# Patient Record
Sex: FEMALE | Race: ASIAN | NOT HISPANIC OR LATINO | Employment: FULL TIME | URBAN - METROPOLITAN AREA
[De-identification: names, ages, dates, MRNs, and addresses within clinical notes are randomized per-mention and may not be internally consistent; named-entity substitution may affect disease eponyms.]

---

## 2017-04-28 ENCOUNTER — GENERIC CONVERSION - ENCOUNTER (OUTPATIENT)
Dept: OTHER | Facility: OTHER | Age: 42
End: 2017-04-28

## 2017-05-19 ENCOUNTER — GENERIC CONVERSION - ENCOUNTER (OUTPATIENT)
Dept: OTHER | Facility: OTHER | Age: 42
End: 2017-05-19

## 2017-10-25 ENCOUNTER — DOCTOR'S OFFICE (OUTPATIENT)
Dept: URBAN - METROPOLITAN AREA CLINIC 137 | Facility: CLINIC | Age: 42
Setting detail: OPHTHALMOLOGY
End: 2017-10-25
Payer: COMMERCIAL

## 2017-10-25 DIAGNOSIS — H52.13: ICD-10-CM

## 2017-10-25 PROCEDURE — 92014 COMPRE OPH EXAM EST PT 1/>: CPT | Performed by: OPHTHALMOLOGY

## 2017-10-25 ASSESSMENT — REFRACTION_MANIFEST
OS_VA3: 20/
OD_VA2: 20/
OU_VA: 20/
OS_VA2: 20/
OS_VA1: 20/
OD_VA1: 20/
OD_VA1: 20/
OS_VA1: 20/
OD_VA2: 20/
OD_VA3: 20/
OS_VA2: 20/
OD_VA3: 20/
OU_VA: 20/
OS_VA3: 20/

## 2017-10-25 ASSESSMENT — REFRACTION_CURRENTRX
OD_OVR_VA: 20/
OD_SPHERE: -5.25
OS_OVR_VA: 20/
OD_OVR_VA: 20/
OS_VPRISM_DIRECTION: SV
OS_OVR_VA: 20/
OS_OVR_VA: 20/
OD_AXIS: 155
OS_SPHERE: -5.50
OS_AXIS: 086
OD_VPRISM_DIRECTION: SV
OD_OVR_VA: 20/
OS_CYLINDER: +1.50
OD_CYLINDER: +0.50

## 2017-10-25 ASSESSMENT — REFRACTION_OUTSIDERX
OD_VA1: 20/20
OS_CYLINDER: +1.00
OD_CYLINDER: SPH
OS_AXIS: 090
OD_VA3: 20/
OS_VA2: 20/
OU_VA: 20/
OD_VA2: 20/
OS_SPHERE: -5.25
OS_VA3: 20/
OD_SPHERE: -5.00
OS_VA1: 20/20

## 2017-10-25 ASSESSMENT — KERATOMETRY
OS_AXISANGLE_DEGREES: 088
OS_K2POWER_DIOPTERS: 43.00
OS_K1POWER_DIOPTERS: 42.00
OD_K1POWER_DIOPTERS: 41.75
OD_K2POWER_DIOPTERS: 42.25
OD_AXISANGLE_DEGREES: 097

## 2017-10-25 ASSESSMENT — SPHEQUIV_DERIVED
OS_SPHEQUIV: -4.625
OD_SPHEQUIV: -4.625

## 2017-10-25 ASSESSMENT — CONFRONTATIONAL VISUAL FIELD TEST (CVF)
OS_FINDINGS: FULL
OD_FINDINGS: FULL

## 2017-10-25 ASSESSMENT — REFRACTION_AUTOREFRACTION
OS_CYLINDER: +1.25
OS_AXIS: 086
OD_AXIS: 111
OD_SPHERE: -4.75
OD_CYLINDER: +0.25
OS_SPHERE: -5.25

## 2017-10-25 ASSESSMENT — VISUAL ACUITY
OD_BCVA: 20/20-1
OS_BCVA: 20/25

## 2017-10-25 ASSESSMENT — AXIALLENGTH_DERIVED
OS_AL: 25.9808
OD_AL: 26.2054

## 2017-10-26 ENCOUNTER — GENERIC CONVERSION - ENCOUNTER (OUTPATIENT)
Dept: OTHER | Facility: OTHER | Age: 42
End: 2017-10-26

## 2017-10-26 LAB
CHOLEST SERPL-MCNC: 196 MG/DL (ref 100–199)
CHOLEST/HDLC SERPL: 2.2 RATIO UNITS (ref 0–4.4)
DEPRECATED RDW RBC AUTO: 13.4 % (ref 12.3–15.4)
HCT VFR BLD AUTO: 38.3 % (ref 34–46.6)
HDLC SERPL-MCNC: 88 MG/DL
HGB BLD-MCNC: 13 G/DL (ref 11.1–15.9)
LDLC SERPL CALC-MCNC: 85 MG/DL (ref 0–99)
MCH RBC QN AUTO: 30.1 PG (ref 26.6–33)
MCHC RBC AUTO-ENTMCNC: 33.9 G/DL (ref 31.5–35.7)
MCV RBC AUTO: 89 FL (ref 79–97)
PLATELET # BLD AUTO: 360 X10E3/UL (ref 150–379)
RBC (HISTORICAL): 4.32 X10E6/UL (ref 3.77–5.28)
TRIGL SERPL-MCNC: 116 MG/DL (ref 0–149)
VLDLC SERPL CALC-MCNC: 23 MG/DL (ref 5–40)
WBC # BLD AUTO: 8.1 X10E3/UL (ref 3.4–10.8)

## 2017-10-27 ENCOUNTER — GENERIC CONVERSION - ENCOUNTER (OUTPATIENT)
Dept: OTHER | Facility: OTHER | Age: 42
End: 2017-10-27

## 2017-10-27 LAB
25(OH)D3 SERPL-MCNC: 20.9 NG/ML (ref 30–100)
A/G RATIO (HISTORICAL): 1.8 (ref 1.2–2.2)
ALBUMIN SERPL BCP-MCNC: 4.6 G/DL (ref 3.5–5.5)
ALP SERPL-CCNC: 37 IU/L (ref 39–117)
ALT SERPL W P-5'-P-CCNC: 8 IU/L (ref 0–32)
AST SERPL W P-5'-P-CCNC: 15 IU/L (ref 0–40)
BILIRUB SERPL-MCNC: 0.4 MG/DL (ref 0–1.2)
BUN SERPL-MCNC: 8 MG/DL (ref 6–24)
BUN/CREA RATIO (HISTORICAL): 15 (ref 9–23)
CALCIUM SERPL-MCNC: 9.6 MG/DL (ref 8.7–10.2)
CHLORIDE SERPL-SCNC: 101 MMOL/L (ref 96–106)
CO2 SERPL-SCNC: 23 MMOL/L (ref 18–29)
CREAT SERPL-MCNC: 0.54 MG/DL (ref 0.57–1)
EGFR AFRICAN AMERICAN (HISTORICAL): 133 ML/MIN/1.73
EGFR-AMERICAN CALC (HISTORICAL): 115 ML/MIN/1.73
GLUCOSE SERPL-MCNC: 93 MG/DL (ref 65–99)
INTERPRETATION (HISTORICAL): NORMAL
POTASSIUM SERPL-SCNC: 4.5 MMOL/L (ref 3.5–5.2)
SODIUM SERPL-SCNC: 141 MMOL/L (ref 134–144)
TOT. GLOBULIN, SERUM (HISTORICAL): 2.5 G/DL (ref 1.5–4.5)
TOTAL PROTEIN (HISTORICAL): 7.1 G/DL (ref 6–8.5)
TSH SERPL DL<=0.05 MIU/L-ACNC: 1.6 UIU/ML (ref 0.45–4.5)

## 2017-11-17 ENCOUNTER — ALLSCRIPTS OFFICE VISIT (OUTPATIENT)
Dept: OTHER | Facility: OTHER | Age: 42
End: 2017-11-17

## 2017-11-17 DIAGNOSIS — Z12.31 ENCOUNTER FOR SCREENING MAMMOGRAM FOR MALIGNANT NEOPLASM OF BREAST: ICD-10-CM

## 2017-11-17 DIAGNOSIS — R92.2 INCONCLUSIVE MAMMOGRAM: ICD-10-CM

## 2017-12-28 ENCOUNTER — GENERIC CONVERSION - ENCOUNTER (OUTPATIENT)
Dept: OTHER | Facility: OTHER | Age: 42
End: 2017-12-28

## 2017-12-28 ENCOUNTER — HOSPITAL ENCOUNTER (OUTPATIENT)
Dept: RADIOLOGY | Facility: HOSPITAL | Age: 42
Discharge: HOME | End: 2017-12-28
Payer: COMMERCIAL

## 2017-12-28 DIAGNOSIS — Z12.31 ENCOUNTER FOR SCREENING MAMMOGRAM FOR MALIGNANT NEOPLASM OF BREAST: ICD-10-CM

## 2017-12-28 DIAGNOSIS — R92.2 INCONCLUSIVE MAMMOGRAM: ICD-10-CM

## 2017-12-28 PROCEDURE — 76641 ULTRASOUND BREAST COMPLETE: CPT

## 2017-12-28 PROCEDURE — 76377 3D RENDER W/INTRP POSTPROCES: CPT

## 2018-01-10 NOTE — RESULT NOTES
Discussion/Summary   Appointment 11/17/17   Please print and put in my folder  Dr Ruiz Bojorquez      Verified Results  [facility]) Cardiovascular Risk Assessment 26Oct2017 10:13AM Antonia Daniel     Test Name Result Flag Reference   Interpretation Note     Supplemental report is available  PDF Image   (1) VITAMIN D 25-HYDROXY 26Oct2017 10:13AM Antonia [place]     Test Name Result Flag Reference   Vitamin D, 25-Hydroxy 20 9 ng/mL L 30 0-100 0   Vitamin D deficiency has been defined by the [address] practice guideline as a  level of serum 25-OH vitamin D less than 20 ng/mL (1,2)  The Endocrine Society went on to further define vitamin D  insufficiency as a level between 21 and 29 ng/mL (2)  1  IOM ([employer])  2010  Dietary reference     intakes for calcium and D  [facility]: The     [place]  2  Ab MF, Young SALES, Kristi VALDEZ, et al      Evaluation, treatment, and prevention of vitamin D     deficiency: an Endocrine Society clinical practice     guideline  JCEM  2011 Jul; 96(7):1911-30  (1) LIPID PANEL, FASTING 26Oct2017 10:13AM Antonia [place]Storm     Test Name Result Flag Reference   Cholesterol, Total 196 mg/dL  100-199   Triglycerides 116 mg/dL  0-149   HDL Cholesterol 88 mg/dL  >39   VLDL Cholesterol Hcetor 23 mg/dL  5-40   LDL Cholesterol Calc 85 mg/dL  0-99   T  Chol/HDL Ratio 2 2 ratio units  0 0-4 4   T  Chol/HDL Ratio                                                             Men  Women                                               1/2 Avg  Risk  3 4    3 3                                                   Avg Risk  5 0    4 4                                                2X Avg  Risk  9 6    7 1                                                3X Avg  Risk 23 4   11 0     (1) COMPREHENSIVE METABOLIC PANEL [id number] 57:68EZ Antonia Rand     Test Name Result Flag Reference   Glucose, Serum 93 mg/dL  65-99   BUN 8 mg/dL  6-24   Creatinine, Serum 0 54 mg/dL L 0 57-1 00   BUN/Creatinine Ratio 15  9-23   Sodium, Serum 141 mmol/L  134-144   Potassium, Serum 4 5 mmol/L  3 5-5 2   Chloride, Serum 101 mmol/L     Carbon Dioxide, Total 23 mmol/L  18-29   Calcium, Serum 9 6 mg/dL  8 7-10 2   Protein, Total, Serum 7 1 g/dL  6 0-8 5   Albumin, Serum 4 6 g/dL  3 5-5 5   Globulin, Total 2 5 g/dL  1 5-4 5   A/G Ratio 1 8  1 2-2 2   Bilirubin, Total 0 4 mg/dL  0 0-1 2   Alkaline Phosphatase, S 37 IU/L L    AST (SGOT) 15 IU/L  0-40   ALT (SGPT) 8 IU/L  0-32   eGFR If NonAfricn Am 115 mL/min/1 73  >59   eGFR If Africn Am 133 mL/min/1 73  >59     (1) TSH WITH FT4 REFLEX 26Oct2017 10:13AM Levada Hopping     Test Name Result Flag Reference   TSH 1 600 uIU/mL  0 450-4 500     (1) CBC/ PLT (NO DIFF) 26Oct2017 10:13AM Levada Hopping     Test Name Result Flag Reference   WBC 8 1 x10E3/uL  3 4-10 8   RBC 4 32 x10E6/uL  3 77-5 28   Hemoglobin 13 0 g/dL  11 1-15 9   Hematocrit 38 3 %  34 0-46  6   MCV 89 fL  79-97   MCH 30 1 pg  26 6-33 0   MCHC 33 9 g/dL  31 5-35 7   RDW 13 4 %  12 3-15 4   Platelets 960 F78U5/NV  150-379

## 2018-01-15 NOTE — PROGRESS NOTES
Assessment    1  Encounter for preventive health examination (V70 0) (Z00 00)   2  BMI 30 0-30 9,adult (V85 30) (Z68 30)   3  Dense breasts (793 82) (R92 2)   4  Encounter for screening mammogram for breast cancer (V76 12) (Z12 31)    Plan  BMI 30 0-30 9,adult    · Begin a limited exercise program ; Status:Complete;   Done: 07YKX5002   · We recommend that you bring your body mass index down to 26 ; Status:Complete;    Done: 67OBW1085  Dense breasts, Encounter for screening mammogram for breast cancer    · MAMMO SCREENING BILATERAL W 3D & CAD; Status:Hold For - Scheduling; Requested  for:17Nov2017;    · Katheryn (DANIEL); Status:Hold For -  Scheduling; Requested for:17Nov2017;   Migraine headache    · Rizatriptan Benzoate 10 MG Oral Tablet Disintegrating (Maxalt-MLT); 1 po at  onset of headache, may repeat in 2 hours if ineffective  Mild intermittent asthma    · ProAir  (90 Base) MCG/ACT Inhalation Aerosol Solution; 1 puff q4hrs prn  cough/wheeze    Discussion/Summary  health maintenance visit Currently, she eats a healthy diet and has an adequate exercise regimen  follows with gynecologist  Breast cancer screening: mammogram has been ordered  Colorectal cancer screening: colorectal cancer screening is not indicated  The immunizations are up to date  Advice and education were given regarding weight loss  Chief Complaint  cpe      History of Present Illness  HM, Adult Female: The patient is being seen for a health maintenance evaluation  General Health: The patient's health since the last visit is described as good  Immunizations status: up to date  Lifestyle:  She does not have a healthy diet  She has weight concerns  She exercises regularly  She does not use tobacco    Reproductive health: the patient is premenopausal    Screening: Active Problems    1  Allergic rhinitis (477 9) (J30 9)   2  BMI 30 0-30 9,adult (V85 30) (Z68 30)   3   Dense breasts (793 82) (R92 2) 4  Encounter for screening for cardiovascular disorders (V81 2) (Z13 6)   5  Encounter for screening mammogram for breast cancer (V76 12) (Z12 31)   6  Migraine headache (346 90) (G43 909)   7  Mild intermittent asthma (493 90) (J45 20)   8  Motion sickness (994 6) (T75 3XXA)   9  Screening for diabetes mellitus (DM) (V77 1) (Z13 1)   10  Vitamin D deficiency (268 9) (E55 9)   11  Well adult on routine health check (V70 0) (Z00 00)    Past Medical History    · Acute upper respiratory infection (465 9) (J06 9)   · History of Acute upper respiratory infection (465 9) (J06 9)   · History of Adhesive capsulitis of right shoulder (726 0) (M75 01)   · History of Benign paroxysmal vertigo, unspecified laterality (386 11) (H81 10)   · History of Cellulitis of leg, except foot (682 6) (L03 119)   · History of acute pharyngitis (V12 69) (Z87 09)   · History of acute sinusitis (V12 69) (Z87 09)   · History of acute sinusitis (V12 69) (Z87 09)   · History of Lymphadenitis (289 3) (I88 9)   · History of Need for immunization against influenza (V04 81) (Z23)   · Screening for thyroid disorder (V77 0) (Z13 29)   · Urinary tract infection (599 0) (N39 0)   · History of Urinary Tract Infection (V13 02)    Surgical History    · History of Shoulder Surgery Right    Family History  Mother    · Family history of Alzheimer's disease (V17 2) (Z82 0)    Social History    · Denied: Alcohol use   · Never a smoker    Current Meds   1  LoSeasonique 0 1-0 02 & 0 01 MG Oral Tablet Recorded   2  Meloxicam 15 MG Oral Tablet; take 1 tablet daily as needed Recorded   3  ProAir  (90 Base) MCG/ACT Inhalation Aerosol Solution; 1 puff q4hrs prn   cough/wheeze; Therapy: 30FNQ9777 to (Last Rx:55Jat8150)  Requested for: 16PKH5506 Ordered   4  Rizatriptan Benzoate 10 MG Oral Tablet Disintegrating; 1 po at onset of headache, may   repeat in 2 hours if ineffective; Therapy: 05NTZ8948 to (Last Rx:13Jan2016)  Requested for: 26VKE0766 Ordered   5  Transderm-Scop (1 5 MG) 1 MG/3DAYS Transdermal Patch 72 Hour; Apply 4 hours prior   to travel, then change every 72 hours; Therapy: 49EIY7093 to (Last EK:18LVY1265)  Requested for: 40KNM9181; Status:   ACTIVE - Transmit to Pharmacy - Awaiting Verification Ordered   6  Vitamin D3 2000 UNIT Oral Tablet; 1 po daily; Therapy: 32LLW6528 to (Evaluate:26Jun2016); Last Rx:03Dbh0522 Ordered    Allergies    1  Penicillins    Vitals   Recorded: 08LXD3979 03:55PM Recorded: 67ATB1400 03:18PM   Temperature  98 2 F   Heart Rate  84   Respiration  16   Systolic  454   Diastolic  76   Height 5 ft 1 5 in 5 ft    Weight  165 lb    BMI Calculated 30 67 32 22   BSA Calculated 1 75 1 72     Physical Exam    Constitutional   General appearance: No acute distress, well appearing and well nourished  Ears, Nose, Mouth, and Throat   External inspection of ears and nose: Normal     Otoscopic examination: Tympanic membranes translucent with normal light reflex  Canals patent without erythema  Oropharynx: Normal with no erythema, edema, exudate or lesions  Pulmonary   Auscultation of lungs: Clear to auscultation  Cardiovascular   Auscultation of heart: Normal rate and rhythm, normal S1 and S2, no murmurs  Abdomen   Abdomen: Non-tender, no masses  Liver and spleen: No hepatomegaly or splenomegaly  Musculoskeletal   Gait and station: Normal     Neurologic   Cortical function: Normal mental status  Psychiatric   Mood and affect: Normal        Procedure    Procedure: Indication: routine screening     Results: 20/20 in both eyes with corrective device, 20/25 in the right eye with corrective device, 20/30 in the left eye with corrective device   Color vision was and the results were normal       Signatures   Electronically signed by : Shiloh Webb DO; Nov 17 2017  4:22PM EST                       (Author)

## 2018-01-16 NOTE — PROGRESS NOTES
Assessment    1  Encounter for preventive health examination (V70 0) (Z00 00)   2  Dense breasts (793 82) (R92 2)   3  Encounter for screening mammogram for breast cancer (V76 12) (Z12 31)   4  BMI 30 0-30 9,adult (V85 30) (Z68 30)   5  Never a smoker   6  Mild intermittent asthma (493 90) (J45 20)    Plan  Dense breasts, Encounter for screening mammogram for breast cancer    · MAMMO SCREENING BILATERAL W 3D & CAD; Status:Active; Requested for:93Rfh5876;    Mild intermittent asthma    · ProAir  (90 Base) MCG/ACT Inhalation Aerosol Solution; 1 puff q4hrs prn  cough/wheeze    Discussion/Summary  health maintenance visit Currently, she eats a healthy diet and has an inadequate exercise regimen  cervical cancer screening is current Dr Los Martinez does her pap smears  She will go for lab work when feeling better  Chief Complaint  cpe      History of Present Illness  HM, Adult Female: The patient is being seen for a health maintenance evaluation  General Health: The patient's health since the last visit is described as good  Lifestyle:  She consumes a diverse and healthy diet  She has weight concerns  She does not exercise regularly  She does not use tobacco    Screening:      Review of Systems    Constitutional: No fever, no chills, feels well, no tiredness, no recent weight gain or weight loss  Cardiovascular: No complaints of slow heart rate, no fast heart rate, no chest pain, no palpitations, no leg claudication, no lower extremity edema  Respiratory: No complaints of shortness of breath, no wheezing, no cough, no SOB on exertion, no orthopnea, no PND  Gastrointestinal: No complaints of abdominal pain, no constipation, no nausea or vomiting, no diarrhea, no bloody stools  Musculoskeletal: No complaints of arthralgias, no myalgias, no joint swelling or stiffness, no limb pain or swelling  Integumentary: No complaints of skin rash or lesions, no itching, no skin wounds, no breast pain or lump  Active Problems    1  Allergic rhinitis (477 9) (J30 9)   2  BMI 30 0-30 9,adult (V85 30) (Z68 30)   3  Dense breasts (793 82) (R92 2)   4  Encounter for screening for cardiovascular disorders (V81 2) (Z13 6)   5  Encounter for screening mammogram for breast cancer (V76 12) (Z12 31)   6  Migraine headache (346 90) (G43 909)   7  Screening for diabetes mellitus (DM) (V77 1) (Z13 1)   8  Screening for thyroid disorder (V77 0) (Z13 29)   9  Vitamin D deficiency (268 9) (E55 9)   10  Well adult on routine health check (V70 0) (Z00 00)    Past Medical History    · Acute upper respiratory infection (465 9) (J06 9)   · History of Acute upper respiratory infection (465 9) (J06 9)   · History of Adhesive capsulitis of right shoulder (726 0) (M75 01)   · History of Benign paroxysmal vertigo, unspecified laterality (386 11) (H81 10)   · History of Cellulitis of leg, except foot (682 6) (L03 119)   · History of acute pharyngitis (V12 69) (Z87 09)   · History of acute sinusitis (V12 69) (Z87 09)   · History of acute sinusitis (V12 69) (Z87 09)   · History of Lymphadenitis (289 3) (I88 9)   · History of Motion Sickness (994 6)   · History of Need for immunization against influenza (V04 81) (Z23)   · Urinary tract infection (599 0) (N39 0)   · History of Urinary Tract Infection (V13 02)    Surgical History    · History of Shoulder Surgery Right    Family History  Mother    · No pertinent family history  Father    · No pertinent family history    Social History    · Denied: Alcohol use   · Never a smoker    Current Meds   1  LoSeasonique 0 1-0 02 & 0 01 MG Oral Tablet Recorded   2  Meloxicam 15 MG Oral Tablet; take 1 tablet daily as needed Recorded   3  ProAir  (90 Base) MCG/ACT Inhalation Aerosol Solution; 1 puff q4hrs prn   cough/wheeze; Therapy: 61JCB0582 to (Last Rx:09Nov2012)  Requested for: 23BEO7631 Ordered   4   Rizatriptan Benzoate 10 MG Oral Tablet Dispersible; 1 po at onset of headache, may   repeat in 2 hours if ineffective; Therapy: 13VTW9314 to (Last Rx:13Jan2016)  Requested for: 47YWZ2195 Ordered   5  Vitamin D3 2000 UNIT Oral Tablet; 1 po daily; Therapy: 24AAE3905 to (Evaluate:26Jun2016); Last Rx:17Rwl4121 Ordered    Allergies    1  Penicillins    Vitals   Recorded: 98HNZ5857 68:30SH   Systolic 047   Diastolic 66   Heart Rate 84   Respiration 16   Temperature 98 2 F   Height 5 ft 1 5 in   Weight 164 lb    BMI Calculated 30 49   BSA Calculated 1 74     Physical Exam    Constitutional   General appearance: No acute distress, well appearing and well nourished  Ears, Nose, Mouth, and Throat   External inspection of ears and nose: Normal     Otoscopic examination: Tympanic membranes translucent with normal light reflex  Canals patent without erythema  Oropharynx: Normal with no erythema, edema, exudate or lesions  Neck   Neck: Supple, symmetric, trachea midline, no masses  Pulmonary   Auscultation of lungs: Clear to auscultation  Cardiovascular   Auscultation of heart: Normal rate and rhythm, normal S1 and S2, no murmurs  Abdomen   Abdomen: Non-tender, no masses  Liver and spleen: No hepatomegaly or splenomegaly  Musculoskeletal   Gait and station: Normal     Neurologic   Cortical function: Normal mental status  Coordination: Normal finger to nose and heel to shin  Psychiatric   Mood and affect: Normal        Results/Data  PHQ-2 Adult Depression Screening 70IJY2937 03:45PM User, s     Test Name Result Flag Reference   PHQ-2 Adult Depression Score 0     Over the last two weeks, how often have you been bothered by any of the following problems? Little interest or pleasure in doing things: Not at all - 0  Feeling down, depressed, or hopeless: Not at all - 0   PHQ-2 Adult Depression Screening Negative         Procedure    Procedure: Indication: routine screening     Results: 20/20 in both eyes with corrective device, 20/20 in the right eye with corrective device, 20/20 in the left eye with corrective device   Color vision was and the results were normal       Signatures   Electronically signed by : Helga Briones DO; Nov 15 2016  4:04PM EST                       (Author)

## 2018-01-17 NOTE — RESULT NOTES
Verified Results  (1) URINE CULTURE [id number] 12:00AM Luisito Lora     Test Name Result Flag Reference   Result 1 Escherichia coli A    50,000-100,000 colony forming units per mL   Urine Culture,Comprehensive Final report A    Antimicrobial Susceptibility Comment     ** S = Susceptible; I = Intermediate; R = Resistant **                     P = Positive; N = Negative              MICS are expressed in micrograms per mL     Antibiotic                 RSLT#1    RSLT#2    RSLT#3    RSLT#4  Amoxicillin/Clavulanic Acid    S  Ampicillin                     S  Cefepime                       S  Ceftriaxone                    S  Cefuroxime                     S  Cephalothin                    I  Ciprofloxacin                  S  Ertapenem                      S  Gentamicin                     S  Imipenem                       S  Levofloxacin                   S  Nitrofurantoin                 S  Piperacillin                   S  Tetracycline                   S  Tobramycin                     S  Trimethoprim/Sulfa             S   Result 1 Escherichia coli A    50,000-100,000 colony forming units per mL   Urine Culture,Comprehensive Final report A    Antimicrobial Susceptibility Comment     ** S = Susceptible; I = Intermediate; R = Resistant **                     P = Positive; N = Negative              MICS are expressed in micrograms per mL     Antibiotic                 RSLT#1    RSLT#2    RSLT#3    RSLT#4  Amoxicillin/Clavulanic Acid    S  Ampicillin                     S  Cefepime                       S  Ceftriaxone                    S  Cefuroxime                     S  Cephalothin                    I  Ciprofloxacin                  S  Ertapenem                      S  Gentamicin                     S  Imipenem                       S  Levofloxacin                   S  Nitrofurantoin                 S  Piperacillin                   S  Tetracycline                   S  Tobramycin S  Trimethoprim/Sulfa             S             Discussion/Summary   Jocelyn,   Your urine culture was positive for E  coli  It was sensitive to the prescribed antibiotic     Dr Salomon Palm

## 2018-01-22 VITALS
DIASTOLIC BLOOD PRESSURE: 76 MMHG | HEIGHT: 62 IN | BODY MASS INDEX: 30.36 KG/M2 | SYSTOLIC BLOOD PRESSURE: 124 MMHG | RESPIRATION RATE: 16 BRPM | TEMPERATURE: 98.2 F | WEIGHT: 165 LBS | HEART RATE: 84 BPM

## 2018-01-23 NOTE — RESULT NOTES
Discussion/Summary   Jocelyn,   Your breast ultrasound is normal    Dr Amanda Barry ([address] [place] [address]) 11Jhh4533 02:50PM Senait Larry Order Number: [id number]    - Patient Instructions: To schedule this appointment, please contact Central Scheduling at [phone]  Test Name Result Flag Reference   US BREAST BILATERAL ABUS (Report)     US Breast Screening (ABUS): December 28, 2017 - Check In #:    [de-identified]   Standard views  Automated breast ultrasound images were obtained on the [employer]   somo-v ABUS system  Imaging was obtained in standard projections including AP,    lateral and medial for both breasts  There is no evidence of    hypoechoic mass or architectural distortion to suggest    malignancy  Automated breast ultrasound is an adjunct, not a replacement, for   routine yearly screening mammography  IMPRESSION: No suspicious hypoechoic mass or architectural    distortion to suggest malignancy  ACR BI-RADSï¾® Assessments: BiRad:1 - Negative     Recommendation:   Routine screening mammogram      The patient is scheduled in a reminder system for screening    mammography  Transcription Location: [facility] 98: [id number]     Risk Value(s):   Tyrer-Cuzick 10 Year: 1 800%, Tyrer-Cuzick Lifetime: 10 800%,    Myriad Table: 1 5%, LUIS MIGUEL 5 Year: 0 5%, NCI Lifetime: 6 5%   US Breast Screening (ABUS): December 28, 2017 - Check In #:    [de-identified]   Standard views  Automated breast ultrasound images were obtained on the [employer]   somo-v ABUS system  Imaging was obtained in standard projections including AP,    lateral and medial for both breasts  There is no evidence of    hypoechoic mass or architectural distortion to suggest    malignancy  Automated breast ultrasound is an adjunct, not a replacement, for   routine yearly screening mammography        No suspicious hypoechoic mass or architectural    distortion to suggest malignancy  ACR BI-RADSï¾® Assessments: BiRad:1 - Negative     Recommendation:   Routine screening mammogram      The patient is scheduled in a reminder system for screening    mammography       Transcription Location: [address] [place] [address] [place]: [id number]     Risk Value(s):   Tyrer-Cuzick 10 Year: 1 800%, Tyrer-Cuzick Lifetime: 10 800%,    Myriad Table: 1 5%, LUIS MIGUEL 5 Year: 0 5%, NCI Lifetime: 6 5%   Signed by:   Sonya Whitfield MD   12/28/17

## 2018-02-08 ENCOUNTER — TELEPHONE (OUTPATIENT)
Dept: FAMILY MEDICINE CLINIC | Facility: CLINIC | Age: 43
End: 2018-02-08

## 2018-02-08 DIAGNOSIS — Z20.828 EXPOSURE TO THE FLU: Primary | ICD-10-CM

## 2018-02-08 RX ORDER — OSELTAMIVIR PHOSPHATE 75 MG/1
75 CAPSULE ORAL DAILY
Qty: 10 CAPSULE | Refills: 0 | Status: SHIPPED | OUTPATIENT
Start: 2018-02-08 | End: 2018-02-18

## 2018-02-08 NOTE — TELEPHONE ENCOUNTER
[address]    Her  and three kids have been diagnosed with the flu and she wanted to know if we can call in tamiflu

## 2018-06-01 ENCOUNTER — OPTICAL OFFICE (OUTPATIENT)
Dept: URBAN - METROPOLITAN AREA CLINIC 146 | Facility: CLINIC | Age: 43
Setting detail: OPHTHALMOLOGY
End: 2018-06-01
Payer: COMMERCIAL

## 2018-06-01 ENCOUNTER — DOCTOR'S OFFICE (OUTPATIENT)
Dept: URBAN - METROPOLITAN AREA CLINIC 137 | Facility: CLINIC | Age: 43
Setting detail: OPHTHALMOLOGY
End: 2018-06-01
Payer: COMMERCIAL

## 2018-06-01 DIAGNOSIS — H52.13: ICD-10-CM

## 2018-06-01 DIAGNOSIS — H52.223: ICD-10-CM

## 2018-06-01 PROCEDURE — 92014 COMPRE OPH EXAM EST PT 1/>: CPT | Performed by: OPHTHALMOLOGY

## 2018-06-01 PROCEDURE — V2744 TINT PHOTOCHROMATIC LENS/ES: HCPCS | Performed by: OPHTHALMOLOGY

## 2018-06-01 PROCEDURE — V2207 LENS SPHCY BIFOCAL 4.25-7D/.: HCPCS | Performed by: OPHTHALMOLOGY

## 2018-06-01 PROCEDURE — V2781 PROGRESSIVE LENS PER LENS: HCPCS | Performed by: OPHTHALMOLOGY

## 2018-06-01 PROCEDURE — V2020 VISION SVCS FRAMES PURCHASES: HCPCS | Performed by: OPHTHALMOLOGY

## 2018-06-01 ASSESSMENT — REFRACTION_CURRENTRX
OD_VPRISM_DIRECTION: SV
OS_CYLINDER: +1.50
OS_AXIS: 086
OD_OVR_VA: 20/
OS_OVR_VA: 20/
OS_SPHERE: -5.50
OS_OVR_VA: 20/
OD_OVR_VA: 20/
OD_OVR_VA: 20/
OD_CYLINDER: +0.50
OS_OVR_VA: 20/
OD_AXIS: 155
OS_VPRISM_DIRECTION: SV
OD_SPHERE: -5.25

## 2018-06-01 ASSESSMENT — CONFRONTATIONAL VISUAL FIELD TEST (CVF)
OS_FINDINGS: FULL
OD_FINDINGS: FULL

## 2018-06-01 ASSESSMENT — REFRACTION_OUTSIDERX
OD_VA1: 20/20
OS_CYLINDER: +1.25
OS_CYLINDER: +1.00
OD_VA2: 20/20
OD_VA1: 20/20
OS_VA2: 20/20
OD_AXIS: 105
OD_SPHERE: -5.25
OS_VA1: 20/20
OD_ADD: +1.50
OD_CYLINDER: SPH
OS_VA3: 20/
OS_AXIS: 090
OD_VA3: 20/
OU_VA: 20/
OS_SPHERE: -5.25
OS_SPHERE: -5.25
OD_SPHERE: -5.00
OS_VA2: 20/
OU_VA: 20/20
OD_CYLINDER: +0.50
OS_VA1: 20/20
OS_ADD: +1.50
OD_VA2: 20/
OS_AXIS: 090
OS_VA3: 20/
OD_VA3: 20/

## 2018-06-01 ASSESSMENT — VISUAL ACUITY
OS_BCVA: 20/30
OD_BCVA: 20/20

## 2018-06-01 ASSESSMENT — REFRACTION_MANIFEST
OS_VA2: 20/
OU_VA: 20/
OD_VA1: 20/
OS_VA3: 20/
OS_VA1: 20/
OD_VA2: 20/
OD_VA3: 20/

## 2018-06-01 ASSESSMENT — KERATOMETRY
OS_K2POWER_DIOPTERS: 43.00
OS_AXISANGLE_DEGREES: 088
OD_K1POWER_DIOPTERS: 41.75
OD_AXISANGLE_DEGREES: 097
OS_K1POWER_DIOPTERS: 42.00
OD_K2POWER_DIOPTERS: 42.25

## 2018-06-01 ASSESSMENT — REFRACTION_AUTOREFRACTION
OD_SPHERE: -5.25
OS_SPHERE: -4.75
OS_AXIS: 90
OD_CYLINDER: +1.00
OS_CYLINDER: +0.75
OD_AXIS: 105

## 2018-06-01 ASSESSMENT — AXIALLENGTH_DERIVED
OS_AL: 25.8632
OD_AL: 26.2657

## 2018-06-01 ASSESSMENT — SPHEQUIV_DERIVED
OS_SPHEQUIV: -4.375
OD_SPHEQUIV: -4.75

## 2018-06-26 ENCOUNTER — DOCTOR'S OFFICE (OUTPATIENT)
Dept: URBAN - METROPOLITAN AREA CLINIC 137 | Facility: CLINIC | Age: 43
Setting detail: OPHTHALMOLOGY
End: 2018-06-26
Payer: COMMERCIAL

## 2018-06-26 DIAGNOSIS — H52.13: ICD-10-CM

## 2018-06-26 PROCEDURE — 92310 CONTACT LENS FITTING OU: CPT | Performed by: OPHTHALMOLOGY

## 2018-06-26 ASSESSMENT — REFRACTION_AUTOREFRACTION
OD_AXIS: 105
OD_SPHERE: -5.25
OS_SPHERE: -4.75
OS_AXIS: 90
OS_CYLINDER: +0.75
OD_CYLINDER: +1.00

## 2018-06-26 ASSESSMENT — REFRACTION_OUTSIDERX
OD_VA1: 20/20
OD_ADD: +1.50
OS_ADD: +1.50
OS_VA1: 20/20
OD_VA3: 20/
OD_SPHERE: -5.25
OS_VA3: 20/
OD_VA3: 20/
OS_AXIS: 090
OU_VA: 20/
OD_AXIS: 105
OD_VA2: 20/
OS_VA2: 20/
OD_VA1: 20/20
OS_VA2: 20/20
OD_VA2: 20/20
OD_SPHERE: -5.00
OS_CYLINDER: +1.25
OS_SPHERE: -5.25
OS_VA3: 20/
OS_CYLINDER: +1.00
OD_CYLINDER: SPH
OS_SPHERE: -5.25
OS_AXIS: 090
OD_CYLINDER: +0.50
OS_VA1: 20/20
OU_VA: 20/20

## 2018-06-26 ASSESSMENT — VISUAL ACUITY
OS_BCVA: 20/20
OD_BCVA: 20/20

## 2018-06-26 ASSESSMENT — REFRACTION_CURRENTRX
OD_SPHERE: -5.25
OD_OVR_VA: 20/
OS_AXIS: 086
OS_OVR_VA: 20/
OS_CYLINDER: +1.50
OD_AXIS: 155
OD_CYLINDER: +0.50
OD_VPRISM_DIRECTION: SV
OS_OVR_VA: 20/
OD_OVR_VA: 20/
OD_OVR_VA: 20/
OS_SPHERE: -5.50
OS_OVR_VA: 20/
OS_VPRISM_DIRECTION: SV

## 2018-06-26 ASSESSMENT — REFRACTION_MANIFEST
OD_VA2: 20/
OU_VA: 20/
OS_VA2: 20/
OS_VA3: 20/
OS_VA1: 20/
OD_VA3: 20/
OD_VA1: 20/

## 2018-06-26 ASSESSMENT — CONFRONTATIONAL VISUAL FIELD TEST (CVF)
OD_FINDINGS: FULL
OS_FINDINGS: FULL

## 2018-06-26 ASSESSMENT — SPHEQUIV_DERIVED
OD_SPHEQUIV: -4.75
OS_SPHEQUIV: -4.375

## 2018-10-05 ENCOUNTER — OFFICE VISIT (OUTPATIENT)
Dept: FAMILY MEDICINE CLINIC | Facility: CLINIC | Age: 43
End: 2018-10-05
Payer: COMMERCIAL

## 2018-10-05 VITALS
WEIGHT: 173 LBS | DIASTOLIC BLOOD PRESSURE: 72 MMHG | BODY MASS INDEX: 32.66 KG/M2 | RESPIRATION RATE: 18 BRPM | SYSTOLIC BLOOD PRESSURE: 122 MMHG | TEMPERATURE: 97.2 F | HEIGHT: 61 IN | HEART RATE: 72 BPM

## 2018-10-05 DIAGNOSIS — M54.50 LUMBAR BACK PAIN: Primary | ICD-10-CM

## 2018-10-05 PROCEDURE — 1036F TOBACCO NON-USER: CPT | Performed by: FAMILY MEDICINE

## 2018-10-05 PROCEDURE — 99213 OFFICE O/P EST LOW 20 MIN: CPT | Performed by: FAMILY MEDICINE

## 2018-10-05 RX ORDER — CYCLOBENZAPRINE HCL 10 MG
10 TABLET ORAL
Qty: 21 TABLET | Refills: 0 | Status: SHIPPED | OUTPATIENT
Start: 2018-10-05 | End: 2019-06-17 | Stop reason: ALTCHOICE

## 2018-10-05 RX ORDER — PREDNISONE 20 MG/1
TABLET ORAL
Qty: 32 TABLET | Refills: 0 | Status: SHIPPED | OUTPATIENT
Start: 2018-10-05 | End: 2019-06-17 | Stop reason: ALTCHOICE

## 2018-10-05 RX ORDER — ALBUTEROL SULFATE 90 UG/1
AEROSOL, METERED RESPIRATORY (INHALATION) AS NEEDED
COMMUNITY
Start: 2012-11-09 | End: 2020-02-07 | Stop reason: SDUPTHER

## 2018-10-05 NOTE — PROGRESS NOTES
Assessment/Plan:    Problem List Items Addressed This Visit     None      Visit Diagnoses     Lumbar back pain    -  Primary    Relevant Medications    predniSONE 20 mg tablet    cyclobenzaprine (FLEXERIL) 10 mg tablet          There are no Patient Instructions on file for this visit  No Follow-up on file  Subjective:      Patient ID: Audelia Cabrera is a 37 y o  female  Chief Complaint   Patient presents with    Back Pain     level 6-8 with movement  symptoms in lower back for the past week  [id number]       Pt states she hurt her back this weekend,  Thinks it may be from carrying heavy bags  Monday am woke up with slight discomfort  Has been taking aleve twice a day every day except today  Has been getting worse, mostly in am   throufght the day gets better, more discomfort than pain  The following portions of the patient's history were reviewed and updated as appropriate: allergies, current medications, past family history, past medical history, past social history, past surgical history and problem list     Review of Systems   Constitutional: Negative  Negative for activity change, appetite change, chills, diaphoresis and fatigue  HENT: Negative  Negative for dental problem, ear pain, sinus pressure and sore throat  Eyes: Negative  Negative for photophobia, pain, discharge, redness, itching and visual disturbance  Respiratory: Negative for apnea and chest tightness  Cardiovascular: Negative  Negative for chest pain, palpitations and leg swelling  Gastrointestinal: Negative  Negative for abdominal distention, abdominal pain, constipation and diarrhea  Endocrine: Negative  Negative for cold intolerance and heat intolerance  Genitourinary: Negative  Negative for difficulty urinating and dyspareunia  Musculoskeletal: Positive for arthralgias, back pain and myalgias  Skin: Negative  Allergic/Immunologic: Negative for environmental allergies  Neurological: Negative  Negative for dizziness  Psychiatric/Behavioral: Negative  Negative for agitation  Current Outpatient Prescriptions   Medication Sig Dispense Refill    albuterol (PROAIR HFA) 90 mcg/act inhaler Inhale as needed      cyclobenzaprine (FLEXERIL) 10 mg tablet Take 1 tablet (10 mg total) by mouth daily at bedtime for 21 days 21 tablet 0    predniSONE 20 mg tablet 4 tabs for three days, 3 tabs for three days, 2 tabs for three days, 1 tab for three days, 1/2 tab for 4 days 32 tablet 0     No current facility-administered medications for this visit  Objective:    /72   Pulse 72   Temp (!) 97 2 °F (36 2 °C)   Resp 18   Ht 5' 1" (1 549 m)   Wt 78 5 kg (173 lb)   LMP 10/05/2014   BMI 32 69 kg/m²        Physical Exam   Constitutional: She appears well-developed and well-nourished  No distress  HENT:   Head: Normocephalic and atraumatic  Right Ear: External ear normal    Left Ear: External ear normal    Nose: Nose normal    Mouth/Throat: Oropharynx is clear and moist  No oropharyngeal exudate  Eyes: Pupils are equal, round, and reactive to light  EOM are normal  Right eye exhibits no discharge  Left eye exhibits no discharge  No scleral icterus  Neck: No thyromegaly present  Cardiovascular: Normal rate and normal heart sounds  No murmur heard  Pulmonary/Chest: Effort normal and breath sounds normal  No respiratory distress  She has no wheezes  Abdominal: Soft  Bowel sounds are normal  She exhibits no distension and no mass  There is no tenderness  There is no rebound and no guarding  Musculoskeletal: Normal range of motion  Arms:  Neurological: She is alert  She displays normal reflexes  Coordination normal    Skin: Skin is warm and dry  No rash noted  She is not diaphoretic  No erythema  Psychiatric: She has a normal mood and affect  Her behavior is normal    Nursing note and vitals reviewed               Sasha Santo DO

## 2018-11-01 ENCOUNTER — TRANSCRIBE ORDERS (OUTPATIENT)
Dept: ADMINISTRATIVE | Facility: HOSPITAL | Age: 43
End: 2018-11-01

## 2018-11-02 ENCOUNTER — TRANSCRIBE ORDERS (OUTPATIENT)
Dept: ADMINISTRATIVE | Facility: HOSPITAL | Age: 43
End: 2018-11-02

## 2018-11-02 DIAGNOSIS — R92.2 INCONCLUSIVE MAMMOGRAM: Primary | ICD-10-CM

## 2019-01-03 ENCOUNTER — TRANSCRIBE ORDERS (OUTPATIENT)
Dept: ADMINISTRATIVE | Facility: HOSPITAL | Age: 44
End: 2019-01-03

## 2019-01-03 DIAGNOSIS — R92.2 INCONCLUSIVE MAMMOGRAPHY: Primary | ICD-10-CM

## 2019-05-08 ENCOUNTER — HOSPITAL ENCOUNTER (OUTPATIENT)
Dept: RADIOLOGY | Facility: HOSPITAL | Age: 44
Discharge: HOME | End: 2019-05-08
Payer: COMMERCIAL

## 2019-05-08 ENCOUNTER — TRANSCRIBE ORDERS (OUTPATIENT)
Dept: ADMINISTRATIVE | Facility: HOSPITAL | Age: 44
End: 2019-05-08

## 2019-05-08 VITALS — HEIGHT: 61 IN | WEIGHT: 161 LBS | BODY MASS INDEX: 30.4 KG/M2

## 2019-05-08 DIAGNOSIS — R92.2 INCONCLUSIVE MAMMOGRAPHY: ICD-10-CM

## 2019-05-08 PROCEDURE — 76377 3D RENDER W/INTRP POSTPROCES: CPT

## 2019-05-08 PROCEDURE — 76641 ULTRASOUND BREAST COMPLETE: CPT

## 2019-05-22 DIAGNOSIS — Z13.6 SCREENING FOR CARDIOVASCULAR CONDITION: ICD-10-CM

## 2019-05-22 DIAGNOSIS — E55.9 VITAMIN D DEFICIENCY: Primary | ICD-10-CM

## 2019-05-22 DIAGNOSIS — Z13.0 SCREENING FOR DEFICIENCY ANEMIA: ICD-10-CM

## 2019-05-24 ENCOUNTER — HOSPITAL ENCOUNTER (OUTPATIENT)
Dept: RADIOLOGY | Facility: HOSPITAL | Age: 44
Discharge: HOME | End: 2019-05-24
Payer: COMMERCIAL

## 2019-05-24 ENCOUNTER — TELEPHONE (OUTPATIENT)
Dept: RADIOLOGY | Facility: HOSPITAL | Age: 44
End: 2019-05-24

## 2019-05-24 VITALS — HEIGHT: 61 IN | WEIGHT: 159 LBS | BODY MASS INDEX: 30.02 KG/M2

## 2019-05-24 DIAGNOSIS — R92.8 ABNORMAL FINDING ON BREAST IMAGING: ICD-10-CM

## 2019-05-24 PROCEDURE — 76642 ULTRASOUND BREAST LIMITED: CPT

## 2019-05-29 ENCOUNTER — TELEPHONE (OUTPATIENT)
Dept: RADIOLOGY | Facility: HOSPITAL | Age: 44
End: 2019-05-29

## 2019-06-07 ENCOUNTER — HOSPITAL ENCOUNTER (OUTPATIENT)
Dept: RADIOLOGY | Facility: HOSPITAL | Age: 44
Discharge: HOME | End: 2019-06-07
Payer: COMMERCIAL

## 2019-06-07 ENCOUNTER — HOSPITAL ENCOUNTER (OUTPATIENT)
Dept: RADIOLOGY | Facility: HOSPITAL | Age: 44
Discharge: HOME | End: 2019-06-07
Admitting: RADIOLOGY
Payer: COMMERCIAL

## 2019-06-07 VITALS
WEIGHT: 159 LBS | BODY MASS INDEX: 30.02 KG/M2 | OXYGEN SATURATION: 100 % | DIASTOLIC BLOOD PRESSURE: 75 MMHG | RESPIRATION RATE: 18 BRPM | HEART RATE: 72 BPM | HEIGHT: 61 IN | SYSTOLIC BLOOD PRESSURE: 123 MMHG

## 2019-06-07 DIAGNOSIS — R92.8 ABNORMAL FINDING ON BREAST IMAGING: ICD-10-CM

## 2019-06-07 PROCEDURE — 88305 TISSUE EXAM BY PATHOLOGIST: CPT | Performed by: PATHOLOGY

## 2019-06-07 PROCEDURE — 88342 IMHCHEM/IMCYTCHM 1ST ANTB: CPT | Performed by: PATHOLOGY

## 2019-06-07 PROCEDURE — 88360 TUMOR IMMUNOHISTOCHEM/MANUAL: CPT | Performed by: PATHOLOGY

## 2019-06-07 PROCEDURE — 88341 IMHCHEM/IMCYTCHM EA ADD ANTB: CPT | Performed by: PATHOLOGY

## 2019-06-07 PROCEDURE — 19083 BX BREAST 1ST LESION US IMAG: CPT

## 2019-06-07 RX ORDER — LIDOCAINE HYDROCHLORIDE 10 MG/ML
INJECTION, SOLUTION INFILTRATION; PERINEURAL CODE/TRAUMA/SEDATION MEDICATION
Status: COMPLETED | OUTPATIENT
Start: 2019-06-07 | End: 2019-06-07

## 2019-06-07 RX ADMIN — LIDOCAINE HYDROCHLORIDE 9 ML: 10 INJECTION, SOLUTION INFILTRATION; PERINEURAL at 08:39

## 2019-06-10 ENCOUNTER — TELEPHONE (OUTPATIENT)
Dept: RADIOLOGY | Facility: HOSPITAL | Age: 44
End: 2019-06-10

## 2019-06-11 ENCOUNTER — TELEPHONE (OUTPATIENT)
Dept: RADIOLOGY | Facility: HOSPITAL | Age: 44
End: 2019-06-11

## 2019-06-11 ENCOUNTER — DOCUMENTATION (OUTPATIENT)
Dept: HEMATOLOGY ONCOLOGY | Facility: CLINIC | Age: 44
End: 2019-06-11

## 2019-06-13 DIAGNOSIS — T75.3XXS MOTION SICKNESS, SEQUELA: Primary | ICD-10-CM

## 2019-06-13 PROBLEM — C50.212 MALIGNANT NEOPLASM OF UPPER-INNER QUADRANT OF LEFT FEMALE BREAST (HCC): Status: ACTIVE | Noted: 2019-06-13

## 2019-06-13 PROBLEM — Z17.0 MALIGNANT NEOPLASM OF UPPER-INNER QUADRANT OF LEFT BREAST IN FEMALE, ESTROGEN RECEPTOR POSITIVE (HCC): Status: ACTIVE | Noted: 2019-06-13

## 2019-06-13 PROBLEM — Z20.828 EXPOSURE TO THE FLU: Status: RESOLVED | Noted: 2018-02-08 | Resolved: 2019-06-13

## 2019-06-13 LAB
25(OH)D3+25(OH)D2 SERPL-MCNC: 41 NG/ML (ref 30–100)
ALBUMIN SERPL-MCNC: 4.3 G/DL (ref 3.5–5.5)
ALBUMIN/GLOB SERPL: 1.7 {RATIO} (ref 1.2–2.2)
ALP SERPL-CCNC: 45 IU/L (ref 39–117)
ALT SERPL-CCNC: 11 IU/L (ref 0–32)
AST SERPL-CCNC: 15 IU/L (ref 0–40)
BASOPHILS # BLD AUTO: 0 X10E3/UL (ref 0–0.2)
BASOPHILS NFR BLD AUTO: 1 %
BILIRUB SERPL-MCNC: 0.2 MG/DL (ref 0–1.2)
BUN SERPL-MCNC: 8 MG/DL (ref 6–24)
BUN/CREAT SERPL: 13 (ref 9–23)
CALCIUM SERPL-MCNC: 9.7 MG/DL (ref 8.7–10.2)
CHLORIDE SERPL-SCNC: 104 MMOL/L (ref 96–106)
CHOLEST SERPL-MCNC: 167 MG/DL (ref 100–199)
CO2 SERPL-SCNC: 24 MMOL/L (ref 20–29)
CREAT SERPL-MCNC: 0.63 MG/DL (ref 0.57–1)
EOSINOPHIL # BLD AUTO: 0.2 X10E3/UL (ref 0–0.4)
EOSINOPHIL NFR BLD AUTO: 3 %
ERYTHROCYTE [DISTWIDTH] IN BLOOD BY AUTOMATED COUNT: 13.5 % (ref 12.3–15.4)
GLOBULIN SER-MCNC: 2.6 G/DL (ref 1.5–4.5)
GLUCOSE SERPL-MCNC: 94 MG/DL (ref 65–99)
HCT VFR BLD AUTO: 37.4 % (ref 34–46.6)
HDLC SERPL-MCNC: 72 MG/DL
HGB BLD-MCNC: 12.2 G/DL (ref 11.1–15.9)
IMM GRANULOCYTES # BLD: 0 X10E3/UL (ref 0–0.1)
IMM GRANULOCYTES NFR BLD: 0 %
LABCORP COMMENT: NORMAL
LDLC SERPL CALC-MCNC: 76 MG/DL (ref 0–99)
LYMPHOCYTES # BLD AUTO: 2 X10E3/UL (ref 0.7–3.1)
LYMPHOCYTES NFR BLD AUTO: 32 %
MCH RBC QN AUTO: 29.8 PG (ref 26.6–33)
MCHC RBC AUTO-ENTMCNC: 32.6 G/DL (ref 31.5–35.7)
MCV RBC AUTO: 91 FL (ref 79–97)
MICRODELETION SYND BLD/T FISH: NORMAL
MONOCYTES # BLD AUTO: 0.5 X10E3/UL (ref 0.1–0.9)
MONOCYTES NFR BLD AUTO: 8 %
NEUTROPHILS # BLD AUTO: 3.5 X10E3/UL (ref 1.4–7)
NEUTROPHILS NFR BLD AUTO: 56 %
PLATELET # BLD AUTO: 268 X10E3/UL (ref 150–450)
POTASSIUM SERPL-SCNC: 4.4 MMOL/L (ref 3.5–5.2)
PROT SERPL-MCNC: 6.9 G/DL (ref 6–8.5)
RBC # BLD AUTO: 4.1 X10E6/UL (ref 3.77–5.28)
SL AMB EGFR AFRICAN AMERICAN: 124 ML/MIN/1.73
SL AMB EGFR NON AFRICAN AMERICAN: 108 ML/MIN/1.73
SODIUM SERPL-SCNC: 140 MMOL/L (ref 134–144)
TRIGL SERPL-MCNC: 94 MG/DL (ref 0–149)
WBC # BLD AUTO: 6.3 X10E3/UL (ref 3.4–10.8)

## 2019-06-13 RX ORDER — SCOLOPAMINE TRANSDERMAL SYSTEM 1 MG/1
1 PATCH, EXTENDED RELEASE TRANSDERMAL
Qty: 6 PATCH | Refills: 0 | Status: SHIPPED | OUTPATIENT
Start: 2019-06-13 | End: 2019-07-23

## 2019-06-17 ENCOUNTER — APPOINTMENT (OUTPATIENT)
Dept: LAB | Facility: CLINIC | Age: 44
End: 2019-06-17
Payer: COMMERCIAL

## 2019-06-17 ENCOUNTER — CONSULT (OUTPATIENT)
Dept: SURGICAL ONCOLOGY | Facility: CLINIC | Age: 44
End: 2019-06-17
Payer: COMMERCIAL

## 2019-06-17 ENCOUNTER — TRANSCRIBE ORDERS (OUTPATIENT)
Dept: LAB | Facility: CLINIC | Age: 44
End: 2019-06-17

## 2019-06-17 VITALS
SYSTOLIC BLOOD PRESSURE: 140 MMHG | HEIGHT: 62 IN | TEMPERATURE: 98.7 F | BODY MASS INDEX: 28.89 KG/M2 | DIASTOLIC BLOOD PRESSURE: 80 MMHG | WEIGHT: 157 LBS | RESPIRATION RATE: 16 BRPM | HEART RATE: 57 BPM

## 2019-06-17 DIAGNOSIS — Z17.0 ESTROGEN RECEPTOR POSITIVE: ICD-10-CM

## 2019-06-17 DIAGNOSIS — C50.212 MALIGNANT NEOPLASM OF UPPER-INNER QUADRANT OF LEFT FEMALE BREAST, UNSPECIFIED ESTROGEN RECEPTOR STATUS (HCC): Primary | ICD-10-CM

## 2019-06-17 DIAGNOSIS — Z17.0 MALIGNANT NEOPLASM OF UPPER-INNER QUADRANT OF LEFT BREAST IN FEMALE, ESTROGEN RECEPTOR POSITIVE (HCC): Primary | ICD-10-CM

## 2019-06-17 DIAGNOSIS — C50.212 MALIGNANT NEOPLASM OF UPPER-INNER QUADRANT OF LEFT BREAST IN FEMALE, ESTROGEN RECEPTOR POSITIVE (HCC): Primary | ICD-10-CM

## 2019-06-17 DIAGNOSIS — C50.212 MALIGNANT NEOPLASM OF UPPER-INNER QUADRANT OF LEFT FEMALE BREAST, UNSPECIFIED ESTROGEN RECEPTOR STATUS (HCC): ICD-10-CM

## 2019-06-17 PROCEDURE — 99245 OFF/OP CONSLTJ NEW/EST HI 55: CPT | Performed by: SURGERY

## 2019-06-17 PROCEDURE — 36415 COLL VENOUS BLD VENIPUNCTURE: CPT

## 2019-06-17 RX ORDER — DIPHENOXYLATE HYDROCHLORIDE AND ATROPINE SULFATE 2.5; .025 MG/1; MG/1
1 TABLET ORAL DAILY
COMMUNITY

## 2019-06-17 RX ORDER — LEVONORGESTREL AND ETHINYL ESTRADIOL 100-20(84)
KIT ORAL
COMMUNITY
End: 2019-07-23

## 2019-06-17 RX ORDER — FLUTICASONE PROPIONATE 50 MCG
1 SPRAY, SUSPENSION (ML) NASAL DAILY PRN
COMMUNITY

## 2019-06-17 RX ORDER — CETIRIZINE HYDROCHLORIDE 10 MG/1
10 TABLET, CHEWABLE ORAL DAILY PRN
COMMUNITY

## 2019-06-18 ENCOUNTER — DOCUMENTATION (OUTPATIENT)
Dept: HEMATOLOGY ONCOLOGY | Facility: CLINIC | Age: 44
End: 2019-06-18

## 2019-06-18 LAB — MISCELLANEOUS LAB TEST RESULT: NORMAL

## 2019-06-25 ENCOUNTER — TELEPHONE (OUTPATIENT)
Dept: HEMATOLOGY ONCOLOGY | Facility: CLINIC | Age: 44
End: 2019-06-25

## 2019-06-25 ENCOUNTER — TELEPHONE (OUTPATIENT)
Dept: SURGICAL ONCOLOGY | Facility: CLINIC | Age: 44
End: 2019-06-25

## 2019-07-01 ENCOUNTER — OFFICE VISIT (OUTPATIENT)
Dept: SURGICAL ONCOLOGY | Facility: CLINIC | Age: 44
End: 2019-07-01
Payer: COMMERCIAL

## 2019-07-01 VITALS
WEIGHT: 155.5 LBS | RESPIRATION RATE: 16 BRPM | HEIGHT: 62 IN | SYSTOLIC BLOOD PRESSURE: 110 MMHG | BODY MASS INDEX: 28.61 KG/M2 | TEMPERATURE: 98.2 F | DIASTOLIC BLOOD PRESSURE: 78 MMHG | HEART RATE: 78 BPM

## 2019-07-01 DIAGNOSIS — Z17.0 MALIGNANT NEOPLASM OF UPPER-INNER QUADRANT OF LEFT BREAST IN FEMALE, ESTROGEN RECEPTOR POSITIVE (HCC): Primary | ICD-10-CM

## 2019-07-01 DIAGNOSIS — C50.212 MALIGNANT NEOPLASM OF UPPER-INNER QUADRANT OF LEFT BREAST IN FEMALE, ESTROGEN RECEPTOR POSITIVE (HCC): Primary | ICD-10-CM

## 2019-07-01 DIAGNOSIS — Z01.818 PREOP EXAMINATION: ICD-10-CM

## 2019-07-01 PROCEDURE — 99214 OFFICE O/P EST MOD 30 MIN: CPT | Performed by: SURGERY

## 2019-07-01 RX ORDER — OXYCODONE HYDROCHLORIDE AND ACETAMINOPHEN 5; 325 MG/1; MG/1
1 TABLET ORAL EVERY 6 HOURS PRN
Qty: 6 TABLET | Refills: 0 | Status: SHIPPED | OUTPATIENT
Start: 2019-07-01 | End: 2019-08-02 | Stop reason: ALTCHOICE

## 2019-07-01 NOTE — PATIENT INSTRUCTIONS
Breast Lumpectomy   AMBULATORY CARE:   What you need to know about a lumpectomy:  A lumpectomy is surgery to remove a mass in your breast  Breast tissue that surrounds the mass may also be taken  A lumpectomy is also known as breast-conserving surgery, a partial mastectomy, or a segmental mastectomy  How to prepare for a lumpectomy:   · You may need a mammogram or ultrasound before surgery  These tests may be done the same day as your surgery or at an earlier time  Your healthcare provider may use pictures from these tests to jeanne the location of the mass  The marker will show him where to make your incision  · Your healthcare provider will talk to you about how to prepare for surgery  He may tell you not to eat or drink anything after midnight on the day of your surgery  He will tell you what medicines to take or not take on the day of your surgery  You may need to stop taking blood thinners or aspirin several days before your surgery  Arrange for someone to drive you home and stay with you for 24 hours after surgery  This person can help care for you, and monitor for any problems  What will happen during a lumpectomy:  You will be given general anesthesia to keep you asleep and free from pain during surgery  You may be given an antibiotic through your IV to help prevent a bacterial infection  Your healthcare provider will make an incision in your breast and remove the mass  He may also remove breast tissue or lymph nodes that are close to the mass  A drain may be inserted near your incision to remove extra fluid  This will decrease swelling and help your incision heal  Your healthcare provider will close your incision with stitches or strips of medical tape and cover it with a bandage  He may also wrap a tight-fitting bandage around both of your breasts  This may decrease swelling, bleeding, and pain  What will happen after a lumpectomy:  Healthcare providers will monitor you until you are awake   You may able to go home when you are awake and your pain is controlled  Instead you may need to spend the night in the hospital    Risks of a lumpectomy:  You may bleed more than expected or get an infection  Nerves, blood vessels, and muscles may be damaged during your surgery  You may have swelling in your arm closest to the lumpectomy or where lymph nodes were removed  This swelling is called lymphedema  Lymphedema may cause tingling, numbness, stiffness, and weakness in your arm  This may be permanent  You may get a blood clot in your arm or leg  The blood clot may travel to your heart lungs, or brain  This may become life-threatening  Call 911 for any of the following:   · You feel lightheaded, short of breath, and have chest pain  · You cough up blood  · You have trouble breathing  Seek care immediately if:   · Blood soaks through your bandage  · Your stitches come apart  · Your bruise suddenly gets bigger  · Your leg or arm is larger than normal and painful  Contact your healthcare provider if:   · You have a fever or chills  · Your wound is red, swollen, or draining pus  · You have nausea or are vomiting  · Your skin is itchy, swollen, or you have a rash  · Your pain does not get better after you take pain medicine  · Your drain falls out or stops draining fluid  · Your drain has pus or foul-smelling fluid coming out of it  · You have questions or concerns about your condition or care  Medicines: You may need any of the following:  · Antibiotics  help prevent a bacterial infection  · Prescription pain medicine  may be given  Ask your healthcare provider how to take this medicine safely  Some prescription pain medicines contain acetaminophen  Do not take other medicines that contain acetaminophen without talking to your healthcare provider  Too much acetaminophen may cause liver damage  Prescription pain medicine may cause constipation   Ask your healthcare provider how to prevent or treat constipation  · NSAIDs , such as ibuprofen, help decrease swelling, pain, and fever  NSAIDs can cause stomach bleeding or kidney problems in certain people  If you take blood thinner medicine, always ask your healthcare provider if NSAIDs are safe for you  Always read the medicine label and follow directions  · Take your medicine as directed  Contact your healthcare provider if you think your medicine is not helping or if you have side effects  Tell him or her if you are allergic to any medicine  Keep a list of the medicines, vitamins, and herbs you take  Include the amounts, and when and why you take them  Bring the list or the pill bottles to follow-up visits  Carry your medicine list with you in case of an emergency  Care for your wound as directed: If you have a tight-fitting bandage, you can remove it in 24 to 48 hours, or as directed  Ask your healthcare provider when your incision can get wet  You may need to take a sponge bath until your drain is removed  Carefully wash around the incision with soap and water  It is okay to allow the soap and water to gently run over your incision  Gently pat dry the area and put on new, clean bandages as directed  Change your bandages when they get wet or dirty  Check your incision every day for redness, pus, or swelling  Self-care:   · Apply ice  on your breast for 15 to 20 minutes every hour or as directed  Use an ice pack, or put crushed ice in a plastic bag  Cover it with a towel  Ice helps prevent tissue damage and decreases swelling and pain  · Rest  as directed  Do not lift anything heavy  Do not push or pull with your arms  Take short walks around the house  Gradually walk further as you feel better  Ask your healthcare provider when you can return to your normal activities  · Empty your drain  as directed  You may need to write down how much you empty from your drain each day   Ask your healthcare provider for more information about how to empty your drain  · Wear a supportive bra  as directed  Wait until you remove the tight-fitting bandage to wear a bra  You may be given a surgical bra or told to wear a sports bra  A supportive bra may help hold your bandages in place  It may also help with swelling and pain  Do not  wear bras with lace or underwire  They may rub against your incision and cause discomfort  Arm stretches: Your healthcare provider may show you how to do arm stretches  Arm stretches may prevent stiff arms or shoulders  You may need to wait until after your drains are removed to begin stretching  Do not do arm stretches until your healthcare provider says it is okay  Ask your healthcare provider for more information about arm stretches  Follow up with your healthcare provider as directed:  Write down your questions so you remember to ask them during your visits  © 2017 [facility] Information is for End User's use only and may not be sold, redistributed or otherwise used for commercial purposes  All illustrations and images included in CareNotes® are the copyrighted property of [employer] M [employer]  or [employer]  The above information is an  only  It is not intended as medical advice for individual conditions or treatments  Talk to your doctor, nurse or pharmacist before following any medical regimen to see if it is safe and effective for you  Breast Cancer [place] Lymph Node Biopsy   AMBULATORY CARE:   What you need to know about a sentinel lymph node biopsy (SLNB):  A sentinel lymph node (SLN) is usually the lymph node closest to the breast tumor  It is usually found in the armpit, or along the sternum (breastbone) or collarbone  A biopsy is a procedure used to find and remove a SLN  During the biopsy, the SLN will be tested for cancer cells   If the test is positive, it may mean that breast cancer has spread outside of your breast  This information can help your healthcare provider decide what other treatments you need  How to prepare for a SLNB:   · You may need a nuclear scan before your procedure  During a nuclear scan, healthcare providers will inject a small amount of radioactive liquid in your breast  Radioactive liquid will move to the location of your lymph nodes and help them show up better in pictures  A camera will take pictures of the lymph nodes  The pictures will help your healthcare provider plan for your procedure  · Your healthcare provider will talk to you about how to prepare for your procedure  He may tell you not to eat or drink anything after midnight on the day of your procedure  He will tell you what medicines to take or not take on the day of your procedure  You may be given contrast liquid during your biopsy  Tell your healthcare provider if you have ever had an allergic reaction to contrast liquid  Arrange for someone to drive you home and stay with you after your procedure  What will happen during a SLNB:   · You may be given an antibiotic through your IV to help prevent a bacterial infection  Tell the healthcare provider if you have ever had an allergic reaction to an antibiotic  You may be given general anesthesia to keep you asleep and free from pain during your procedure  You may instead be given local anesthesia to numb the area  With local anesthesia, you may still feel pressure or pushing during the procedure, but you should not feel any pain  · Your healthcare provider will inject blue contrast liquid, radioactive liquid, or both near the tumor  The liquid will move to the SLN  Your healthcare provider may use an instrument to help find the SLN  He will do this by gently moving an instrument over your skin  The instrument will show pictures of the SLN on a monitor  Your healthcare provider will make a small incision in the skin that covers the SLN   The incision is usually in your armpit or chest  The SLN will be removed and checked for cancer cells  If cancer is found, your healthcare provider may remove several more lymph nodes for testing  Your incision may be closed with stitches or strips of medical tape and covered with a bandage  What will happen after a SLNB:  Healthcare providers will monitor you until you are awake  You may be able to go home after you are awake and your pain is controlled  Your urine or bowel movement may be blue for 24 to 48 hours after your procedure  This is caused by the blue contrast liquid given to you during the procedure  You may have bruising or swelling at the biopsy site  This is normal and expected  The arm closest to the biopsy site may be sore  This should get better within 48 to 72 hours  Risks of a SLNB:  You may bleed more than expected or get an infection  You may develop a condition called lymphedema  Lymphedema is tissue swelling in your arm nearest to where the SLN was removed  You may have long-term pain or discomfort in your arm  Your skin in the arm may be permanently thick or hard  Your nerves may be damaged during your procedure  This may cause numbness or tingling in your arm  It may also cause difficulty moving your arm  You may have an allergic reaction to the contrast liquid  This may require medicine or other treatments  Seek care immediately if:   · Blood soaks through your bandage  · Your stitches come apart  · Your bruise suddenly gets larger and feels firm  Contact your healthcare provider if:   · You have a fever or chills  · Your wound is red, swollen, or draining pus  · You have nausea or are vomiting  · Your skin is itchy, swollen, or you have a rash  · Your pain does not get better after you take medicine for pain  · You have questions or concerns about your condition or care  Medicines: You may need any of the following:  · NSAIDs , such as ibuprofen, help decrease swelling, pain, and fever   This medicine is available with or without a doctor's order  NSAIDs can cause stomach bleeding or kidney problems in certain people  If you take blood thinner medicine, always ask your healthcare provider if NSAIDs are safe for you  Always read the medicine label and follow directions  · Acetaminophen  decreases pain and fever  It is available without a doctor's order  Ask how much to take and how often to take it  Follow directions  Read the labels of all other medicines you are using to see if they also contain acetaminophen, or ask your doctor or pharmacist  Acetaminophen can cause liver damage if not taken correctly  Do not use more than 4 grams (4,000 milligrams) total of acetaminophen in one day  · Prescription pain medicine  may be given  Ask your healthcare provider how to take this medicine safely  Some prescription pain medicines contain acetaminophen  Do not take other medicines that contain acetaminophen without talking to your healthcare provider  Too much acetaminophen may cause liver damage  Prescription pain medicine may cause constipation  Ask your healthcare provider how to prevent or treat constipation  · Take your medicine as directed  Contact your healthcare provider if you think your medicine is not helping or if you have side effects  Tell him or her if you are allergic to any medicine  Keep a list of the medicines, vitamins, and herbs you take  Include the amounts, and when and why you take them  Bring the list or the pill bottles to follow-up visits  Carry your medicine list with you in case of an emergency  Care for your incision wound as directed:  Ask your healthcare provider when your wound can get wet  Carefully wash around the wound with soap and water  It is okay to let soap and water gently run over your wound  Do not  scrub your wound  Gently pat dry the area and put on new, clean bandages as directed  Change your bandages when they get wet or dirty   If you have strips of medical tape, let them fall of on their own  It may take 10 to 14 days for them to fall off  Check your wound every day for signs of infection, such as redness, swelling, or pus  Do not put powders or lotions on your wound  If lymph nodes have been taken from your armpit, ask your healthcare provider when you can wear deodorant  Self-care:   · Apply ice  on your wound for 15 to 20 minutes every hour or as directed  Use an ice pack, or put crushed ice in a plastic bag  Cover it with a towel before you apply it to your skin  Ice helps prevent tissue damage and decreases swelling and pain  · Elevate  your arm nearest to the biopsy site as often as you can  This will help decrease swelling and pain  Prop your arm on pillows or blankets to keep it elevated above the level of your heart comfortably  · Do not do strenuous activities  for 24 to 48 hours  Strenuous activities include heavy lifting, sports, or running  If lymph nodes were taken from your armpit, do not push or pull with your arm  These activities may put too much stress on your wound  Rest and take short walks around the house  Ask your healthcare provider when you can return to your normal activities  · Drink plenty of liquids  as directed  This will help flush out contrast liquid from your body  Ask how much liquid to drink each day and which liquids are best for you  Ask your healthcare provider how to prevent lymphedema and infection:  Lymphedema is fluid buildup in fatty tissues under your skin  Lymphedema may happen in the arm closest to where lymph nodes were removed  An infection in your skin can make lymphedema worse  Ask your healthcare provider how you can decrease your risk for skin infections and lymphedema  Follow up with your healthcare provider as directed:  Write down your questions so you remember to ask them during your visits     © 2017 [employer] Information is for End User's use only and may not be sold, redistributed or otherwise used for commercial purposes  All illustrations and images included in CareNotes® are the copyrighted property of [employer] M , Inc  or [employer]  The above information is an  only  It is not intended as medical advice for individual conditions or treatments  Talk to your doctor, nurse or pharmacist before following any medical regimen to see if it is safe and effective for you

## 2019-07-01 NOTE — H&P (VIEW-ONLY)
Surgical Oncology Follow Up       [address] [facility] [place]  [address] [address]    Jocelyn Medranoadam  1975  [id number]  [address] Sheehan [facility] [place]  [address] [facility] [address]    Chief Complaint   Patient presents with    Follow-up     2wk f/u and pre op          Assessment & Plan:   Patient presents for a follow-up visit  Her BRCA testing was negative  We went through the process of informed consent for left breast needle localization lumpectomy in conjunction with a sentinel lymph node biopsy and possible axillary dissection  All questions were answered the patient's satisfaction  We will coordinate the surgery for are next mutual convenience  Cancer History:        Malignant neoplasm of upper-inner quadrant of left breast in female, estrogen receptor positive ([address] )    6/7/2019 Biopsy     Left breast ultrasound-guided biopsy  11 o'clock, 4 cm from nipple  Invasive lobular carcinoma  Grade 1  ER 90  WI 45  HER2 1+      6/17/2019 Genetic Testing     BRCA negative  [facility]           Interval History:   Patient returns  Her BRCA testing was negative  This has been communicated to her previously  Clinically this is a stage I T1 N0 M0 ER 90% WI 45% HER2 Dai negative tumor  Her axillary ultrasound is negative  Review of Systems:   Review of Systems   All other systems reviewed and are negative        Past Medical History     Patient Active Problem List   Diagnosis    Allergic rhinitis    Dense breasts    Migraine headache    Mild intermittent asthma    Motion sickness    Vitamin D deficiency    Malignant neoplasm of upper-inner quadrant of left breast in female, estrogen receptor positive ([address] )     Past Medical History:   Diagnosis Date    Adhesive capsulitis of right shoulder     Resolved 11/15/2016     Benign paroxysmal vertigo, unspecified ear     Resolved 11/15/2016  BRCA gene mutation negative 06/2019    [facility]    Cellulitis of leg, except foot     Resolved 12/10/2014     Lymphadenitis     Resolved 12/10/2014      Past Surgical History:   Procedure Laterality Date    SHOULDER SURGERY Right     Last assessed 12/29/2015     US GUIDED BREAST BIOPSY LEFT COMPLETE Left 6/7/2019     Family History   Problem Relation Age of Onset    Alzheimer's disease Mother     COPD Father     No Known Problems Sister     No Known Problems Daughter     Alzheimer's disease Maternal Grandmother     No Known Problems Maternal Grandfather     No Known Problems Paternal Grandmother     No Known Problems Paternal Grandfather     No Known Problems Brother     No Known Problems Brother     No Known Problems Brother     No Known Problems Daughter      Social History     Socioeconomic History    Marital status: /Civil Union     Spouse name: Not on file    Number of children: Not on file    Years of education: Not on file    Highest education level: Not on file   Occupational History    Not on file   Social Needs    Financial resource strain: Not on file    Food insecurity:     Worry: Not on file     Inability: Not on file    Transportation needs:     Medical: Not on file     Non-medical: Not on file   Tobacco Use    Smoking status: Never Smoker    Smokeless tobacco: Never Used   Substance and Sexual Activity    Alcohol use: No    Drug use: No    Sexual activity: Not on file   Lifestyle    Physical activity:     Days per week: Not on file     Minutes per session: Not on file    Stress: Not on file   Relationships    Social connections:     Talks on phone: Not on file     Gets together: Not on file     Attends [employer] witness service: Not on file     Active member of club or organization: Not on file     Attends meetings of clubs or organizations: Not on file     Relationship status: Not on file    Intimate partner violence:     Fear of current or ex partner: Not on file Emotionally abused: Not on file     Physically abused: Not on file     Forced sexual activity: Not on file   Other Topics Concern    Not on file   Social History Narrative    Not on file       Current Outpatient Medications:     albuterol (PROAIR HFA) 90 mcg/act inhaler, Inhale as needed, Disp: , Rfl:     cetirizine (ZyrTEC) 10 MG chewable tablet, Chew 10 mg daily, Disp: , Rfl:     fluticasone (FLONASE) 50 mcg/act nasal spray, 1 spray into each nostril daily, Disp: , Rfl:     Levonorgest-Eth Estrad 91-Day (LOSEASONIQUE) 0 1-0 02 & 0 01 MG TABS, TAKE 1 TABLET BY MOUTH DAILY, Disp: , Rfl:     multivitamin (THERAGRAN) TABS, Take 1 tablet by mouth daily, Disp: , Rfl:     scopolamine (TRANSDERM-SCOP) 1 5 mg/3 days TD 72 hr patch, Place 1 patch on the skin every third day Apply 4 hours prior to travel, Disp: 6 patch, Rfl: 0    TURMERIC CURCUMIN PO, Take by mouth daily, Disp: , Rfl:   No Known Allergies    Physical Exam:     Vitals:    07/01/19 0903   BP: 110/78   Pulse: 78   Resp: 16   Temp: 98 2 °F (36 8 °C)     Physical Exam   Constitutional: She is oriented to person, place, and time  She appears well-developed and well-nourished  HENT:   Head: Normocephalic and atraumatic  Mouth/Throat: Oropharynx is clear and moist    Eyes: Pupils are equal, round, and reactive to light  EOM are normal    Neck: Normal range of motion  Neck supple  No JVD present  No tracheal deviation present  No thyromegaly present  Cardiovascular: Normal rate, regular rhythm, normal heart sounds and intact distal pulses  Exam reveals no gallop and no friction rub  No murmur heard  Pulmonary/Chest: Effort normal and breath sounds normal  No respiratory distress  She has no wheezes  She has no rales  Both breasts were examined in the sitting and supine position  There are no worrisome skin lesions, no nipple retraction and no nipple discharge   There are no dominant masses, axillary adenopathy or supraclavicular adenopathy on either side  Abdominal: Soft  She exhibits no distension and no mass  There is no hepatomegaly  There is no tenderness  There is no rebound and no guarding  Musculoskeletal: Normal range of motion  She exhibits no edema or tenderness  Lymphadenopathy:     She has no cervical adenopathy  Neurological: She is alert and oriented to person, place, and time  No cranial nerve deficit  Skin: Skin is warm and dry  No rash noted  No erythema  Psychiatric: She has a normal mood and affect  Her behavior is normal    Vitals reviewed  Results/discussion:   I reviewed her genetic results  I have previously reviewed her films  We went through the process of informed consent I reviewed all the complications as outlined on the consent form  We will coordinate the surgery for later this month  All questions were answered the patient's satisfaction  Advance Care Planning/Advance Directives:  I discussed the disease status, treatment plans and follow-up with the patient

## 2019-07-01 NOTE — PROGRESS NOTES
Surgical Oncology Follow Up       [address] [facility],[address] [facility]  [facility]  [address] [address]    Jocelyn Medranoadam  1975  [id number]  [address]  [facility] [place]  [address] [address]    Chief Complaint   Patient presents with    Follow-up     2wk f/u and pre op          Assessment & Plan:   Patient presents for a follow-up visit  Her BRCA testing was negative  We went through the process of informed consent for left breast needle localization lumpectomy in conjunction with a sentinel lymph node biopsy and possible axillary dissection  All questions were answered the patient's satisfaction  We will coordinate the surgery for are next mutual convenience  Cancer History:        Malignant neoplasm of upper-inner quadrant of left breast in female, estrogen receptor positive ([address] )    6/7/2019 Biopsy     Left breast ultrasound-guided biopsy  11 o'clock, 4 cm from nipple  Invasive lobular carcinoma  Grade 1  ER 90  VT 45  HER2 1+      6/17/2019 Genetic Testing     BRCA negative  [facility]           Interval History:   Patient returns  Her BRCA testing was negative  This has been communicated to her previously  Clinically this is a stage I T1 N0 M0 ER 90% VT 45% HER2 Dai negative tumor  Her axillary ultrasound is negative  Review of Systems:   Review of Systems   All other systems reviewed and are negative        Past Medical History     Patient Active Problem List   Diagnosis    Allergic rhinitis    Dense breasts    Migraine headache    Mild intermittent asthma    Motion sickness    Vitamin D deficiency    Malignant neoplasm of upper-inner quadrant of left breast in female, estrogen receptor positive ([address] )     Past Medical History:   Diagnosis Date    Adhesive capsulitis of right shoulder     Resolved 11/15/2016     Benign paroxysmal vertigo, unspecified ear     Resolved 11/15/2016  BRCA gene mutation negative 06/2019    [facility]    Cellulitis of leg, except foot     Resolved 12/10/2014     Lymphadenitis     Resolved 12/10/2014      Past Surgical History:   Procedure Laterality Date    SHOULDER SURGERY Right     Last assessed 12/29/2015     US GUIDED BREAST BIOPSY LEFT COMPLETE Left 6/7/2019     Family History   Problem Relation Age of Onset    Alzheimer's disease Mother     COPD Father     No Known Problems Sister     No Known Problems Daughter     Alzheimer's disease Maternal Grandmother     No Known Problems Maternal Grandfather     No Known Problems Paternal Grandmother     No Known Problems Paternal Grandfather     No Known Problems Brother     No Known Problems Brother     No Known Problems Brother     No Known Problems Daughter      Social History     Socioeconomic History    Marital status: /Civil Union     Spouse name: Not on file    Number of children: Not on file    Years of education: Not on file    Highest education level: Not on file   Occupational History    Not on file   Social Needs    Financial resource strain: Not on file    Food insecurity:     Worry: Not on file     Inability: Not on file    Transportation needs:     Medical: Not on file     Non-medical: Not on file   Tobacco Use    Smoking status: Never Smoker    Smokeless tobacco: Never Used   Substance and Sexual Activity    Alcohol use: No    Drug use: No    Sexual activity: Not on file   Lifestyle    Physical activity:     Days per week: Not on file     Minutes per session: Not on file    Stress: Not on file   Relationships    Social connections:     Talks on phone: Not on file     Gets together: Not on file     Attends Evangelical service: Not on file     Active member of club or organization: Not on file     Attends meetings of clubs or organizations: Not on file     Relationship status: Not on file    Intimate partner violence:     Fear of current or ex partner: Not on file Emotionally abused: Not on file     Physically abused: Not on file     Forced sexual activity: Not on file   Other Topics Concern    Not on file   Social History Narrative    Not on file       Current Outpatient Medications:     albuterol (PROAIR HFA) 90 mcg/act inhaler, Inhale as needed, Disp: , Rfl:     cetirizine (ZyrTEC) 10 MG chewable tablet, Chew 10 mg daily, Disp: , Rfl:     fluticasone (FLONASE) 50 mcg/act nasal spray, 1 spray into each nostril daily, Disp: , Rfl:     Levonorgest-Eth Estrad 91-Day (LOSEASONIQUE) 0 1-0 02 & 0 01 MG TABS, TAKE 1 TABLET BY MOUTH DAILY, Disp: , Rfl:     multivitamin (THERAGRAN) TABS, Take 1 tablet by mouth daily, Disp: , Rfl:     scopolamine (TRANSDERM-SCOP) 1 5 mg/3 days TD 72 hr patch, Place 1 patch on the skin every third day Apply 4 hours prior to travel, Disp: 6 patch, Rfl: 0    TURMERIC CURCUMIN PO, Take by mouth daily, Disp: , Rfl:   No Known Allergies    Physical Exam:     Vitals:    07/01/19 0903   BP: 110/78   Pulse: 78   Resp: 16   Temp: 98 2 °F (36 8 °C)     Physical Exam   Constitutional: She is oriented to person, place, and time  She appears well-developed and well-nourished  HENT:   Head: Normocephalic and atraumatic  Mouth/Throat: Oropharynx is clear and moist    Eyes: Pupils are equal, round, and reactive to light  EOM are normal    Neck: Normal range of motion  Neck supple  No JVD present  No tracheal deviation present  No thyromegaly present  Cardiovascular: Normal rate, regular rhythm, normal heart sounds and intact distal pulses  Exam reveals no gallop and no friction rub  No murmur heard  Pulmonary/Chest: Effort normal and breath sounds normal  No respiratory distress  She has no wheezes  She has no rales  Both breasts were examined in the sitting and supine position  There are no worrisome skin lesions, no nipple retraction and no nipple discharge   There are no dominant masses, axillary adenopathy or supraclavicular adenopathy on either side  Abdominal: Soft  She exhibits no distension and no mass  There is no hepatomegaly  There is no tenderness  There is no rebound and no guarding  Musculoskeletal: Normal range of motion  She exhibits no edema or tenderness  Lymphadenopathy:     She has no cervical adenopathy  Neurological: She is alert and oriented to person, place, and time  No cranial nerve deficit  Skin: Skin is warm and dry  No rash noted  No erythema  Psychiatric: She has a normal mood and affect  Her behavior is normal    Vitals reviewed  Results/discussion:   I reviewed her genetic results  I have previously reviewed her films  We went through the process of informed consent I reviewed all the complications as outlined on the consent form  We will coordinate the surgery for later this month  All questions were answered the patient's satisfaction  Advance Care Planning/Advance Directives:  I discussed the disease status, treatment plans and follow-up with the patient

## 2019-07-02 ENCOUNTER — HOSPITAL ENCOUNTER (OUTPATIENT)
Dept: RADIOLOGY | Facility: HOSPITAL | Age: 44
Discharge: HOME | End: 2019-07-02
Attending: SURGERY
Payer: COMMERCIAL

## 2019-07-02 ENCOUNTER — OFFICE VISIT (OUTPATIENT)
Dept: LAB | Facility: CLINIC | Age: 44
End: 2019-07-02
Payer: COMMERCIAL

## 2019-07-02 ENCOUNTER — APPOINTMENT (OUTPATIENT)
Dept: LAB | Facility: CLINIC | Age: 44
End: 2019-07-02
Payer: COMMERCIAL

## 2019-07-02 DIAGNOSIS — C50.212 MALIGNANT NEOPLASM OF UPPER-INNER QUADRANT OF LEFT BREAST IN FEMALE, ESTROGEN RECEPTOR POSITIVE (HCC): ICD-10-CM

## 2019-07-02 DIAGNOSIS — Z17.0 MALIGNANT NEOPLASM OF UPPER-INNER QUADRANT OF LEFT BREAST IN FEMALE, ESTROGEN RECEPTOR POSITIVE (HCC): ICD-10-CM

## 2019-07-02 LAB
BASOPHILS # BLD AUTO: 0.07 THOUSANDS/ΜL (ref 0–0.1)
BASOPHILS NFR BLD AUTO: 1 % (ref 0–1)
EOSINOPHIL # BLD AUTO: 0.36 THOUSAND/ΜL (ref 0–0.61)
EOSINOPHIL NFR BLD AUTO: 5 % (ref 0–6)
ERYTHROCYTE [DISTWIDTH] IN BLOOD BY AUTOMATED COUNT: 12.8 % (ref 11.6–15.1)
HCT VFR BLD AUTO: 41.2 % (ref 34.8–46.1)
HGB BLD-MCNC: 13.1 G/DL (ref 11.5–15.4)
IMM GRANULOCYTES # BLD AUTO: 0.02 THOUSAND/UL (ref 0–0.2)
IMM GRANULOCYTES NFR BLD AUTO: 0 % (ref 0–2)
LYMPHOCYTES # BLD AUTO: 2.73 THOUSANDS/ΜL (ref 0.6–4.47)
LYMPHOCYTES NFR BLD AUTO: 35 % (ref 14–44)
MCH RBC QN AUTO: 30.1 PG (ref 26.8–34.3)
MCHC RBC AUTO-ENTMCNC: 31.8 G/DL (ref 31.4–37.4)
MCV RBC AUTO: 95 FL (ref 82–98)
MONOCYTES # BLD AUTO: 0.78 THOUSAND/ΜL (ref 0.17–1.22)
MONOCYTES NFR BLD AUTO: 10 % (ref 4–12)
NEUTROPHILS # BLD AUTO: 3.76 THOUSANDS/ΜL (ref 1.85–7.62)
NEUTS SEG NFR BLD AUTO: 49 % (ref 43–75)
NRBC BLD AUTO-RTO: 0 /100 WBCS
PLATELET # BLD AUTO: 273 THOUSANDS/UL (ref 149–390)
PMV BLD AUTO: 9.2 FL (ref 8.9–12.7)
RBC # BLD AUTO: 4.35 MILLION/UL (ref 3.81–5.12)
WBC # BLD AUTO: 7.72 THOUSAND/UL (ref 4.31–10.16)

## 2019-07-02 PROCEDURE — 36415 COLL VENOUS BLD VENIPUNCTURE: CPT

## 2019-07-02 PROCEDURE — 85025 COMPLETE CBC W/AUTO DIFF WBC: CPT

## 2019-07-02 PROCEDURE — 71046 X-RAY EXAM CHEST 2 VIEWS: CPT

## 2019-07-02 PROCEDURE — 93005 ELECTROCARDIOGRAM TRACING: CPT

## 2019-07-03 LAB
ATRIAL RATE: 66 BPM
P AXIS: 3 DEGREES
PR INTERVAL: 154 MS
QRS AXIS: -12 DEGREES
QRSD INTERVAL: 92 MS
QT INTERVAL: 412 MS
QTC INTERVAL: 431 MS
T WAVE AXIS: -2 DEGREES
VENTRICULAR RATE: 66 BPM

## 2019-07-03 PROCEDURE — 93010 ELECTROCARDIOGRAM REPORT: CPT | Performed by: INTERNAL MEDICINE

## 2019-07-19 ENCOUNTER — TELEPHONE (OUTPATIENT)
Dept: FAMILY MEDICINE CLINIC | Facility: CLINIC | Age: 44
End: 2019-07-19

## 2019-07-19 NOTE — TELEPHONE ENCOUNTER
Spoke to pt  Will continue drinking lots of liquids  Will call if there are any other questions    Task Complete for now   [email or web address]

## 2019-07-19 NOTE — TELEPHONE ENCOUNTER
It is going to take time to feel better, this is a side effect of the medication  They should focus on hydration    Fernanda Henry, DO

## 2019-07-19 NOTE — TELEPHONE ENCOUNTER
Pt stated they both used the motion sickness patches constantly the entire 2 week cruise  Pt has been home since Wednesday  Still feeling nauseated, migraine headache, loss of appetite but eating bland  Able to drink fluids  Not vomiting  Please advise    [email or web address]

## 2019-07-19 NOTE — TELEPHONE ENCOUNTER
Patient has questions for her and her  who were given motion sickness patches    They are both experiencing nausea     Sent to [facility]

## 2019-07-23 RX ORDER — RIZATRIPTAN BENZOATE 10 MG/1
10 TABLET, ORALLY DISINTEGRATING ORAL ONCE AS NEEDED
COMMUNITY
End: 2019-08-16 | Stop reason: SDUPTHER

## 2019-07-23 RX ORDER — CALCIUM CARBONATE 300MG(750)
1 TABLET,CHEWABLE ORAL DAILY
COMMUNITY

## 2019-07-23 NOTE — PRE-PROCEDURE INSTRUCTIONS
Pre-Surgery Instructions:   Medication Instructions    albuterol (PROAIR HFA) 90 mcg/act inhaler Patient was instructed by Physician and understands   cetirizine (ZyrTEC) 10 MG chewable tablet Patient was instructed by Physician and understands   Cholecalciferol (VITAMIN D3) 1000 units CHEW Instructed patient per Anesthesia Guidelines   fluticasone (FLONASE) 50 mcg/act nasal spray Patient was instructed by Physician and understands   multivitamin (THERAGRAN) TABS Instructed patient per Anesthesia Guidelines   oxyCODONE-acetaminophen (PERCOCET) 5-325 mg per tablet Patient was instructed by Physician and understands   rizatriptan (MAXALT-MLT) 10 MG disintegrating tablet Patient was instructed by Physician and understands   TURMERIC CURCUMIN PO Patient was instructed by Physician and understands  Pre op and bathing instructions given  Pt has hibiclens

## 2019-07-30 ENCOUNTER — HOSPITAL ENCOUNTER (OUTPATIENT)
Dept: NUCLEAR MEDICINE | Facility: HOSPITAL | Age: 44
Discharge: HOME | End: 2019-07-30
Attending: SURGERY
Payer: COMMERCIAL

## 2019-07-30 ENCOUNTER — HOSPITAL ENCOUNTER (OUTPATIENT)
Dept: MAMMOGRAPHY | Facility: HOSPITAL | Age: 44
Discharge: HOME | End: 2019-07-30
Attending: SURGERY
Payer: COMMERCIAL

## 2019-07-30 ENCOUNTER — APPOINTMENT (OUTPATIENT)
Dept: MAMMOGRAPHY | Facility: HOSPITAL | Age: 44
End: 2019-07-30
Payer: COMMERCIAL

## 2019-07-30 ENCOUNTER — HOSPITAL ENCOUNTER (OUTPATIENT)
Facility: HOSPITAL | Age: 44
Setting detail: OUTPATIENT SURGERY
Discharge: HOME | End: 2019-07-30
Attending: SURGERY | Admitting: SURGERY
Payer: COMMERCIAL

## 2019-07-30 ENCOUNTER — ANESTHESIA (OUTPATIENT)
Dept: PERIOP | Facility: HOSPITAL | Age: 44
End: 2019-07-30
Payer: COMMERCIAL

## 2019-07-30 ENCOUNTER — ANESTHESIA EVENT (OUTPATIENT)
Dept: PERIOP | Facility: HOSPITAL | Age: 44
End: 2019-07-30
Payer: COMMERCIAL

## 2019-07-30 VITALS
SYSTOLIC BLOOD PRESSURE: 144 MMHG | HEART RATE: 82 BPM | WEIGHT: 154 LBS | BODY MASS INDEX: 28.34 KG/M2 | TEMPERATURE: 98 F | DIASTOLIC BLOOD PRESSURE: 98 MMHG | OXYGEN SATURATION: 98 % | HEIGHT: 62 IN | RESPIRATION RATE: 20 BRPM

## 2019-07-30 VITALS — WEIGHT: 154 LBS | BODY MASS INDEX: 28.34 KG/M2 | HEIGHT: 62 IN

## 2019-07-30 DIAGNOSIS — C50.212 MALIGNANT NEOPLASM OF UPPER-INNER QUADRANT OF LEFT BREAST IN FEMALE, ESTROGEN RECEPTOR POSITIVE (HCC): ICD-10-CM

## 2019-07-30 DIAGNOSIS — Z17.0 MALIGNANT NEOPLASM OF UPPER-INNER QUADRANT OF LEFT BREAST IN FEMALE, ESTROGEN RECEPTOR POSITIVE (HCC): ICD-10-CM

## 2019-07-30 PROCEDURE — 88341 IMHCHEM/IMCYTCHM EA ADD ANTB: CPT | Performed by: PATHOLOGY

## 2019-07-30 PROCEDURE — 38900 IO MAP OF SENT LYMPH NODE: CPT | Performed by: SURGERY

## 2019-07-30 PROCEDURE — 88342 IMHCHEM/IMCYTCHM 1ST ANTB: CPT | Performed by: PATHOLOGY

## 2019-07-30 PROCEDURE — 38525 BIOPSY/REMOVAL LYMPH NODES: CPT | Performed by: SURGERY

## 2019-07-30 PROCEDURE — 38792 RA TRACER ID OF SENTINL NODE: CPT | Performed by: SURGERY

## 2019-07-30 PROCEDURE — 38792 RA TRACER ID OF SENTINL NODE: CPT

## 2019-07-30 PROCEDURE — 19301 PARTIAL MASTECTOMY: CPT | Performed by: SURGERY

## 2019-07-30 PROCEDURE — 88307 TISSUE EXAM BY PATHOLOGIST: CPT | Performed by: PATHOLOGY

## 2019-07-30 PROCEDURE — A9541 TC99M SULFUR COLLOID: HCPCS

## 2019-07-30 PROCEDURE — 19281 PERQ DEVICE BREAST 1ST IMAG: CPT

## 2019-07-30 RX ORDER — MAGNESIUM HYDROXIDE 1200 MG/15ML
LIQUID ORAL AS NEEDED
Status: DISCONTINUED | OUTPATIENT
Start: 2019-07-30 | End: 2019-07-30 | Stop reason: HOSPADM

## 2019-07-30 RX ORDER — DEXAMETHASONE SODIUM PHOSPHATE 10 MG/ML
INJECTION, SOLUTION INTRAMUSCULAR; INTRAVENOUS AS NEEDED
Status: DISCONTINUED | OUTPATIENT
Start: 2019-07-30 | End: 2019-07-30 | Stop reason: SURG

## 2019-07-30 RX ORDER — BUPIVACAINE HYDROCHLORIDE AND EPINEPHRINE 2.5; 5 MG/ML; UG/ML
INJECTION, SOLUTION INFILTRATION; PERINEURAL AS NEEDED
Status: DISCONTINUED | OUTPATIENT
Start: 2019-07-30 | End: 2019-07-30 | Stop reason: HOSPADM

## 2019-07-30 RX ORDER — LIDOCAINE HYDROCHLORIDE 10 MG/ML
5 INJECTION, SOLUTION INFILTRATION; PERINEURAL ONCE
Status: COMPLETED | OUTPATIENT
Start: 2019-07-30 | End: 2019-07-30

## 2019-07-30 RX ORDER — MIDAZOLAM HYDROCHLORIDE 1 MG/ML
INJECTION INTRAMUSCULAR; INTRAVENOUS AS NEEDED
Status: DISCONTINUED | OUTPATIENT
Start: 2019-07-30 | End: 2019-07-30 | Stop reason: SURG

## 2019-07-30 RX ORDER — LIDOCAINE HYDROCHLORIDE 10 MG/ML
INJECTION, SOLUTION INFILTRATION; PERINEURAL AS NEEDED
Status: DISCONTINUED | OUTPATIENT
Start: 2019-07-30 | End: 2019-07-30 | Stop reason: SURG

## 2019-07-30 RX ORDER — OXYCODONE HYDROCHLORIDE AND ACETAMINOPHEN 5; 325 MG/1; MG/1
1 TABLET ORAL EVERY 4 HOURS PRN
Status: DISCONTINUED | OUTPATIENT
Start: 2019-07-30 | End: 2019-07-30 | Stop reason: HOSPADM

## 2019-07-30 RX ORDER — ACETAMINOPHEN 325 MG/1
650 TABLET ORAL EVERY 6 HOURS PRN
Status: DISCONTINUED | OUTPATIENT
Start: 2019-07-30 | End: 2019-07-30 | Stop reason: HOSPADM

## 2019-07-30 RX ORDER — ONDANSETRON 2 MG/ML
4 INJECTION INTRAMUSCULAR; INTRAVENOUS ONCE AS NEEDED
Status: DISCONTINUED | OUTPATIENT
Start: 2019-07-30 | End: 2019-07-30 | Stop reason: HOSPADM

## 2019-07-30 RX ORDER — CEFAZOLIN SODIUM 1 G/50ML
1000 SOLUTION INTRAVENOUS ONCE
Status: COMPLETED | OUTPATIENT
Start: 2019-07-30 | End: 2019-07-30

## 2019-07-30 RX ORDER — ONDANSETRON 2 MG/ML
INJECTION INTRAMUSCULAR; INTRAVENOUS AS NEEDED
Status: DISCONTINUED | OUTPATIENT
Start: 2019-07-30 | End: 2019-07-30 | Stop reason: SURG

## 2019-07-30 RX ORDER — SODIUM CHLORIDE, SODIUM LACTATE, POTASSIUM CHLORIDE, CALCIUM CHLORIDE 600; 310; 30; 20 MG/100ML; MG/100ML; MG/100ML; MG/100ML
INJECTION, SOLUTION INTRAVENOUS CONTINUOUS PRN
Status: DISCONTINUED | OUTPATIENT
Start: 2019-07-30 | End: 2019-07-30 | Stop reason: SURG

## 2019-07-30 RX ORDER — FENTANYL CITRATE/PF 50 MCG/ML
50 SYRINGE (ML) INJECTION
Status: DISCONTINUED | OUTPATIENT
Start: 2019-07-30 | End: 2019-07-30 | Stop reason: HOSPADM

## 2019-07-30 RX ORDER — METOCLOPRAMIDE HYDROCHLORIDE 5 MG/ML
10 INJECTION INTRAMUSCULAR; INTRAVENOUS ONCE AS NEEDED
Status: DISCONTINUED | OUTPATIENT
Start: 2019-07-30 | End: 2019-07-30 | Stop reason: HOSPADM

## 2019-07-30 RX ORDER — SODIUM CHLORIDE, SODIUM LACTATE, POTASSIUM CHLORIDE, CALCIUM CHLORIDE 600; 310; 30; 20 MG/100ML; MG/100ML; MG/100ML; MG/100ML
125 INJECTION, SOLUTION INTRAVENOUS CONTINUOUS
Status: CANCELLED | OUTPATIENT
Start: 2019-07-30

## 2019-07-30 RX ORDER — FENTANYL CITRATE 50 UG/ML
INJECTION, SOLUTION INTRAMUSCULAR; INTRAVENOUS AS NEEDED
Status: DISCONTINUED | OUTPATIENT
Start: 2019-07-30 | End: 2019-07-30 | Stop reason: SURG

## 2019-07-30 RX ORDER — OXYCODONE HYDROCHLORIDE 5 MG/1
5 TABLET ORAL EVERY 4 HOURS PRN
Status: DISCONTINUED | OUTPATIENT
Start: 2019-07-30 | End: 2019-07-30

## 2019-07-30 RX ORDER — PROPOFOL 10 MG/ML
INJECTION, EMULSION INTRAVENOUS AS NEEDED
Status: DISCONTINUED | OUTPATIENT
Start: 2019-07-30 | End: 2019-07-30 | Stop reason: SURG

## 2019-07-30 RX ADMIN — SODIUM CHLORIDE, SODIUM LACTATE, POTASSIUM CHLORIDE, AND CALCIUM CHLORIDE: .6; .31; .03; .02 INJECTION, SOLUTION INTRAVENOUS at 09:15

## 2019-07-30 RX ADMIN — CEFAZOLIN SODIUM 1000 MG: 1 SOLUTION INTRAVENOUS at 09:31

## 2019-07-30 RX ADMIN — LIDOCAINE HYDROCHLORIDE ANHYDROUS 5 ML: 10 INJECTION, SOLUTION INFILTRATION at 08:23

## 2019-07-30 RX ADMIN — FENTANYL CITRATE 50 MCG: 50 INJECTION INTRAMUSCULAR; INTRAVENOUS at 10:02

## 2019-07-30 RX ADMIN — FENTANYL CITRATE 50 MCG: 50 INJECTION INTRAMUSCULAR; INTRAVENOUS at 09:37

## 2019-07-30 RX ADMIN — ONDANSETRON 4 MG: 2 INJECTION INTRAMUSCULAR; INTRAVENOUS at 10:23

## 2019-07-30 RX ADMIN — LIDOCAINE HYDROCHLORIDE ANHYDROUS 50 MG: 10 INJECTION, SOLUTION INFILTRATION at 09:32

## 2019-07-30 RX ADMIN — PROPOFOL 200 MG: 10 INJECTION, EMULSION INTRAVENOUS at 09:32

## 2019-07-30 RX ADMIN — DEXAMETHASONE SODIUM PHOSPHATE 4 MG: 10 INJECTION, SOLUTION INTRAMUSCULAR; INTRAVENOUS at 09:37

## 2019-07-30 RX ADMIN — MIDAZOLAM HYDROCHLORIDE 2 MG: 1 INJECTION, SOLUTION INTRAMUSCULAR; INTRAVENOUS at 09:28

## 2019-07-30 NOTE — ANESTHESIA PREPROCEDURE EVALUATION
Review of Systems/Medical History  Patient summary reviewed  Chart reviewed  No history of anesthetic complications     Cardiovascular  Negative cardio ROS    Pulmonary  Asthma ,        GI/Hepatic  Negative GI/hepatic ROS          Negative  ROS        Endo/Other  Negative endo/other ROS      GYN    Breast cancer        Hematology  Negative hematology ROS      Musculoskeletal    Arthritis     Neurology    Headaches,    Psychology           Physical Exam    Airway    Mallampati score: II  TM Distance: >3 FB  Neck ROM: full     Dental       Cardiovascular  Comment: Negative ROS,     Pulmonary      Other Findings        Anesthesia Plan  ASA Score- 2     Anesthesia Type- general with ASA Monitors  Additional Monitors:   Airway Plan: LMA  Plan Factors-    Induction- intravenous  Postoperative Plan-     Informed Consent- Anesthetic plan and risks discussed with patient  I personally reviewed this patient with the CRNA  Discussed and agreed on the Anesthesia Plan with the CRNA  Chester Moe

## 2019-07-30 NOTE — DISCHARGE INSTRUCTIONS
POST-OPERATIVE BREAST CARE INSTRUCTIONS    Wound Closure/Dressing: Your wound is closed with:   Steri strips-white pieces of tape that hold your incision together along with surgical glue           Dissolvable sutures-underneath the skin    Wound care:     If you have gauze over your wound you may remove it the day after surgery  Leave the Steri-strips on, they will fall off on their own in 1-2 weeks   You may shower using soap and water to clean your wound  Gently pat dry  Drain Care: If you do not have a drain, disregard this section   Visiting Nursing should have been arranged upon discharge from hospital   A nurse will call your home to schedule an initial visit  Please call the number below if you have not received a call within 48 hours of hospital discharge   You may shower with the MARYAM drains  Wash area around the drains with soap and water and pat dry   Record drain outputs on chart given to you at pre op visit and bring that chart to office at post op appt   MARYAM drains can be removed in the office by a nurse either at post op visit OR when drain output is 30 ccs or less in a 24 hour period for three days in a row   If you had plastic surgery or reconstructive surgery, the plastic surgeon will manage the drains  Other:       You can begin wearing your own bra when it feels comfortable   You may resume using deodorant the day after surgery                 Post-op visit:  your post-op visit is scheduled for:  2019 @ 9:45 AM    Call our office at [phone] if you have any  of the followin  Redness, swelling, heat, unusual drainage or heavy bleeding from wound  2  Fever greater than 101 °  3  Pain not relieved by medication

## 2019-07-30 NOTE — OP NOTE
OPERATIVE REPORT  PATIENT NAME: Wilson Smith    :  1975  MRN: [id number]  Pt Location: AN OR ROOM 02    SURGERY DATE: 2019    Surgeon(s) and Role:     * Chris Fragoso MD - Primary     * Criss Salazar MD - Assisting    Preop Diagnosis:  Malignant neoplasm of upper-inner quadrant of left breast in female, estrogen receptor positive ([address] ) [C50 212, Z17 0]    Post-Op Diagnosis Codes:     * Malignant neoplasm of upper-inner quadrant of left breast in female, estrogen receptor positive ([address] ) [C50 212, Z17 0]    Procedure(s) (LRB):  BREAST LUMPECTOMY; BREAST NEEDLE LOCALIZATION (NEEDLE LOC AT 0800) (Left)  SENTINEL LYMPH NODE BIOPSY; LYMPHATIC MAPPING WITH BLUE DYE AND RADIOACTIVE DYE (INJECT AT 0930 BY DR LEAVITT IN THE OR) (Left)    Specimen(s):  ID Type Source Tests Collected by Time Destination   1 : Left breast lumpectomy Tissue Breast, Left TISSUE EXAM Chris Fragoso MD 2019 [id number]    2 : Left breast lumpectomy new margin superior Tissue Breast, Left TISSUE EXAM Chris Fragoso MD 2019 4831    3 : Left breast lumpectomy new margin posterior Tissue Breast, Left TISSUE EXAM Chris Fragoso MD 2019 1110    4 : Left breast lumpectomy new margin inferior Tissue Breast, Left TISSUE EXAM Chris Fragoso MD 2019 8886    5 : Left breast lumpectomy new margin medial Tissue Breast, Left TISSUE EXAM Chris Fragoso MD 2019 0952    6 : Left breast lumpectomy new margin lateral Tissue Breast, Left TISSUE EXAM Chris Fragoso MD 2019 9551    7 : Left axillary sentinel lymph node  Tissue Lymph Node, [place] TISSUE EXAM Chris Fragoso MD 2019 6752    8 : Left axilla additional hilario tissue Tissue Axillary TISSUE EXAM Chris Fragoso MD 2019 1019        Estimated Blood Loss:   Minimal    Drains:  * No LDAs found *    Anesthesia Type:   General    Operative Indications:  Malignant neoplasm of upper-inner quadrant of left breast in female, estrogen receptor positive ([address] ) [J44 162, Z17 0]      Operative Findings:  A sentinel lymph node was radioactive but not blue, is very small but strongly radioactive  There is an additional node next to it which was slightly enlarged and was sent as additional hilario tissue it was neither radioactive or blue  Good specimen radiograph    Complications:   None    Procedure and Technique:  Lumpectomy  The patient was brought to the operation room and placed under general anesthesia  Attention was paid to ensure appropriate padding and correct positioning  The left breast was injected intradermally with technetium sulfur colloid followed by 0 3cc of methylene blue  This area was vigorously massaged to facilitate identification of the sentinel lymph node (SLN)  The breast and axillary region were then prepped and draped in a sterile fashion  I initiated a time-out, identifying the patient, the correct side and the above procedure  All parties agreed with the time out  The planned incision was then injected with 0 25% Marcaine and epinephrine for local anesthesia  The incision was sharply incised  The localizing wire was used to guide the dissection  Superior, inferior, medial and lateral planes were developed around the localizing wire and the specimen truncated posteriorly with electrocautery just beyond the tip of the wire  The specimen was then inked for orientation purposes  A specimen radiograph was taken in two dimensions and additional margins were removed to optimize complete removal of the tumor  The additional margins were inked on the most distal area  anterior with skin, posterior was muscle  All specimens were sent to pathology for permanent analysis  Superficial bleeding controlled with electrocautery and the wound was extensively irrigated  The wound was closed with two layers, an inner layer of 3-0 vicryl and a subcuticular layer of 4-0 monocryl  Steri-strips were applied      SLN Biopsy  The previously marked location of the sentinel lymph node was injected with 0 25% Marcaine and epinephrine  A curivilinear incision was made and dissection was taken down into the axillar proper with electrocautery  The roney counter was used to help localize the SLN  The sentinel lymph node was identified and removed with electrocautery  SLN Findings  The SLN was radioactive but not blue  The lymph node was grossly normal and sent for permanent pathologic analysis  There is no adjacent slightly enlarged node which was neither blue or radioactive not clinically suspicious but was removed and sent as additional hilario tissue  This wound was also closed in 2 layers  The counts were correct x2       I was present for the entire procedure    Patient Disposition:  PACU     SIGNATURE: Maddie Munoz MD  DATE: July 30, 2019  TIME: 10:20 AM

## 2019-07-30 NOTE — ANESTHESIA POSTPROCEDURE EVALUATION
Post-Op Assessment Note    CV Status:  Stable  Pain Score: 0    Pain management: adequate     Mental Status:  Alert and sleepy   Hydration Status:  Euvolemic   PONV Controlled:  Controlled   Airway Patency:  Patent   Post Op Vitals Reviewed: Yes      Staff: CRNA           /69 (07/30/19 1045)    Temp 98 2 °F (36 8 °C) (07/30/19 1045)    Pulse  72   Resp   16   SpO2   100%

## 2019-07-31 ENCOUNTER — TELEPHONE (OUTPATIENT)
Dept: HEMATOLOGY ONCOLOGY | Facility: CLINIC | Age: 44
End: 2019-07-31

## 2019-07-31 NOTE — TELEPHONE ENCOUNTER
Patient called with questions/concerns regarding billing for genetic testing through [facility]  Patient stated that she received a check for payment for genetic testing from her insurance company and that her E  O B shows a balance of 450$  I informed her that I have looked into this with the Rep and billing department at [facility], which they said, insurance is out of network and that patient should only receive a bill of 100 00 or less  Patient has not received a bill yet from [facility] however she is unsure what to do with the check received from her [address]  I provided her again with the direct phone number and  "Jesus Wick" for [employer] and instructed her to call them for definitive answers  I informed pt that I don't work for [employer] nor insurance sector  We did however agree that most likely she will need to submit the check from [facility] of $1050 00 along with $100 00 to [facility] once she does receive a bill  All questions that I could answer were answered to her satisfaction  We also discussed her recent surgical procedure  Patient states she is only having mild pain which is controlled  She had no other complaints at this time  I encouraged her to call for any concerns  She has my contact information  Will follow up as needed

## 2019-08-02 ENCOUNTER — OFFICE VISIT (OUTPATIENT)
Dept: FAMILY MEDICINE CLINIC | Facility: CLINIC | Age: 44
End: 2019-08-02
Payer: COMMERCIAL

## 2019-08-02 VITALS
RESPIRATION RATE: 16 BRPM | SYSTOLIC BLOOD PRESSURE: 132 MMHG | TEMPERATURE: 98.8 F | DIASTOLIC BLOOD PRESSURE: 78 MMHG | BODY MASS INDEX: 28.34 KG/M2 | HEIGHT: 62 IN | WEIGHT: 154 LBS | HEART RATE: 72 BPM

## 2019-08-02 DIAGNOSIS — Z00.00 ANNUAL PHYSICAL EXAM: Primary | ICD-10-CM

## 2019-08-02 DIAGNOSIS — C50.212 MALIGNANT NEOPLASM OF UPPER-INNER QUADRANT OF LEFT BREAST IN FEMALE, ESTROGEN RECEPTOR POSITIVE (HCC): ICD-10-CM

## 2019-08-02 DIAGNOSIS — Z17.0 MALIGNANT NEOPLASM OF UPPER-INNER QUADRANT OF LEFT BREAST IN FEMALE, ESTROGEN RECEPTOR POSITIVE (HCC): ICD-10-CM

## 2019-08-02 PROCEDURE — 99396 PREV VISIT EST AGE 40-64: CPT | Performed by: FAMILY MEDICINE

## 2019-08-02 RX ORDER — COPPER 313.4 MG/1
1 INTRAUTERINE DEVICE INTRAUTERINE ONCE
COMMUNITY
End: 2021-02-02 | Stop reason: SDUPTHER

## 2019-08-02 NOTE — PROGRESS NOTES
FAMILY PRACTICE HEALTH MAINTENANCE OFFICE VISIT  [facility]    NAME: Erick Smith  AGE: 53 y o  SEX: female  : 1975     DATE: 2019    Assessment and Plan     Problem List Items Addressed This Visit     Malignant neoplasm of upper-inner quadrant of left breast in female, estrogen receptor positive ([address] )     Lumpectomy is healing well  Other Visit Diagnoses     Annual physical exam    -  Primary            · Patient Counseling:   · Nutrition: Stressed importance of a well balanced diet, moderation of sodium/saturated fat, caloric balance and sufficient intake of fiber  · Exercise: Stressed the importance of regular exercise with a goal of 150 minutes per week  · Dental Health: Discussed daily flossing and brushing and regular dental visits     · Immunizations reviewed and are up to date  · Discussed benefits of screening mammogram    BMI Counseling: Body mass index is 28 17 kg/m²  Discussed with patient's BMI with her  The BMI is above average  BMI counseling and education was provided to the patient  Exercise recommendations include exercising 3-5 times per week  Return in about 6 months (around 2020) for Next scheduled follow up  Chief Complaint     Chief Complaint   Patient presents with    Physical Exam     no pap wmcma       History of Present Illness     She is recovering from her lumpectomy well  She is not having any drainage are swelling          Well Adult Physical   Patient here for a comprehensive physical exam       Diet and Physical Activity  Diet: well balanced diet  Exercise: frequently      Depression Screen  PHQ-9 Depression Screening    PHQ-9:    Frequency of the following problems over the past two weeks:       Little interest or pleasure in doing things:  0 - not at all  Feeling down, depressed, or hopeless:  0 - not at all  PHQ-2 Score:  0          General Health  Hearing: Normal:  bilateral  Vision: wears glasses  Dental: regular dental visits    Reproductive Health  Just had an IUD put in and a lumpectomy      The following portions of the patient's history were reviewed and updated as appropriate: allergies, current medications, past family history, past medical history, past social history, past surgical history and problem list     Review of Systems     Review of Systems   Constitutional: Negative for fever  Respiratory: Negative  Cardiovascular: Negative  Past Medical History     Past Medical History:   Diagnosis Date    Adhesive capsulitis of right shoulder     Resolved 11/15/2016     Asthma     Benign paroxysmal vertigo, unspecified ear     Resolved 11/15/2016     BRCA gene mutation negative 06/2019    [facility]    Breast cancer ([facility] [address] )     Cellulitis of leg, except foot     Resolved 12/10/2014     Lymphadenitis     Resolved 12/10/2014     Snoring        Past Surgical History     Past Surgical History:   Procedure Laterality Date    BREAST LUMPECTOMY Left 7/30/2019    Procedure: BREAST LUMPECTOMY; BREAST NEEDLE LOCALIZATION (NEEDLE LOC AT 0800); Surgeon: Evon Pride MD;  [facility];  Service: Surgical Oncology    INTRAUTERINE DEVICE INSERTION  2019    LYMPH NODE BIOPSY Left 7/30/2019    Procedure: SENTINEL LYMPH NODE BIOPSY; LYMPHATIC MAPPING WITH BLUE DYE AND RADIOACTIVE DYE (INJECT AT 0930 BY DR LEAVITT IN THE OR);   Surgeon: Evon Pride MD;  [facility];  Service: Surgical Oncology    MAMMO NEEDLE LOCALIZATION LEFT (ALL INC) Left 7/30/2019    SHOULDER SURGERY Right     Last assessed 12/29/2015     US GUIDED BREAST BIOPSY LEFT COMPLETE Left 6/7/2019    WISDOM TOOTH EXTRACTION         Social History     Social History     Socioeconomic History    Marital status: /Civil Union     Spouse name: None    Number of children: None    Years of education: None    Highest education level: None   Occupational History    None   Social Needs    Financial resource strain: None    Food insecurity:     Worry: None     Inability: None    Transportation needs:     Medical: None     Non-medical: None   Tobacco Use    Smoking status: Never Smoker    Smokeless tobacco: Never Used   Substance and Sexual Activity    Alcohol use: No    Drug use: No    Sexual activity: Yes     Partners: Male   Lifestyle    Physical activity:     Days per week: None     Minutes per session: None    Stress: None   Relationships    Social connections:     Talks on phone: None     Gets together: None     Attends Confucianism service: None     Active member of club or organization: None     Attends meetings of clubs or organizations: None     Relationship status: None    Intimate partner violence:     Fear of current or ex partner: None     Emotionally abused: None     Physically abused: None     Forced sexual activity: None   Other Topics Concern    None   Social History Narrative    None       Family History     Family History   Problem Relation Age of Onset    Alzheimer's disease Mother     COPD Father     No Known Problems Sister     No Known Problems Daughter     Alzheimer's disease Maternal Grandmother     No Known Problems Maternal Grandfather     No Known Problems Paternal Grandmother     No Known Problems Paternal Grandfather     No Known Problems Brother     No Known Problems Brother     No Known Problems Brother     No Known Problems Daughter        Current Medications       Current Outpatient Medications:     albuterol (PROAIR HFA) 90 mcg/act inhaler, Inhale as needed, Disp: , Rfl:     cetirizine (ZyrTEC) 10 MG chewable tablet, Chew 10 mg daily, Disp: , Rfl:     Cholecalciferol (VITAMIN D3) 1000 units CHEW, Chew 1 tablet daily, Disp: , Rfl:     fluticasone (FLONASE) 50 mcg/act nasal spray, 1 spray into each nostril daily, Disp: , Rfl:     intrauterine copper (PARAGARD) IUD, 1 each by Intrauterine route once, Disp: , Rfl:     multivitamin (THERAGRAN) TABS, Take 1 tablet by mouth daily, Disp: , Rfl:     rizatriptan (MAXALT-MLT) 10 MG disintegrating tablet, Take 10 mg by mouth once as needed for migraine May repeat in 2 hours if needed, Disp: , Rfl:     TURMERIC CURCUMIN PO, Take by mouth daily, Disp: , Rfl:      Allergies     No Known Allergies    Objective     /78   Pulse 72   Temp 98 8 °F (37 1 °C)   Resp 16   Ht 5' 2" (1 575 m)   Wt 69 9 kg (154 lb)   LMP 07/12/2019 (Approximate) Comment: Pt  denies pregnancy-IUD  BMI 28 17 kg/m²      Physical Exam   Constitutional: She is oriented to person, place, and time  She appears well-developed and well-nourished  HENT:   Head: Normocephalic and atraumatic  Right Ear: External ear normal    Left Ear: External ear normal    Mouth/Throat: Oropharynx is clear and moist    Eyes: Pupils are equal, round, and reactive to light  Neck: Normal range of motion  Cardiovascular: Normal rate, regular rhythm and normal heart sounds  Exam reveals no friction rub  No murmur heard  Pulmonary/Chest: Effort normal and breath sounds normal  No respiratory distress  She has no wheezes  She has no rales  Abdominal: Soft  Bowel sounds are normal  She exhibits no distension and no mass  There is no tenderness  There is no rebound and no guarding  Musculoskeletal: Normal range of motion  She exhibits no edema  Neurological: She is alert and oriented to person, place, and time  She has normal reflexes  Skin: Skin is warm  Left breast incision - no drainage or erythema    Nursing note and vitals reviewed           Visual Acuity Screening    Right eye Left eye Both eyes   Without correction:      With correction: 20/20 20/25 [address]

## 2019-08-05 ENCOUNTER — OPTICAL OFFICE (OUTPATIENT)
Dept: URBAN - METROPOLITAN AREA CLINIC 146 | Facility: CLINIC | Age: 44
Setting detail: OPHTHALMOLOGY
End: 2019-08-05
Payer: COMMERCIAL

## 2019-08-05 ENCOUNTER — DOCTOR'S OFFICE (OUTPATIENT)
Dept: URBAN - METROPOLITAN AREA CLINIC 137 | Facility: CLINIC | Age: 44
Setting detail: OPHTHALMOLOGY
End: 2019-08-05
Payer: COMMERCIAL

## 2019-08-05 DIAGNOSIS — H52.13: ICD-10-CM

## 2019-08-05 DIAGNOSIS — H52.223: ICD-10-CM

## 2019-08-05 DIAGNOSIS — H52.4: ICD-10-CM

## 2019-08-05 PROCEDURE — V2784 LENS POLYCARB OR EQUAL: HCPCS | Performed by: OPTOMETRIST

## 2019-08-05 PROCEDURE — V2207 LENS SPHCY BIFOCAL 4.25-7D/.: HCPCS | Performed by: OPTOMETRIST

## 2019-08-05 PROCEDURE — 92310 CONTACT LENS FITTING OU: CPT | Performed by: OPTOMETRIST

## 2019-08-05 PROCEDURE — V2020 VISION SVCS FRAMES PURCHASES: HCPCS | Performed by: OPTOMETRIST

## 2019-08-05 PROCEDURE — 92015 DETERMINE REFRACTIVE STATE: CPT | Performed by: OPTOMETRIST

## 2019-08-05 PROCEDURE — 92014 COMPRE OPH EXAM EST PT 1/>: CPT | Performed by: OPTOMETRIST

## 2019-08-05 PROCEDURE — V2781 PROGRESSIVE LENS PER LENS: HCPCS | Performed by: OPTOMETRIST

## 2019-08-05 PROCEDURE — V2744 TINT PHOTOCHROMATIC LENS/ES: HCPCS | Performed by: OPTOMETRIST

## 2019-08-05 PROCEDURE — V2799 MISC VISION ITEM OR SERVICE: HCPCS | Performed by: OPTOMETRIST

## 2019-08-05 ASSESSMENT — REFRACTION_MANIFEST
OU_VA: 20/20
OS_AXIS: 180
OD_VA1: 20/20
OS_VA1: 20/
OD_SPHERE: -4.50
OD_VA2: 20/
OS_CYLINDER: -1.50
OD_VA1: 20/
OD_ADD: +1.75
OS_VA2: 20/
OS_ADD: +1.75
OS_VA1: 20/20
OD_VA3: 20/
OU_VA: 20/
OD_CYLINDER: -1.00
OS_VA3: 20/
OS_SPHERE: -4.25
OD_VA2: 20/
OS_VA2: 20/
OD_VA3: 20/
OD_AXIS: 015
OS_VA3: 20/

## 2019-08-05 ASSESSMENT — VISUAL ACUITY
OS_BCVA: 20/20
OD_BCVA: 20/20

## 2019-08-05 ASSESSMENT — REFRACTION_CURRENTRX
OD_CYLINDER: -0.50
OS_OVR_VA: 20/
OD_OVR_VA: 20/
OD_ADD: +1.50
OS_CYLINDER: -1.25
OD_AXIS: 26
OS_OVR_VA: 20/
OS_SPHERE: -4.00
OD_OVR_VA: 20/
OS_ADD: +1.50
OS_AXIS: 176
OD_SPHERE: -4.75
OS_OVR_VA: 20/
OD_VPRISM_DIRECTION: PROGS
OS_VPRISM_DIRECTION: PROGS
OD_OVR_VA: 20/

## 2019-08-05 ASSESSMENT — SPHEQUIV_DERIVED
OS_SPHEQUIV: -5
OD_SPHEQUIV: -5
OD_SPHEQUIV: -5
OS_SPHEQUIV: -4.75

## 2019-08-05 ASSESSMENT — KERATOMETRY
OS_K2POWER_DIOPTERS: 43.00
OS_K1POWER_DIOPTERS: 42.00
OD_K1POWER_DIOPTERS: 41.75
OD_K2POWER_DIOPTERS: 42.25
OD_AXISANGLE_DEGREES: 097
OS_AXISANGLE_DEGREES: 088

## 2019-08-05 ASSESSMENT — REFRACTION_AUTOREFRACTION
OD_AXIS: 13
OS_CYLINDER: -1.50
OD_SPHERE: -4.50
OS_AXIS: 171
OD_CYLINDER: -1.00
OS_SPHERE: -4.00

## 2019-08-05 ASSESSMENT — AXIALLENGTH_DERIVED
OD_AL: 26.387
OS_AL: 26.1592
OD_AL: 26.387
OS_AL: 26.04

## 2019-08-05 ASSESSMENT — CONFRONTATIONAL VISUAL FIELD TEST (CVF)
OS_FINDINGS: FULL
OD_FINDINGS: FULL

## 2019-08-12 ENCOUNTER — OFFICE VISIT (OUTPATIENT)
Dept: SURGICAL ONCOLOGY | Facility: CLINIC | Age: 44
End: 2019-08-12

## 2019-08-12 VITALS
SYSTOLIC BLOOD PRESSURE: 112 MMHG | WEIGHT: 156 LBS | RESPIRATION RATE: 14 BRPM | DIASTOLIC BLOOD PRESSURE: 76 MMHG | HEIGHT: 62 IN | BODY MASS INDEX: 28.71 KG/M2 | TEMPERATURE: 97.6 F | HEART RATE: 58 BPM

## 2019-08-12 DIAGNOSIS — C50.212 MALIGNANT NEOPLASM OF UPPER-INNER QUADRANT OF LEFT BREAST IN FEMALE, ESTROGEN RECEPTOR POSITIVE (HCC): Primary | ICD-10-CM

## 2019-08-12 DIAGNOSIS — Z17.0 MALIGNANT NEOPLASM OF UPPER-INNER QUADRANT OF LEFT BREAST IN FEMALE, ESTROGEN RECEPTOR POSITIVE (HCC): Primary | ICD-10-CM

## 2019-08-12 DIAGNOSIS — Z98.890 STATUS POST LEFT BREAST LUMPECTOMY: ICD-10-CM

## 2019-08-12 PROCEDURE — 99024 POSTOP FOLLOW-UP VISIT: CPT | Performed by: SURGERY

## 2019-08-12 NOTE — PROGRESS NOTES
Surgical Oncology Breast Post-Op       [address]  [facility]  [address] [facility] [place] [address]    Jocelyn Medranoadam  1975  [id number]  [address] [facility] [facility]  [facility] [place]  [address] [address]    Chief Complaint:   Shai Joya is seen for a post-operative visit of her recent left breast surgery  Oncology History:        Malignant neoplasm of upper-inner quadrant of left breast in female, estrogen receptor positive ([facility] [address] )    6/7/2019 Biopsy     Left breast ultrasound-guided biopsy  11 o'clock, 4 cm from nipple  Invasive lobular carcinoma  Grade 1  ER 90  SD 45  HER2 1+      6/17/2019 Genetic Testing     BRCA negative  [facility]      7/30/2019 Surgery     Left breast lumpectomy with sentinel lymph node biopsy  Invasive lobular carcinoma  Grade 1  1 1 cm  0/3 Lymph nodes  Margins negative  Anatomic/Prognostic Stage IA         Assessment & Plan:   Assessment/Plan    The patient presents for of postoperative visit  She has no complaints referable to her surgery other than some soreness under the arm  Clinical examination shows no evidence of infection  She may have a  Very small seroma underneath the axillary incision,  There would be too small for me to recommend drainage  I reviewed her situation with Dr Sameera Henderson and we have agreed to move forward with hormonal therapy only this was conveyed to her  We will also coordinate appointment with Radiation Oncology as well  I will see her back in 3 months  History of Present Illness:    6 see above    Interval History:    see above    Review of Systems:   Review of Systems   Constitutional: Negative for activity change, appetite change and fatigue  HENT: Negative  Eyes: Negative  Respiratory: Negative for cough, shortness of breath and wheezing  Cardiovascular: Negative for chest pain and leg swelling  Gastrointestinal: Negative  Endocrine: Negative  Genitourinary: Negative  Musculoskeletal:        No new changes or complaints of bone pain   Skin: Negative  Allergic/Immunologic: Negative  Neurological: Negative  Hematological: Negative  Psychiatric/Behavioral: Negative  Past Medical History:     Patient Active Problem List   Diagnosis    Allergic rhinitis    Dense breasts    Migraine headache    Mild intermittent asthma    Motion sickness    Vitamin D deficiency    Malignant neoplasm of upper-inner quadrant of left breast in female, estrogen receptor positive ([facility] [address] )     Past Medical History:   Diagnosis Date    Adhesive capsulitis of right shoulder     Resolved 11/15/2016     Asthma     Benign paroxysmal vertigo, unspecified ear     Resolved 11/15/2016     BRCA gene mutation negative 06/2019    [facility]    Breast cancer ([facility] [address] )     Cellulitis of leg, except foot     Resolved 12/10/2014     Lymphadenitis     Resolved 12/10/2014     Snoring      Past Surgical History:   Procedure Laterality Date    BREAST LUMPECTOMY Left 7/30/2019    Procedure: BREAST LUMPECTOMY; BREAST NEEDLE LOCALIZATION (NEEDLE LOC AT 0800); Surgeon: Terese Flynn MD;  [facility];  Service: Surgical Oncology    INTRAUTERINE DEVICE INSERTION  2019    LYMPH NODE BIOPSY Left 7/30/2019    Procedure: SENTINEL LYMPH NODE BIOPSY; LYMPHATIC MAPPING WITH BLUE DYE AND RADIOACTIVE DYE (INJECT AT 0930 BY DR LEAVITT IN THE OR);   Surgeon: Terese Flynn MD;  [facility];  Service: Surgical Oncology    MAMMO NEEDLE LOCALIZATION LEFT (ALL INC) Left 7/30/2019    SHOULDER SURGERY Right     Last assessed 12/29/2015     US GUIDED BREAST BIOPSY LEFT COMPLETE Left 6/7/2019    WISDOM TOOTH EXTRACTION       Family History   Problem Relation Age of Onset    Alzheimer's disease Mother     COPD Father     No Known Problems Sister     No Known Problems Daughter     Alzheimer's disease Maternal Grandmother     No Known Problems Maternal Grandfather     No Known Problems Paternal Grandmother     No Known Problems Paternal Grandfather     No Known Problems Brother     No Known Problems Brother     No Known Problems Brother     No Known Problems Daughter      Social History     Socioeconomic History    Marital status: /Civil Union     Spouse name: Not on file    Number of children: Not on file    Years of education: Not on file    Highest education level: Not on file   Occupational History    Not on file   Social Needs    Financial resource strain: Not on file    Food insecurity:     Worry: Not on file     Inability: Not on file    Transportation needs:     Medical: Not on file     Non-medical: Not on file   Tobacco Use    Smoking status: Never Smoker    Smokeless tobacco: Never Used   Substance and Sexual Activity    Alcohol use: No    Drug use: No    Sexual activity: Yes     Partners: Male   Lifestyle    Physical activity:     Days per week: Not on file     Minutes per session: Not on file    Stress: Not on file   Relationships    Social connections:     Talks on phone: Not on file     Gets together: Not on file     Attends Islam service: Not on file     Active member of club or organization: Not on file     Attends meetings of clubs or organizations: Not on file     Relationship status: Not on file    Intimate partner violence:     Fear of current or ex partner: Not on file     Emotionally abused: Not on file     Physically abused: Not on file     Forced sexual activity: Not on file   Other Topics Concern    Not on file   Social History Narrative    Not on file       Current Outpatient Medications:     albuterol (PROAIR HFA) 90 mcg/act inhaler, Inhale as needed, Disp: , Rfl:     cetirizine (ZyrTEC) 10 MG chewable tablet, Chew 10 mg daily, Disp: , Rfl:     Cholecalciferol (VITAMIN D3) 1000 units CHEW, Chew 1 tablet daily, Disp: , Rfl:     fluticasone (FLONASE) 50 mcg/act nasal spray, 1 spray into each nostril daily, Disp: , Rfl:     intrauterine copper (PARAGARD) IUD, 1 each by Intrauterine route once, Disp: , Rfl:     multivitamin (THERAGRAN) TABS, Take 1 tablet by mouth daily, Disp: , Rfl:     rizatriptan (MAXALT-MLT) 10 MG disintegrating tablet, Take 10 mg by mouth once as needed for migraine May repeat in 2 hours if needed, Disp: , Rfl:     TURMERIC CURCUMIN PO, Take by mouth daily, Disp: , Rfl:   No Known Allergies    Physical Exams:     Vitals:    08/12/19 0946   BP: 112/76   Pulse: 58   Resp: 14   Temp: 97 6 °F (36 4 °C)     Physical Exam   Pulmonary/Chest:    Exam  Of both wounds showed no evidence of infection  There may be a small seroma underneath the anterior aspect of the incision though this would be very small and I would not   Recommend aspiration  Results:       Labs:       Discussion/Summary:    see above, I have recommended radiation therapy as well as a consult with Medical Oncology  All questions were answered the patient's satisfaction  I provided her a copy of her pathology report  I will see her back in 3 months  Following that she will follow up with Gwendolyn Hutchinson our advanced practitioner

## 2019-08-13 ENCOUNTER — TELEPHONE (OUTPATIENT)
Dept: SURGICAL ONCOLOGY | Facility: CLINIC | Age: 44
End: 2019-08-13

## 2019-08-13 NOTE — TELEPHONE ENCOUNTER
New Patient Encounter      New Patient Intake Form   Patient Details:  Maynor Madera  1975  [id number]    Background Information:  [address] starts by opening a telephone encounter and gathering the following information   Who is calling to schedule? If not self, relationship to patient? self   Referring Provider Stephen Santa   What is the diagnosis? Breast ca   When was the diagnosis? 7/2019   Is patient aware of diagnosis? Yes   Reason for visit? NP DX   Have you had any testing done? If so: when, where? Yes   Are records in [facility]? Yes   Was the patient told to bring a disk? no   Scheduling Information:   Preferred [place]: [place]   Requesting Specific Provider? yes   Are there any dates/time the patient cannot be seen? no   Counseling Pre-Screen:  If the patient answers YES to any of the below questions, please route to the appropriate location specific counselor    Have you felt anxious or worried about cancer and the treatment you are receiving? No   Has your diagnosis caused physical, emotional, or financial hardship for you? No   Do you anticipate any transportation issues to get to your appointment? No   Miscellaneous: Pt is scheduled with Dr Deb Silver   After completing the above information, please route to Financial Counselor and the appropriate Nurse Navigator for review

## 2019-08-14 ENCOUNTER — APPOINTMENT (OUTPATIENT)
Dept: LAB | Facility: CLINIC | Age: 44
End: 2019-08-14
Payer: COMMERCIAL

## 2019-08-14 ENCOUNTER — RADIATION ONCOLOGY CONSULT (OUTPATIENT)
Dept: RADIATION ONCOLOGY | Facility: HOSPITAL | Age: 44
End: 2019-08-14
Attending: RADIOLOGY
Payer: COMMERCIAL

## 2019-08-14 ENCOUNTER — TRANSCRIBE ORDERS (OUTPATIENT)
Dept: LAB | Facility: CLINIC | Age: 44
End: 2019-08-14

## 2019-08-14 VITALS
SYSTOLIC BLOOD PRESSURE: 104 MMHG | RESPIRATION RATE: 16 BRPM | TEMPERATURE: 97.8 F | DIASTOLIC BLOOD PRESSURE: 70 MMHG | BODY MASS INDEX: 28.93 KG/M2 | HEART RATE: 82 BPM | HEIGHT: 62 IN | WEIGHT: 157.2 LBS

## 2019-08-14 DIAGNOSIS — Z17.0 MALIGNANT NEOPLASM OF UPPER-INNER QUADRANT OF LEFT BREAST IN FEMALE, ESTROGEN RECEPTOR POSITIVE (HCC): Primary | ICD-10-CM

## 2019-08-14 DIAGNOSIS — Z17.0 MALIGNANT NEOPLASM OF UPPER-INNER QUADRANT OF LEFT BREAST IN FEMALE, ESTROGEN RECEPTOR POSITIVE (HCC): ICD-10-CM

## 2019-08-14 DIAGNOSIS — C50.212 MALIGNANT NEOPLASM OF UPPER-INNER QUADRANT OF LEFT BREAST IN FEMALE, ESTROGEN RECEPTOR POSITIVE (HCC): Primary | ICD-10-CM

## 2019-08-14 DIAGNOSIS — C50.212 MALIGNANT NEOPLASM OF UPPER-INNER QUADRANT OF LEFT BREAST IN FEMALE, ESTROGEN RECEPTOR POSITIVE (HCC): ICD-10-CM

## 2019-08-14 LAB — HCG SERPL QL: NEGATIVE

## 2019-08-14 PROCEDURE — 99211 OFF/OP EST MAY X REQ PHY/QHP: CPT | Performed by: RADIOLOGY

## 2019-08-14 PROCEDURE — 84703 CHORIONIC GONADOTROPIN ASSAY: CPT

## 2019-08-14 PROCEDURE — 36415 COLL VENOUS BLD VENIPUNCTURE: CPT

## 2019-08-14 PROCEDURE — G0463 HOSPITAL OUTPT CLINIC VISIT: HCPCS | Performed by: RADIOLOGY

## 2019-08-14 NOTE — PROGRESS NOTES
Consultation - Radiation Oncology     RWW:[id number] : 1975  Encounter: [id number]  Patient Information: Akiraj Michel  Chief Complaint   Patient presents with    Consult     Breast Cancer     Cancer Staging  Malignant neoplasm of upper-inner quadrant of left breast in female, estrogen receptor positive ([facility] [address] )  Staging form: Breast, AJCC 8th Edition  - Clinical: Stage IA (cT1c, cN0, cM0, G1, ER+, VA+, HER2-) - Signed by Luis Manuel Steiner MD on 2019  Laterality: Left  Histologic grading system: 3 grade system           History of Present Illness   Marcin Joaquin is a 53y o  year old female who presents with recently diagnosed invasive lobular carcinoma of the left breast status post lumpectomy and sentinel node biopsy with negative margins  She presents today to discuss adjuvant radiation therapy  Workup to date as described below:    19 screening mammogram which demonstrated no abnormalities however, 19 US done on left breast because of dense breasts demonstrated a 0 7 cm mass  19 Left breast biopsy was positive for invasive lobular carcinoma at the 11 o'clock position 4 cm from the nipple  The tumor was grade 1, ER 90%, VA 45%, HER2 Dai 1+/negative  Axillary ultrasound showed no evidence of any axillary adenopathy  19 Consult wit Dr Bird Dolan:  Patient presents with a new diagnosis of left breast invasive lobular carcinoma  Recommended breast conservation therapy unless she has a genetic predisposition to breast cancer  BRCA negative by [facility]  19 Underwent Left breast lumpectomy with sentinel lymph node biopsy  Pathology was positive for nvasive lobular carcinoma; Grade 1; 1 1 cm; 0/3 Lymph nodes; Margins negative  19 Post-op visit with Dr Bird Dolan:  No complaints referable to her surgery other than some soreness under the arm  Clinical examination shows no evidence of infection    She may have a very small seroma underneath the axillary incision  Do not recommend drainage at this time  Referred to Radiation Oncology as well as a consult with Medical Oncology  8/16/19 Consult with Dr Elena Cazares  Historical Information   Oncology History              Malignant neoplasm of upper-inner quadrant of left breast in female, estrogen receptor positive ([facility] [address] )    6/7/2019 Biopsy     Left breast ultrasound-guided biopsy  11 o'clock, 4 cm from nipple  Invasive lobular carcinoma  Grade 1  ER 90  WI 45  HER2 1+      6/17/2019 Genetic Testing     BRCA negative  [facility]      7/30/2019 Surgery     Left breast lumpectomy with sentinel lymph node biopsy  Invasive lobular carcinoma  Grade 1  1 1 cm  0/3 Lymph nodes  Margins negative  Anatomic/Prognostic Stage IA      8/14/2019 -  Cancer Staged     Staging form: Breast, AJCC 8th Edition  - Clinical: Stage IA (cT1c, cN0, cM0, G1, ER+, WI+, HER2-) - Signed by Leo Jones MD on 8/14/2019  Laterality: Left  Histologic grading system: 3 grade system             Past Medical History:   Diagnosis Date    Adhesive capsulitis of right shoulder     Resolved 11/15/2016     Asthma     Benign paroxysmal vertigo, unspecified ear     Resolved 11/15/2016     BRCA gene mutation negative 06/2019    [facility]    Breast cancer ([facility] [address] )     Cellulitis of leg, except foot     Resolved 12/10/2014     Lymphadenitis     Resolved 12/10/2014     Snoring      Past Surgical History:   Procedure Laterality Date    BREAST LUMPECTOMY Left 7/30/2019    Procedure: BREAST LUMPECTOMY; BREAST NEEDLE LOCALIZATION (NEEDLE LOC AT 0800); Surgeon: Yared Ghosh MD;  [facility];  Service: Surgical Oncology    INTRAUTERINE DEVICE INSERTION  2019    LYMPH NODE BIOPSY Left 7/30/2019    Procedure: SENTINEL LYMPH NODE BIOPSY; LYMPHATIC MAPPING WITH BLUE DYE AND RADIOACTIVE DYE (INJECT AT 0930 BY DR LEAVITT IN THE OR);   Surgeon: Yared Ghosh MD;  [facility];  Service: Surgical Oncology    MAMMO NEEDLE LOCALIZATION LEFT (ALL INC) Left 2019    SHOULDER SURGERY Right     Last assessed 2015     US GUIDED BREAST BIOPSY LEFT COMPLETE Left 2019    WISDOM TOOTH EXTRACTION         Family History   Problem Relation Age of Onset    Alzheimer's disease Mother     COPD Father     No Known Problems Sister     No Known Problems Daughter     Alzheimer's disease Maternal Grandmother     No Known Problems Maternal Grandfather     No Known Problems Paternal Grandmother     No Known Problems Paternal Grandfather     No Known Problems Brother     No Known Problems Brother     No Known Problems Brother     No Known Problems Daughter        Social History   Social History     Substance and Sexual Activity   Alcohol Use No     Social History     Substance and Sexual Activity   Drug Use No     Social History     Tobacco Use   Smoking Status Never Smoker   Smokeless Tobacco Never Used         Meds/Allergies     Current Outpatient Medications:     albuterol (PROAIR HFA) 90 mcg/act inhaler, Inhale as needed, Disp: , Rfl:     cetirizine (ZyrTEC) 10 MG chewable tablet, Chew 10 mg daily as needed , Disp: , Rfl:     Cholecalciferol (VITAMIN D3) 1000 units CHEW, Chew 1 tablet daily, Disp: , Rfl:     fluticasone (FLONASE) 50 mcg/act nasal spray, 1 spray into each nostril daily, Disp: , Rfl:     intrauterine copper (PARAGARD) IUD, 1 each by Intrauterine route once, Disp: , Rfl:     multivitamin (THERAGRAN) TABS, Take 1 tablet by mouth daily, Disp: , Rfl:     rizatriptan (MAXALT-MLT) 10 MG disintegrating tablet, Take 10 mg by mouth once as needed for migraine May repeat in 2 hours if needed, Disp: , Rfl:     TURMERIC CURCUMIN PO, Take by mouth daily, Disp: , Rfl:   No Known Allergies    OB/GYN History:  The patient underwent menarche at 13 years  Menopause Status Josefina   3   Para 3   Age at first delivery being 30 years  Nursing: yes  Birth control pills: yes    Years used 30 years      Review of Systems   Review of Systems Constitutional: Negative  HENT: Negative  Eyes: Negative  Respiratory: Negative  Cardiovascular: Negative  Gastrointestinal: Negative  Endocrine: Negative  Genitourinary: Negative  Musculoskeletal: Negative  Skin: Negative  Allergic/Immunologic: Positive for environmental allergies (Seasonal)  Neurological: Positive for headaches (History of migraines)  Hematological: Negative  Psychiatric/Behavioral: Negative  OBJECTIVE:   /70 (BP Location: Right arm, Patient Position: Sitting, Cuff Size: Standard)   Pulse 82   Temp 97 8 °F (36 6 °C) (Temporal)   Resp 16   Ht 5' 2" (1 575 m)   Wt 71 3 kg (157 lb 3 2 oz)   LMP 08/08/2019 (Exact Date)   BMI 28 75 kg/m²   Pain Assessment:  0  Performance Status: Karnofsky: 90 - Able to carry on normal activity; minor signs or symptoms of disease     Physical Exam   The patient presents today no apparent distress  Sclera anicteric  No cervical or supraclavicular lymphadenopathy  Lungs clear to auscultation bilaterally  Normal S1-S2 regular rate and rhythm  The right breast within normal limits  Left breast is soft, with a well-healed lumpectomy incision in the upper inner quadrant  No axillary lymphadenopathy  No lymphedema  Normal range of motion of the shoulders        RESULTS  Lab Results    Chemistry        Component Value Date/Time     10/26/2017 1013    K 4 4 06/10/2019 0817     06/10/2019 0817    CO2 24 06/10/2019 0817    BUN 8 06/10/2019 0817    CREATININE 0 63 06/10/2019 0817    CREATININE 0 54 (L) 10/26/2017 1013        Component Value Date/Time    CALCIUM 9 6 10/26/2017 1013    ALKPHOS 37 (L) 10/26/2017 1013    AST 15 06/10/2019 0817    ALT 11 06/10/2019 0817    BILITOT 0 4 10/26/2017 1013            Lab Results   Component Value Date    WBC 7 72 07/02/2019    HGB 13 1 07/02/2019    HCT 41 2 07/02/2019    MCV 95 07/02/2019     07/02/2019         Imaging Studies  Mammo Needle Localization Left (all Inc)    Result Date: 7/30/2019  Narrative: EXAM:  MAMMO NEEDLE LOCALIZATION LEFT (ALL INC) ACCESSION:  [id number] EXAM DATE AND TIME:  7/30/2019 7:38 AM INDICATION:  Preoperative wire localization  Invasive lobular carcinoma  FINDINGS: The patient's prior imaging was reviewed demonstrating the location of the biopsy marker clip in the upper slightly inner left breast  The patient was identified by name and date of birth  The left breast is the proposed site of procedure  Informed written consent obtained prior to the procedure  The patient was imaged in the craniocaudal projection using alphanumeric grid  The location of the biopsy clip was identified on the coordinates  The patient's skin was cleaned with Betadine  Local anesthesia was achieved with 1% lidocaine buffered with bicarbonate  No adverse consequences  A 7 cm needle was then advanced into the breast from a superior approach directed towards the site of the biopsy marker clip  The needle position was felt to be satisfactory  The patient was then imaged in the orthogonal projection to verify adequate depth of the needle  Positioning was satisfactory  The hookwire device was deployed through the needle and the needle was withdrawn  A final image was obtained demonstrating the location of the clip with respect to the hookwire device  This image was annotated for Dr Sang Nickerson use  Patient tolerated procedure well  IMPRESSION Successful needle localization of the left breast  ASSESSMENT: ACR BI-RADS Category 6 - Known biopsy proven malignancy    Left RECOMMENDATION: 1:  Treatment plan 2: COMMENTS: Workstation performed: [id number]     Nm Sentinal Node Inj    Result Date: 7/30/2019  Narrative: SENTINEL NODE INJECTION INDICATION:  C50 212: Malignant neoplasm of upper-inner quadrant of left female breast Z17 0: Estrogen receptor positive status (ER+)  FINDINGS: 0 53 mCi Tc-99m sulfur colloid (0 6 cc volume) was administered intradermally into the left breast by CHRISTIANE LEAVITT in the OR  No imaging was performed  Impression: Injection of  0 53 mCi Tc-99m sulfur colloid into the left breast in the OR  Workstation performed: [id number]     Mammo Breast Specimen Left (no Charge)    Result Date: 7/30/2019  Narrative: EXAM:  MAMMO BREAST SPECIMEN LEFT (NO CHARGE) ACCESSION:  [id number] INDICATION:  Lumpectomy left breast FINDINGS:  2 radiographic images of the lumpectomy specimen demonstrate biopsy marker clip and wire localizer device centrally located in the specimen  IMPRESSION Successful retrieval of target biopsy clip RECOMMENDATION: 1  Treatment plan COMMENTS: Workstation performed: [id number]         ASSESSMENT  1  Malignant neoplasm of upper-inner quadrant of left breast in female, estrogen receptor positive ([address] )  Pregnancy Test (HCG Qualitative)     Cancer Staging  Malignant neoplasm of upper-inner quadrant of left breast in female, estrogen receptor positive ([facility] [address] )  Staging form: Breast, AJCC 8th Edition  - Clinical: Stage IA (cT1c, cN0, cM0, G1, ER+, CO+, HER2-) - Signed by Sujata Altamirano MD on 8/14/2019  Laterality: Left  Histologic grading system: 3 grade system        PLAN/DISCUSSION  Orders Placed This Encounter   Procedures    Pregnancy Test (HCG Qualitative)          Amaris Edwards is a 53y o  year old female with recently diagnosed invasive lobular carcinoma of the left breast status post lumpectomy and sentinel node biopsy with negative margins, Stage IA (cT1c, cN0, cM0, G1, ER+, CO+, HER2-)  The patient will be meeting with Medical Oncology later this week and will be on hormonal therapy  She will not require chemotherapy  We have recommended a course of hypo fractionated radiation therapy which should significantly reduce her risk of local regional recurrence    The associated risks and toxicities of treatment were discussed with the patient in detail, including, but not limited to, fatigue, erythema, hyperpigmentation, desquamation, subcutaneous fibrosis, rib fracture, pneumonitis, cardiac toxicity, lymphedema, and secondary malignancy  She will return later this week for CT planning  She will also have a pregnancy test prior to the initiation of treatment  Sheila Hobbs MD  3/86/4335,2:69 AM      Portions of the record may have been created with voice recognition software   Occasional wrong word or "sound a like" substitutions may have occurred due to the inherent limitations of voice recognition software   Read the chart carefully and recognize, using context, where substitutions have occurred

## 2019-08-14 NOTE — PROGRESS NOTES
Jocelyn Smith 1975 is a 53 y o  female  19 screening mammogram which demonstrated no abnormalities however, 19 US done on left breast because of dense breasts demonstrated a 0 7 cm mass  19 Left breast biopsy was positive for invasive lobular carcinoma at the 11 o'clock position 4 cm from the nipple  The tumor was grade 1, ER 90%, CT 45%, HER2 Dai 1+/negative  Axillary ultrasound showed no evidence of any axillary adenopathy  19 Consult wit Dr Aisha Seymour:  Patient presents with a new diagnosis of left breast invasive lobular carcinoma  Recommended breast conservation therapy unless she has a genetic predisposition to breast cancer  BRCA negative by [facility]  19 Underwent Left breast lumpectomy with sentinel lymph node biopsy  Pathology was positive for nvasive lobular carcinoma; Grade 1; 1 1 cm; 0/3 Lymph nodes; Margins negative  19 Post-op visit with Dr Aisha Seymour:  No complaints referable to her surgery other than some soreness under the arm  Clinical examination shows no evidence of infection  She may have a very small seroma underneath the axillary incision  Do not recommend drainage at this time  Referred to Radiation Oncology as well as a consult with Medical Oncology  19 Consult with Dr Chinmay Jackson    Oncology History              Malignant neoplasm of upper-inner quadrant of left breast in female, estrogen receptor positive ([facility] [address] )    2019 Biopsy     Left breast ultrasound-guided biopsy  11 o'clock, 4 cm from nipple  Invasive lobular carcinoma  Grade 1  ER 90  CT 45  HER2 1+      2019 Genetic Testing     BRCA negative  [facility]      2019 Surgery     Left breast lumpectomy with sentinel lymph node biopsy  Invasive lobular carcinoma  Grade 1  1 1 cm  0/3 Lymph nodes  Margins negative  Anatomic/Prognostic Stage IA     Clinical Trial: no    OB/GYN History:  The patient underwent menarche at 13 years  Menopause Status Josefina   3   Para 3 Age at first delivery being 30 years  Nursing: yes  Birth control pills: yes  Years used 30 years    Health Maintenance   Topic Date Due    PAP SMEAR  02/23/1994    Pneumococcal Vaccine: Pediatrics (0 to 5 Years) and At-Risk Patients (6 to 59 Years) (2 of 3 - PCV13) 09/17/2011    INFLUENZA VACCINE  07/01/2019    MAMMOGRAM  11/23/2019    Depression Screening PHQ  08/02/2020    BMI: Followup Plan  08/02/2020    BMI: Adult  08/12/2020    DTaP,Tdap,and Td Vaccines (2 - Td) 09/17/2020    Pneumococcal Vaccine: 65+ Years (1 of 2 - PCV13) 02/23/2038    HEPATITIS B VACCINES  Aged Out     Past Medical History:   Diagnosis Date    Adhesive capsulitis of right shoulder     Resolved 11/15/2016     Asthma     Benign paroxysmal vertigo, unspecified ear     Resolved 11/15/2016     BRCA gene mutation negative 06/2019    [facility]    Breast cancer ([facility] [address] )     Cellulitis of leg, except foot     Resolved 12/10/2014     Lymphadenitis     Resolved 12/10/2014     Snoring      Past Surgical History:   Procedure Laterality Date    BREAST LUMPECTOMY Left 7/30/2019    Procedure: BREAST LUMPECTOMY; BREAST NEEDLE LOCALIZATION (NEEDLE LOC AT 0800); Surgeon: Tere Wilde MD;  [facility];  Service: Surgical Oncology    INTRAUTERINE DEVICE INSERTION  2019    LYMPH NODE BIOPSY Left 7/30/2019    Procedure: SENTINEL LYMPH NODE BIOPSY; LYMPHATIC MAPPING WITH BLUE DYE AND RADIOACTIVE DYE (INJECT AT 0930 BY DR LEAVITT IN THE OR);   Surgeon: Tere Widle MD;  [facility];  Service: Surgical Oncology    MAMMO NEEDLE LOCALIZATION LEFT (ALL INC) Left 7/30/2019    SHOULDER SURGERY Right     Last assessed 12/29/2015     US GUIDED BREAST BIOPSY LEFT COMPLETE Left 6/7/2019    WISDOM TOOTH EXTRACTION       Family History   Problem Relation Age of Onset    Alzheimer's disease Mother     COPD Father     No Known Problems Sister     No Known Problems Daughter     Alzheimer's disease Maternal Grandmother     No Known Problems Maternal Grandfather     No Known Problems Paternal Grandmother     No Known Problems Paternal Grandfather     No Known Problems Brother     No Known Problems Brother     No Known Problems Brother     No Known Problems Daughter      Social History     Tobacco Use    Smoking status: Never Smoker    Smokeless tobacco: Never Used   Substance Use Topics    Alcohol use: No    Drug use: No      Current Outpatient Medications:     albuterol (PROAIR HFA) 90 mcg/act inhaler, Inhale as needed, Disp: , Rfl:     cetirizine (ZyrTEC) 10 MG chewable tablet, Chew 10 mg daily as needed , Disp: , Rfl:     Cholecalciferol (VITAMIN D3) 1000 units CHEW, Chew 1 tablet daily, Disp: , Rfl:     fluticasone (FLONASE) 50 mcg/act nasal spray, 1 spray into each nostril daily, Disp: , Rfl:     intrauterine copper (PARAGARD) IUD, 1 each by Intrauterine route once, Disp: , Rfl:     multivitamin (THERAGRAN) TABS, Take 1 tablet by mouth daily, Disp: , Rfl:     rizatriptan (MAXALT-MLT) 10 MG disintegrating tablet, Take 10 mg by mouth once as needed for migraine May repeat in 2 hours if needed, Disp: , Rfl:     TURMERIC CURCUMIN PO, Take by mouth daily, Disp: , Rfl:     No Known Allergies     Review of Systems:  Review of Systems   Constitutional: Negative  HENT: Negative  Eyes: Negative  Respiratory: Negative  Cardiovascular: Negative  Gastrointestinal: Negative  Endocrine: Negative  Genitourinary: Negative  Musculoskeletal: Negative  Skin: Negative  Allergic/Immunologic: Positive for environmental allergies (Seasonal)  Neurological: Positive for headaches (History of migraines)  Hematological: Negative  Psychiatric/Behavioral: Negative          Vitals:    08/14/19 0815   BP: 104/70   BP Location: Right arm   Patient Position: Sitting   Cuff Size: Standard   Pulse: 82   Resp: 16   Temp: 97 8 °F (36 6 °C)   TempSrc: Temporal   Weight: 71 3 kg (157 lb 3 2 oz)   Height: 5' 2" (1 575 m) Pain Score:   3    Imaging:No images are attached to the encounter       Teaching NCI packet; RT to breast

## 2019-08-16 ENCOUNTER — CONSULT (OUTPATIENT)
Dept: HEMATOLOGY ONCOLOGY | Facility: CLINIC | Age: 44
End: 2019-08-16
Payer: COMMERCIAL

## 2019-08-16 VITALS
SYSTOLIC BLOOD PRESSURE: 116 MMHG | TEMPERATURE: 98.1 F | DIASTOLIC BLOOD PRESSURE: 80 MMHG | OXYGEN SATURATION: 98 % | WEIGHT: 156 LBS | HEART RATE: 79 BPM | HEIGHT: 62 IN | RESPIRATION RATE: 18 BRPM | BODY MASS INDEX: 28.71 KG/M2

## 2019-08-16 DIAGNOSIS — Z17.0 MALIGNANT NEOPLASM OF UPPER-INNER QUADRANT OF LEFT BREAST IN FEMALE, ESTROGEN RECEPTOR POSITIVE (HCC): Primary | ICD-10-CM

## 2019-08-16 DIAGNOSIS — G43.809 OTHER MIGRAINE WITHOUT STATUS MIGRAINOSUS, NOT INTRACTABLE: Primary | ICD-10-CM

## 2019-08-16 DIAGNOSIS — C50.212 MALIGNANT NEOPLASM OF UPPER-INNER QUADRANT OF LEFT BREAST IN FEMALE, ESTROGEN RECEPTOR POSITIVE (HCC): Primary | ICD-10-CM

## 2019-08-16 PROCEDURE — 99245 OFF/OP CONSLTJ NEW/EST HI 55: CPT | Performed by: INTERNAL MEDICINE

## 2019-08-16 RX ORDER — TAMOXIFEN CITRATE 20 MG/1
20 TABLET ORAL 2 TIMES DAILY
Qty: 90 TABLET | Refills: 1 | Status: SHIPPED | OUTPATIENT
Start: 2019-08-16 | End: 2020-01-20

## 2019-08-16 NOTE — PROGRESS NOTES
Hematology / Oncology Outpatient Consult Note    Amaris Edwards 53 y o  female [id number]         Date:  8/16/2019    Assessment / Plan:  A 47-year-old premenopausal woman with newly diagnosed stage IA left breast cancer, grade 1, invasive lobular histology, ER 90 % positive, MD 40 % positive, HER2 negative disease  She underwent lumpectomy and sentinel lymph node biopsy, resulting in DIEGO  She is negative for BRCA gene mutation  She presents today to discuss adjuvant treatment options  We had extensive discussion regarding the diagnosis, histology type, tumor phenotype, staging information, good prognosis and treatment options  Based on the stage, low-grade disease, adjuvant chemotherapy is not indicated  I recommended her to have adjuvant hormonal therapy with tamoxifen 20mg daily  Side effects of tamoxifen was thoroughly discussed, including but not limited to hot flashes, irregularity of menstrual cycle, small risk of DVT, stroke and endometrial cancers  She understood and wished to proceed  I will see her again in 6 months for routine follow-up  She is in agreement with my recommendation  Subjective:     HPI:  A 47-year-old premenopausal woman was found to have abnormality in her left breast based on a screening mammography  She underwent left breast biopsy in June 7, 2019 which showed invasive lobular carcinoma, grade 1  This was ER 90 % positive, MD 40 % positive, HER2 negative disease  She underwent genetic testing which was negative for BRCA gene mutation  She underwent lumpectomy and sentinel lymph node biopsy by Dr Jody Flannery in July 30, 2019  She had 11 millimeter of invasive lobular carcinoma, grade 1  Lymphovascular invasion was not present  3 sentinel lymph nodes were all negative for metastatic disease  She presents today to discuss adjuvant treatment options  She has no breast related symptomatology except minor soreness in the left axilla    She denied any pain   She has no respiratory symptoms  She has stable weight  She denied fever, chills or night sweats  She has no family history of breast cancer  She is a lifetime never smoker  Her performance status is normal           Interval History:          Objective:     Primary Diagnosis:    1  Left breast cancer, stage IA (pT1c, pN0, M0) grade 1, invasive lobular histology, ER 90 %, NE 40 %, HER2 negative disease  Diagnosed in July 2019    2  BRCA gene mutation negative  Cancer Staging:  Cancer Staging  Malignant neoplasm of upper-inner quadrant of left breast in female, estrogen receptor positive ([facility] [address] )  Staging form: Breast, AJCC 8th Edition  - Clinical: Stage IA (cT1c, cN0, cM0, G1, ER+, NE+, HER2-) - Signed by Tramaine Zavala MD on 8/14/2019  Laterality: Left  Histologic grading system: 3 grade system        Previous Hematologic/ Oncologic Treatment:         Current Hematologic/ Oncologic Treatment:      Adjuvant hormonal therapy with tamoxifen to be started in August 2019  Disease Status:     DIEGO status post lumpectomy and sentinel lymph node biopsy  Test Results:    Pathology:    1 1cm of invasive lobular carcinoma, grade 1, lymphovascular invasion was absent  3 sentinel lymph nodes were negative for metastatic disease  ER 90 % positive, NE 40 %, HER2 negative disease  Stage IA (pT1c, pN0, M0)    Radiology:    Chest x-ray was negative for pulmonary disease  Laboratory:    See below  Physical Exam:      General Appearance:    Alert, oriented        Eyes:    PERRL   Ears:    Normal external ear canals, both ears   Nose:   Nares normal, septum midline   Throat:   Mucosa moist  Pharynx without injection      Neck:   Supple       Lungs:     Clear to auscultation bilaterally   Chest Wall:    No tenderness or deformity    Heart:    Regular rate and rhythm       Abdomen:     Soft, non-tender, bowel sounds +, no organomegaly           Extremities:   Extremities no cyanosis or edema       Skin:   no rash or icterus  Lymph nodes:   Cervical, supraclavicular, and axillary nodes normal   Neurologic:   CNII-XII intact, normal strength, sensation and reflexes     Throughout          Breast exam:   Lumpectomy scar at 12:00 position in her left breast with no palpable abnormality  Right breast exam is negative  ROS: Review of Systems   All other systems reviewed and are negative  Imaging: Mammo Needle Localization Left (all Inc)    Result Date: 7/30/2019  Narrative: EXAM:  MAMMO NEEDLE LOCALIZATION LEFT (ALL INC) ACCESSION:  [id number] EXAM DATE AND TIME:  7/30/2019 7:38 AM INDICATION:  Preoperative wire localization  Invasive lobular carcinoma  FINDINGS: The patient's prior imaging was reviewed demonstrating the location of the biopsy marker clip in the upper slightly inner left breast  The patient was identified by name and date of birth  The left breast is the proposed site of procedure  Informed written consent obtained prior to the procedure  The patient was imaged in the craniocaudal projection using alphanumeric grid  The location of the biopsy clip was identified on the coordinates  The patient's skin was cleaned with Betadine  Local anesthesia was achieved with 1% lidocaine buffered with bicarbonate  No adverse consequences  A 7 cm needle was then advanced into the breast from a superior approach directed towards the site of the biopsy marker clip  The needle position was felt to be satisfactory  The patient was then imaged in the orthogonal projection to verify adequate depth of the needle  Positioning was satisfactory  The hookwire device was deployed through the needle and the needle was withdrawn  A final image was obtained demonstrating the location of the clip with respect to the hookwire device  This image was annotated for Dr Donta Garnica use  Patient tolerated procedure well   IMPRESSION Successful needle localization of the left breast  ASSESSMENT: ACR BI-RADS Category 6 - Known biopsy proven malignancy  Left RECOMMENDATION: 1:  Treatment plan 2: COMMENTS: Workstation performed: [id number]     Nm Sentinal Node Inj    Result Date: 7/30/2019  Narrative: SENTINEL NODE INJECTION INDICATION:  C50 212: Malignant neoplasm of upper-inner quadrant of left female breast Z17 0: Estrogen receptor positive status (ER+)  FINDINGS: 0 53 mCi Tc-99m sulfur colloid (0 6 cc volume) was administered intradermally into the left breast by CHRISTIANE LEAVITT in the OR  No imaging was performed  Impression: Injection of  0 53 mCi Tc-99m sulfur colloid into the left breast in the OR  Workstation performed: [id number]     Mammo Breast Specimen Left (no Charge)    Result Date: 7/30/2019  Narrative: EXAM:  MAMMO BREAST SPECIMEN LEFT (NO CHARGE) ACCESSION:  [id number] INDICATION:  Lumpectomy left breast FINDINGS:  2 radiographic images of the lumpectomy specimen demonstrate biopsy marker clip and wire localizer device centrally located in the specimen  IMPRESSION Successful retrieval of target biopsy clip RECOMMENDATION: 1  Treatment plan COMMENTS: Workstation performed: [id number]         Labs:   Lab Results   Component Value Date    WBC 7 72 07/02/2019    HGB 13 1 07/02/2019    HCT 41 2 07/02/2019    MCV 95 07/02/2019     07/02/2019     Lab Results   Component Value Date     10/26/2017    K 4 4 06/10/2019     06/10/2019    CO2 24 06/10/2019    BUN 8 06/10/2019    CREATININE 0 63 06/10/2019    GLUCOSE 93 10/26/2017    CALCIUM 9 6 10/26/2017    AST 15 06/10/2019    ALT 11 06/10/2019    ALKPHOS 37 (L) 10/26/2017    PROT 7 1 10/26/2017    BILITOT 0 4 10/26/2017         Vital Sign:    Body surface area is 1 72 meters squared      Wt Readings from Last 3 Encounters:   08/16/19 70 8 kg (156 lb)   08/14/19 71 3 kg (157 lb 3 2 oz)   08/12/19 70 8 kg (156 lb)        Temp Readings from Last 3 Encounters:   08/16/19 98 1 °F (36 7 °C) (Tympanic Core)   08/14/19 97 8 °F (36 6 °C) (Temporal)   08/12/19 97 6 °F (36 4 °C)        BP Readings from Last 3 Encounters:   08/16/19 116/80   08/14/19 104/70   08/12/19 112/76         Pulse Readings from Last 3 Encounters:   08/16/19 79   08/14/19 82   08/12/19 58     @LASTSAO2(3)@    Active Problems:   Patient Active Problem List   Diagnosis    Allergic rhinitis    Dense breasts    Migraine headache    Mild intermittent asthma    Motion sickness    Vitamin D deficiency    Malignant neoplasm of upper-inner quadrant of left breast in female, estrogen receptor positive ([facility] [address] )       Past Medical History:   Past Medical History:   Diagnosis Date    Adhesive capsulitis of right shoulder     Resolved 11/15/2016     Asthma     Benign paroxysmal vertigo, unspecified ear     Resolved 11/15/2016     BRCA gene mutation negative 06/2019    [facility]    Breast cancer ([facility] [address] )     Cellulitis of leg, except foot     Resolved 12/10/2014     Lymphadenitis     Resolved 12/10/2014     Snoring        Surgical History:   Past Surgical History:   Procedure Laterality Date    BREAST LUMPECTOMY Left 7/30/2019    Procedure: BREAST LUMPECTOMY; BREAST NEEDLE LOCALIZATION (NEEDLE LOC AT 0800); Surgeon: Chris Fragoso MD;  [facility];  Service: Surgical Oncology    INTRAUTERINE DEVICE INSERTION  2019    LYMPH NODE BIOPSY Left 7/30/2019    Procedure: SENTINEL LYMPH NODE BIOPSY; LYMPHATIC MAPPING WITH BLUE DYE AND RADIOACTIVE DYE (INJECT AT 0930 BY DR LEAVITT IN THE OR);   Surgeon: Chris Fragoso MD;  [facility];  Service: Surgical Oncology    MAMMO NEEDLE LOCALIZATION LEFT (ALL INC) Left 7/30/2019    SHOULDER SURGERY Right     Last assessed 12/29/2015     US GUIDED BREAST BIOPSY LEFT COMPLETE Left 6/7/2019    WISDOM TOOTH EXTRACTION         Family History:    Family History   Problem Relation Age of Onset    Alzheimer's disease Mother     COPD Father     No Known Problems Sister     No Known Problems Daughter     Alzheimer's disease Maternal Grandmother     No Known Problems Maternal Grandfather     No Known Problems Paternal Grandmother     No Known Problems Paternal Grandfather     No Known Problems Brother     No Known Problems Brother     No Known Problems Brother     No Known Problems Daughter        Cancer-related family history is not on file      Social History:   Social History     Socioeconomic History    Marital status: /Civil Union     Spouse name: Not on file    Number of children: 3    Years of education: Not on file    Highest education level: Not on file   Occupational History    Not on file   Social Needs    Financial resource strain: Not on file    Food insecurity:     Worry: Not on file     Inability: Not on file    Transportation needs:     Medical: Not on file     Non-medical: Not on file   Tobacco Use    Smoking status: Never Smoker    Smokeless tobacco: Never Used   Substance and Sexual Activity    Alcohol use: No    Drug use: No    Sexual activity: Yes     Partners: Male   Lifestyle    Physical activity:     Days per week: Not on file     Minutes per session: Not on file    Stress: Not on file   Relationships    Social connections:     Talks on phone: Not on file     Gets together: Not on file     Attends [employer] witness service: Not on file     Active member of club or organization: Not on file     Attends meetings of clubs or organizations: Not on file     Relationship status: Not on file    Intimate partner violence:     Fear of current or ex partner: Not on file     Emotionally abused: Not on file     Physically abused: Not on file     Forced sexual activity: Not on file   Other Topics Concern    Not on file   Social History Narrative    Not on file       Current Medications:   Current Outpatient Medications   Medication Sig Dispense Refill    albuterol (PROAIR HFA) 90 mcg/act inhaler Inhale as needed      cetirizine (ZyrTEC) 10 MG chewable tablet Chew 10 mg daily as needed       Cholecalciferol (VITAMIN D3) 1000 units CHEW Chew 1 tablet daily      fluticasone (FLONASE) 50 mcg/act nasal spray 1 spray into each nostril daily      intrauterine copper (PARAGARD) IUD 1 each by Intrauterine route once      multivitamin (THERAGRAN) TABS Take 1 tablet by mouth daily      rizatriptan (MAXALT-MLT) 10 MG disintegrating tablet Take 10 mg by mouth once as needed for migraine May repeat in 2 hours if needed      TURMERIC CURCUMIN PO Take by mouth daily       No current facility-administered medications for this visit          Allergies: No Known Allergies

## 2019-08-19 ENCOUNTER — APPOINTMENT (OUTPATIENT)
Dept: RADIATION ONCOLOGY | Facility: HOSPITAL | Age: 44
End: 2019-08-19
Attending: RADIOLOGY
Payer: COMMERCIAL

## 2019-08-19 PROCEDURE — 77290 THER RAD SIMULAJ FIELD CPLX: CPT | Performed by: RADIOLOGY

## 2019-08-19 PROCEDURE — 77332 RADIATION TREATMENT AID(S): CPT | Performed by: RADIOLOGY

## 2019-08-19 PROCEDURE — 77412 RADIATION TX DELIVERY LVL 3: CPT | Performed by: RADIOLOGY

## 2019-08-19 RX ORDER — RIZATRIPTAN BENZOATE 10 MG/1
TABLET, ORALLY DISINTEGRATING ORAL
Qty: 12 TABLET | Refills: 0 | Status: SHIPPED | OUTPATIENT
Start: 2019-08-19

## 2019-08-21 PROCEDURE — 77300 RADIATION THERAPY DOSE PLAN: CPT | Performed by: RADIOLOGY

## 2019-08-21 PROCEDURE — 77334 RADIATION TREATMENT AID(S): CPT | Performed by: RADIOLOGY

## 2019-08-21 PROCEDURE — 77295 3-D RADIOTHERAPY PLAN: CPT | Performed by: RADIOLOGY

## 2019-08-26 ENCOUNTER — APPOINTMENT (OUTPATIENT)
Dept: RADIATION ONCOLOGY | Facility: HOSPITAL | Age: 44
End: 2019-08-26
Attending: RADIOLOGY
Payer: COMMERCIAL

## 2019-08-26 PROCEDURE — 77280 THER RAD SIMULAJ FIELD SMPL: CPT | Performed by: RADIOLOGY

## 2019-08-27 ENCOUNTER — APPOINTMENT (OUTPATIENT)
Dept: RADIATION ONCOLOGY | Facility: HOSPITAL | Age: 44
End: 2019-08-27
Attending: RADIOLOGY
Payer: COMMERCIAL

## 2019-08-27 DIAGNOSIS — Z17.0 MALIGNANT NEOPLASM OF UPPER-INNER QUADRANT OF LEFT BREAST IN FEMALE, ESTROGEN RECEPTOR POSITIVE (HCC): Primary | ICD-10-CM

## 2019-08-27 DIAGNOSIS — C50.212 MALIGNANT NEOPLASM OF UPPER-INNER QUADRANT OF LEFT BREAST IN FEMALE, ESTROGEN RECEPTOR POSITIVE (HCC): Primary | ICD-10-CM

## 2019-08-27 PROCEDURE — 77412 RADIATION TX DELIVERY LVL 3: CPT | Performed by: RADIOLOGY

## 2019-08-27 PROCEDURE — 77387 GUIDANCE FOR RADJ TX DLVR: CPT | Performed by: RADIOLOGY

## 2019-08-27 PROCEDURE — 77331 SPECIAL RADIATION DOSIMETRY: CPT | Performed by: RADIOLOGY

## 2019-08-28 ENCOUNTER — APPOINTMENT (OUTPATIENT)
Dept: RADIATION ONCOLOGY | Facility: HOSPITAL | Age: 44
End: 2019-08-28
Attending: RADIOLOGY
Payer: COMMERCIAL

## 2019-08-28 PROCEDURE — 77387 GUIDANCE FOR RADJ TX DLVR: CPT | Performed by: RADIOLOGY

## 2019-08-28 PROCEDURE — 77412 RADIATION TX DELIVERY LVL 3: CPT | Performed by: RADIOLOGY

## 2019-08-29 ENCOUNTER — APPOINTMENT (OUTPATIENT)
Dept: RADIATION ONCOLOGY | Facility: HOSPITAL | Age: 44
End: 2019-08-29
Attending: RADIOLOGY
Payer: COMMERCIAL

## 2019-08-29 PROCEDURE — 77387 GUIDANCE FOR RADJ TX DLVR: CPT | Performed by: RADIOLOGY

## 2019-08-29 PROCEDURE — 77412 RADIATION TX DELIVERY LVL 3: CPT | Performed by: RADIOLOGY

## 2019-08-30 ENCOUNTER — APPOINTMENT (OUTPATIENT)
Dept: LAB | Facility: CLINIC | Age: 44
End: 2019-08-30
Payer: COMMERCIAL

## 2019-08-30 ENCOUNTER — APPOINTMENT (OUTPATIENT)
Dept: RADIATION ONCOLOGY | Facility: HOSPITAL | Age: 44
End: 2019-08-30
Attending: RADIOLOGY
Payer: COMMERCIAL

## 2019-08-30 DIAGNOSIS — Z17.0 MALIGNANT NEOPLASM OF UPPER-INNER QUADRANT OF LEFT BREAST IN FEMALE, ESTROGEN RECEPTOR POSITIVE (HCC): ICD-10-CM

## 2019-08-30 DIAGNOSIS — C50.212 MALIGNANT NEOPLASM OF UPPER-INNER QUADRANT OF LEFT BREAST IN FEMALE, ESTROGEN RECEPTOR POSITIVE (HCC): ICD-10-CM

## 2019-08-30 LAB
BASOPHILS # BLD AUTO: 0.04 THOUSANDS/ΜL (ref 0–0.1)
BASOPHILS NFR BLD AUTO: 1 % (ref 0–1)
EOSINOPHIL # BLD AUTO: 0.24 THOUSAND/ΜL (ref 0–0.61)
EOSINOPHIL NFR BLD AUTO: 5 % (ref 0–6)
ERYTHROCYTE [DISTWIDTH] IN BLOOD BY AUTOMATED COUNT: 13.2 % (ref 11.6–15.1)
HCT VFR BLD AUTO: 40.7 % (ref 34.8–46.1)
HGB BLD-MCNC: 12.7 G/DL (ref 11.5–15.4)
IMM GRANULOCYTES # BLD AUTO: 0.01 THOUSAND/UL (ref 0–0.2)
IMM GRANULOCYTES NFR BLD AUTO: 0 % (ref 0–2)
LYMPHOCYTES # BLD AUTO: 1.83 THOUSANDS/ΜL (ref 0.6–4.47)
LYMPHOCYTES NFR BLD AUTO: 34 % (ref 14–44)
MCH RBC QN AUTO: 30 PG (ref 26.8–34.3)
MCHC RBC AUTO-ENTMCNC: 31.2 G/DL (ref 31.4–37.4)
MCV RBC AUTO: 96 FL (ref 82–98)
MONOCYTES # BLD AUTO: 0.49 THOUSAND/ΜL (ref 0.17–1.22)
MONOCYTES NFR BLD AUTO: 9 % (ref 4–12)
NEUTROPHILS # BLD AUTO: 2.74 THOUSANDS/ΜL (ref 1.85–7.62)
NEUTS SEG NFR BLD AUTO: 51 % (ref 43–75)
NRBC BLD AUTO-RTO: 0 /100 WBCS
PLATELET # BLD AUTO: 270 THOUSANDS/UL (ref 149–390)
PMV BLD AUTO: 9.1 FL (ref 8.9–12.7)
RBC # BLD AUTO: 4.24 MILLION/UL (ref 3.81–5.12)
WBC # BLD AUTO: 5.35 THOUSAND/UL (ref 4.31–10.16)

## 2019-08-30 PROCEDURE — 77336 RADIATION PHYSICS CONSULT: CPT | Performed by: RADIOLOGY

## 2019-08-30 PROCEDURE — 77412 RADIATION TX DELIVERY LVL 3: CPT | Performed by: RADIOLOGY

## 2019-08-30 PROCEDURE — 77387 GUIDANCE FOR RADJ TX DLVR: CPT | Performed by: RADIOLOGY

## 2019-08-30 PROCEDURE — 85025 COMPLETE CBC W/AUTO DIFF WBC: CPT

## 2019-08-30 PROCEDURE — 36415 COLL VENOUS BLD VENIPUNCTURE: CPT

## 2019-09-03 ENCOUNTER — APPOINTMENT (OUTPATIENT)
Dept: RADIATION ONCOLOGY | Facility: HOSPITAL | Age: 44
End: 2019-09-03
Attending: RADIOLOGY
Payer: COMMERCIAL

## 2019-09-03 PROCEDURE — 77412 RADIATION TX DELIVERY LVL 3: CPT | Performed by: RADIOLOGY

## 2019-09-03 PROCEDURE — 77387 GUIDANCE FOR RADJ TX DLVR: CPT | Performed by: RADIOLOGY

## 2019-09-03 PROCEDURE — 77417 THER RADIOLOGY PORT IMAGE(S): CPT | Performed by: RADIOLOGY

## 2019-09-04 ENCOUNTER — APPOINTMENT (OUTPATIENT)
Dept: RADIATION ONCOLOGY | Facility: HOSPITAL | Age: 44
End: 2019-09-04
Attending: RADIOLOGY
Payer: COMMERCIAL

## 2019-09-04 PROCEDURE — 77387 GUIDANCE FOR RADJ TX DLVR: CPT | Performed by: RADIOLOGY

## 2019-09-04 PROCEDURE — 77412 RADIATION TX DELIVERY LVL 3: CPT | Performed by: RADIOLOGY

## 2019-09-05 ENCOUNTER — APPOINTMENT (OUTPATIENT)
Dept: RADIATION ONCOLOGY | Facility: HOSPITAL | Age: 44
End: 2019-09-05
Attending: RADIOLOGY
Payer: COMMERCIAL

## 2019-09-05 PROCEDURE — 77412 RADIATION TX DELIVERY LVL 3: CPT | Performed by: RADIOLOGY

## 2019-09-05 PROCEDURE — 77387 GUIDANCE FOR RADJ TX DLVR: CPT | Performed by: RADIOLOGY

## 2019-09-06 ENCOUNTER — APPOINTMENT (OUTPATIENT)
Dept: RADIATION ONCOLOGY | Facility: HOSPITAL | Age: 44
End: 2019-09-06
Attending: RADIOLOGY
Payer: COMMERCIAL

## 2019-09-06 ENCOUNTER — DOCTOR'S OFFICE (OUTPATIENT)
Dept: URBAN - METROPOLITAN AREA CLINIC 137 | Facility: CLINIC | Age: 44
Setting detail: OPHTHALMOLOGY
End: 2019-09-06

## 2019-09-06 DIAGNOSIS — H52.13: ICD-10-CM

## 2019-09-06 PROCEDURE — 77387 GUIDANCE FOR RADJ TX DLVR: CPT | Performed by: RADIOLOGY

## 2019-09-06 PROCEDURE — 77412 RADIATION TX DELIVERY LVL 3: CPT | Performed by: RADIOLOGY

## 2019-09-06 PROCEDURE — CLFUP CONTACT LENS FOLLOW-UP: Performed by: OPTOMETRIST

## 2019-09-06 ASSESSMENT — REFRACTION_CURRENTRX
OS_AXIS: 176
OD_OVR_VA: 20/
OD_CYLINDER: -0.50
OS_VPRISM_DIRECTION: PROGS
OS_SPHERE: -4.00
OD_ADD: +1.50
OD_VPRISM_DIRECTION: PROGS
OS_ADD: +1.50
OS_OVR_VA: 20/
OD_OVR_VA: 20/
OS_OVR_VA: 20/
OD_AXIS: 26
OS_CYLINDER: -1.25
OD_SPHERE: -4.75
OD_OVR_VA: 20/
OS_OVR_VA: 20/

## 2019-09-06 ASSESSMENT — REFRACTION_MANIFEST
OS_AXIS: 180
OD_ADD: +1.75
OD_VA3: 20/
OS_VA2: 20/
OD_VA2: 20/
OS_ADD: +1.75
OU_VA: 20/20
OD_VA1: 20/
OS_VA2: 20/
OS_VA3: 20/
OS_CYLINDER: -1.50
OS_VA1: 20/20
OD_SPHERE: -4.50
OD_VA1: 20/20
OD_CYLINDER: -1.00
OD_VA3: 20/
OS_VA1: 20/
OU_VA: 20/
OD_VA2: 20/
OS_VA3: 20/
OD_AXIS: 015
OS_SPHERE: -4.25

## 2019-09-06 ASSESSMENT — VISUAL ACUITY
OD_BCVA: 20/25-1
OS_BCVA: 20/20

## 2019-09-06 ASSESSMENT — SPHEQUIV_DERIVED
OS_SPHEQUIV: -4.75
OD_SPHEQUIV: -5
OD_SPHEQUIV: -5
OS_SPHEQUIV: -5

## 2019-09-06 ASSESSMENT — REFRACTION_AUTOREFRACTION
OD_AXIS: 13
OD_SPHERE: -4.50
OS_SPHERE: -4.00
OD_CYLINDER: -1.00
OS_CYLINDER: -1.50
OS_AXIS: 171

## 2019-09-06 ASSESSMENT — CONFRONTATIONAL VISUAL FIELD TEST (CVF)
OD_FINDINGS: FULL
OS_FINDINGS: FULL

## 2019-09-09 ENCOUNTER — APPOINTMENT (OUTPATIENT)
Dept: RADIATION ONCOLOGY | Facility: HOSPITAL | Age: 44
End: 2019-09-09
Attending: RADIOLOGY
Payer: COMMERCIAL

## 2019-09-09 PROCEDURE — 77412 RADIATION TX DELIVERY LVL 3: CPT | Performed by: RADIOLOGY

## 2019-09-09 PROCEDURE — 77336 RADIATION PHYSICS CONSULT: CPT | Performed by: RADIOLOGY

## 2019-09-09 PROCEDURE — 77387 GUIDANCE FOR RADJ TX DLVR: CPT | Performed by: RADIOLOGY

## 2019-09-10 ENCOUNTER — APPOINTMENT (OUTPATIENT)
Dept: RADIATION ONCOLOGY | Facility: HOSPITAL | Age: 44
End: 2019-09-10
Attending: RADIOLOGY
Payer: COMMERCIAL

## 2019-09-10 PROCEDURE — 77412 RADIATION TX DELIVERY LVL 3: CPT | Performed by: RADIOLOGY

## 2019-09-10 PROCEDURE — 77387 GUIDANCE FOR RADJ TX DLVR: CPT | Performed by: RADIOLOGY

## 2019-09-10 PROCEDURE — 77417 THER RADIOLOGY PORT IMAGE(S): CPT | Performed by: RADIOLOGY

## 2019-09-11 ENCOUNTER — APPOINTMENT (OUTPATIENT)
Dept: RADIATION ONCOLOGY | Facility: HOSPITAL | Age: 44
End: 2019-09-11
Attending: RADIOLOGY
Payer: COMMERCIAL

## 2019-09-11 PROCEDURE — 77387 GUIDANCE FOR RADJ TX DLVR: CPT | Performed by: RADIOLOGY

## 2019-09-11 PROCEDURE — 77412 RADIATION TX DELIVERY LVL 3: CPT | Performed by: RADIOLOGY

## 2019-09-12 ENCOUNTER — APPOINTMENT (OUTPATIENT)
Dept: RADIATION ONCOLOGY | Facility: HOSPITAL | Age: 44
End: 2019-09-12
Attending: RADIOLOGY
Payer: COMMERCIAL

## 2019-09-12 PROCEDURE — 77412 RADIATION TX DELIVERY LVL 3: CPT | Performed by: RADIOLOGY

## 2019-09-12 PROCEDURE — 77387 GUIDANCE FOR RADJ TX DLVR: CPT | Performed by: RADIOLOGY

## 2019-09-13 ENCOUNTER — APPOINTMENT (OUTPATIENT)
Dept: RADIATION ONCOLOGY | Facility: HOSPITAL | Age: 44
End: 2019-09-13
Attending: RADIOLOGY
Payer: COMMERCIAL

## 2019-09-13 PROCEDURE — 77387 GUIDANCE FOR RADJ TX DLVR: CPT | Performed by: RADIOLOGY

## 2019-09-13 PROCEDURE — 77412 RADIATION TX DELIVERY LVL 3: CPT | Performed by: RADIOLOGY

## 2019-09-16 ENCOUNTER — APPOINTMENT (OUTPATIENT)
Dept: RADIATION ONCOLOGY | Facility: HOSPITAL | Age: 44
End: 2019-09-16
Attending: RADIOLOGY
Payer: COMMERCIAL

## 2019-09-16 PROCEDURE — 77336 RADIATION PHYSICS CONSULT: CPT | Performed by: RADIOLOGY

## 2019-09-16 PROCEDURE — 77387 GUIDANCE FOR RADJ TX DLVR: CPT | Performed by: RADIOLOGY

## 2019-09-16 PROCEDURE — 77334 RADIATION TREATMENT AID(S): CPT | Performed by: RADIOLOGY

## 2019-09-16 PROCEDURE — 77321 SPECIAL TELETX PORT PLAN: CPT | Performed by: RADIOLOGY

## 2019-09-16 PROCEDURE — 77412 RADIATION TX DELIVERY LVL 3: CPT | Performed by: RADIOLOGY

## 2019-09-17 ENCOUNTER — APPOINTMENT (OUTPATIENT)
Dept: RADIATION ONCOLOGY | Facility: HOSPITAL | Age: 44
End: 2019-09-17
Attending: RADIOLOGY
Payer: COMMERCIAL

## 2019-09-17 PROCEDURE — 77417 THER RADIOLOGY PORT IMAGE(S): CPT | Performed by: RADIOLOGY

## 2019-09-17 PROCEDURE — 77412 RADIATION TX DELIVERY LVL 3: CPT | Performed by: RADIOLOGY

## 2019-09-17 PROCEDURE — 77387 GUIDANCE FOR RADJ TX DLVR: CPT | Performed by: RADIOLOGY

## 2019-09-18 ENCOUNTER — APPOINTMENT (OUTPATIENT)
Dept: RADIATION ONCOLOGY | Facility: HOSPITAL | Age: 44
End: 2019-09-18
Attending: RADIOLOGY
Payer: COMMERCIAL

## 2019-09-18 PROCEDURE — 77412 RADIATION TX DELIVERY LVL 3: CPT | Performed by: RADIOLOGY

## 2019-09-18 PROCEDURE — 77387 GUIDANCE FOR RADJ TX DLVR: CPT | Performed by: RADIOLOGY

## 2019-09-19 ENCOUNTER — APPOINTMENT (OUTPATIENT)
Dept: RADIATION ONCOLOGY | Facility: HOSPITAL | Age: 44
End: 2019-09-19
Attending: RADIOLOGY
Payer: COMMERCIAL

## 2019-09-19 PROCEDURE — 77412 RADIATION TX DELIVERY LVL 3: CPT | Performed by: RADIOLOGY

## 2019-09-19 PROCEDURE — 77280 THER RAD SIMULAJ FIELD SMPL: CPT | Performed by: RADIOLOGY

## 2019-09-20 ENCOUNTER — APPOINTMENT (OUTPATIENT)
Dept: RADIATION ONCOLOGY | Facility: HOSPITAL | Age: 44
End: 2019-09-20
Attending: RADIOLOGY
Payer: COMMERCIAL

## 2019-09-20 PROCEDURE — 77387 GUIDANCE FOR RADJ TX DLVR: CPT | Performed by: RADIOLOGY

## 2019-09-20 PROCEDURE — 77412 RADIATION TX DELIVERY LVL 3: CPT | Performed by: RADIOLOGY

## 2019-09-23 ENCOUNTER — APPOINTMENT (OUTPATIENT)
Dept: RADIATION ONCOLOGY | Facility: HOSPITAL | Age: 44
End: 2019-09-23
Attending: RADIOLOGY
Payer: COMMERCIAL

## 2019-09-23 PROCEDURE — 77387 GUIDANCE FOR RADJ TX DLVR: CPT | Performed by: RADIOLOGY

## 2019-09-23 PROCEDURE — 77336 RADIATION PHYSICS CONSULT: CPT | Performed by: RADIOLOGY

## 2019-09-23 PROCEDURE — 77331 SPECIAL RADIATION DOSIMETRY: CPT | Performed by: RADIOLOGY

## 2019-09-23 PROCEDURE — 77412 RADIATION TX DELIVERY LVL 3: CPT | Performed by: RADIOLOGY

## 2019-09-24 ENCOUNTER — APPOINTMENT (OUTPATIENT)
Dept: RADIATION ONCOLOGY | Facility: HOSPITAL | Age: 44
End: 2019-09-24
Attending: RADIOLOGY
Payer: COMMERCIAL

## 2019-09-24 PROCEDURE — 77412 RADIATION TX DELIVERY LVL 3: CPT | Performed by: RADIOLOGY

## 2019-09-24 PROCEDURE — 77387 GUIDANCE FOR RADJ TX DLVR: CPT | Performed by: RADIOLOGY

## 2019-09-25 ENCOUNTER — APPOINTMENT (OUTPATIENT)
Dept: RADIATION ONCOLOGY | Facility: HOSPITAL | Age: 44
End: 2019-09-25
Attending: RADIOLOGY
Payer: COMMERCIAL

## 2019-09-25 PROCEDURE — 77412 RADIATION TX DELIVERY LVL 3: CPT | Performed by: RADIOLOGY

## 2019-09-25 PROCEDURE — 77387 GUIDANCE FOR RADJ TX DLVR: CPT | Performed by: RADIOLOGY

## 2019-10-25 ENCOUNTER — CLINICAL SUPPORT (OUTPATIENT)
Dept: RADIATION ONCOLOGY | Facility: HOSPITAL | Age: 44
End: 2019-10-25
Attending: RADIOLOGY
Payer: COMMERCIAL

## 2019-10-25 VITALS
WEIGHT: 160.6 LBS | OXYGEN SATURATION: 99 % | DIASTOLIC BLOOD PRESSURE: 84 MMHG | BODY MASS INDEX: 29.55 KG/M2 | TEMPERATURE: 98.7 F | HEIGHT: 62 IN | SYSTOLIC BLOOD PRESSURE: 120 MMHG | RESPIRATION RATE: 18 BRPM | HEART RATE: 91 BPM

## 2019-10-25 DIAGNOSIS — Z17.0 MALIGNANT NEOPLASM OF UPPER-INNER QUADRANT OF LEFT BREAST IN FEMALE, ESTROGEN RECEPTOR POSITIVE (HCC): Primary | ICD-10-CM

## 2019-10-25 DIAGNOSIS — C50.212 MALIGNANT NEOPLASM OF UPPER-INNER QUADRANT OF LEFT BREAST IN FEMALE, ESTROGEN RECEPTOR POSITIVE (HCC): Primary | ICD-10-CM

## 2019-10-25 PROCEDURE — G0463 HOSPITAL OUTPT CLINIC VISIT: HCPCS | Performed by: RADIOLOGY

## 2019-10-25 PROCEDURE — 99211 OFF/OP EST MAY X REQ PHY/QHP: CPT | Performed by: RADIOLOGY

## 2019-10-25 NOTE — PROGRESS NOTES
Follow-up - Radiation Oncology   Soraida Valdivia 1975 53 y o  female [id number]      History of Present Illness   Cancer Staging  Malignant neoplasm of upper-inner quadrant of left breast in female, estrogen receptor positive ([facility] [address] )  Staging form: Breast, AJCC 8th Edition  - Clinical: Stage IA (cT1c, cN0, cM0, G1, ER+, NM+, HER2-) - Signed by Angelica Rodriguez MD on 8/14/2019  Laterality: Left  Histologic grading system: 3 grade system      Soraida Valdivia returns today for routine scheduled follow-up visit approximately 1 month status post completion of treatment  Overall she feels well  She had minimal skin reaction during her course of treatment, but subsequently developed diffuse hyperpigmentation with dry desquamation  She has been moisturizing intermittently  She denies any discomfort in the treated breast     Presents for first follow up after completing left breast radiation on 9/25/19 8/16/19 Kimberlee Walker [facility]:   Tamoxifen 20 mg daily  F/U 6 months      11/20/19 Dr Marquis Olivia      Malignant neoplasm of upper-inner quadrant of left breast in female, estrogen receptor positive ([facility] [address] )    6/7/2019 Biopsy     Left breast ultrasound-guided biopsy  11 o'clock, 4 cm from nipple  Invasive lobular carcinoma  Grade 1  ER 90  NM 45  HER2 1+      6/17/2019 Genetic Testing     BRCA negative  [facility]      7/30/2019 Surgery     Left breast lumpectomy with sentinel lymph node biopsy  Invasive lobular carcinoma  Grade 1  1 1 cm  0/3 Lymph nodes  Margins negative  Anatomic/Prognostic Stage IA      8/14/2019 -  Cancer Staged     Staging form: Breast, AJCC 8th Edition  - Clinical: Stage IA (cT1c, cN0, cM0, G1, ER+, NM+, HER2-) - Signed by Angelica Rodriguez MD on 8/14/2019  Laterality: Left  Histologic grading system: 3 grade system        8/27/2019 - 9/25/2019 Radiation     Course: C1    Plan ID Energy Fractions Dose per Fraction (cGy) Dose Correction (cGy) Total Dose Delivered (cGy) Elapsed Days   BH L Breast 6X 16 / 16 266 0 4,256 22   BH L Brst e 12E 5 / 5 200 0 1,000 6      Treatment dates:  C1: 8/27/2019 - 9/25/2019 8/2019 -  Hormone Therapy     Tamoxifen 20 mg daily         Past Medical History:   Diagnosis Date    Adhesive capsulitis of right shoulder     Resolved 11/15/2016     Asthma     Benign paroxysmal vertigo, unspecified ear     Resolved 11/15/2016     BRCA gene mutation negative 06/2019    [facility]    Breast cancer ([facility] [address] )     Cellulitis of leg, except foot     Resolved 12/10/2014     Lymphadenitis     Resolved 12/10/2014     Snoring      Past Surgical History:   Procedure Laterality Date    BREAST LUMPECTOMY Left 7/30/2019    Procedure: BREAST LUMPECTOMY; BREAST NEEDLE LOCALIZATION (NEEDLE LOC AT 0800); Surgeon: Dale Tucker MD;  [facility];  Service: Surgical Oncology    INTRAUTERINE DEVICE INSERTION  2019    LYMPH NODE BIOPSY Left 7/30/2019    Procedure: SENTINEL LYMPH NODE BIOPSY; LYMPHATIC MAPPING WITH BLUE DYE AND RADIOACTIVE DYE (INJECT AT 0930 BY DR LEAVITT IN THE OR);   Surgeon: Dale Tucker MD;  [facility];  Service: Surgical Oncology    MAMMO NEEDLE LOCALIZATION LEFT (ALL INC) Left 7/30/2019    SHOULDER SURGERY Right     Last assessed 12/29/2015     US GUIDED BREAST BIOPSY LEFT COMPLETE Left 6/7/2019    WISDOM TOOTH EXTRACTION         Social History   Social History     Substance and Sexual Activity   Alcohol Use No     Social History     Substance and Sexual Activity   Drug Use No     Social History     Tobacco Use   Smoking Status Never Smoker   Smokeless Tobacco Never Used         Meds/Allergies     Current Outpatient Medications:     albuterol (PROAIR HFA) 90 mcg/act inhaler, Inhale as needed, Disp: , Rfl:     cetirizine (ZyrTEC) 10 MG chewable tablet, Chew 10 mg daily as needed , Disp: , Rfl:     Cholecalciferol (VITAMIN D3) 1000 units CHEW, Chew 1 tablet daily, Disp: , Rfl:     fluticasone (FLONASE) 50 mcg/act nasal spray, 1 spray into each nostril daily as needed , Disp: , Rfl:     intrauterine copper (PARAGARD) IUD, 1 each by Intrauterine route once, Disp: , Rfl:     multivitamin (THERAGRAN) TABS, Take 1 tablet by mouth daily, Disp: , Rfl:     rizatriptan (MAXALT-MLT) 10 MG disintegrating tablet, DISSOLVE ONE TABLET IN MOUTH at onset of headache  May repeat in two hours if ineffective, Disp: 12 tablet, Rfl: 0    tamoxifen (NOLVADEX) 20 mg tablet, Take 1 tablet (20 mg total) by mouth 2 (two) times a day (Patient taking differently: Take 20 mg by mouth daily ), Disp: 90 tablet, Rfl: 1    TURMERIC CURCUMIN PO, Take by mouth daily, Disp: , Rfl:   No Known Allergies      Review of Systems   Review of Systems   Constitutional: Negative  HENT: Negative  Eyes: Negative  Respiratory: Negative  Cardiovascular: Negative  Gastrointestinal: Negative  Endocrine: Negative  Genitourinary: Negative  Musculoskeletal: Negative  Skin: Positive for color change (L breast discolored)  L breast peeling   Allergic/Immunologic: Positive for environmental allergies (Seasonal)  Neurological: Positive for headaches (History of migraines)  Hematological: Negative  Psychiatric/Behavioral: Negative  OBJECTIVE:   /84   Pulse 91   Temp 98 7 °F (37 1 °C)   Resp 18   Ht 5' 2" (1 575 m)   Wt 72 8 kg (160 lb 9 6 oz)   SpO2 99%   BMI 29 37 kg/m²   Pain Assessment:  0  Karnofsky: 90 - Able to carry on normal activity; minor signs or symptoms of disease     Physical Exam   The patient presents today no apparent distress  Sclera anicteric  No palpable cervical or supraclavicular lymphadenopathy  Lungs clear to auscultation bilaterally  Normal S1-S2 regular rate and rhythm  The right breast within normal limits  The left breast is soft, nontender, with patchy hyperpigmentation  No axillary lymphadenopathy  No lymphedema          RESULTS    Lab Results: No results found for this or any previous visit (from the past 672 hour(s))  Imaging Studies:No results found  Assessment/Plan:  No orders of the defined types were placed in this encounter  Eve Casas has done well in follow-up  We expect her residual hyperpigmentation to continue to resolve with time  We have encouraged her to moisturize on a daily basis  She will be following up with Medical and Surgical Oncology will return to our department in 6 months  Meseret Thomas MD  10/25/2019,3:14 PM    Portions of the record may have been created with voice recognition software   Occasional wrong word or "sound a like" substitutions may have occurred due to the inherent limitations of voice recognition software   Read the chart carefully and recognize, using context, where substitutions have occurred

## 2019-10-25 NOTE — PROGRESS NOTES
Jocelyn Smith 1975 is a 53 y o  female       Follow up visit     Presents for first follow up after completing left breast radiation on 9/25/19 8/16/19 Misty Olsonc:   Tamoxifen 20 mg daily  F/U 6 months      11/20/19 Dr Agnieszka Curiel       Malignant neoplasm of upper-inner quadrant of left breast in female, estrogen receptor positive ([address] )    6/7/2019 Biopsy     Left breast ultrasound-guided biopsy  11 o'clock, 4 cm from nipple  Invasive lobular carcinoma  Grade 1  ER 90  WI 45  HER2 1+      6/17/2019 Genetic Testing     BRCA negative  [facility]      7/30/2019 Surgery     Left breast lumpectomy with sentinel lymph node biopsy  Invasive lobular carcinoma  Grade 1  1 1 cm  0/3 Lymph nodes  Margins negative  Anatomic/Prognostic Stage IA      8/14/2019 -  Cancer Staged     Staging form: Breast, AJCC 8th Edition  - Clinical: Stage IA (cT1c, cN0, cM0, G1, ER+, WI+, HER2-) - Signed by Francisco Javier Roach MD on 8/14/2019  Laterality: Left  Histologic grading system: 3 grade system        8/27/2019 - 9/25/2019 Radiation     Course: C1    Plan ID Energy Fractions Dose per Fraction (cGy) Dose Correction (cGy) Total Dose Delivered (cGy) Elapsed Days   BH L Breast 6X 16 / 16 266 0 4,256 22   BH L Brst e 12E 5 / 5 200 0 1,000 6      Treatment dates:  C1: 8/27/2019 - 9/25/2019 8/2019 -  Hormone Therapy     Tamoxifen 20 mg daily         Clinical Trial: no      Health Maintenance   Topic Date Due    Cervical Cancer Screening  02/23/1994    Pneumococcal Vaccine: Pediatrics (0 to 5 Years) and At-Risk Patients (6 to 59 Years) (2 of 3 - PCV13) 09/17/2011    INFLUENZA VACCINE  07/01/2019    MAMMOGRAM  11/23/2019    BMI: Followup Plan  08/02/2020    Depression Screening PHQ  08/14/2020    BMI: Adult  08/16/2020    DTaP,Tdap,and Td Vaccines (2 - Td) 09/17/2020    Pneumococcal Vaccine: 65+ Years (1 of 2 - PCV13) 02/23/2038    HEPATITIS B VACCINES  Aged Out       Patient Active Problem List   Diagnosis    Allergic rhinitis    Dense breasts    Migraine headache    Mild intermittent asthma    Motion sickness    Vitamin D deficiency    Malignant neoplasm of upper-inner quadrant of left breast in female, estrogen receptor positive ([facility] [address] )     Past Medical History:   Diagnosis Date    Adhesive capsulitis of right shoulder     Resolved 11/15/2016     Asthma     Benign paroxysmal vertigo, unspecified ear     Resolved 11/15/2016     BRCA gene mutation negative 06/2019    [facility]    Breast cancer ([facility] [address] )     Cellulitis of leg, except foot     Resolved 12/10/2014     Lymphadenitis     Resolved 12/10/2014     Snoring      Past Surgical History:   Procedure Laterality Date    BREAST LUMPECTOMY Left 7/30/2019    Procedure: BREAST LUMPECTOMY; BREAST NEEDLE LOCALIZATION (NEEDLE LOC AT 0800); Surgeon: Alcides Johnson MD;  [facility];  Service: Surgical Oncology    INTRAUTERINE DEVICE INSERTION  2019    LYMPH NODE BIOPSY Left 7/30/2019    Procedure: SENTINEL LYMPH NODE BIOPSY; LYMPHATIC MAPPING WITH BLUE DYE AND RADIOACTIVE DYE (INJECT AT 0930 BY DR LEAVITT IN THE OR);   Surgeon: Alcides Johnson MD;  [facility];  Service: Surgical Oncology    MAMMO NEEDLE LOCALIZATION LEFT (ALL INC) Left 7/30/2019    SHOULDER SURGERY Right     Last assessed 12/29/2015     US GUIDED BREAST BIOPSY LEFT COMPLETE Left 6/7/2019    WISDOM TOOTH EXTRACTION       Family History   Problem Relation Age of Onset    Alzheimer's disease Mother     COPD Father     No Known Problems Sister     No Known Problems Daughter     Alzheimer's disease Maternal Grandmother     No Known Problems Maternal Grandfather     No Known Problems Paternal Grandmother     No Known Problems Paternal Grandfather     No Known Problems Brother     No Known Problems Brother     No Known Problems Brother     No Known Problems Daughter      Social History     Socioeconomic History    Marital status: /Civil Union     Spouse name: Not on file    Number of children: 3    Years of education: Not on file    Highest education level: Not on file   Occupational History    Not on file   Social Needs    Financial resource strain: Not on file    Food insecurity:     Worry: Not on file     Inability: Not on file    Transportation needs:     Medical: Not on file     Non-medical: Not on file   Tobacco Use    Smoking status: Never Smoker    Smokeless tobacco: Never Used   Substance and Sexual Activity    Alcohol use: No    Drug use: No    Sexual activity: Yes     Partners: Male   Lifestyle    Physical activity:     Days per week: Not on file     Minutes per session: Not on file    Stress: Not on file   Relationships    Social connections:     Talks on phone: Not on file     Gets together: Not on file     Attends [employer] service: Not on file     Active member of club or organization: Not on file     Attends meetings of clubs or organizations: Not on file     Relationship status: Not on file    Intimate partner violence:     Fear of current or ex partner: Not on file     Emotionally abused: Not on file     Physically abused: Not on file     Forced sexual activity: Not on file   Other Topics Concern    Not on file   Social History Narrative    Not on file       Current Outpatient Medications:     albuterol (PROAIR HFA) 90 mcg/act inhaler, Inhale as needed, Disp: , Rfl:     cetirizine (ZyrTEC) 10 MG chewable tablet, Chew 10 mg daily as needed , Disp: , Rfl:     Cholecalciferol (VITAMIN D3) 1000 units CHEW, Chew 1 tablet daily, Disp: , Rfl:     fluticasone (FLONASE) 50 mcg/act nasal spray, 1 spray into each nostril daily as needed , Disp: , Rfl:     intrauterine copper (PARAGARD) IUD, 1 each by Intrauterine route once, Disp: , Rfl:     multivitamin (THERAGRAN) TABS, Take 1 tablet by mouth daily, Disp: , Rfl:     rizatriptan (MAXALT-MLT) 10 MG disintegrating tablet, DISSOLVE ONE TABLET IN MOUTH at onset of headache    May repeat in two hours if ineffective, Disp: 12 tablet, Rfl: 0    tamoxifen (NOLVADEX) 20 mg tablet, Take 1 tablet (20 mg total) by mouth 2 (two) times a day (Patient taking differently: Take 20 mg by mouth daily ), Disp: 90 tablet, Rfl: 1    TURMERIC CURCUMIN PO, Take by mouth daily, Disp: , Rfl:   No Known Allergies    Review of Systems:  Review of Systems   Constitutional: Negative  HENT: Negative  Eyes: Negative  Respiratory: Negative  Cardiovascular: Negative  Gastrointestinal: Negative  Endocrine: Negative  Genitourinary: Negative  Musculoskeletal: Negative  Skin: Positive for color change (L breast discolored)  L breast peeling   Allergic/Immunologic: Positive for environmental allergies (Seasonal)  Neurological: Positive for headaches (History of migraines)  Hematological: Negative  Psychiatric/Behavioral: Negative  Vitals:    10/25/19 1450   BP: 120/84   Pulse: 91   Resp: 18   Temp: 98 7 °F (37 1 °C)   SpO2: 99%   Weight: 72 8 kg (160 lb 9 6 oz)   Height: 5' 2" (1 575 m)        Pain assessment:0    Pain Score: 0-No pain      Imaging:No results found

## 2019-11-20 ENCOUNTER — OFFICE VISIT (OUTPATIENT)
Dept: SURGICAL ONCOLOGY | Facility: CLINIC | Age: 44
End: 2019-11-20
Payer: COMMERCIAL

## 2019-11-20 VITALS
SYSTOLIC BLOOD PRESSURE: 120 MMHG | RESPIRATION RATE: 16 BRPM | HEART RATE: 85 BPM | BODY MASS INDEX: 29.26 KG/M2 | TEMPERATURE: 98.7 F | WEIGHT: 159 LBS | DIASTOLIC BLOOD PRESSURE: 80 MMHG | HEIGHT: 62 IN

## 2019-11-20 DIAGNOSIS — Z17.0 MALIGNANT NEOPLASM OF UPPER-INNER QUADRANT OF LEFT BREAST IN FEMALE, ESTROGEN RECEPTOR POSITIVE (HCC): Primary | ICD-10-CM

## 2019-11-20 DIAGNOSIS — C50.212 MALIGNANT NEOPLASM OF UPPER-INNER QUADRANT OF LEFT BREAST IN FEMALE, ESTROGEN RECEPTOR POSITIVE (HCC): Primary | ICD-10-CM

## 2019-11-20 PROCEDURE — 99214 OFFICE O/P EST MOD 30 MIN: CPT | Performed by: SURGERY

## 2019-11-20 NOTE — PROGRESS NOTES
Surgical Oncology Follow Up       [address] [facility],[address] [place] [address]  [facility]  [address] [facility] [address]    Jocelyn HERNDON Sarah  1975  [id number]  [address] [facility],[address] [place] [address]  [facility]  [address] [address]    Chief Complaint   Patient presents with    Breast Cancer     Pt is here for 3 month f/u           Assessment & Plan:   The patient presents for a three-month follow-up visit  She has finished her radiation therapy  She is now on tamoxifen and tolerating it quite well  She is not having any hot flashes  Clinical examination demonstrates no evidence of local regional recurrence disease  We will coordinate on bilateral mammogram for March  We will see her back in 6 months  She is agreeable to see Nicola Miller our advanced practitioner at that time  I recommended she have a low threshold to contact us with any concerns      Cancer History:        Malignant neoplasm of upper-inner quadrant of left breast in female, estrogen receptor positive ([facility] [address] )    6/7/2019 Biopsy     Left breast ultrasound-guided biopsy  11 o'clock, 4 cm from nipple  Invasive lobular carcinoma  Grade 1  ER 90  AK 45  HER2 1+      6/17/2019 Genetic Testing     BRCA negative  [facility]      7/30/2019 Surgery     Left breast lumpectomy with sentinel lymph node biopsy  Invasive lobular carcinoma  Grade 1  1 1 cm  0/3 Lymph nodes  Margins negative  Anatomic/Prognostic Stage IA      8/14/2019 -  Cancer Staged     Staging form: Breast, AJCC 8th Edition  - Clinical: Stage IA (cT1c, cN0, cM0, G1, ER+, AK+, HER2-) - Signed by Cora Fuller MD on 8/14/2019  Laterality: Left  Histologic grading system: 3 grade system        8/27/2019 - 9/25/2019 Radiation     Course: C1    Plan ID Energy Fractions Dose per Fraction (cGy) Dose Correction (cGy) Total Dose Delivered (cGy) Elapsed Days   BH L Breast 6X 16 / 16 266 0 4,256 22   BH L Brst e 12E 5 / 5 200 0 1,000 6 Treatment dates:  C1: 8/27/2019 - 9/25/2019 8/2019 -  Hormone Therapy     Tamoxifen 20 mg daily  With Dr Saurabh Bermudez           Interval History:   See Assessment & Plan    Review of Systems:   Review of Systems   Constitutional: Negative for activity change, appetite change and fatigue  HENT: Negative  Eyes: Negative  Respiratory: Negative for cough, shortness of breath and wheezing  Cardiovascular: Negative for chest pain and leg swelling  Gastrointestinal: Negative  Endocrine: Negative  Genitourinary: Negative  Musculoskeletal:        No new changes or complaints of bone pain   Skin: Negative  Allergic/Immunologic: Negative  Neurological: Negative  Hematological: Negative  Psychiatric/Behavioral: Negative  Past Medical History     Patient Active Problem List   Diagnosis    Allergic rhinitis    Dense breasts    Migraine headache    Mild intermittent asthma    Motion sickness    Vitamin D deficiency    Malignant neoplasm of upper-inner quadrant of left breast in female, estrogen receptor positive ([facility] [address] )     Past Medical History:   Diagnosis Date    Adhesive capsulitis of right shoulder     Resolved 11/15/2016     Asthma     Benign paroxysmal vertigo, unspecified ear     Resolved 11/15/2016     BRCA gene mutation negative 06/2019    [facility]    Breast cancer ([facility] [address] )     Cellulitis of leg, except foot     Resolved 12/10/2014     Lymphadenitis     Resolved 12/10/2014     Snoring      Past Surgical History:   Procedure Laterality Date    BREAST LUMPECTOMY Left 7/30/2019    Procedure: BREAST LUMPECTOMY; BREAST NEEDLE LOCALIZATION (NEEDLE LOC AT 0800); Surgeon: Rayna Barker MD;  [facility];  Service: Surgical Oncology    INTRAUTERINE DEVICE INSERTION  2019    LYMPH NODE BIOPSY Left 7/30/2019    Procedure: SENTINEL LYMPH NODE BIOPSY; LYMPHATIC MAPPING WITH BLUE DYE AND RADIOACTIVE DYE (INJECT AT 0930 BY DR LEAVITT IN THE OR);   Surgeon: Rayna Barker MD; [facility];  Service: Surgical Oncology    MAMMO NEEDLE LOCALIZATION LEFT (ALL INC) Left 7/30/2019    SHOULDER SURGERY Right     Last assessed 12/29/2015     US GUIDED BREAST BIOPSY LEFT COMPLETE Left 6/7/2019    WISDOM TOOTH EXTRACTION       Family History   Problem Relation Age of Onset    Alzheimer's disease Mother     COPD Father     No Known Problems Sister     No Known Problems Daughter     Alzheimer's disease Maternal Grandmother     No Known Problems Maternal Grandfather     No Known Problems Paternal Grandmother     No Known Problems Paternal Grandfather     No Known Problems Brother     No Known Problems Brother     No Known Problems Brother     No Known Problems Daughter      Social History     Socioeconomic History    Marital status: /Civil Union     Spouse name: Not on file    Number of children: 3    Years of education: Not on file    Highest education level: Not on file   Occupational History    Not on file   Social Needs    Financial resource strain: Not on file    Food insecurity:     Worry: Not on file     Inability: Not on file    Transportation needs:     Medical: Not on file     Non-medical: Not on file   Tobacco Use    Smoking status: Never Smoker    Smokeless tobacco: Never Used   Substance and Sexual Activity    Alcohol use: No    Drug use: No    Sexual activity: Yes     Partners: Male   Lifestyle    Physical activity:     Days per week: Not on file     Minutes per session: Not on file    Stress: Not on file   Relationships    Social connections:     Talks on phone: Not on file     Gets together: Not on file     Attends Religion service: Not on file     Active member of club or organization: Not on file     Attends meetings of clubs or organizations: Not on file     Relationship status: Not on file    Intimate partner violence:     Fear of current or ex partner: Not on file     Emotionally abused: Not on file     Physically abused: Not on file     Forced sexual activity: Not on file   Other Topics Concern    Not on file   Social History Narrative    Not on file       Current Outpatient Medications:     albuterol (PROAIR HFA) 90 mcg/act inhaler, Inhale as needed, Disp: , Rfl:     cetirizine (ZyrTEC) 10 MG chewable tablet, Chew 10 mg daily as needed , Disp: , Rfl:     Cholecalciferol (VITAMIN D3) 1000 units CHEW, Chew 1 tablet daily, Disp: , Rfl:     fluticasone (FLONASE) 50 mcg/act nasal spray, 1 spray into each nostril daily as needed , Disp: , Rfl:     intrauterine copper (PARAGARD) IUD, 1 each by Intrauterine route once, Disp: , Rfl:     multivitamin (THERAGRAN) TABS, Take 1 tablet by mouth daily, Disp: , Rfl:     rizatriptan (MAXALT-MLT) 10 MG disintegrating tablet, DISSOLVE ONE TABLET IN MOUTH at onset of headache  May repeat in two hours if ineffective, Disp: 12 tablet, Rfl: 0    tamoxifen (NOLVADEX) 20 mg tablet, Take 1 tablet (20 mg total) by mouth 2 (two) times a day (Patient taking differently: Take 20 mg by mouth daily ), Disp: 90 tablet, Rfl: 1    TURMERIC CURCUMIN PO, Take by mouth daily, Disp: , Rfl:   No Known Allergies    Physical Exam:     Vitals:    11/20/19 0814   BP: 120/80   Pulse: 85   Resp: 16   Temp: 98 7 °F (37 1 °C)     Physical Exam   Constitutional: She is oriented to person, place, and time  She appears well-developed and well-nourished  HENT:   Head: Normocephalic and atraumatic  Mouth/Throat: Oropharynx is clear and moist    Eyes: Pupils are equal, round, and reactive to light  EOM are normal    Neck: Normal range of motion  Neck supple  No JVD present  No tracheal deviation present  No thyromegaly present  Cardiovascular: Normal rate, regular rhythm, normal heart sounds and intact distal pulses  Exam reveals no gallop and no friction rub  No murmur heard  Pulmonary/Chest: Effort normal and breath sounds normal  No respiratory distress  She has no wheezes  She has no rales       Both breasts were examined in the sitting and supine position  There are no worrisome skin lesions, no nipple retraction and no nipple discharge  There are no dominant masses, axillary adenopathy or supraclavicular adenopathy on either side  Abdominal: Soft  She exhibits no distension and no mass  There is no hepatomegaly  There is no tenderness  There is no rebound and no guarding  Musculoskeletal: Normal range of motion  She exhibits no edema or tenderness  Lymphadenopathy:     She has no cervical adenopathy  Neurological: She is alert and oriented to person, place, and time  No cranial nerve deficit  Skin: Skin is warm and dry  No rash noted  No erythema  Psychiatric: She has a normal mood and affect  Her behavior is normal    Vitals reviewed  Results & Discussion:   There is no evidence or recurrent disease  Advance Care Planning/Advance Directives:  I discussed the disease status, treatment plans and follow-up with the patient

## 2020-01-19 DIAGNOSIS — Z17.0 MALIGNANT NEOPLASM OF UPPER-INNER QUADRANT OF LEFT BREAST IN FEMALE, ESTROGEN RECEPTOR POSITIVE (HCC): ICD-10-CM

## 2020-01-19 DIAGNOSIS — C50.212 MALIGNANT NEOPLASM OF UPPER-INNER QUADRANT OF LEFT BREAST IN FEMALE, ESTROGEN RECEPTOR POSITIVE (HCC): ICD-10-CM

## 2020-01-20 RX ORDER — TAMOXIFEN CITRATE 20 MG/1
20 TABLET ORAL DAILY
Qty: 90 TABLET | Refills: 1 | Status: SHIPPED | OUTPATIENT
Start: 2020-01-20 | End: 2020-08-03

## 2020-02-07 ENCOUNTER — OFFICE VISIT (OUTPATIENT)
Dept: FAMILY MEDICINE CLINIC | Facility: CLINIC | Age: 45
End: 2020-02-07
Payer: COMMERCIAL

## 2020-02-07 VITALS
TEMPERATURE: 98.8 F | OXYGEN SATURATION: 98 % | DIASTOLIC BLOOD PRESSURE: 70 MMHG | HEIGHT: 62 IN | SYSTOLIC BLOOD PRESSURE: 110 MMHG | WEIGHT: 153 LBS | BODY MASS INDEX: 28.16 KG/M2 | RESPIRATION RATE: 18 BRPM | HEART RATE: 85 BPM

## 2020-02-07 DIAGNOSIS — J45.20 MILD INTERMITTENT ASTHMA WITHOUT COMPLICATION: Primary | ICD-10-CM

## 2020-02-07 DIAGNOSIS — Z13.0 SCREENING FOR DEFICIENCY ANEMIA: ICD-10-CM

## 2020-02-07 DIAGNOSIS — E55.9 VITAMIN D DEFICIENCY: ICD-10-CM

## 2020-02-07 DIAGNOSIS — G43.809 OTHER MIGRAINE WITHOUT STATUS MIGRAINOSUS, NOT INTRACTABLE: ICD-10-CM

## 2020-02-07 DIAGNOSIS — Z13.6 SCREENING FOR CARDIOVASCULAR CONDITION: ICD-10-CM

## 2020-02-07 PROCEDURE — 3008F BODY MASS INDEX DOCD: CPT | Performed by: FAMILY MEDICINE

## 2020-02-07 PROCEDURE — 1036F TOBACCO NON-USER: CPT | Performed by: FAMILY MEDICINE

## 2020-02-07 PROCEDURE — 99213 OFFICE O/P EST LOW 20 MIN: CPT | Performed by: FAMILY MEDICINE

## 2020-02-07 RX ORDER — ALBUTEROL SULFATE 90 UG/1
2 AEROSOL, METERED RESPIRATORY (INHALATION) EVERY 4 HOURS PRN
Qty: 1 INHALER | Refills: 2 | Status: SHIPPED | OUTPATIENT
Start: 2020-02-07 | End: 2022-01-24 | Stop reason: SDUPTHER

## 2020-02-07 NOTE — PROGRESS NOTES
Assessment/Plan:    1  Mild intermittent asthma without complication  Assessment & Plan:  Well controlled     Orders:  -     albuterol (PROAIR HFA) 90 mcg/act inhaler; Inhale 2 puffs every 4 (four) hours as needed for wheezing    2  Other migraine without status migrainosus, not intractable  Assessment & Plan:  Well controlled       3  Vitamin D deficiency  -     Vitamin D 25 hydroxy; Future  -     Vitamin D 25 hydroxy    4  Screening for cardiovascular condition  -     Comprehensive metabolic panel; Future  -     Lipid Panel with Direct LDL reflex; Future  -     Comprehensive metabolic panel  -     Lipid Panel with Direct LDL reflex    5  Screening for deficiency anemia  -     CBC; Future  -     CBC    BMI Counseling: Body mass index is 27 98 kg/m²  The BMI is above normal  Exercise recommendations include exercising 3-5 times per week  There are no Patient Instructions on file for this visit  Return in about 6 months (around 8/7/2020) for Annual physical     Subjective:      Patient ID: Ramon Monsivais is a 53 y o  female  Chief Complaint   Patient presents with    Follow-up     medication review  [id number]       She has started a keto diet  She has lost weight  Her headaches have been good  She has not had any migraines  Asthma - she has not needed her inhaler in a while  The following portions of the patient's history were reviewed and updated as appropriate:  past social history    Review of Systems   Constitutional: Negative  Respiratory: Negative  Cardiovascular: Negative            Current Outpatient Medications   Medication Sig Dispense Refill    albuterol (PROAIR HFA) 90 mcg/act inhaler Inhale 2 puffs every 4 (four) hours as needed for wheezing 1 Inhaler 2    cetirizine (ZyrTEC) 10 MG chewable tablet Chew 10 mg daily as needed       Cholecalciferol (VITAMIN D3) 1000 units CHEW Chew 1 tablet daily      fluticasone (FLONASE) 50 mcg/act nasal spray 1 spray into each nostril daily as needed       intrauterine copper (PARAGARD) IUD 1 each by Intrauterine route once      multivitamin (THERAGRAN) TABS Take 1 tablet by mouth daily      rizatriptan (MAXALT-MLT) 10 MG disintegrating tablet DISSOLVE ONE TABLET IN MOUTH at onset of headache  May repeat in two hours if ineffective 12 tablet 0    tamoxifen (NOLVADEX) 20 mg tablet Take 1 tablet (20 mg total) by mouth daily 90 tablet 1    TURMERIC CURCUMIN PO Take by mouth daily       No current facility-administered medications for this visit  Objective:    /70   Pulse 85   Temp 98 8 °F (37 1 °C)   Resp 18   Ht 5' 2" (1 575 m)   Wt 69 4 kg (153 lb)   LMP 01/15/2020 (Approximate)   SpO2 98%   BMI 27 98 kg/m²      Physical Exam   Constitutional: She appears well-developed and well-nourished  HENT:   Head: Normocephalic and atraumatic  Right Ear: External ear normal    Left Ear: External ear normal    Mouth/Throat: Oropharynx is clear and moist    Cardiovascular: Normal rate, regular rhythm and normal heart sounds  Exam reveals no friction rub  No murmur heard  Pulmonary/Chest: Effort normal and breath sounds normal  No respiratory distress  She has no wheezes  She has no rales  Musculoskeletal: She exhibits no edema or deformity  Nursing note and vitals reviewed            Truong Brasher DO

## 2020-02-12 ENCOUNTER — TELEPHONE (OUTPATIENT)
Dept: SURGICAL ONCOLOGY | Facility: CLINIC | Age: 45
End: 2020-02-12

## 2020-02-12 NOTE — TELEPHONE ENCOUNTER
Patient called the office with new breast concerns  States that in the beginning of January, she noticed changes in the lower portion of her left breast  States that she feels pain in the area every time she raises her left arm and reports that it feels "hard " Patient states that her left breast appears "wright" than the right  Denies redness, fevers, or any trauma to the area  Appointment made for patient to see JACIEL Gutierrez on 2/21/2020 at 8:30  Patient verbalized understanding of appointment details and locations

## 2020-02-13 LAB
25(OH)D3+25(OH)D2 SERPL-MCNC: 23.5 NG/ML (ref 30–100)
ALBUMIN SERPL-MCNC: 4.1 G/DL (ref 3.8–4.8)
ALBUMIN/GLOB SERPL: 1.6 {RATIO} (ref 1.2–2.2)
ALP SERPL-CCNC: 32 IU/L (ref 39–117)
ALT SERPL-CCNC: 14 IU/L (ref 0–32)
AST SERPL-CCNC: 17 IU/L (ref 0–40)
BILIRUB SERPL-MCNC: 0.3 MG/DL (ref 0–1.2)
BUN SERPL-MCNC: 13 MG/DL (ref 6–24)
BUN/CREAT SERPL: 20 (ref 9–23)
CALCIUM SERPL-MCNC: 9.4 MG/DL (ref 8.7–10.2)
CHLORIDE SERPL-SCNC: 105 MMOL/L (ref 96–106)
CHOLEST SERPL-MCNC: 153 MG/DL (ref 100–199)
CO2 SERPL-SCNC: 22 MMOL/L (ref 20–29)
CREAT SERPL-MCNC: 0.64 MG/DL (ref 0.57–1)
ERYTHROCYTE [DISTWIDTH] IN BLOOD BY AUTOMATED COUNT: 14.3 % (ref 11.7–15.4)
GLOBULIN SER-MCNC: 2.6 G/DL (ref 1.5–4.5)
GLUCOSE SERPL-MCNC: 96 MG/DL (ref 65–99)
HCT VFR BLD AUTO: 37.8 % (ref 34–46.6)
HDLC SERPL-MCNC: 85 MG/DL
HGB BLD-MCNC: 12.3 G/DL (ref 11.1–15.9)
LDLC SERPL CALC-MCNC: 52 MG/DL (ref 0–99)
LDLC/HDLC SERPL: 0.6 RATIO (ref 0–3.2)
MCH RBC QN AUTO: 29.6 PG (ref 26.6–33)
MCHC RBC AUTO-ENTMCNC: 32.5 G/DL (ref 31.5–35.7)
MCV RBC AUTO: 91 FL (ref 79–97)
MICRODELETION SYND BLD/T FISH: NORMAL
PLATELET # BLD AUTO: 228 X10E3/UL (ref 150–450)
POTASSIUM SERPL-SCNC: 4.1 MMOL/L (ref 3.5–5.2)
PROT SERPL-MCNC: 6.7 G/DL (ref 6–8.5)
RBC # BLD AUTO: 4.16 X10E6/UL (ref 3.77–5.28)
SL AMB EGFR AFRICAN AMERICAN: 124 ML/MIN/1.73
SL AMB EGFR NON AFRICAN AMERICAN: 107 ML/MIN/1.73
SL AMB VLDL CHOLESTEROL CALC: 16 MG/DL (ref 5–40)
SODIUM SERPL-SCNC: 141 MMOL/L (ref 134–144)
TRIGL SERPL-MCNC: 82 MG/DL (ref 0–149)
WBC # BLD AUTO: 4.5 X10E3/UL (ref 3.4–10.8)

## 2020-02-19 ENCOUNTER — OFFICE VISIT (OUTPATIENT)
Dept: HEMATOLOGY ONCOLOGY | Facility: CLINIC | Age: 45
End: 2020-02-19
Payer: COMMERCIAL

## 2020-02-19 VITALS
OXYGEN SATURATION: 98 % | DIASTOLIC BLOOD PRESSURE: 72 MMHG | RESPIRATION RATE: 18 BRPM | HEIGHT: 62 IN | SYSTOLIC BLOOD PRESSURE: 116 MMHG | BODY MASS INDEX: 28.71 KG/M2 | HEART RATE: 71 BPM | WEIGHT: 156 LBS | TEMPERATURE: 99.4 F

## 2020-02-19 DIAGNOSIS — Z17.0 MALIGNANT NEOPLASM OF UPPER-INNER QUADRANT OF LEFT BREAST IN FEMALE, ESTROGEN RECEPTOR POSITIVE (HCC): Primary | ICD-10-CM

## 2020-02-19 DIAGNOSIS — C50.212 MALIGNANT NEOPLASM OF UPPER-INNER QUADRANT OF LEFT BREAST IN FEMALE, ESTROGEN RECEPTOR POSITIVE (HCC): Primary | ICD-10-CM

## 2020-02-19 PROCEDURE — 99214 OFFICE O/P EST MOD 30 MIN: CPT | Performed by: INTERNAL MEDICINE

## 2020-02-19 PROCEDURE — 3008F BODY MASS INDEX DOCD: CPT | Performed by: INTERNAL MEDICINE

## 2020-02-19 PROCEDURE — 1036F TOBACCO NON-USER: CPT | Performed by: INTERNAL MEDICINE

## 2020-02-19 NOTE — PROGRESS NOTES
Hematology / Oncology Outpatient Follow Up Note    Azalea Farah 53 y o  female WEL:[birth year][address] [facility]:[id number]         Date:  2/19/2020    Assessment / Plan:  A 23-year-old premenopausal woman with stage IA left breast cancer, grade 1, invasive lobular histology, ER 90 % positive, NY 40 % positive, HER2 negative disease  She underwent lumpectomy and sentinel lymph node biopsy, resulting in DIEGO  She is negative for BRCA gene mutation  She is currently on adjuvant hormonal therapy with tamoxifen with no toxicity  Clinically, she has no evidence recurrent disease  I recommended her to continue with tamoxifen 20 mg daily  She is in agreement with my recommendation  I will see her again in a year for routine follow-up              Subjective:      HPI:  A 23-year-old premenopausal woman was found to have abnormality in her left breast based on a screening mammography  She underwent left breast biopsy in June 7, 2019 which showed invasive lobular carcinoma, grade 1  This was ER 90 % positive, NY 40 % positive, HER2 negative disease  She underwent genetic testing which was negative for BRCA gene mutation  She underwent lumpectomy and sentinel lymph node biopsy by Dr Deisy Jiménez in July 30, 2019  She had 11 millimeter of invasive lobular carcinoma, grade 1  Lymphovascular invasion was not present  3 sentinel lymph nodes were all negative for metastatic disease  She presents today to discuss adjuvant treatment options  She has no breast related symptomatology except minor soreness in the left axilla  She denied any pain  She has no respiratory symptoms  She has stable weight  She denied fever, chills or night sweats  She has no family history of breast cancer  She is a lifetime never smoker    Her performance status is normal              Interval History:   A 23-year-old premenopausal woman with stage IA left breast cancer, grade 1, invasive lobular histology, ER 90 % positive, NY 40 % positive, HER2 negative disease  She underwent lumpectomy and sentinel lymph node biopsy, resulting in DIEGO  She is negative for BRCA gene mutation  Since August 2019, she has been on adjuvant hormonal therapy with tamoxifen  She came in today for routine follow-up  She has some discomfort in the left breast after the radiation  She denied any pain  She has no complaint hot flashes  She has regular menstrual cycle  She denied any swelling lower extremity or respiratory symptoms  Her performance status is normal            Objective:      Primary Diagnosis:     1  Left breast cancer, stage IA (pT1c, pN0, M0) grade 1, invasive lobular histology, ER 90 %, CO 40 %, HER2 negative disease  Diagnosed in July 2019      2  BRCA gene mutation negative      Cancer Staging:  Cancer Staging  Malignant neoplasm of upper-inner quadrant of left breast in female, estrogen receptor positive ([facility] [address] )  Staging form: Breast, AJCC 8th Edition  - Clinical: Stage IA (cT1c, cN0, cM0, G1, ER+, CO+, HER2-) - Signed by Tommy Mcallister MD on 8/14/2019  Laterality: Left  Histologic grading system: 3 grade system           Previous Hematologic/ Oncologic Treatment:            Current Hematologic/ Oncologic Treatment:       Adjuvant hormonal therapy with tamoxifen since August 2019       Disease Status:      DIEGO status post lumpectomy and sentinel lymph node biopsy      Test Results:     Pathology:     1 1cm of invasive lobular carcinoma, grade 1, lymphovascular invasion was absent  3 sentinel lymph nodes were negative for metastatic disease  ER 90 % positive, CO 40 % positive, HER2 negative disease  Stage IA (pT1c, pN0, M0)     Radiology:     Chest x-ray was negative for pulmonary disease      Laboratory:     See below      Physical Exam:        General Appearance:    Alert, oriented          Eyes:    PERRL   Ears:    Normal external ear canals, both ears   Nose:   Nares normal, septum midline   Throat:   Mucosa moist  Pharynx without injection      Neck: Supple         Lungs:     Clear to auscultation bilaterally   Chest Wall:    No tenderness or deformity    Heart:    Regular rate and rhythm         Abdomen:     Soft, non-tender, bowel sounds +, no organomegaly               Extremities:   Extremities no cyanosis or edema         Skin:   no rash or icterus  Lymph nodes:   Cervical, supraclavicular, and axillary nodes normal   Neurologic:   CNII-XII intact, normal strength, sensation and reflexes     Throughout             Breast exam:   Lumpectomy scar at 12:00 position in her left breast with no palpable abnormality  Right breast exam is negative  ROS: Review of Systems   All other systems reviewed and are negative  Imaging: No results found  Labs:   Lab Results   Component Value Date    WBC 4 5 02/12/2020    HGB 12 3 02/12/2020    HCT 37 8 02/12/2020    MCV 91 02/12/2020     02/12/2020     Lab Results   Component Value Date     10/26/2017    K 4 1 02/12/2020     02/12/2020    CO2 22 02/12/2020    BUN 13 02/12/2020    CREATININE 0 64 02/12/2020    GLUCOSE 93 10/26/2017    CALCIUM 9 6 10/26/2017    AST 17 02/12/2020    ALT 14 02/12/2020    ALKPHOS 37 (L) 10/26/2017    PROT 7 1 10/26/2017    BILITOT 0 4 10/26/2017         Current Medications: Reviewed  Allergies: Reviewed  PMH/FH/SH:  Reviewed      Vital Sign:    Body surface area is 1 72 meters squared      Wt Readings from Last 3 Encounters:   02/19/20 70 8 kg (156 lb)   02/07/20 69 4 kg (153 lb)   11/20/19 72 1 kg (159 lb)        Temp Readings from Last 3 Encounters:   02/19/20 99 4 °F (37 4 °C) (Tympanic Core)   02/07/20 98 8 °F (37 1 °C)   11/20/19 98 7 °F (37 1 °C) (Oral)        BP Readings from Last 3 Encounters:   02/19/20 116/72   02/07/20 110/70   11/20/19 120/80         Pulse Readings from Last 3 Encounters:   02/19/20 71   02/07/20 85   11/20/19 85     @LASTSAO2(3)@

## 2020-03-11 ENCOUNTER — HOSPITAL ENCOUNTER (OUTPATIENT)
Dept: RADIOLOGY | Facility: HOSPITAL | Age: 45
Discharge: HOME | End: 2020-03-11
Attending: SURGERY
Payer: COMMERCIAL

## 2020-03-11 VITALS — HEIGHT: 62 IN | WEIGHT: 156 LBS | BODY MASS INDEX: 28.71 KG/M2

## 2020-03-11 DIAGNOSIS — Z17.0 MALIGNANT NEOPLASM OF UPPER-INNER QUADRANT OF LEFT BREAST IN FEMALE, ESTROGEN RECEPTOR POSITIVE (HCC): ICD-10-CM

## 2020-03-11 DIAGNOSIS — C50.212 MALIGNANT NEOPLASM OF UPPER-INNER QUADRANT OF LEFT BREAST IN FEMALE, ESTROGEN RECEPTOR POSITIVE (HCC): ICD-10-CM

## 2020-03-11 PROCEDURE — 77066 DX MAMMO INCL CAD BI: CPT

## 2020-03-11 PROCEDURE — G0279 TOMOSYNTHESIS, MAMMO: HCPCS

## 2020-03-13 ENCOUNTER — TELEPHONE (OUTPATIENT)
Dept: FAMILY MEDICINE CLINIC | Facility: CLINIC | Age: 45
End: 2020-03-13

## 2020-03-13 NOTE — TELEPHONE ENCOUNTER
----- Message from Andrew Portillo LPN sent at 9/80/5217  8:36 AM EDT -----  Regarding: FW: Non-Urgent Medical Question  Contact: [phone]      ----- Message -----  From: Rajeev Chavez  Sent: 3/13/2020   1:35 AM EDT  To: THE [facility] Clinical  Subject: Non-Urgent Amanda Stubbs  I'm actually emailing you for Emiliano Haynes, as he is not able to log into his account to send you a message  He wanted to find out if he needs to come in, or be tested, since he's been going into the office in [place] and his boss drives in daily from a section of [place] which currently has positive cases of COVID-19  He has been experiencing a constant dry mouth with a scratchy throat, sinus pressure around his eyes, and occasional dizziness for the last 2 days, but no fever  He has stayed home Thursday and today, just in case  Please let us know what you think he should do  Thanks

## 2020-03-13 NOTE — TELEPHONE ENCOUNTER
Pt is not describing cough, fever and SOB at this time so as far as covid is concerned I do not see any reason to test   However Sounds like he could be sick, not covid, and may need evaluation for that

## 2020-03-25 NOTE — TELEPHONE ENCOUNTER
Closed this note since it was copied/pasted into Herb's chart  (see that telephone message which was addressed)  No further action is required [address] [place] [address]

## 2020-04-20 ENCOUNTER — TELEMEDICINE (OUTPATIENT)
Dept: RADIATION ONCOLOGY | Facility: HOSPITAL | Age: 45
End: 2020-04-20
Attending: RADIOLOGY

## 2020-04-20 DIAGNOSIS — C50.212 MALIGNANT NEOPLASM OF UPPER-INNER QUADRANT OF LEFT BREAST IN FEMALE, ESTROGEN RECEPTOR POSITIVE (HCC): Primary | ICD-10-CM

## 2020-04-20 DIAGNOSIS — Z17.0 MALIGNANT NEOPLASM OF UPPER-INNER QUADRANT OF LEFT BREAST IN FEMALE, ESTROGEN RECEPTOR POSITIVE (HCC): Primary | ICD-10-CM

## 2020-06-02 ENCOUNTER — OFFICE VISIT (OUTPATIENT)
Dept: SURGICAL ONCOLOGY | Facility: CLINIC | Age: 45
End: 2020-06-02
Payer: COMMERCIAL

## 2020-06-02 VITALS
SYSTOLIC BLOOD PRESSURE: 132 MMHG | WEIGHT: 161 LBS | TEMPERATURE: 98.4 F | HEIGHT: 62 IN | HEART RATE: 84 BPM | DIASTOLIC BLOOD PRESSURE: 80 MMHG | BODY MASS INDEX: 29.63 KG/M2 | RESPIRATION RATE: 14 BRPM

## 2020-06-02 DIAGNOSIS — C50.212 MALIGNANT NEOPLASM OF UPPER-INNER QUADRANT OF LEFT BREAST IN FEMALE, ESTROGEN RECEPTOR POSITIVE (HCC): Primary | ICD-10-CM

## 2020-06-02 DIAGNOSIS — Z17.0 MALIGNANT NEOPLASM OF UPPER-INNER QUADRANT OF LEFT BREAST IN FEMALE, ESTROGEN RECEPTOR POSITIVE (HCC): Primary | ICD-10-CM

## 2020-06-02 DIAGNOSIS — Z79.810 USE OF TAMOXIFEN (NOLVADEX): ICD-10-CM

## 2020-06-02 PROCEDURE — 1036F TOBACCO NON-USER: CPT | Performed by: NURSE PRACTITIONER

## 2020-06-02 PROCEDURE — 3008F BODY MASS INDEX DOCD: CPT | Performed by: NURSE PRACTITIONER

## 2020-06-02 PROCEDURE — 99214 OFFICE O/P EST MOD 30 MIN: CPT | Performed by: NURSE PRACTITIONER

## 2020-07-14 ENCOUNTER — DOCTOR'S OFFICE (OUTPATIENT)
Dept: URBAN - METROPOLITAN AREA CLINIC 137 | Facility: CLINIC | Age: 45
Setting detail: OPHTHALMOLOGY
End: 2020-07-14
Payer: COMMERCIAL

## 2020-07-14 DIAGNOSIS — H52.13: ICD-10-CM

## 2020-07-14 DIAGNOSIS — H52.4: ICD-10-CM

## 2020-07-14 DIAGNOSIS — H52.223: ICD-10-CM

## 2020-07-14 PROCEDURE — 92014 COMPRE OPH EXAM EST PT 1/>: CPT | Performed by: OPTOMETRIST

## 2020-07-14 PROCEDURE — 92310 CONTACT LENS FITTING OU: CPT | Performed by: OPTOMETRIST

## 2020-07-14 ASSESSMENT — AXIALLENGTH_DERIVED
OS_AL: 26.0995
OD_AL: 26.2657
OD_AL: 26.448
OS_AL: 26.0995

## 2020-07-14 ASSESSMENT — REFRACTION_MANIFEST
OS_SPHERE: -4.00
OD_AXIS: 10
OS_AXIS: 175
OS_VA1: 20/20
OS_ADD: +2.00
OU_VA: 20/20
OD_SPHERE: -4.25
OD_CYLINDER: -1.00
OD_VA1: 20/20
OS_CYLINDER: -1.75
OD_ADD: +2.00

## 2020-07-14 ASSESSMENT — REFRACTION_CURRENTRX
OD_CYLINDER: -1.25
OD_VPRISM_DIRECTION: PROGS
OS_CYLINDER: -1.50
OS_OVR_VA: 20/
OD_SPHERE: -4.00
OS_AXIS: 003
OS_SPHERE: -4.00
OD_AXIS: 014
OS_VPRISM_DIRECTION: PROGS
OD_OVR_VA: 20/
OD_ADD: +1.75
OS_ADD: +1.75

## 2020-07-14 ASSESSMENT — REFRACTION_AUTOREFRACTION
OD_SPHERE: -4.75
OS_AXIS: 001
OS_SPHERE: -4.25
OS_CYLINDER: -1.25
OD_AXIS: 014
OD_CYLINDER: -0.75

## 2020-07-14 ASSESSMENT — SPHEQUIV_DERIVED
OS_SPHEQUIV: -4.875
OD_SPHEQUIV: -4.75
OD_SPHEQUIV: -5.125
OS_SPHEQUIV: -4.875

## 2020-07-14 ASSESSMENT — KERATOMETRY
OD_AXISANGLE_DEGREES: 097
OS_K2POWER_DIOPTERS: 43.00
OS_AXISANGLE_DEGREES: 088
OD_K2POWER_DIOPTERS: 42.25
OS_K1POWER_DIOPTERS: 42.00
OD_K1POWER_DIOPTERS: 41.75

## 2020-07-14 ASSESSMENT — VISUAL ACUITY
OS_BCVA: 20/20
OD_BCVA: 20/30-2

## 2020-07-14 ASSESSMENT — CONFRONTATIONAL VISUAL FIELD TEST (CVF)
OS_FINDINGS: FULL
OD_FINDINGS: FULL

## 2020-07-15 ENCOUNTER — OPTICAL OFFICE (OUTPATIENT)
Dept: URBAN - METROPOLITAN AREA CLINIC 146 | Facility: CLINIC | Age: 45
Setting detail: OPHTHALMOLOGY
End: 2020-07-15
Payer: COMMERCIAL

## 2020-07-15 DIAGNOSIS — H52.223: ICD-10-CM

## 2020-07-15 PROCEDURE — V2799 MISC VISION ITEM OR SERVICE: HCPCS | Performed by: OPTOMETRIST

## 2020-07-15 PROCEDURE — V2744 TINT PHOTOCHROMATIC LENS/ES: HCPCS | Performed by: OPTOMETRIST

## 2020-07-15 PROCEDURE — V2784 LENS POLYCARB OR EQUAL: HCPCS | Performed by: OPTOMETRIST

## 2020-07-15 PROCEDURE — V2203 LENS SPHCYL BIFOCAL 4.00D/.1: HCPCS | Performed by: OPTOMETRIST

## 2020-07-15 PROCEDURE — V2020 VISION SVCS FRAMES PURCHASES: HCPCS | Performed by: OPTOMETRIST

## 2020-07-15 PROCEDURE — V2781 PROGRESSIVE LENS PER LENS: HCPCS | Performed by: OPTOMETRIST

## 2020-08-02 DIAGNOSIS — C50.212 MALIGNANT NEOPLASM OF UPPER-INNER QUADRANT OF LEFT BREAST IN FEMALE, ESTROGEN RECEPTOR POSITIVE (HCC): ICD-10-CM

## 2020-08-02 DIAGNOSIS — Z17.0 MALIGNANT NEOPLASM OF UPPER-INNER QUADRANT OF LEFT BREAST IN FEMALE, ESTROGEN RECEPTOR POSITIVE (HCC): ICD-10-CM

## 2020-08-03 RX ORDER — TAMOXIFEN CITRATE 20 MG/1
TABLET ORAL
Qty: 90 TABLET | Refills: 1 | Status: SHIPPED | OUTPATIENT
Start: 2020-08-03 | End: 2021-01-29

## 2020-08-11 ENCOUNTER — OFFICE VISIT (OUTPATIENT)
Dept: FAMILY MEDICINE CLINIC | Facility: CLINIC | Age: 45
End: 2020-08-11
Payer: COMMERCIAL

## 2020-08-11 VITALS
HEART RATE: 72 BPM | TEMPERATURE: 98.2 F | BODY MASS INDEX: 29.08 KG/M2 | SYSTOLIC BLOOD PRESSURE: 118 MMHG | DIASTOLIC BLOOD PRESSURE: 74 MMHG | RESPIRATION RATE: 16 BRPM | HEIGHT: 62 IN | WEIGHT: 158 LBS

## 2020-08-11 DIAGNOSIS — Z13.6 SCREENING FOR CARDIOVASCULAR CONDITION: ICD-10-CM

## 2020-08-11 DIAGNOSIS — Z13.0 SCREENING FOR DEFICIENCY ANEMIA: ICD-10-CM

## 2020-08-11 DIAGNOSIS — E55.9 VITAMIN D DEFICIENCY: ICD-10-CM

## 2020-08-11 DIAGNOSIS — Z00.00 ANNUAL PHYSICAL EXAM: Primary | ICD-10-CM

## 2020-08-11 PROCEDURE — 1036F TOBACCO NON-USER: CPT | Performed by: FAMILY MEDICINE

## 2020-08-11 PROCEDURE — 99396 PREV VISIT EST AGE 40-64: CPT | Performed by: FAMILY MEDICINE

## 2020-08-11 PROCEDURE — 3008F BODY MASS INDEX DOCD: CPT | Performed by: FAMILY MEDICINE

## 2020-08-11 PROCEDURE — 3725F SCREEN DEPRESSION PERFORMED: CPT | Performed by: FAMILY MEDICINE

## 2020-08-11 RX ORDER — COPPER 313.4 MG/1
1 INTRAUTERINE DEVICE INTRAUTERINE ONCE
COMMUNITY
End: 2021-06-10

## 2020-08-11 NOTE — PROGRESS NOTES
FAMILY PRACTICE HEALTH MAINTENANCE OFFICE VISIT  [facility]    NAME: Zakia Smith  AGE: 50 y o  SEX: female  : 1975     DATE: 2020    Assessment and Plan     1  Annual physical exam    2  Vitamin D deficiency  -     Vitamin D 25 hydroxy; Future  -     Vitamin D 25 hydroxy    3  Screening for cardiovascular condition  -     Comprehensive metabolic panel; Future  -     Lipid Panel with Direct LDL reflex; Future  -     Comprehensive metabolic panel  -     Lipid Panel with Direct LDL reflex    4  Screening for deficiency anemia  -     CBC; Future  -     CBC        · Patient Counseling:   · Nutrition: Stressed importance of a well balanced diet, moderation of sodium/saturated fat, caloric balance and sufficient intake of fiber  · Exercise: Stressed the importance of regular exercise with a goal of 150 minutes per week  · Dental Health: Discussed daily flossing and brushing and regular dental visits     · Immunizations reviewed: Up To Date  · Discussed benefits of:  Mammogram , Cervical Cancer screening and Screening labs   BMI Counseling: Body mass index is 28 9 kg/m²  Discussed with patient's BMI with her  The BMI is above normal  Exercise recommendations include exercising 3-5 times per week  Return in about 6 months (around 2021) for Next scheduled follow up  Chief Complaint     Chief Complaint   Patient presents with    Physical Exam     no  pap  bchurch lpn       History of Present Illness     She has been working from home         Well Adult Physical   Patient here for a comprehensive physical exam       Diet and Physical Activity  Diet: well balanced diet  Exercise: frequently      Depression Screen  PHQ-9 Depression Screening    PHQ-9:    Frequency of the following problems over the past two weeks:       Little interest or pleasure in doing things:  0 - not at all  Feeling down, depressed, or hopeless:  0 - not at all  PHQ-2 Score:  0 General Health  Hearing: Normal:  bilateral  Vision: wears glasses  Dental: regular dental visits    Reproductive Health  Regular Periods      The following portions of the patient's history were reviewed and updated as appropriate: allergies, current medications, past family history, past medical history, past social history, past surgical history and problem list     Review of Systems     Review of Systems   Constitutional: Negative  Respiratory: Negative  Cardiovascular: Negative  Past Medical History     Past Medical History:   Diagnosis Date    Adhesive capsulitis of right shoulder     Resolved 11/15/2016     Asthma     Benign paroxysmal vertigo, unspecified ear     Resolved 11/15/2016     BRCA gene mutation negative 06/2019    [facility]    Breast cancer ([facility] [address] ) 07/30/2019    Cellulitis of leg, except foot     Resolved 12/10/2014     Hx of radiation therapy 08/01/2019    Lymphadenitis     Resolved 12/10/2014     Snoring        Past Surgical History     Past Surgical History:   Procedure Laterality Date    BREAST BIOPSY Left 2019    BREAST LUMPECTOMY Left 7/30/2019    Procedure: BREAST LUMPECTOMY; BREAST NEEDLE LOCALIZATION (NEEDLE LOC AT 0800); Surgeon: Gianni Cárdenas MD;  [facility];  Service: Surgical Oncology    INTRAUTERINE DEVICE INSERTION  2019    LYMPH NODE BIOPSY Left 7/30/2019    Procedure: SENTINEL LYMPH NODE BIOPSY; LYMPHATIC MAPPING WITH BLUE DYE AND RADIOACTIVE DYE (INJECT AT 0930 BY DR LEAVITT IN THE OR);   Surgeon: Gianni Cárdenas MD;  [facility];  Service: Surgical Oncology    MAMMO NEEDLE LOCALIZATION LEFT (ALL INC) Left 7/30/2019    SHOULDER SURGERY Right     Last assessed 12/29/2015     US GUIDED BREAST BIOPSY LEFT COMPLETE Left 6/7/2019    WISDOM TOOTH EXTRACTION         Social History     Social History     Socioeconomic History    Marital status: /Civil Union     Spouse name: None    Number of children: 3    Years of education: None    Highest education level: None   Occupational History    None   Social Needs    Financial resource strain: None    Food insecurity     Worry: None     Inability: None    Transportation needs     Medical: None     Non-medical: None   Tobacco Use    Smoking status: Never Smoker    Smokeless tobacco: Never Used   Substance and Sexual Activity    Alcohol use: No    Drug use: No    Sexual activity: Yes     Partners: Male   Lifestyle    Physical activity     Days per week: None     Minutes per session: None    Stress: None   Relationships    Social connections     Talks on phone: None     Gets together: None     Attends Worship service: None     Active member of club or organization: None     Attends meetings of clubs or organizations: None     Relationship status: None    Intimate partner violence     Fear of current or ex partner: None     Emotionally abused: None     Physically abused: None     Forced sexual activity: None   Other Topics Concern    None   Social History Narrative    None       Family History     Family History   Problem Relation Age of Onset    Alzheimer's disease Mother     COPD Father     No Known Problems Sister     No Known Problems Daughter     Alzheimer's disease Maternal Grandmother     No Known Problems Maternal Grandfather     No Known Problems Paternal Grandmother     No Known Problems Paternal Grandfather     No Known Problems Brother     No Known Problems Brother     No Known Problems Brother     No Known Problems Daughter        Current Medications       Current Outpatient Medications:     albuterol (PROAIR HFA) 90 mcg/act inhaler, Inhale 2 puffs every 4 (four) hours as needed for wheezing, Disp: 1 Inhaler, Rfl: 2    cetirizine (ZyrTEC) 10 MG chewable tablet, Chew 10 mg daily as needed , Disp: , Rfl:     Cholecalciferol (VITAMIN D3) 1000 units CHEW, Chew 1 tablet daily, Disp: , Rfl:     fluticasone (FLONASE) 50 mcg/act nasal spray, 1 spray into each nostril daily as needed , Disp: , Rfl:     intrauterine copper (PARAGARD) IUD, 1 each by Intrauterine route once, Disp: , Rfl:     intrauterine copper (PARAGARD) IUD, 1 each by Intrauterine route once, Disp: , Rfl:     multivitamin (THERAGRAN) TABS, Take 1 tablet by mouth daily, Disp: , Rfl:     rizatriptan (MAXALT-MLT) 10 MG disintegrating tablet, DISSOLVE ONE TABLET IN MOUTH at onset of headache  May repeat in two hours if ineffective, Disp: 12 tablet, Rfl: 0    tamoxifen (NOLVADEX) 20 mg tablet, TAKE ONE TABLET BY MOUTH ONE TIME DAILY , Disp: 90 tablet, Rfl: 1    TURMERIC CURCUMIN PO, Take by mouth daily, Disp: , Rfl:      Allergies     No Known Allergies    Objective     /74   Pulse 72   Temp 98 2 °F (36 8 °C)   Resp 16   Ht 5' 2" (1 575 m)   Wt 71 7 kg (158 lb)   LMP 08/09/2020 (Exact Date)   BMI 28 90 kg/m²      Physical Exam  Vitals signs and nursing note reviewed  Constitutional:       Appearance: She is well-developed  HENT:      Head: Normocephalic and atraumatic  Right Ear: External ear normal       Left Ear: External ear normal    Eyes:      Pupils: Pupils are equal, round, and reactive to light  Neck:      Musculoskeletal: Normal range of motion  Cardiovascular:      Rate and Rhythm: Normal rate and regular rhythm  Heart sounds: Normal heart sounds  No murmur  No friction rub  Pulmonary:      Effort: Pulmonary effort is normal  No respiratory distress  Breath sounds: Normal breath sounds  No wheezing or rales  Abdominal:      General: Bowel sounds are normal  There is no distension  Palpations: Abdomen is soft  There is no mass  Tenderness: There is no abdominal tenderness  There is no guarding or rebound  Musculoskeletal: Normal range of motion  Skin:     General: Skin is warm  Neurological:      Mental Status: She is alert and oriented to person, place, and time  Deep Tendon Reflexes: Reflexes are normal and symmetric             Vision Screening Comments: Declined, just had annual eye exam         Helga Briones, [address]

## 2020-12-10 ENCOUNTER — OFFICE VISIT (OUTPATIENT)
Dept: SURGICAL ONCOLOGY | Facility: CLINIC | Age: 45
End: 2020-12-10
Payer: COMMERCIAL

## 2020-12-10 VITALS
WEIGHT: 166 LBS | HEART RATE: 93 BPM | HEIGHT: 62 IN | DIASTOLIC BLOOD PRESSURE: 80 MMHG | TEMPERATURE: 97.2 F | BODY MASS INDEX: 30.55 KG/M2 | SYSTOLIC BLOOD PRESSURE: 122 MMHG | RESPIRATION RATE: 16 BRPM

## 2020-12-10 DIAGNOSIS — Z17.0 MALIGNANT NEOPLASM OF UPPER-INNER QUADRANT OF LEFT BREAST IN FEMALE, ESTROGEN RECEPTOR POSITIVE (HCC): Primary | ICD-10-CM

## 2020-12-10 DIAGNOSIS — C50.212 MALIGNANT NEOPLASM OF UPPER-INNER QUADRANT OF LEFT BREAST IN FEMALE, ESTROGEN RECEPTOR POSITIVE (HCC): Primary | ICD-10-CM

## 2020-12-10 DIAGNOSIS — Z79.810 USE OF TAMOXIFEN (NOLVADEX): ICD-10-CM

## 2020-12-10 PROCEDURE — 99214 OFFICE O/P EST MOD 30 MIN: CPT | Performed by: NURSE PRACTITIONER

## 2020-12-10 PROCEDURE — 3008F BODY MASS INDEX DOCD: CPT | Performed by: NURSE PRACTITIONER

## 2020-12-22 LAB
25(OH)D3+25(OH)D2 SERPL-MCNC: 27.5 NG/ML (ref 30–100)
ALBUMIN SERPL-MCNC: 4.1 G/DL (ref 3.8–4.8)
ALBUMIN/GLOB SERPL: 1.9 {RATIO} (ref 1.2–2.2)
ALP SERPL-CCNC: 42 IU/L (ref 39–117)
ALT SERPL-CCNC: 10 IU/L (ref 0–32)
AST SERPL-CCNC: 14 IU/L (ref 0–40)
BILIRUB SERPL-MCNC: 0.2 MG/DL (ref 0–1.2)
BUN SERPL-MCNC: 8 MG/DL (ref 6–24)
BUN/CREAT SERPL: 14 (ref 9–23)
CALCIUM SERPL-MCNC: 9.1 MG/DL (ref 8.7–10.2)
CHLORIDE SERPL-SCNC: 107 MMOL/L (ref 96–106)
CHOLEST SERPL-MCNC: 186 MG/DL (ref 100–199)
CO2 SERPL-SCNC: 24 MMOL/L (ref 20–29)
CREAT SERPL-MCNC: 0.59 MG/DL (ref 0.57–1)
ERYTHROCYTE [DISTWIDTH] IN BLOOD BY AUTOMATED COUNT: 13 % (ref 11.7–15.4)
GLOBULIN SER-MCNC: 2.2 G/DL (ref 1.5–4.5)
GLUCOSE SERPL-MCNC: 100 MG/DL (ref 65–99)
HCT VFR BLD AUTO: 34.4 % (ref 34–46.6)
HDLC SERPL-MCNC: 91 MG/DL
HGB BLD-MCNC: 11.2 G/DL (ref 11.1–15.9)
LDLC SERPL CALC-MCNC: 77 MG/DL (ref 0–99)
LDLC/HDLC SERPL: 0.8 RATIO (ref 0–3.2)
MCH RBC QN AUTO: 29.3 PG (ref 26.6–33)
MCHC RBC AUTO-ENTMCNC: 32.6 G/DL (ref 31.5–35.7)
MCV RBC AUTO: 90 FL (ref 79–97)
MICRODELETION SYND BLD/T FISH: NORMAL
PLATELET # BLD AUTO: 284 X10E3/UL (ref 150–450)
POTASSIUM SERPL-SCNC: 4.3 MMOL/L (ref 3.5–5.2)
PROT SERPL-MCNC: 6.3 G/DL (ref 6–8.5)
RBC # BLD AUTO: 3.82 X10E6/UL (ref 3.77–5.28)
SL AMB EGFR AFRICAN AMERICAN: 126 ML/MIN/1.73
SL AMB EGFR NON AFRICAN AMERICAN: 109 ML/MIN/1.73
SL AMB VLDL CHOLESTEROL CALC: 18 MG/DL (ref 5–40)
SODIUM SERPL-SCNC: 143 MMOL/L (ref 134–144)
TRIGL SERPL-MCNC: 107 MG/DL (ref 0–149)
WBC # BLD AUTO: 5.4 X10E3/UL (ref 3.4–10.8)

## 2021-01-29 DIAGNOSIS — C50.212 MALIGNANT NEOPLASM OF UPPER-INNER QUADRANT OF LEFT BREAST IN FEMALE, ESTROGEN RECEPTOR POSITIVE (HCC): ICD-10-CM

## 2021-01-29 DIAGNOSIS — Z17.0 MALIGNANT NEOPLASM OF UPPER-INNER QUADRANT OF LEFT BREAST IN FEMALE, ESTROGEN RECEPTOR POSITIVE (HCC): ICD-10-CM

## 2021-01-29 RX ORDER — TAMOXIFEN CITRATE 20 MG/1
TABLET ORAL
Qty: 90 TABLET | Refills: 1 | Status: SHIPPED | OUTPATIENT
Start: 2021-01-29 | End: 2021-07-26

## 2021-02-10 PROBLEM — R73.09 ABNORMAL GLUCOSE: Status: ACTIVE | Noted: 2021-02-10

## 2021-02-11 ENCOUNTER — OFFICE VISIT (OUTPATIENT)
Dept: FAMILY MEDICINE CLINIC | Facility: CLINIC | Age: 46
End: 2021-02-11
Payer: COMMERCIAL

## 2021-02-11 VITALS
SYSTOLIC BLOOD PRESSURE: 106 MMHG | BODY MASS INDEX: 30.36 KG/M2 | TEMPERATURE: 97.1 F | DIASTOLIC BLOOD PRESSURE: 64 MMHG | RESPIRATION RATE: 20 BRPM | WEIGHT: 165 LBS | OXYGEN SATURATION: 98 % | HEIGHT: 62 IN | HEART RATE: 82 BPM

## 2021-02-11 DIAGNOSIS — C50.212 MALIGNANT NEOPLASM OF UPPER-INNER QUADRANT OF LEFT BREAST IN FEMALE, ESTROGEN RECEPTOR POSITIVE (HCC): ICD-10-CM

## 2021-02-11 DIAGNOSIS — R73.09 ABNORMAL GLUCOSE: ICD-10-CM

## 2021-02-11 DIAGNOSIS — Z17.0 MALIGNANT NEOPLASM OF UPPER-INNER QUADRANT OF LEFT BREAST IN FEMALE, ESTROGEN RECEPTOR POSITIVE (HCC): ICD-10-CM

## 2021-02-11 DIAGNOSIS — J45.20 MILD INTERMITTENT ASTHMA WITHOUT COMPLICATION: Primary | ICD-10-CM

## 2021-02-11 DIAGNOSIS — Z13.0 SCREENING FOR DEFICIENCY ANEMIA: ICD-10-CM

## 2021-02-11 DIAGNOSIS — Z13.6 SCREENING FOR CARDIOVASCULAR CONDITION: ICD-10-CM

## 2021-02-11 PROCEDURE — 99214 OFFICE O/P EST MOD 30 MIN: CPT | Performed by: FAMILY MEDICINE

## 2021-02-11 PROCEDURE — 3008F BODY MASS INDEX DOCD: CPT | Performed by: INTERNAL MEDICINE

## 2021-02-11 PROCEDURE — 3725F SCREEN DEPRESSION PERFORMED: CPT | Performed by: FAMILY MEDICINE

## 2021-02-11 NOTE — PROGRESS NOTES
Assessment/Plan:    1  Mild intermittent asthma without complication  Assessment & Plan:   Stable   Continue ProAir p r n       2  Malignant neoplasm of upper-inner quadrant of left breast in female, estrogen receptor positive ([facility] [address] )  Assessment & Plan:  Doing well on tamoxifen  Stable       3  Abnormal glucose  Assessment & Plan:    New  Last fasting sugar was 100   low sugar-low-carbohydrate diet recommended    Orders:  -     Hemoglobin A1C; Future; Expected date: 07/26/2021  -     Hemoglobin A1C    4  Screening for cardiovascular condition  -     Comprehensive metabolic panel; Future; Expected date: 07/26/2021  -     Lipid Panel with Direct LDL reflex; Future; Expected date: 07/26/2021  -     Comprehensive metabolic panel  -     Lipid Panel with Direct LDL reflex    5  Screening for deficiency anemia  -     CBC; Future; Expected date: 07/26/2021  -     CBC    BMI Counseling: Body mass index is 30 18 kg/m²  The BMI is above normal  Nutrition recommendations include moderation in carbohydrate intake  There are no Patient Instructions on file for this visit  Return in about 6 months (around 8/11/2021) for Annual physical     Subjective:      Patient ID: Gloria Oden is a 50 y o  female  Chief Complaint   Patient presents with    Follow-up     follow up on labs  [id number]        She gets some post nasal drip during the winter, but her asthma has been good  Abnormal glucose - she has never had high sugars in the past  She was following keto  Breast cancer -patient has been doing well on her tamoxifen  She has been following with her oncologist regularly  She has been getting  Her tubes removed  She has an IUD currently but she is not doing well with that        The following portions of the patient's history were reviewed and updated as appropriate: allergies, current medications, past family history, past medical history, past social history, past surgical history and problem list     Review of Systems   Respiratory: Negative  Cardiovascular: Negative  Current Outpatient Medications   Medication Sig Dispense Refill    albuterol (PROAIR HFA) 90 mcg/act inhaler Inhale 2 puffs every 4 (four) hours as needed for wheezing 1 Inhaler 2    cetirizine (ZyrTEC) 10 MG chewable tablet Chew 10 mg daily as needed       Cholecalciferol (VITAMIN D3) 1000 units CHEW Chew 1 tablet daily      fluticasone (FLONASE) 50 mcg/act nasal spray 1 spray into each nostril daily as needed       intrauterine copper (PARAGARD) IUD 1 each by Intrauterine route once      multivitamin (THERAGRAN) TABS Take 1 tablet by mouth daily      rizatriptan (MAXALT-MLT) 10 MG disintegrating tablet DISSOLVE ONE TABLET IN MOUTH at onset of headache  May repeat in two hours if ineffective 12 tablet 0    tamoxifen (NOLVADEX) 20 mg tablet TAKE ONE TABLET BY MOUTH DAILY  90 tablet 1    TURMERIC CURCUMIN PO Take by mouth daily       No current facility-administered medications for this visit  Objective:    /64   Pulse 82   Temp (!) 97 1 °F (36 2 °C)   Resp 20   Ht 5' 2" (1 575 m)   Wt 74 8 kg (165 lb)   SpO2 98%   BMI 30 18 kg/m²        Physical Exam  Vitals signs and nursing note reviewed  Constitutional:       Appearance: She is well-developed  HENT:      Head: Normocephalic and atraumatic  Right Ear: Tympanic membrane and external ear normal       Left Ear: Tympanic membrane and external ear normal    Cardiovascular:      Rate and Rhythm: Normal rate and regular rhythm  Heart sounds: Normal heart sounds  No murmur  No friction rub  Pulmonary:      Effort: Pulmonary effort is normal  No respiratory distress  Breath sounds: Normal breath sounds  No wheezing or rales  Musculoskeletal:      Right lower leg: No edema  Left lower leg: No edema                  Stacie Smith DO

## 2021-02-16 ENCOUNTER — TELEMEDICINE (OUTPATIENT)
Dept: HEMATOLOGY ONCOLOGY | Facility: CLINIC | Age: 46
End: 2021-02-16
Payer: COMMERCIAL

## 2021-02-16 ENCOUNTER — TELEPHONE (OUTPATIENT)
Dept: HEMATOLOGY ONCOLOGY | Facility: CLINIC | Age: 46
End: 2021-02-16

## 2021-02-16 DIAGNOSIS — C50.212 MALIGNANT NEOPLASM OF UPPER-INNER QUADRANT OF LEFT BREAST IN FEMALE, ESTROGEN RECEPTOR POSITIVE (HCC): Primary | ICD-10-CM

## 2021-02-16 DIAGNOSIS — Z17.0 MALIGNANT NEOPLASM OF UPPER-INNER QUADRANT OF LEFT BREAST IN FEMALE, ESTROGEN RECEPTOR POSITIVE (HCC): Primary | ICD-10-CM

## 2021-02-16 PROCEDURE — 1036F TOBACCO NON-USER: CPT | Performed by: INTERNAL MEDICINE

## 2021-02-16 PROCEDURE — 99214 OFFICE O/P EST MOD 30 MIN: CPT | Performed by: INTERNAL MEDICINE

## 2021-02-16 NOTE — PROGRESS NOTES
Virtual Regular Visit      Assessment/Plan:    Problem List Items Addressed This Visit        Other    Malignant neoplasm of upper-inner quadrant of left breast in female, estrogen receptor positive ([facility] [address] ) - Primary               Reason for visit is   Chief Complaint   Patient presents with    Follow-up    Virtual Regular Visit        Encounter provider Carlos Mahajan MD    Provider located at [address] [id number]      Recent Visits  Date Type Provider Dept   [facility] [address] recent visits within past 7 days and meeting all other requirements     Today's Visits  Date Type Provider Dept   02/16/21 Telemedicine Carlos Mahajan MD [facility]   Showing today's visits and meeting all other requirements     Future Appointments  No visits were found meeting these conditions  Showing future appointments within next 150 days and meeting all other requirements        The patient was identified by name and date of birth  Rachael Cabezas was informed that this is a telemedicine visit and that the visit is being conducted through [employer] and patient was informed that this is a secure, HIPAA-compliant platform  She agrees to proceed     My office door was closed  No one else was in the room  She acknowledged consent and understanding of privacy and security of the video platform  The patient has agreed to participate and understands they can discontinue the visit at any time  Patient is aware this is a billable service  Assessment / Plan:  A 47-year-old premenopausal woman with stage IA left breast cancer, grade 1, invasive lobular histology, ER 90 % positive, ID 40 % positive, HER2 negative disease   She underwent lumpectomy and sentinel lymph node biopsy, resulting in DIEGO   She is negative for BRCA gene mutation    She is currently on adjuvant hormonal therapy with tamoxifen with no toxicity  Clinically, she is doing well with no sign recurrent disease, based on the conversation we had  I recommended her to continue with tamoxifen 20 mg daily  I will see her again in a year for routine follow-up  She is in agreement with my recommendations            Subjective:      HPI:  A 39-year-old premenopausal woman was found to have abnormality in her left breast based on a screening mammography   She underwent left breast biopsy in June 7, 2019 which showed invasive lobular carcinoma, grade 1  This was ER 90 % positive, MN 40 % positive, HER2 negative disease   She underwent genetic testing which was negative for BRCA gene mutation   She underwent lumpectomy and sentinel lymph node biopsy by Dr Bjorn Gillespie in July 30, 2019  She had 11 millimeter of invasive lobular carcinoma, grade 1  Lymphovascular invasion was not present   3 sentinel lymph nodes were all negative for metastatic disease   She presents today to discuss adjuvant treatment options   She has no breast related symptomatology except minor soreness in the left axilla   She denied any pain   She has no respiratory symptoms   She has stable weight   She denied fever, chills or night sweats   She has no family history of breast cancer  Neena Conway is a lifetime never smoker   Her performance status is normal              Interval History:   A 39-year-old premenopausal woman with stage IA left breast cancer, grade 1, invasive lobular histology, ER 90 % positive, MN 40 % positive, HER2 negative disease   She underwent lumpectomy and sentinel lymph node biopsy, resulting in DIEGO   She is negative for BRCA gene mutation  Since August 2019, she has been on adjuvant hormonal therapy with tamoxifen  This is her routine virtual visit  She continued to do well with no new complaints  She has no pain  Her weight is stable  She has no respiratory symptoms  She does not have hot flashes    She have menstrual cycles which is irregular  Her performance status is normal          Objective:      Primary Diagnosis:     1  Left breast cancer, stage IA (pT1c, pN0, M0) grade 1, invasive lobular histology, ER 90 %, ME 40 %, HER2 negative disease   Diagnosed in July 2019       2  BRCA gene mutation negative      Cancer Staging:  Cancer Staging  Malignant neoplasm of upper-inner quadrant of left breast in female, estrogen receptor positive ([address] )  Staging form: Breast, AJCC 8th Edition  - Clinical: Stage IA (cT1c, cN0, cM0, G1, ER+, ME+, HER2-) - Signed by Warner Sutton MD on 8/14/2019  Laterality: Left  Histologic grading system: 3 grade system           Previous Hematologic/ Oncologic Treatment:            Current Hematologic/ Oncologic Treatment:       Adjuvant hormonal therapy with tamoxifen since August 2019       Disease Status:      DIEGO status post lumpectomy and sentinel lymph node biopsy      Test Results:     Pathology:     1 1cm of invasive lobular carcinoma, grade 1, lymphovascular invasion was absent   3 sentinel lymph nodes were negative for metastatic disease   ER 90 % positive, ME 40 % positive, HER2 negative disease   Stage IA (pT1c, pN0, M0)     Radiology:       Mammography in March 2020 was benign  BI-RADS 2      Laboratory:     See below      Physical Exam:      General: Patient appears well-developed  Patient is adequately nourished  Patient is not diaphoretic  Patient is not in distress  Neck: Visualization of the neck demonstrates no grossly visible masses  Neck mobility is not compromised, neck appears supple  HEENT: Oral mucosa appears moist  Patient does not identify palpable neck masses  Patient reports no oral tenderness or readily identifiable masses  Eyes: Conjunctivae appear normal bilaterally  Right eye with no discharge  Left eye with no discharge  No evidence of scleral icterus  No evidence of strabismus  Respiratory: Respiratory effort appears normal  There is no respiratory distress  Patient able to speak in full sentences  There was no audible stridor or cough  Abdomen: Patient states her abdomen is soft  States abdomen is non-tender  States abdomen is non-distended  Patient denies visible or palpable bulges to suggest hernias  Musculoskeletal: Patient reports and I can confirm no visible deformities in 4 extremities  Patient reports and I can confirm full mobility in 4 extremities  There is no grossly visible limb edema  There is no evidence of clubbing or peripheral cyanosis  Neurologic: Patient is fully alert and responsive  Patient is oriented to time, place and person  Gross evaluation of CNs III-IV-VI-VII-VIII and XI demonstrates no deficits  Patient reports normal gait and balance  Skin: My evaluation of exposed skin areas reveals no evidence of pallor  My evaluation of exposed skin areas reveals no obvious rashes  My evaluation of exposed skin areas reveals no grossly visible lesions  My evaluation of exposed skin areas reveals no evidence of erythema  Psychiatric / Behavioral: Patient's mood and affect appears normal  Patient's judgement is preserved  Patient is coherent and thought content appears directionally and contextually appropriate for age and health status  Past Medical History:   Diagnosis Date    Adhesive capsulitis of right shoulder     Resolved 11/15/2016     Asthma     Benign paroxysmal vertigo, unspecified ear     Resolved 11/15/2016     BRCA gene mutation negative 06/2019    [employer]    Breast cancer ([facility] [address] ) 07/30/2019    Cellulitis of leg, except foot     Resolved 12/10/2014     Hx of radiation therapy 08/01/2019    Lymphadenitis     Resolved 12/10/2014     Snoring        Past Surgical History:   Procedure Laterality Date    BREAST BIOPSY Left 2019    BREAST LUMPECTOMY Left 7/30/2019    Procedure: BREAST LUMPECTOMY; BREAST NEEDLE LOCALIZATION (NEEDLE LOC AT 0800);   Surgeon: Nelson Young MD;  [facility];  Service: Surgical Oncology    INTRAUTERINE DEVICE INSERTION  2019    LYMPH NODE BIOPSY Left 7/30/2019    Procedure: SENTINEL LYMPH NODE BIOPSY; LYMPHATIC MAPPING WITH BLUE DYE AND RADIOACTIVE DYE (INJECT AT 0930 BY DR LEAVITT IN THE OR); Surgeon: Chepe Daugherty MD;  [facility];  Service: Surgical Oncology    MAMMO NEEDLE LOCALIZATION LEFT (ALL INC) Left 7/30/2019    SHOULDER SURGERY Right     Last assessed 12/29/2015     US GUIDED BREAST BIOPSY LEFT COMPLETE Left 6/7/2019    WISDOM TOOTH EXTRACTION         Current Outpatient Medications   Medication Sig Dispense Refill    albuterol (PROAIR HFA) 90 mcg/act inhaler Inhale 2 puffs every 4 (four) hours as needed for wheezing 1 Inhaler 2    cetirizine (ZyrTEC) 10 MG chewable tablet Chew 10 mg daily as needed       Cholecalciferol (VITAMIN D3) 1000 units CHEW Chew 1 tablet daily      fluticasone (FLONASE) 50 mcg/act nasal spray 1 spray into each nostril daily as needed       intrauterine copper (PARAGARD) IUD 1 each by Intrauterine route once      multivitamin (THERAGRAN) TABS Take 1 tablet by mouth daily      rizatriptan (MAXALT-MLT) 10 MG disintegrating tablet DISSOLVE ONE TABLET IN MOUTH at onset of headache  May repeat in two hours if ineffective 12 tablet 0    tamoxifen (NOLVADEX) 20 mg tablet TAKE ONE TABLET BY MOUTH DAILY  90 tablet 1    TURMERIC CURCUMIN PO Take by mouth daily       No current facility-administered medications for this visit  No Known Allergies    Review of Systems   All other systems reviewed and are negative  Video Exam    Vitals:       Physical Exam     I spent 15 minutes directly with the patient during this visit      VIRTUAL VISIT [address]  [facility] acknowledges that she has consented to an online visit or consultation   She understands that the online visit is based solely on information provided by her, and that, in the absence of a face-to-face physical evaluation by the physician, the diagnosis she receives is both limited and provisional in terms of accuracy and completeness  This is not intended to replace a full medical face-to-face evaluation by the physician  Yakelin Brooks understands and accepts these terms

## 2021-03-10 DIAGNOSIS — Z23 ENCOUNTER FOR IMMUNIZATION: ICD-10-CM

## 2021-03-12 ENCOUNTER — TRANSCRIBE ORDERS (OUTPATIENT)
Dept: ADMINISTRATIVE | Facility: HOSPITAL | Age: 46
End: 2021-03-12

## 2021-03-12 ENCOUNTER — HOSPITAL ENCOUNTER (OUTPATIENT)
Dept: RADIOLOGY | Facility: HOSPITAL | Age: 46
Discharge: HOME | End: 2021-03-12
Payer: COMMERCIAL

## 2021-03-12 VITALS — HEIGHT: 62 IN | BODY MASS INDEX: 30.36 KG/M2 | WEIGHT: 165 LBS

## 2021-03-12 DIAGNOSIS — Z17.0 MALIGNANT NEOPLASM OF UPPER-INNER QUADRANT OF LEFT BREAST IN FEMALE, ESTROGEN RECEPTOR POSITIVE (HCC): ICD-10-CM

## 2021-03-12 DIAGNOSIS — C50.212 MALIGNANT NEOPLASM OF UPPER-INNER QUADRANT OF LEFT BREAST IN FEMALE, ESTROGEN RECEPTOR POSITIVE (HCC): ICD-10-CM

## 2021-03-12 PROCEDURE — 77066 DX MAMMO INCL CAD BI: CPT

## 2021-03-12 PROCEDURE — G0279 TOMOSYNTHESIS, MAMMO: HCPCS

## 2021-04-19 ENCOUNTER — RADIATION ONCOLOGY FOLLOW-UP (OUTPATIENT)
Dept: RADIATION ONCOLOGY | Facility: HOSPITAL | Age: 46
End: 2021-04-19
Attending: RADIOLOGY
Payer: COMMERCIAL

## 2021-04-19 VITALS
DIASTOLIC BLOOD PRESSURE: 52 MMHG | BODY MASS INDEX: 31.39 KG/M2 | HEART RATE: 93 BPM | HEIGHT: 62 IN | RESPIRATION RATE: 18 BRPM | WEIGHT: 170.6 LBS | SYSTOLIC BLOOD PRESSURE: 118 MMHG | TEMPERATURE: 97.4 F

## 2021-04-19 DIAGNOSIS — C50.212 MALIGNANT NEOPLASM OF UPPER-INNER QUADRANT OF LEFT BREAST IN FEMALE, ESTROGEN RECEPTOR POSITIVE (HCC): Primary | ICD-10-CM

## 2021-04-19 DIAGNOSIS — Z17.0 MALIGNANT NEOPLASM OF UPPER-INNER QUADRANT OF LEFT BREAST IN FEMALE, ESTROGEN RECEPTOR POSITIVE (HCC): Primary | ICD-10-CM

## 2021-04-19 PROCEDURE — G0463 HOSPITAL OUTPT CLINIC VISIT: HCPCS | Performed by: RADIOLOGY

## 2021-04-19 PROCEDURE — 99211 OFF/OP EST MAY X REQ PHY/QHP: CPT | Performed by: RADIOLOGY

## 2021-04-19 NOTE — PROGRESS NOTES
Jocelyn Smith 1975 is a 48 y o  female       Follow up visit   Patient returns for routine scheduled follow-up after completing left breast radiation on 9/25/19  A virtual follow up was done on 4/20/20 6/2/20 Zakia Jacobs  "She states that she has been massaging her left axillary scar and his noticed a decrease in the amount of scar tissue in this area  We did discuss the role of physical therapy as she does states the area still feels tight  We also discussed the strength after breast cancer program and I have provided the patient with written information for this  She will contact our office if she would like a referral "    12/10/20 JACIEL Lu  No complaints and no concerns on exam    2/16/21 Lisa Meeks MD virtual visit  Continue Tamoxifen; tolerating well    No evidence of recurrent disease    3/12/21 Bilateral diagnostic mammogram  Stable therapeutic changes left breast   No evidence for malignancy    6/10/21 JACIEL Mejia  2/16/22 Lisa Meeks MD      Oncology History   Malignant neoplasm of upper-inner quadrant of left breast in female, estrogen receptor positive ([facility] [address] )   6/7/2019 Biopsy    Left breast ultrasound-guided biopsy  11 o'clock, 4 cm from nipple  Invasive lobular carcinoma  Grade 1  ER 90  NY 45  HER2 1+     6/17/2019 Genetic Testing    BRCA negative  [facility]     7/30/2019 Surgery    Left breast lumpectomy with sentinel lymph node biopsy  Invasive lobular carcinoma  Grade 1  1 1 cm  0/3 Lymph nodes  Margins negative  Anatomic/Prognostic Stage IA     8/14/2019 -  Cancer Staged    Staging form: Breast, AJCC 8th Edition  - Clinical: Stage IA (cT1c, cN0, cM0, G1, ER+, NY+, HER2-) - Signed by Piyush Cruz MD on 8/14/2019  Laterality: Left  Histologic grading system: 3 grade system       8/27/2019 - 9/25/2019 Radiation    Course: C1    Plan ID Energy Fractions Dose per Fraction (cGy) Dose Correction (cGy) Total Dose Delivered (cGy) Elapsed Days BH L Breast 6X 16 / 16 266 0 4,256 22   BH L Brst e 12E 5 / 5 200 0 1,000 6      Treatment dates:  C1: 8/27/2019 - 9/25/2019 8/2019 -  Hormone Therapy    Tamoxifen 20 mg daily  With Dr Claude Junes         Clinical Trial: no      Health Maintenance   Topic Date Due    HIV Screening  Never done    COVID-19 Vaccine (1) Never done    Cervical Cancer Screening  Never done    DTaP,Tdap,and Td Vaccines (2 - Td) 09/17/2020    Annual Physical  08/11/2021    Depression Screening PHQ  02/11/2022    BMI: Followup Plan  02/11/2022    MAMMOGRAM  03/12/2022    BMI: Adult  04/19/2022    Pneumococcal Vaccine: Pediatrics (0 to 5 Years) and At-Risk Patients (6 to 59 Years)  Completed    Influenza Vaccine  Completed    HIB Vaccine  Aged Out    Hepatitis B Vaccine  Aged Out    IPV Vaccine  Aged Out    Hepatitis A Vaccine  Aged Out    Meningococcal ACWY Vaccine  Aged Out    HPV Vaccine  Aged Out       Patient Active Problem List   Diagnosis    Allergic rhinitis    Dense breasts    Migraine headache    Mild intermittent asthma    Motion sickness    Vitamin D deficiency    Malignant neoplasm of upper-inner quadrant of left breast in female, estrogen receptor positive ([address] )    Use of tamoxifen (Nolvadex)    Abnormal glucose     Past Medical History:   Diagnosis Date    Adhesive capsulitis of right shoulder     Resolved 11/15/2016     Asthma     Benign paroxysmal vertigo, unspecified ear     Resolved 11/15/2016     BRCA gene mutation negative 06/2019    [employer]    Breast cancer ([facility] [address] ) 07/30/2019    Cellulitis of leg, except foot     Resolved 12/10/2014     Hx of radiation therapy 08/01/2019    Lymphadenitis     Resolved 12/10/2014     Snoring      Past Surgical History:   Procedure Laterality Date    BREAST BIOPSY Left 2019    BREAST LUMPECTOMY Left 7/30/2019    Procedure: BREAST LUMPECTOMY; BREAST NEEDLE LOCALIZATION (NEEDLE LOC AT 0800);   Surgeon: Dano Garcia MD;  [facility];  Service: Surgical Oncology    INTRAUTERINE DEVICE INSERTION  2019    LYMPH NODE BIOPSY Left 7/30/2019    Procedure: SENTINEL LYMPH NODE BIOPSY; LYMPHATIC MAPPING WITH BLUE DYE AND RADIOACTIVE DYE (INJECT AT 0930 BY DR LEAVITT IN THE OR);   Surgeon: Brendan Urbano MD;  [facility];  Service: Surgical Oncology    MAMMO NEEDLE LOCALIZATION LEFT (ALL INC) Left 7/30/2019    SHOULDER SURGERY Right     Last assessed 12/29/2015     US GUIDED BREAST BIOPSY LEFT COMPLETE Left 6/7/2019    WISDOM TOOTH EXTRACTION       Family History   Problem Relation Age of Onset    Alzheimer's disease Mother     COPD Father     No Known Problems Sister     No Known Problems Daughter     Alzheimer's disease Maternal Grandmother     No Known Problems Maternal Grandfather     No Known Problems Paternal Grandmother     No Known Problems Paternal Grandfather     No Known Problems Brother     No Known Problems Brother     No Known Problems Brother     No Known Problems Daughter      Social History     Socioeconomic History    Marital status: /Civil Union     Spouse name: Not on file    Number of children: 3    Years of education: Not on file    Highest education level: Not on file   Occupational History    Not on file   Social Needs    Financial resource strain: Not on file    Food insecurity     Worry: Not on file     Inability: Not on file   [employer] needs     Medical: Not on file     Non-medical: Not on file   Tobacco Use    Smoking status: Never Smoker    Smokeless tobacco: Never Used   Substance and Sexual Activity    Alcohol use: No    Drug use: No    Sexual activity: Yes     Partners: Male   Lifestyle    Physical activity     Days per week: Not on file     Minutes per session: Not on file    Stress: Not on file   Relationships    Social connections     Talks on phone: Not on file     Gets together: Not on file     Attends Rastafari service: Not on file     Active member of club or organization: Not on file     Attends meetings of clubs or organizations: Not on file     Relationship status: Not on file    Intimate partner violence     Fear of current or ex partner: Not on file     Emotionally abused: Not on file     Physically abused: Not on file     Forced sexual activity: Not on file   Other Topics Concern    Not on file   Social History Narrative    Not on file       Current Outpatient Medications:     albuterol (PROAIR HFA) 90 mcg/act inhaler, Inhale 2 puffs every 4 (four) hours as needed for wheezing, Disp: 1 Inhaler, Rfl: 2    cetirizine (ZyrTEC) 10 MG chewable tablet, Chew 10 mg daily as needed , Disp: , Rfl:     Cholecalciferol (VITAMIN D3) 1000 units CHEW, Chew 1 tablet daily, Disp: , Rfl:     fluticasone (FLONASE) 50 mcg/act nasal spray, 1 spray into each nostril daily as needed , Disp: , Rfl:     multivitamin (THERAGRAN) TABS, Take 1 tablet by mouth daily, Disp: , Rfl:     rizatriptan (MAXALT-MLT) 10 MG disintegrating tablet, DISSOLVE ONE TABLET IN MOUTH at onset of headache  May repeat in two hours if ineffective, Disp: 12 tablet, Rfl: 0    tamoxifen (NOLVADEX) 20 mg tablet, TAKE ONE TABLET BY MOUTH DAILY , Disp: 90 tablet, Rfl: 1    TURMERIC CURCUMIN PO, Take by mouth daily, Disp: , Rfl:     intrauterine copper (PARAGARD) IUD, 1 each by Intrauterine route once, Disp: , Rfl:   No Known Allergies    Review of Systems:  Review of Systems   Constitutional: Negative  HENT: Negative  Eyes: Negative  Respiratory: Negative  Cardiovascular: Negative  Gastrointestinal: Negative  Endocrine: Negative  Genitourinary: Negative  Musculoskeletal: Negative  Skin: Negative  Allergic/Immunologic: Positive for environmental allergies  Neurological: Positive for headaches (occasional)  Hematological: Negative  Psychiatric/Behavioral: Negative          Vitals:    04/19/21 0814   BP: 118/52   BP Location: Left arm   Patient Position: Sitting   Pulse: 93   Resp: 18   Temp: Emajose Foy ) 97 4 °F (36 3 °C)   TempSrc: Temporal   Weight: 77 4 kg (170 lb 9 6 oz)   Height: 5' 2" (1 575 m)        Pain Score: 0-No pain      Imaging:No results found      Teaching

## 2021-04-19 NOTE — PROGRESS NOTES
Follow-up - Radiation Oncology   Melinda Lazcano 1975 48 y o  female [id number]      History of Present Illness   Cancer Staging  Malignant neoplasm of upper-inner quadrant of left breast in female, estrogen receptor positive ([facility] [address] )  Staging form: Breast, AJCC 8th Edition  - Clinical: Stage IA (cT1c, cN0, cM0, G1, ER+, NJ+, HER2-) - Signed by Eliceo Lopez MD on 8/14/2019  Histologic grading system: 3 grade system  Laterality: Left      [address] today for routine scheduled follow-up visit  Overall she feels well  She denies any significant discomfort in the treated breast or axillary region  She is essentially without complaints  Patient returns for routine scheduled follow-up after completing left breast radiation on 9/25/19  A virtual follow up was done on 4/20/20 6/2/20 Obinna Cerda  "She states that she has been massaging her left axillary scar and his noticed a decrease in the amount of scar tissue in this area  We did discuss the role of physical therapy as she does states the area still feels tight  We also discussed the strength after breast cancer program and I have provided the patient with written information for this  She will contact our office if she would like a referral "    12/10/20 JACIEL Richardson  No complaints and no concerns on exam    2/16/21 Anupam Dixon MD virtual visit  Continue Tamoxifen; tolerating well    No evidence of recurrent disease    3/12/21 Bilateral diagnostic mammogram  Stable therapeutic changes left breast   No evidence for malignancy    6/10/21 JACIEL Juarez  2/16/22 Anupam Dixon MD        Historical Information   Oncology History   Malignant neoplasm of upper-inner quadrant of left breast in female, estrogen receptor positive ([facility] [address] )   6/7/2019 Biopsy    Left breast ultrasound-guided biopsy  11 o'clock, 4 cm from nipple  Invasive lobular carcinoma  Grade 1  ER 90  NJ 45  HER2 1+     6/17/2019 Genetic Testing    BRCA negative  [facility]     7/30/2019 Surgery    Left breast lumpectomy with sentinel lymph node biopsy  Invasive lobular carcinoma  Grade 1  1 1 cm  0/3 Lymph nodes  Margins negative  Anatomic/Prognostic Stage IA     8/14/2019 -  Cancer Staged    Staging form: Breast, AJCC 8th Edition  - Clinical: Stage IA (cT1c, cN0, cM0, G1, ER+, HI+, HER2-) - Signed by Mayelin Wynn MD on 8/14/2019  Laterality: Left  Histologic grading system: 3 grade system       8/27/2019 - 9/25/2019 Radiation    Course: C1    Plan ID Energy Fractions Dose per Fraction (cGy) Dose Correction (cGy) Total Dose Delivered (cGy) Elapsed Days   BH L Breast 6X 16 / 16 266 0 4,256 22   BH L Brst e 12E 5 / 5 200 0 1,000 6      Treatment dates:  C1: 8/27/2019 - 9/25/2019 8/2019 -  Hormone Therapy    Tamoxifen 20 mg daily  With Dr Esperanza Rowe         Past Medical History:   Diagnosis Date    Adhesive capsulitis of right shoulder     Resolved 11/15/2016     Asthma     Benign paroxysmal vertigo, unspecified ear     Resolved 11/15/2016     BRCA gene mutation negative 06/2019    [facility]    Breast cancer ([facility] [address] ) 07/30/2019    Cellulitis of leg, except foot     Resolved 12/10/2014     Hx of radiation therapy 08/01/2019    Lymphadenitis     Resolved 12/10/2014     Snoring      Past Surgical History:   Procedure Laterality Date    BREAST BIOPSY Left 2019    BREAST LUMPECTOMY Left 7/30/2019    Procedure: BREAST LUMPECTOMY; BREAST NEEDLE LOCALIZATION (NEEDLE LOC AT 0800); Surgeon: Vel Jj MD;  [facility];  Service: Surgical Oncology    INTRAUTERINE DEVICE INSERTION  2019    LYMPH NODE BIOPSY Left 7/30/2019    Procedure: SENTINEL LYMPH NODE BIOPSY; LYMPHATIC MAPPING WITH BLUE DYE AND RADIOACTIVE DYE (INJECT AT 0930 BY DR LEAVITT IN THE OR);   Surgeon: Vel Jj MD;  [facility];  Service: Surgical Oncology    MAMMO NEEDLE LOCALIZATION LEFT (ALL INC) Left 7/30/2019    SHOULDER SURGERY Right     Last assessed 12/29/2015     US GUIDED BREAST BIOPSY LEFT COMPLETE Left 6/7/2019    WISDOM TOOTH EXTRACTION         Social History   Social History     Substance and Sexual Activity   Alcohol Use No     Social History     Substance and Sexual Activity   Drug Use No     Social History     Tobacco Use   Smoking Status Never Smoker   Smokeless Tobacco Never Used         Meds/Allergies     Current Outpatient Medications:     albuterol (PROAIR HFA) 90 mcg/act inhaler, Inhale 2 puffs every 4 (four) hours as needed for wheezing, Disp: 1 Inhaler, Rfl: 2    cetirizine (ZyrTEC) 10 MG chewable tablet, Chew 10 mg daily as needed , Disp: , Rfl:     Cholecalciferol (VITAMIN D3) 1000 units CHEW, Chew 1 tablet daily, Disp: , Rfl:     fluticasone (FLONASE) 50 mcg/act nasal spray, 1 spray into each nostril daily as needed , Disp: , Rfl:     multivitamin (THERAGRAN) TABS, Take 1 tablet by mouth daily, Disp: , Rfl:     rizatriptan (MAXALT-MLT) 10 MG disintegrating tablet, DISSOLVE ONE TABLET IN MOUTH at onset of headache  May repeat in two hours if ineffective, Disp: 12 tablet, Rfl: 0    tamoxifen (NOLVADEX) 20 mg tablet, TAKE ONE TABLET BY MOUTH DAILY , Disp: 90 tablet, Rfl: 1    TURMERIC CURCUMIN PO, Take by mouth daily, Disp: , Rfl:     intrauterine copper (PARAGARD) IUD, 1 each by Intrauterine route once, Disp: , Rfl:   No Known Allergies      Review of Systems   Review of Systems   Constitutional: Negative  HENT: Negative  Eyes: Negative  Respiratory: Negative  Cardiovascular: Negative  Gastrointestinal: Negative  Endocrine: Negative  Genitourinary: Negative  Musculoskeletal: Negative  Skin: Negative  Allergic/Immunologic: Positive for environmental allergies  Neurological: Positive for headaches (occasional)  Hematological: Negative  Psychiatric/Behavioral: Negative             OBJECTIVE:   /52 (BP Location: Left arm, Patient Position: Sitting)   Pulse 93   Temp (!) 97 4 °F (36 3 °C) (Temporal)   Resp 18   Ht 5' 2" (1 575 m)   Wt 77 4 kg (170 lb 9 6 oz)   BMI 31 20 kg/m²   Pain Assessment:  0  Karnofsky: 90 - Able to carry on normal activity; minor signs or symptoms of disease     Physical Exam     The patient presents today no apparent distress  Sclera anicteric  No palpable cervical or supraclavicular lymphadenopathy  Lungs clear to auscultation bilaterally  Normal S1-S2 regular rate and rhythm  The right breast within normal limits  The left breast is soft, nontender, without visible or palpable suspicious findings  No axillary lymphadenopathy  No lymphedema  RESULTS    Lab Results: No results found for this or any previous visit (from the past 672 hour(s))  Imaging Studies:No results found  Assessment/Plan:  No orders of the defined types were placed in this encounter  Blanca Espinoza  Has done well in follow-up and has no clinical evidence of recurrent disease at this time  She will continue to follow closely both Medical and Surgical Oncology will return to our department in 1 year for routine follow-up  Nereida Jaimes MD  7/60/0281,6:30 AM    Portions of the record may have been created with voice recognition software   Occasional wrong word or "sound a like" substitutions may have occurred due to the inherent limitations of voice recognition software   Read the chart carefully and recognize, using context, where substitutions have occurred

## 2021-04-26 ENCOUNTER — TELEPHONE (OUTPATIENT)
Dept: FAMILY MEDICINE CLINIC | Facility: CLINIC | Age: 46
End: 2021-04-26

## 2021-04-26 NOTE — TELEPHONE ENCOUNTER
As per Dr Wade Angeles, patient is to call the surgeon for advice and/or evaluation  Patient is aware and agreed  NFA needed    Selene Jorgensen MA

## 2021-04-26 NOTE — TELEPHONE ENCOUNTER
----- Message from [address] [place] [address]  Sarah sent at 4/26/2021 12:41 AM EDT -----  Regarding: Non-Urgent Medical Question  Contact: [phone]  Hi Dr Rod Nurse  On March 17th, I had an endoscopic  bilateral salpingectomy,  where I had incisions on the left & right side of my abdomen, as well as in my belly button  Tonight, I felt some moisture by my belly button, and after checking with my finger, it looked like a little whitish fluid was there, but after I checked some more (which required me to look into my belly button, thus stretching the skin in that area), I started bleeding  Is this something I can go to your office to have checked out, or just monitor the bleeding? Right now, after leaving it alone, the bleeding seems to have stopped  Please let me know  Thanks

## 2021-06-10 ENCOUNTER — OFFICE VISIT (OUTPATIENT)
Dept: SURGICAL ONCOLOGY | Facility: CLINIC | Age: 46
End: 2021-06-10
Payer: COMMERCIAL

## 2021-06-10 VITALS
DIASTOLIC BLOOD PRESSURE: 74 MMHG | BODY MASS INDEX: 30.64 KG/M2 | SYSTOLIC BLOOD PRESSURE: 116 MMHG | HEIGHT: 62 IN | HEART RATE: 80 BPM | RESPIRATION RATE: 12 BRPM | WEIGHT: 166.5 LBS

## 2021-06-10 DIAGNOSIS — Z79.810 USE OF TAMOXIFEN (NOLVADEX): ICD-10-CM

## 2021-06-10 DIAGNOSIS — Z17.0 MALIGNANT NEOPLASM OF UPPER-INNER QUADRANT OF LEFT BREAST IN FEMALE, ESTROGEN RECEPTOR POSITIVE (HCC): Primary | ICD-10-CM

## 2021-06-10 DIAGNOSIS — C50.212 MALIGNANT NEOPLASM OF UPPER-INNER QUADRANT OF LEFT BREAST IN FEMALE, ESTROGEN RECEPTOR POSITIVE (HCC): Primary | ICD-10-CM

## 2021-06-10 PROCEDURE — 99214 OFFICE O/P EST MOD 30 MIN: CPT | Performed by: NURSE PRACTITIONER

## 2021-06-10 PROCEDURE — 1036F TOBACCO NON-USER: CPT | Performed by: NURSE PRACTITIONER

## 2021-06-10 PROCEDURE — 3008F BODY MASS INDEX DOCD: CPT | Performed by: NURSE PRACTITIONER

## 2021-06-10 NOTE — PROGRESS NOTES
Surgical Oncology Follow Up       [address]  [facility] [place]  [address] [facility] [address]    Jocelyn Smith  1975  [id number]  [address] [facility],[address]  [facility] [place]  [address] [address]    Chief Complaint   Patient presents with    Follow-up     6 month        Assessment/Plan:  1  Malignant neoplasm of upper-inner quadrant of left breast in female, estrogen receptor positive ([facility] [address] )  - 6 mo f/u visit    2  Use of tamoxifen (Nolvadex)  - Continue use per medical oncology      Discussion/Summary: Patient is a pleasant 48year-old female who presents today for a follow-up visit for left breast cancer diagnosed in June of 2019  Her pathology revealed invasive lobular carcinoma, ER 90%, CT 45%, her 2-   She underwent genetic testing which was negative   She underwent a left lumpectomy and sentinel node biopsy by Dr Floresita Goldman completed whole breast radiation therapy and is currently taking tamoxifen   She had a bilateral 3D diagnostic mammogram on March 12, 2021 which was BI-RADS 2, category 3 density  She offers no new complaints today and there are no concerns on today's exam   I will plan to see the patient back in 6 months or sooner should the need arise  She was instructed to call with any new concerns or symptoms prior to that time  All of her questions were answered today      History of Present Illness:     Oncology History   Malignant neoplasm of upper-inner quadrant of left breast in female, estrogen receptor positive ([facility] [address] )   6/7/2019 Biopsy    Left breast ultrasound-guided biopsy  11 o'clock, 4 cm from nipple  Invasive lobular carcinoma  Grade 1  ER 90  CT 45  HER2 1+     6/17/2019 Genetic Testing    BRCA negative  [facility]     7/30/2019 Surgery    Left breast lumpectomy with sentinel lymph node biopsy  Invasive lobular carcinoma  Grade 1  1 1 cm  0/3 Lymph nodes  Margins negative  Anatomic/Prognostic Stage IA     8/14/2019 -  Cancer Staged    Staging form: Breast, AJCC 8th Edition  - Clinical: Stage IA (cT1c, cN0, cM0, G1, ER+, ID+, HER2-) - Signed by Liliana Jennings MD on 8/14/2019  Laterality: Left  Histologic grading system: 3 grade system       8/27/2019 - 9/25/2019 Radiation    Course: C1    Plan ID Energy Fractions Dose per Fraction (cGy) Dose Correction (cGy) Total Dose Delivered (cGy) Elapsed Days   BH L Breast 6X 16 / 16 266 0 4,256 22   BH L Brst e 12E 5 / 5 200 0 1,000 6      Treatment dates:  C1: 8/27/2019 - 9/25/2019 8/2019 -  Hormone Therapy    Tamoxifen 20 mg daily  With Dr Matt Parkinson          -Interval History: Patient presents today for a follow-up visit for left breast cancer  She notices no new breast lumps, skin changes, nipple changes or discharge  Denies persistent headaches, back pain or bone pain, cough or shortness of breath, abdominal pain  She continues on tamoxifen  She underwent a salpingectomy but ovaries remain intact  She no longer has an IUD  Review of Systems:  Review of Systems   Constitutional: Negative for activity change, appetite change, chills, fatigue, fever and unexpected weight change  Respiratory: Negative for cough and shortness of breath  Cardiovascular: Negative for chest pain  Gastrointestinal: Negative for abdominal pain, constipation, diarrhea, nausea and vomiting  Musculoskeletal: Negative for arthralgias, back pain, gait problem and myalgias  Skin: Negative for color change and rash  Neurological: Negative for dizziness and headaches  Hematological: Negative for adenopathy  Psychiatric/Behavioral: Negative for agitation and confusion  All other systems reviewed and are negative        Patient Active Problem List   Diagnosis    Allergic rhinitis    Dense breasts    Migraine headache    Mild intermittent asthma    Motion sickness    Vitamin D deficiency    Malignant neoplasm of upper-inner quadrant of left breast in female, estrogen receptor positive ([facility] [address] )    Use of tamoxifen (Nolvadex)    Abnormal glucose     Past Medical History:   Diagnosis Date    Adhesive capsulitis of right shoulder     Resolved 11/15/2016     Asthma     Benign paroxysmal vertigo, unspecified ear     Resolved 11/15/2016     BRCA gene mutation negative 06/2019    [facility]    Breast cancer ([facility] [address] ) 07/30/2019    Cellulitis of leg, except foot     Resolved 12/10/2014     Hx of radiation therapy 08/01/2019    Lymphadenitis     Resolved 12/10/2014     Snoring      Past Surgical History:   Procedure Laterality Date    BREAST BIOPSY Left 2019    BREAST LUMPECTOMY Left 7/30/2019    Procedure: BREAST LUMPECTOMY; BREAST NEEDLE LOCALIZATION (NEEDLE LOC AT 0800); Surgeon: Kayla Goldstein MD;  [facility];  Service: Surgical Oncology    INTRAUTERINE DEVICE INSERTION  2019    LYMPH NODE BIOPSY Left 7/30/2019    Procedure: SENTINEL LYMPH NODE BIOPSY; LYMPHATIC MAPPING WITH BLUE DYE AND RADIOACTIVE DYE (INJECT AT 0930 BY DR LEAVITT IN THE OR);   Surgeon: Kayla Goldstein MD;  [facility];  Service: Surgical Oncology    MAMMO NEEDLE LOCALIZATION LEFT (ALL INC) Left 7/30/2019    SHOULDER SURGERY Right     Last assessed 12/29/2015     US GUIDED BREAST BIOPSY LEFT COMPLETE Left 6/7/2019    WISDOM TOOTH EXTRACTION       Family History   Problem Relation Age of Onset    Alzheimer's disease Mother     COPD Father     No Known Problems Sister     No Known Problems Daughter     Alzheimer's disease Maternal Grandmother     No Known Problems Maternal Grandfather     No Known Problems Paternal Grandmother     No Known Problems Paternal Grandfather     No Known Problems Brother     No Known Problems Brother     No Known Problems Brother     No Known Problems Daughter      Social History     Socioeconomic History    Marital status: /Civil Union     Spouse name: Not on file    Number of children: 3    Years of education: Not on file    Highest education level: Not on file   Occupational History    Not on file   Social Needs    Financial resource strain: Not on file    Food insecurity     Worry: Not on file     Inability: Not on file    Transportation needs     Medical: Not on file     Non-medical: Not on file   Tobacco Use    Smoking status: Never Smoker    Smokeless tobacco: Never Used   Substance and Sexual Activity    Alcohol use: No    Drug use: No    Sexual activity: Yes     Partners: Male   Lifestyle    Physical activity     Days per week: Not on file     Minutes per session: Not on file    Stress: Not on file   Relationships    Social connections     Talks on phone: Not on file     Gets together: Not on file     Attends Yarsanism service: Not on file     Active member of club or organization: Not on file     Attends meetings of clubs or organizations: Not on file     Relationship status: Not on file    Intimate partner violence     Fear of current or ex partner: Not on file     Emotionally abused: Not on file     Physically abused: Not on file     Forced sexual activity: Not on file   Other Topics Concern    Not on file   Social History Narrative    Not on file       Current Outpatient Medications:     albuterol (PROAIR HFA) 90 mcg/act inhaler, Inhale 2 puffs every 4 (four) hours as needed for wheezing, Disp: 1 Inhaler, Rfl: 2    cetirizine (ZyrTEC) 10 MG chewable tablet, Chew 10 mg daily as needed , Disp: , Rfl:     Cholecalciferol (VITAMIN D3) 1000 units CHEW, Chew 1 tablet daily, Disp: , Rfl:     fluticasone (FLONASE) 50 mcg/act nasal spray, 1 spray into each nostril daily as needed , Disp: , Rfl:     multivitamin (THERAGRAN) TABS, Take 1 tablet by mouth daily, Disp: , Rfl:     rizatriptan (MAXALT-MLT) 10 MG disintegrating tablet, DISSOLVE ONE TABLET IN MOUTH at onset of headache    May repeat in two hours if ineffective, Disp: 12 tablet, Rfl: 0    tamoxifen (NOLVADEX) 20 mg tablet, TAKE ONE TABLET BY MOUTH DAILY , Disp: 90 tablet, Rfl: 1    TURMERIC CURCUMIN PO, Take by mouth daily, Disp: , Rfl:   No Known Allergies  Vitals:    06/10/21 1403   BP: 116/74   Pulse: 80   Resp: 12       Physical Exam  Vitals signs reviewed  Constitutional:       General: She is not in acute distress  Appearance: Normal appearance  She is well-developed  She is not diaphoretic  HENT:      Head: Normocephalic and atraumatic  Neck:      Musculoskeletal: Normal range of motion  Cardiovascular:      Rate and Rhythm: Normal rate and regular rhythm  Heart sounds: Normal heart sounds  Pulmonary:      Effort: Pulmonary effort is normal       Breath sounds: Normal breath sounds  Chest:      Breasts:         Right: No swelling, bleeding, inverted nipple, mass, nipple discharge, skin change or tenderness  Left: Skin change (surgical scar- breast, axilla) present  No swelling, bleeding, inverted nipple, mass, nipple discharge or tenderness  Abdominal:      Palpations: Abdomen is soft  There is no mass  Tenderness: There is no abdominal tenderness  Musculoskeletal: Normal range of motion  Lymphadenopathy:      Upper Body:      Right upper body: No supraclavicular or axillary adenopathy  Left upper body: No supraclavicular or axillary adenopathy  Skin:     General: Skin is warm and dry  Findings: No rash  Neurological:      Mental Status: She is alert and oriented to person, place, and time  Psychiatric:         Speech: Speech normal            Advance Care Planning/Advance Directives:  Discussed disease status, cancer treatment plans and/or cancer treatment goals with the patient

## 2021-07-02 LAB
ALBUMIN SERPL-MCNC: 4.4 G/DL (ref 3.8–4.8)
ALBUMIN/GLOB SERPL: 1.9 {RATIO} (ref 1.2–2.2)
ALP SERPL-CCNC: 38 IU/L (ref 48–121)
ALT SERPL-CCNC: 11 IU/L (ref 0–32)
AST SERPL-CCNC: 17 IU/L (ref 0–40)
BILIRUB SERPL-MCNC: 0.3 MG/DL (ref 0–1.2)
BUN SERPL-MCNC: 14 MG/DL (ref 6–24)
BUN/CREAT SERPL: 26 (ref 9–23)
CALCIUM SERPL-MCNC: 9.5 MG/DL (ref 8.7–10.2)
CHLORIDE SERPL-SCNC: 106 MMOL/L (ref 96–106)
CHOLEST SERPL-MCNC: 177 MG/DL (ref 100–199)
CO2 SERPL-SCNC: 24 MMOL/L (ref 20–29)
CREAT SERPL-MCNC: 0.53 MG/DL (ref 0.57–1)
ERYTHROCYTE [DISTWIDTH] IN BLOOD BY AUTOMATED COUNT: 13 % (ref 11.7–15.4)
EST. AVERAGE GLUCOSE BLD GHB EST-MCNC: 108 MG/DL
GLOBULIN SER-MCNC: 2.3 G/DL (ref 1.5–4.5)
GLUCOSE SERPL-MCNC: 93 MG/DL (ref 65–99)
HBA1C MFR BLD: 5.4 % (ref 4.8–5.6)
HCT VFR BLD AUTO: 36.3 % (ref 34–46.6)
HDLC SERPL-MCNC: 80 MG/DL
HGB BLD-MCNC: 12 G/DL (ref 11.1–15.9)
LDLC SERPL CALC-MCNC: 81 MG/DL (ref 0–99)
LDLC/HDLC SERPL: 1 RATIO (ref 0–3.2)
MCH RBC QN AUTO: 29.8 PG (ref 26.6–33)
MCHC RBC AUTO-ENTMCNC: 33.1 G/DL (ref 31.5–35.7)
MCV RBC AUTO: 90 FL (ref 79–97)
MICRODELETION SYND BLD/T FISH: NORMAL
PLATELET # BLD AUTO: 241 X10E3/UL (ref 150–450)
POTASSIUM SERPL-SCNC: 4.2 MMOL/L (ref 3.5–5.2)
PROT SERPL-MCNC: 6.7 G/DL (ref 6–8.5)
RBC # BLD AUTO: 4.03 X10E6/UL (ref 3.77–5.28)
SL AMB EGFR AFRICAN AMERICAN: 130 ML/MIN/1.73
SL AMB EGFR NON AFRICAN AMERICAN: 113 ML/MIN/1.73
SL AMB VLDL CHOLESTEROL CALC: 16 MG/DL (ref 5–40)
SODIUM SERPL-SCNC: 141 MMOL/L (ref 134–144)
TRIGL SERPL-MCNC: 90 MG/DL (ref 0–149)
WBC # BLD AUTO: 5.8 X10E3/UL (ref 3.4–10.8)

## 2021-07-26 DIAGNOSIS — Z17.0 MALIGNANT NEOPLASM OF UPPER-INNER QUADRANT OF LEFT BREAST IN FEMALE, ESTROGEN RECEPTOR POSITIVE (HCC): ICD-10-CM

## 2021-07-26 DIAGNOSIS — C50.212 MALIGNANT NEOPLASM OF UPPER-INNER QUADRANT OF LEFT BREAST IN FEMALE, ESTROGEN RECEPTOR POSITIVE (HCC): ICD-10-CM

## 2021-07-26 RX ORDER — TAMOXIFEN CITRATE 20 MG/1
TABLET ORAL
Qty: 90 TABLET | Refills: 1 | Status: SHIPPED | OUTPATIENT
Start: 2021-07-26 | End: 2022-01-24

## 2021-08-14 ENCOUNTER — OFFICE VISIT (OUTPATIENT)
Dept: FAMILY MEDICINE CLINIC | Facility: CLINIC | Age: 46
End: 2021-08-14
Payer: COMMERCIAL

## 2021-08-14 VITALS
TEMPERATURE: 100.7 F | BODY MASS INDEX: 30.36 KG/M2 | RESPIRATION RATE: 16 BRPM | SYSTOLIC BLOOD PRESSURE: 90 MMHG | HEART RATE: 108 BPM | WEIGHT: 165 LBS | HEIGHT: 62 IN | DIASTOLIC BLOOD PRESSURE: 60 MMHG

## 2021-08-14 DIAGNOSIS — J02.9 ACUTE PHARYNGITIS, UNSPECIFIED ETIOLOGY: Primary | ICD-10-CM

## 2021-08-14 LAB — S PYO AG THROAT QL: NEGATIVE

## 2021-08-14 PROCEDURE — 87880 STREP A ASSAY W/OPTIC: CPT | Performed by: NURSE PRACTITIONER

## 2021-08-14 PROCEDURE — 99213 OFFICE O/P EST LOW 20 MIN: CPT | Performed by: NURSE PRACTITIONER

## 2021-08-14 RX ORDER — AMOXICILLIN 875 MG/1
875 TABLET, COATED ORAL 2 TIMES DAILY
Qty: 20 TABLET | Refills: 0 | Status: SHIPPED | OUTPATIENT
Start: 2021-08-14 | End: 2021-08-24

## 2021-08-14 NOTE — PROGRESS NOTES
Assessment/Plan:    Rapid strep negative  Will cover with Amoxicillin  Reviewed supportive care  EBV titers next week if no improvement  To call with any new or persistent symptoms    1  Acute pharyngitis, unspecified etiology  -     POCT rapid strepA  -     CBC and differential; Future  -     EBV acute panel; Future  -     CBC and differential  -     EBV acute panel  -     amoxicillin (AMOXIL) 875 mg tablet; Take 1 tablet (875 mg total) by mouth 2 (two) times a day for 10 days            There are no Patient Instructions on file for this visit  Return if symptoms worsen or fail to improve  Subjective:      Patient ID: Oni Joyner is a 48 y o  female  Chief Complaint   Patient presents with    Sore Throat     Woke up yesteday with "lump" on the side of her throat, fever yesterday  Temp 100 [id number]       Here today with complaints of left sided throat pain and difficulty swallowing since yesterday  Has been feeling feverish, but did not check her temp  Has some chills and body aches  Denies any sinus congestion, rhinorrhea, or cough  She has been vaccinated against COVID 19  No sick contacts      The following portions of the patient's history were reviewed and updated as appropriate: allergies, current medications, past family history, past medical history, past social history, past surgical history and problem list     Review of Systems   Constitutional: Positive for chills and fever  Negative for fatigue  HENT: Positive for sore throat  Negative for congestion, ear pain, postnasal drip, rhinorrhea and sinus pressure  Respiratory: Negative for cough, shortness of breath and wheezing  Cardiovascular: Negative for chest pain  Gastrointestinal: Negative for abdominal pain, diarrhea, nausea and vomiting  Musculoskeletal: Positive for myalgias  Negative for arthralgias  Skin: Negative for rash  Neurological: Negative for headaches           Current Outpatient Medications Medication Sig Dispense Refill    albuterol (PROAIR HFA) 90 mcg/act inhaler Inhale 2 puffs every 4 (four) hours as needed for wheezing 1 Inhaler 2    cetirizine (ZyrTEC) 10 MG chewable tablet Chew 10 mg daily as needed       Cholecalciferol (VITAMIN D3) 1000 units CHEW Chew 1 tablet daily      fluticasone (FLONASE) 50 mcg/act nasal spray 1 spray into each nostril daily as needed       multivitamin (THERAGRAN) TABS Take 1 tablet by mouth daily      rizatriptan (MAXALT-MLT) 10 MG disintegrating tablet DISSOLVE ONE TABLET IN MOUTH at onset of headache  May repeat in two hours if ineffective 12 tablet 0    tamoxifen (NOLVADEX) 20 mg tablet TAKE ONE TABLET BY MOUTH ONE TIME DAILY  90 tablet 1    TURMERIC CURCUMIN PO Take by mouth daily      amoxicillin (AMOXIL) 875 mg tablet Take 1 tablet (875 mg total) by mouth 2 (two) times a day for 10 days 20 tablet 0     No current facility-administered medications for this visit  Objective:    BP 90/60   Pulse (!) 108   Temp (!) 100 7 °F (38 2 °C)   Resp 16   Ht 5' 2" (1 575 m)   Wt 74 8 kg (165 lb)   LMP 07/28/2021 (Exact Date)   BMI 30 18 kg/m²        Physical Exam  Vitals and nursing note reviewed  Constitutional:       General: She is not in acute distress  Appearance: Normal appearance  She is well-developed  She is not ill-appearing  HENT:      Head: Normocephalic and atraumatic  Right Ear: Tympanic membrane, ear canal and external ear normal       Left Ear: Tympanic membrane, ear canal and external ear normal       Nose: No mucosal edema or rhinorrhea  Mouth/Throat:      Pharynx: Uvula midline  Tonsils: Tonsillar exudate present  No tonsillar abscesses  1+ on the right  1+ on the left  Eyes:      Conjunctiva/sclera: Conjunctivae normal    Neck:      Thyroid: No thyromegaly  Cardiovascular:      Rate and Rhythm: Normal rate and regular rhythm  Heart sounds: Normal heart sounds  No murmur heard       Pulmonary: Effort: Pulmonary effort is normal       Breath sounds: Normal breath sounds  Abdominal:      General: Bowel sounds are normal  There is no distension  Palpations: There is no hepatomegaly or splenomegaly  Tenderness: There is no abdominal tenderness  Musculoskeletal:      Cervical back: Neck supple  No edema  Lymphadenopathy:      Cervical:      Right cervical: No superficial cervical adenopathy  Left cervical: No superficial cervical adenopathy  Comments: Left anterior cervical , not enlarged   Skin:     General: Skin is warm and dry  Findings: No rash  Neurological:      Mental Status: She is alert     Psychiatric:         Mood and Affect: Mood normal          Behavior: Behavior normal                 JACIEL Dutton

## 2021-08-17 ENCOUNTER — OFFICE VISIT (OUTPATIENT)
Dept: FAMILY MEDICINE CLINIC | Facility: CLINIC | Age: 46
End: 2021-08-17
Payer: COMMERCIAL

## 2021-08-17 VITALS
BODY MASS INDEX: 30.36 KG/M2 | SYSTOLIC BLOOD PRESSURE: 108 MMHG | TEMPERATURE: 99.4 F | HEIGHT: 62 IN | RESPIRATION RATE: 16 BRPM | DIASTOLIC BLOOD PRESSURE: 70 MMHG | HEART RATE: 76 BPM | WEIGHT: 165 LBS

## 2021-08-17 DIAGNOSIS — Z00.00 ANNUAL PHYSICAL EXAM: Primary | ICD-10-CM

## 2021-08-17 DIAGNOSIS — Z23 NEED FOR VACCINATION: ICD-10-CM

## 2021-08-17 DIAGNOSIS — Z17.0 MALIGNANT NEOPLASM OF UPPER-INNER QUADRANT OF LEFT BREAST IN FEMALE, ESTROGEN RECEPTOR POSITIVE (HCC): ICD-10-CM

## 2021-08-17 DIAGNOSIS — C50.212 MALIGNANT NEOPLASM OF UPPER-INNER QUADRANT OF LEFT BREAST IN FEMALE, ESTROGEN RECEPTOR POSITIVE (HCC): ICD-10-CM

## 2021-08-17 PROCEDURE — 3725F SCREEN DEPRESSION PERFORMED: CPT | Performed by: FAMILY MEDICINE

## 2021-08-17 PROCEDURE — 99396 PREV VISIT EST AGE 40-64: CPT | Performed by: FAMILY MEDICINE

## 2021-08-17 PROCEDURE — 90715 TDAP VACCINE 7 YRS/> IM: CPT

## 2021-08-17 PROCEDURE — 90471 IMMUNIZATION ADMIN: CPT

## 2021-08-17 PROCEDURE — 1036F TOBACCO NON-USER: CPT | Performed by: FAMILY MEDICINE

## 2021-08-17 PROCEDURE — 3008F BODY MASS INDEX DOCD: CPT | Performed by: FAMILY MEDICINE

## 2021-08-17 NOTE — PROGRESS NOTES
FAMILY PRACTICE HEALTH MAINTENANCE OFFICE VISIT  [facility]    NAME: Chandni Smith  AGE: 48 y o  SEX: female  : 1975     DATE: 2021    Assessment and Plan     1  Annual physical exam    2  Malignant neoplasm of upper-inner quadrant of left breast in female, estrogen receptor positive ([address] )  Assessment & Plan:  Stable  Continue tamoxifen      3  Need for vaccination  -     TDAP VACCINE GREATER THAN OR EQUAL TO 8YO IM      · Patient Counseling:   · Nutrition: Stressed importance of a well balanced diet, moderation of sodium/saturated fat, caloric balance and sufficient intake of fiber  · Exercise: Stressed the importance of regular exercise with a goal of 150 minutes per week  · Dental Health: Discussed daily flossing and brushing and regular dental visits     · Immunizations reviewed: See Orders  · Discussed benefits of:  Mammogram  and Screening labs   BMI Counseling: Body mass index is 30 42 kg/m²  Discussed with patient's BMI with her  The BMI is above normal  Exercise recommendations include exercising 3-5 times per week  Return in about 1 year (around 2022) for Annual physical         Chief Complaint     Chief Complaint   Patient presents with    Well Check     GYN Dr Julieta Kuhn does her annual exams [id number]       History of Present Illness     She is feeling well and trying to watch her diet         Well Adult Physical   Patient here for a comprehensive physical exam       Diet and Physical Activity  Diet: well balanced diet  Exercise: infrequently      Depression Screen  PHQ-9 Depression Screening    PHQ-9:   Frequency of the following problems over the past two weeks:      Little interest or pleasure in doing things: 0 - not at all  Feeling down, depressed, or hopeless: 0 - not at all  PHQ-2 Score: 0          General Health  Hearing: Normal:  bilateral  Vision: wears glasses  Dental: regular dental visits    Reproductive Health  Follows with gynecologist      The following portions of the patient's history were reviewed and updated as appropriate: allergies, current medications, past family history, past medical history, past social history, past surgical history and problem list     Review of Systems     Review of Systems   Constitutional: Negative  Respiratory: Negative  Cardiovascular: Negative  Past Medical History     Past Medical History:   Diagnosis Date    Adhesive capsulitis of right shoulder     Resolved 11/15/2016     Asthma     Benign paroxysmal vertigo, unspecified ear     Resolved 11/15/2016     BRCA gene mutation negative 06/2019    [facility]    Breast cancer ([facility] [address] ) 07/30/2019    Cellulitis of leg, except foot     Resolved 12/10/2014     Hx of radiation therapy 08/01/2019    Lymphadenitis     Resolved 12/10/2014     Snoring        Past Surgical History     Past Surgical History:   Procedure Laterality Date    BREAST BIOPSY Left 2019    BREAST LUMPECTOMY Left 07/30/2019    Procedure: BREAST LUMPECTOMY; BREAST NEEDLE LOCALIZATION (NEEDLE LOC AT 0800); Surgeon: Juice Solomon MD;  [facility];  Service: Surgical Oncology    INTRAUTERINE DEVICE INSERTION  2019    LYMPH NODE BIOPSY Left 07/30/2019    Procedure: SENTINEL LYMPH NODE BIOPSY; LYMPHATIC MAPPING WITH BLUE DYE AND RADIOACTIVE DYE (INJECT AT 0930 BY DR LEAVITT IN THE OR);   Surgeon: Juice Solomon MD;  [facility];  Service: Surgical Oncology    MAMMO NEEDLE LOCALIZATION LEFT (ALL INC) Left 07/30/2019    SALPINGECTOMY Bilateral     SHOULDER SURGERY Right     Last assessed 12/29/2015     US GUIDED BREAST BIOPSY LEFT COMPLETE Left 06/07/2019    WISDOM TOOTH EXTRACTION         Social History     Social History     Socioeconomic History    Marital status: /Civil Union     Spouse name: None    Number of children: 3    Years of education: None    Highest education level: None   Occupational History    None   Tobacco Use    Smoking status: Never Smoker    Smokeless tobacco: Never Used   Vaping Use    Vaping Use: Never used   Substance and Sexual Activity    Alcohol use: No    Drug use: No    Sexual activity: Yes     Partners: Male   Other Topics Concern    None   Social History Narrative    None     Social Determinants of Health     Financial Resource Strain:     Difficulty of Paying Living Expenses:    Food Insecurity:     Worried About Running Out of Food in the Last Year:     Ran Out of Food in the Last Year:    Transportation Needs:     Lack of Transportation (Medical):      Lack of Transportation (Non-Medical):    Physical Activity:     Days of Exercise per Week:     Minutes of Exercise per Session:    Stress:     Feeling of Stress :    Social Connections:     Frequency of Communication with Friends and Family:     Frequency of Social Gatherings with Friends and Family:     Attends Mandaeism Services:     Active Member of Clubs or Organizations:     Attends Club or Organization Meetings:     Marital Status:    Intimate Partner Violence:     Fear of Current or Ex-Partner:     Emotionally Abused:     Physically Abused:     Sexually Abused:        Family History     Family History   Problem Relation Age of Onset    Alzheimer's disease Mother     COPD Father     No Known Problems Sister     No Known Problems Daughter     Alzheimer's disease Maternal Grandmother     No Known Problems Maternal Grandfather     No Known Problems Paternal Grandmother     No Known Problems Paternal Grandfather     No Known Problems Brother     No Known Problems Brother     No Known Problems Brother     No Known Problems Daughter        Current Medications       Current Outpatient Medications:     albuterol (PROAIR HFA) 90 mcg/act inhaler, Inhale 2 puffs every 4 (four) hours as needed for wheezing, Disp: 1 Inhaler, Rfl: 2    amoxicillin (AMOXIL) 875 mg tablet, Take 1 tablet (875 mg total) by mouth 2 (two) times a day for 10 days, Disp: 20 tablet, Rfl: 0    cetirizine (ZyrTEC) 10 MG chewable tablet, Chew 10 mg daily as needed , Disp: , Rfl:     Cholecalciferol (VITAMIN D3) 1000 units CHEW, Chew 1 tablet daily, Disp: , Rfl:     fluticasone (FLONASE) 50 mcg/act nasal spray, 1 spray into each nostril daily as needed , Disp: , Rfl:     multivitamin (THERAGRAN) TABS, Take 1 tablet by mouth daily, Disp: , Rfl:     rizatriptan (MAXALT-MLT) 10 MG disintegrating tablet, DISSOLVE ONE TABLET IN MOUTH at onset of headache  May repeat in two hours if ineffective, Disp: 12 tablet, Rfl: 0    tamoxifen (NOLVADEX) 20 mg tablet, TAKE ONE TABLET BY MOUTH ONE TIME DAILY , Disp: 90 tablet, Rfl: 1    TURMERIC CURCUMIN PO, Take by mouth daily, Disp: , Rfl:      Allergies     No Known Allergies    Objective     /70   Pulse 76   Temp 99 4 °F (37 4 °C)   Resp 16   Ht 5' 1 75" (1 568 m)   Wt 74 8 kg (165 lb)   LMP 07/28/2021 (Exact Date)   BMI 30 42 kg/m²      Physical Exam  Vitals and nursing note reviewed  Constitutional:       Appearance: She is well-developed  HENT:      Head: Normocephalic and atraumatic  Right Ear: Tympanic membrane and external ear normal       Left Ear: Tympanic membrane and external ear normal    Cardiovascular:      Rate and Rhythm: Normal rate and regular rhythm  Heart sounds: Normal heart sounds  No murmur heard  No friction rub  Pulmonary:      Effort: Pulmonary effort is normal  No respiratory distress  Breath sounds: Normal breath sounds  No wheezing or rales  Abdominal:      General: There is no distension  Palpations: Abdomen is soft  There is no mass  Tenderness: There is no abdominal tenderness  There is no guarding or rebound  Musculoskeletal:      Cervical back: Normal range of motion  Right lower leg: No edema  Left lower leg: No edema  Skin:     General: Skin is warm     Neurological:      Mental Status: She is alert and oriented to person, place, and time    Psychiatric:         Mood and Affect: Mood normal             Visual Acuity Screening    Right eye Left eye Both eyes   Without correction:      With correction: 20/20 20/20 20/20           DO BRITANY [facility]

## 2022-01-13 ENCOUNTER — OFFICE VISIT (OUTPATIENT)
Dept: SURGICAL ONCOLOGY | Facility: CLINIC | Age: 47
End: 2022-01-13
Payer: COMMERCIAL

## 2022-01-13 VITALS
HEART RATE: 112 BPM | HEIGHT: 62 IN | RESPIRATION RATE: 16 BRPM | BODY MASS INDEX: 31.83 KG/M2 | TEMPERATURE: 97.8 F | OXYGEN SATURATION: 97 % | WEIGHT: 173 LBS | DIASTOLIC BLOOD PRESSURE: 90 MMHG | SYSTOLIC BLOOD PRESSURE: 154 MMHG

## 2022-01-13 DIAGNOSIS — Z79.810 USE OF TAMOXIFEN (NOLVADEX): ICD-10-CM

## 2022-01-13 DIAGNOSIS — Z17.0 MALIGNANT NEOPLASM OF UPPER-INNER QUADRANT OF LEFT BREAST IN FEMALE, ESTROGEN RECEPTOR POSITIVE (HCC): Primary | ICD-10-CM

## 2022-01-13 DIAGNOSIS — C50.212 MALIGNANT NEOPLASM OF UPPER-INNER QUADRANT OF LEFT BREAST IN FEMALE, ESTROGEN RECEPTOR POSITIVE (HCC): Primary | ICD-10-CM

## 2022-01-13 PROCEDURE — 99214 OFFICE O/P EST MOD 30 MIN: CPT | Performed by: NURSE PRACTITIONER

## 2022-01-13 PROCEDURE — 1036F TOBACCO NON-USER: CPT | Performed by: NURSE PRACTITIONER

## 2022-01-13 PROCEDURE — 3008F BODY MASS INDEX DOCD: CPT | Performed by: NURSE PRACTITIONER

## 2022-01-13 NOTE — PROGRESS NOTES
Surgical Oncology Follow Up       [address] [facility]COL[facility]Y [place]  [address] [facility] [address] [address]    Jocelyn Smith  1975  [id number]  [address] [facility] [place]  [address] [address]    Chief Complaint   Patient presents with    Breast Cancer       Assessment/Plan:  1  Malignant neoplasm of upper-inner quadrant of left breast in female, estrogen receptor positive ([facility] [address] )  - Ambulatory referral to Physical Therapy; Future  - Mammo diagnostic bilateral w 3d & cad; Future  - 6 mo f/u visit    2  Use of tamoxifen (Nolvadex)  - Continue use per medical oncology      Discussion/Summary: Patient is a pleasant 48year-old female who presents today for a follow-up visit for left breast cancer diagnosed in June of 2019  Her pathology revealed invasive lobular carcinoma, ER 90%, ID 45%, her 2-   She underwent genetic testing which was negative   She underwent a left lumpectomy and sentinel node biopsy by Dr Glenn Painting completed whole breast radiation therapy and is currently taking tamoxifen   She had a bilateral 3D diagnostic mammogram on March 12, 2021 which was BI-RADS 2, category 3 density  She offers no new complaints today and there are no concerns on today's exam   I am referring her to our strength ABC program and we also discussed the option of our Thrive program today  I will make arrangements for her mammogram in March and I will plan to see her back in 6 months or sooner should the need arise  She was instructed to call with any new concerns or symptoms prior to that time  All of her questions were answered today      History of Present Illness:     Oncology History   Malignant neoplasm of upper-inner quadrant of left breast in female, estrogen receptor positive ([facility] [address] )   6/7/2019 Biopsy    Left breast ultrasound-guided biopsy  11 o'clock, 4 cm from nipple  Invasive lobular carcinoma  Grade 1  ER 90  MN 45  HER2 1+     6/17/2019 Genetic Testing    BRCA negative  [facility]     7/30/2019 Surgery    Left breast lumpectomy with sentinel lymph node biopsy  Invasive lobular carcinoma  Grade 1  1 1 cm  0/3 Lymph nodes  Margins negative  Anatomic/Prognostic Stage IA     8/14/2019 -  Cancer Staged    Staging form: Breast, AJCC 8th Edition  - Clinical: Stage IA (cT1c, cN0, cM0, G1, ER+, MN+, HER2-) - Signed by Miki Guevara MD on 8/14/2019  Laterality: Left  Histologic grading system: 3 grade system       8/27/2019 - 9/25/2019 Radiation    Course: C1    Plan ID Energy Fractions Dose per Fraction (cGy) Dose Correction (cGy) Total Dose Delivered (cGy) Elapsed Days   BH L Breast 6X 16 / 16 266 0 4,256 22   BH L Brst e 12E 5 / 5 200 0 1,000 6      Treatment dates:  C1: 8/27/2019 - 9/25/2019 8/2019 -  Hormone Therapy    Tamoxifen 20 mg daily  With Dr Chandler Muniz          -Interval History:  Patient presents today for follow-up visit for left breast cancer  She notices no changes on her self breast exam   Denies any new or persistent symptoms  She reports weight gain  She recently purchased new gym equipment further home and is looking to increase her exercise  She continues on tamoxifen  Review of Systems:  Review of Systems   Constitutional: Negative for activity change, appetite change, chills, fatigue, fever and unexpected weight change  Respiratory: Negative for cough and shortness of breath  Cardiovascular: Negative for chest pain  Gastrointestinal: Negative for abdominal pain, constipation, diarrhea, nausea and vomiting  Musculoskeletal: Negative for arthralgias, back pain, gait problem and myalgias  Skin: Negative for color change and rash  Neurological: Negative for dizziness and headaches  Hematological: Negative for adenopathy  Psychiatric/Behavioral: Negative for agitation and confusion  All other systems reviewed and are negative        Patient Active Problem List   Diagnosis    Allergic rhinitis    Dense breasts    Migraine headache    Mild intermittent asthma    Motion sickness    Vitamin D deficiency    Malignant neoplasm of upper-inner quadrant of left breast in female, estrogen receptor positive ([facility] [address] )    Use of tamoxifen (Nolvadex)    Abnormal glucose     Past Medical History:   Diagnosis Date    Adhesive capsulitis of right shoulder     Resolved 11/15/2016     Asthma     Benign paroxysmal vertigo, unspecified ear     Resolved 11/15/2016     BRCA gene mutation negative 06/2019    [facility]    Breast cancer ([facility] [address] ) 07/30/2019    Cellulitis of leg, except foot     Resolved 12/10/2014     Hx of radiation therapy 08/01/2019    Lymphadenitis     Resolved 12/10/2014     Snoring      Past Surgical History:   Procedure Laterality Date    BREAST BIOPSY Left 2019    BREAST LUMPECTOMY Left 07/30/2019    Procedure: BREAST LUMPECTOMY; BREAST NEEDLE LOCALIZATION (NEEDLE LOC AT 0800); Surgeon: Khris Baer MD;  [facility];  Service: Surgical Oncology    INTRAUTERINE DEVICE INSERTION  2019    LYMPH NODE BIOPSY Left 07/30/2019    Procedure: SENTINEL LYMPH NODE BIOPSY; LYMPHATIC MAPPING WITH BLUE DYE AND RADIOACTIVE DYE (INJECT AT 0930 BY DR LEAVITT IN THE OR);   Surgeon: Khris Baer MD;  [facility];  Service: Surgical Oncology    MAMMO NEEDLE LOCALIZATION LEFT (ALL INC) Left 07/30/2019    SALPINGECTOMY Bilateral 03/2021    SHOULDER SURGERY Right     Last assessed 12/29/2015     US GUIDED BREAST BIOPSY LEFT COMPLETE Left 06/07/2019    WISDOM TOOTH EXTRACTION       Family History   Problem Relation Age of Onset    Alzheimer's disease Mother    [address] COPD Father     No Known Problems Sister     No Known Problems Daughter     Alzheimer's disease Maternal Grandmother     No Known Problems Maternal Grandfather     No Known Problems Paternal Grandmother     No Known Problems Paternal Grandfather     No Known Problems Brother     No Known Problems Brother     No Known Problems Brother     No Known Problems Daughter      Social History     Socioeconomic History    Marital status: /Civil Union     Spouse name: Not on file    Number of children: 3    Years of education: Not on file    Highest education level: Not on file   Occupational History    Not on file   Tobacco Use    Smoking status: Never Smoker    Smokeless tobacco: Never Used   Vaping Use    Vaping Use: Never used   Substance and Sexual Activity    Alcohol use: No    Drug use: No    Sexual activity: Yes     Partners: Male   Other Topics Concern    Not on file   Social History Narrative    Not on file     Social Determinants of Health     Financial Resource Strain: Not on file   Food Insecurity: Not on file   Transportation Needs: Not on file   Physical Activity: Not on file   Stress: Not on file   Social Connections: Not on file   Intimate Partner Violence: Not on file   Housing Stability: Not on file       Current Outpatient Medications:     albuterol (PROAIR HFA) 90 mcg/act inhaler, Inhale 2 puffs every 4 (four) hours as needed for wheezing, Disp: 1 Inhaler, Rfl: 2    cetirizine (ZyrTEC) 10 MG chewable tablet, Chew 10 mg daily as needed , Disp: , Rfl:     Cholecalciferol (VITAMIN D3) 1000 units CHEW, Chew 1 tablet daily, Disp: , Rfl:     fluticasone (FLONASE) 50 mcg/act nasal spray, 1 spray into each nostril daily as needed , Disp: , Rfl:     multivitamin (THERAGRAN) TABS, Take 1 tablet by mouth daily, Disp: , Rfl:     rizatriptan (MAXALT-MLT) 10 MG disintegrating tablet, DISSOLVE ONE TABLET IN MOUTH at onset of headache    May repeat in two hours if ineffective, Disp: 12 tablet, Rfl: 0    tamoxifen (NOLVADEX) 20 mg tablet, TAKE ONE TABLET BY MOUTH ONE TIME DAILY , Disp: 90 tablet, Rfl: 1    TURMERIC CURCUMIN PO, Take by mouth daily, Disp: , Rfl:   No Known Allergies  Vitals:    01/13/22 0822   BP: 154/90   Pulse: (!) 112   Resp: 16   Temp: 97 8 °F (36 6 °C)   SpO2: 97% Physical Exam  Vitals reviewed  Constitutional:       General: She is not in acute distress  Appearance: Normal appearance  She is well-developed  She is not diaphoretic  HENT:      Head: Normocephalic and atraumatic  Cardiovascular:      Rate and Rhythm: Normal rate and regular rhythm  Heart sounds: Normal heart sounds  Pulmonary:      Effort: Pulmonary effort is normal       Breath sounds: Normal breath sounds  Chest:      Chest wall: Tenderness (mild, left lateral chest wall) present  Breasts:      Right: No swelling, bleeding, inverted nipple, mass, nipple discharge, skin change, tenderness, axillary adenopathy or supraclavicular adenopathy  Left: Skin change (surgical scars- breast, axilla) present  No swelling, bleeding, inverted nipple, mass, nipple discharge, tenderness, axillary adenopathy or supraclavicular adenopathy  Abdominal:      Palpations: Abdomen is soft  There is no mass  Tenderness: There is no abdominal tenderness  Musculoskeletal:         General: Normal range of motion  Cervical back: Normal range of motion  Lymphadenopathy:      Upper Body:      Right upper body: No supraclavicular or axillary adenopathy  Left upper body: No supraclavicular or axillary adenopathy  Skin:     General: Skin is warm and dry  Findings: No rash  Neurological:      Mental Status: She is alert and oriented to person, place, and time  Psychiatric:         Speech: Speech normal          Advance Care Planning/Advance Directives:  Discussed disease status, cancer treatment plans and/or cancer treatment goals with the patient

## 2022-01-23 DIAGNOSIS — C50.212 MALIGNANT NEOPLASM OF UPPER-INNER QUADRANT OF LEFT BREAST IN FEMALE, ESTROGEN RECEPTOR POSITIVE (HCC): ICD-10-CM

## 2022-01-23 DIAGNOSIS — Z17.0 MALIGNANT NEOPLASM OF UPPER-INNER QUADRANT OF LEFT BREAST IN FEMALE, ESTROGEN RECEPTOR POSITIVE (HCC): ICD-10-CM

## 2022-01-24 DIAGNOSIS — C50.212 MALIGNANT NEOPLASM OF UPPER-INNER QUADRANT OF LEFT BREAST IN FEMALE, ESTROGEN RECEPTOR POSITIVE (HCC): ICD-10-CM

## 2022-01-24 DIAGNOSIS — Z17.0 MALIGNANT NEOPLASM OF UPPER-INNER QUADRANT OF LEFT BREAST IN FEMALE, ESTROGEN RECEPTOR POSITIVE (HCC): ICD-10-CM

## 2022-01-24 DIAGNOSIS — J45.20 MILD INTERMITTENT ASTHMA WITHOUT COMPLICATION: ICD-10-CM

## 2022-01-24 RX ORDER — TAMOXIFEN CITRATE 20 MG/1
TABLET ORAL
Qty: 90 TABLET | Refills: 1 | Status: SHIPPED | OUTPATIENT
Start: 2022-01-24 | End: 2022-01-24 | Stop reason: SDUPTHER

## 2022-01-24 RX ORDER — TAMOXIFEN CITRATE 20 MG/1
20 TABLET ORAL DAILY
Qty: 90 TABLET | Refills: 1 | Status: SHIPPED | OUTPATIENT
Start: 2022-01-24 | End: 2022-01-25

## 2022-01-24 RX ORDER — ALBUTEROL SULFATE 90 UG/1
2 AEROSOL, METERED RESPIRATORY (INHALATION) EVERY 4 HOURS PRN
Qty: 18 G | Refills: 1 | Status: SHIPPED | OUTPATIENT
Start: 2022-01-24

## 2022-01-25 DIAGNOSIS — C50.212 MALIGNANT NEOPLASM OF UPPER-INNER QUADRANT OF LEFT BREAST IN FEMALE, ESTROGEN RECEPTOR POSITIVE (HCC): ICD-10-CM

## 2022-01-25 DIAGNOSIS — Z17.0 MALIGNANT NEOPLASM OF UPPER-INNER QUADRANT OF LEFT BREAST IN FEMALE, ESTROGEN RECEPTOR POSITIVE (HCC): ICD-10-CM

## 2022-01-25 RX ORDER — TAMOXIFEN CITRATE 20 MG/1
TABLET ORAL
Qty: 90 TABLET | Refills: 1 | Status: SHIPPED | OUTPATIENT
Start: 2022-01-25 | End: 2022-01-25 | Stop reason: SDUPTHER

## 2022-01-25 RX ORDER — TAMOXIFEN CITRATE 20 MG/1
20 TABLET ORAL DAILY
Qty: 90 TABLET | Refills: 1 | Status: SHIPPED | OUTPATIENT
Start: 2022-01-25

## 2022-01-31 ENCOUNTER — EVALUATION (OUTPATIENT)
Dept: PHYSICAL THERAPY | Facility: CLINIC | Age: 47
End: 2022-01-31
Payer: COMMERCIAL

## 2022-01-31 DIAGNOSIS — G89.18 CHEST WALL PAIN FOLLOWING SURGERY: Primary | ICD-10-CM

## 2022-01-31 DIAGNOSIS — R07.89 CHEST WALL PAIN FOLLOWING SURGERY: Primary | ICD-10-CM

## 2022-01-31 DIAGNOSIS — Z17.0 MALIGNANT NEOPLASM OF UPPER-INNER QUADRANT OF LEFT BREAST IN FEMALE, ESTROGEN RECEPTOR POSITIVE (HCC): ICD-10-CM

## 2022-01-31 DIAGNOSIS — Z74.09 DECREASED FUNCTIONAL MOBILITY AND ENDURANCE: ICD-10-CM

## 2022-01-31 DIAGNOSIS — C50.212 MALIGNANT NEOPLASM OF UPPER-INNER QUADRANT OF LEFT BREAST IN FEMALE, ESTROGEN RECEPTOR POSITIVE (HCC): ICD-10-CM

## 2022-01-31 PROCEDURE — 97162 PT EVAL MOD COMPLEX 30 MIN: CPT | Performed by: PHYSICAL THERAPIST

## 2022-01-31 PROCEDURE — 97110 THERAPEUTIC EXERCISES: CPT | Performed by: PHYSICAL THERAPIST

## 2022-01-31 NOTE — PROGRESS NOTES
PT Evaluation     Today's date: 2022  Patient name: Martin Ravi  : 1975  MRN: [id number]  Referring provider: JACIEL Chambers  Dx:   Encounter Diagnosis     ICD-10-CM    1  Malignant neoplasm of upper-inner quadrant of left breast in female, estrogen receptor positive ([facility] [address] )  C50 212 Ambulatory referral to Physical Therapy    Z17 0                   Assessment  Assessment details: Martin Ravi is a 48 y o  female with LUE weakness, decreased endurance and ROM following L lumpectomy  She presents with pain, decreased strength, decreased ROM,  and postural  dysfunction  Due to these impairments, Patient has difficulty performing a/iadls, recreational activities and engaging in social activities  Patient's clinical presentation is consistent with their referring diagnosis  Patient would benefit from skilled physical therapy to address their aforementioned impairments, improve their level of function and to improve their overall quality of life   has been given a home exercise program and is in agreement with the plan of care  Thank you for your referral   Impairments: abnormal or restricted ROM, activity intolerance, impaired physical strength and lacks appropriate home exercise program    Symptom irritability: moderateUnderstanding of Dx/Px/POC: excellent  Goals  ST Goals - 2-4 weeks  1  Patient will report decreased pain with activity by at least 2 points within 4 weeks  2  Patient will improve ROM 5-10 degrees within 4 weeks  3  Patient will demonstrate ability to actively correct posture without cueing within 4 weeks  4  Patient will perform IADLs without pain in 2 weeks  5  Patient will increase strength by 25% in 4 weeks    LT Goals - Discharge  1  Patient will improve FOTO score to maximum stated or greater by discharge  2  Patient will return to preferred recreational activity without significant pain increase by discharge   3    Patient will return to all work related activities without pain by discharge     Plan  Patient would benefit from: skilled physical therapy  Referral necessary: No  Planned therapy interventions: joint mobilization, manual therapy, therapeutic exercise, therapeutic activities, home exercise program, postural training, neuromuscular re-education, strengthening and stretching  Frequency: 2x week        Subjective Evaluation    History of Present Illness  Mechanism of injury: Patient reports that in 2019 she had L lumpectomy with 0/3 lymph nodes removed  She also had radiation therapy and is taking tamoxifen  She is not certain how many  She states that she noticed recently that has less strength in the LUE  CC:  She recently noticed tightness and decreased strength  Function:  She cannot reach behind herin the car  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  She feels that her posture may be affected by her sitting posture  She has not been participating in an exercise program   She denies pain in the UE or axlla  She notes tenderness at the incision area if she palpates there                Recurrent probem    Quality of life: excellent    Patient Goals  Patient goals for therapy: increased strength, return to sport/leisure activities, independence with ADLs/IADLs and increased motion          Objective     Active Range of Motion   Left Shoulder   Flexion: 147 degrees   Abduction: 140 degrees   External rotation BTH: C5   Internal rotation BTB: WFL    Right Shoulder   Flexion: 160 degrees   Abduction: 147 degrees   External rotation BTH: WFL  Internal rotation BTB: WFL    Strength/Myotome Testing     Left Shoulder     Planes of Motion   Flexion: 4   Abduction: 4-   External rotation at 0°: 4   Internal rotation at 0°: 4     Isolated Muscles   Biceps: 4   Triceps: 4-     Right Shoulder   Normal muscle strength    Isolated Muscles   Triceps: 4     Additional Strength Details  Biceps:  4/5 on L    Swelling   Left shoulder swelling details: Lymphedema MMTs                        LEFT    palm                     20 3   wrist          15 5  Wrist + 10 cm         21   Wrist + 16 cm          25 6   Elbow            27  Wrist + 32           33 (12 from elbow)   Wrist + 40           36 5       Right shoulder swelling details: Lymphedema MMTs                                 RIGHT    palm                 20  wrist                 16  Wrist + 10 cm     22  Wrist + 16 cm                26 5    Elbow                  27  Wrist + 32                 32 2  (12 cm from elbow)  Wrist + 40                 35                  Precautions: BCA      Manuals 1/31            PROM                                                    Neuro Re-Ed                                                                                                        Ther Ex             Pulleys             Doorway St 5 x :20            Wall Climbs 5 x :10            SBS                                                                 Ther Activity             SBS 10 x :05                         Gait Training                                       Modalities

## 2022-02-02 ENCOUNTER — OFFICE VISIT (OUTPATIENT)
Dept: HEMATOLOGY ONCOLOGY | Facility: CLINIC | Age: 47
End: 2022-02-02
Payer: COMMERCIAL

## 2022-02-02 ENCOUNTER — OFFICE VISIT (OUTPATIENT)
Dept: PHYSICAL THERAPY | Facility: CLINIC | Age: 47
End: 2022-02-02
Payer: COMMERCIAL

## 2022-02-02 VITALS
RESPIRATION RATE: 16 BRPM | HEART RATE: 94 BPM | OXYGEN SATURATION: 99 % | HEIGHT: 62 IN | WEIGHT: 175 LBS | SYSTOLIC BLOOD PRESSURE: 128 MMHG | DIASTOLIC BLOOD PRESSURE: 76 MMHG | BODY MASS INDEX: 32.2 KG/M2 | TEMPERATURE: 97.2 F

## 2022-02-02 DIAGNOSIS — Z74.09 DECREASED FUNCTIONAL MOBILITY AND ENDURANCE: ICD-10-CM

## 2022-02-02 DIAGNOSIS — R07.89 CHEST WALL PAIN FOLLOWING SURGERY: Primary | ICD-10-CM

## 2022-02-02 DIAGNOSIS — G89.18 CHEST WALL PAIN FOLLOWING SURGERY: Primary | ICD-10-CM

## 2022-02-02 DIAGNOSIS — Z17.0 MALIGNANT NEOPLASM OF UPPER-INNER QUADRANT OF LEFT BREAST IN FEMALE, ESTROGEN RECEPTOR POSITIVE (HCC): Primary | ICD-10-CM

## 2022-02-02 DIAGNOSIS — C50.212 MALIGNANT NEOPLASM OF UPPER-INNER QUADRANT OF LEFT BREAST IN FEMALE, ESTROGEN RECEPTOR POSITIVE (HCC): Primary | ICD-10-CM

## 2022-02-02 PROCEDURE — 97110 THERAPEUTIC EXERCISES: CPT | Performed by: PHYSICAL THERAPIST

## 2022-02-02 PROCEDURE — 99214 OFFICE O/P EST MOD 30 MIN: CPT | Performed by: INTERNAL MEDICINE

## 2022-02-02 PROCEDURE — 3008F BODY MASS INDEX DOCD: CPT | Performed by: INTERNAL MEDICINE

## 2022-02-02 PROCEDURE — 97140 MANUAL THERAPY 1/> REGIONS: CPT | Performed by: PHYSICAL THERAPIST

## 2022-02-02 PROCEDURE — 1036F TOBACCO NON-USER: CPT | Performed by: INTERNAL MEDICINE

## 2022-02-02 NOTE — PROGRESS NOTES
Hematology / Oncology Outpatient Follow Up Note    Neo Yuan 48 y o  female MHF:[id number] [facility]:[id number]         Date:  2/2/2022    Assessment / Plan:  A 58-year-old premenopausal woman with stage IA left breast cancer, grade 1, invasive lobular histology, ER 90 % positive, NJ 40 % positive, HER2 negative disease   She underwent lumpectomy and sentinel lymph node biopsy, resulting in DIEGO   She is negative for BRCA gene mutation   She is currently on adjuvant hormonal therapy with tamoxifen with no toxicity  She has no evidence recurrent disease based on her symptoms and physical examinations  I recommended her to continue tamoxifen 20 mg daily  She is in agreement with my recommendation  I will see her again in a year for routine follow-up        Subjective:      HPI:  A 58-year-old premenopausal woman was found to have abnormality in her left breast based on a screening mammography   She underwent left breast biopsy in June 7, 2019 which showed invasive lobular carcinoma, grade 1  This was ER 90 % positive, NJ 40 % positive, HER2 negative disease   She underwent genetic testing which was negative for BRCA gene mutation   She underwent lumpectomy and sentinel lymph node biopsy by Dr Floresita Cali in July 30, 2019  She had 11 millimeter of invasive lobular carcinoma, grade 1   Lymphovascular invasion was not present   3 sentinel lymph nodes were all negative for metastatic disease   She presents today to discuss adjuvant treatment options   She has no breast related symptomatology except minor soreness in the left axilla   She denied any pain   She has no respiratory symptoms   She has stable weight   She denied fever, chills or night sweats   She has no family history of breast cancer  Lockwoodmiguel Goldman is a lifetime never smoker   Her performance status is normal              Interval History:   A 48year-old premenopausal woman with stage IA left breast cancer, grade 1, invasive lobular histology, ER 90 % positive, NJ 40 % positive, HER2 negative disease   She underwent lumpectomy and sentinel lymph node biopsy, resulting in DIEGO   She is negative for BRCA gene mutation   Since August 2019, she has been on adjuvant hormonal therapy with tamoxifen  She presents today for routine follow-up  She feels well  She has no complaint hot flashes  She has quite regular menstrual cycle  She denied any pain or respiratory symptoms  Her weight is stable  She has normal performance status         Objective:      Primary Diagnosis:     1  Left breast cancer, stage IA (pT1c, pN0, M0) grade 1, invasive lobular histology, ER 90 %, LA 40 %, HER2 negative disease   Diagnosed in July 2019       2  BRCA gene mutation negative      Cancer Staging:  Cancer Staging  Malignant neoplasm of upper-inner quadrant of left breast in female, estrogen receptor positive ([facility] [address] )  Staging form: Breast, AJCC 8th Edition  - Clinical: Stage IA (cT1c, cN0, cM0, G1, ER+, LA+, HER2-) - Signed by Piyush Cruz MD on 8/14/2019  Laterality: Left  Histologic grading system: 3 grade system           Previous Hematologic/ Oncologic Treatment:            Current Hematologic/ Oncologic Treatment:       Adjuvant hormonal therapy with tamoxifen since August 2019       Disease Status:      DIEGO status post lumpectomy and sentinel lymph node biopsy      Test Results:     Pathology:     1 1cm of invasive lobular carcinoma, grade 1, lymphovascular invasion was absent   3 sentinel lymph nodes were negative for metastatic disease   ER 90 % positive, LA 40 % positive, HER2 negative disease   Stage IA (pT1c, pN0, M0)     Radiology:       Mammography in March 2021 was benign  BI-RADS 2      Laboratory:     See below  ROS: Review of Systems   All other systems reviewed and are negative  Imaging: No results found        Labs:   Lab Results   Component Value Date    WBC 5 8 06/30/2021    HGB 12 0 06/30/2021    HCT 36 3 06/30/2021    MCV 90 06/30/2021     06/30/2021 Lab Results   Component Value Date     10/26/2017    K 4 2 06/30/2021     06/30/2021    CO2 24 06/30/2021    BUN 14 06/30/2021    CREATININE 0 53 (L) 06/30/2021    GLUCOSE 93 10/26/2017    CALCIUM 9 6 10/26/2017    AST 17 06/30/2021    ALT 11 06/30/2021    ALKPHOS 37 (L) 10/26/2017    PROT 7 1 10/26/2017    BILITOT 0 4 10/26/2017         Current Medications: Reviewed  Allergies: Reviewed  PMH/FH/SH:  Reviewed      Vital Sign:    Body surface area is 1 8 meters squared      Wt Readings from Last 3 Encounters:   02/02/22 79 4 kg (175 lb)   01/13/22 78 5 kg (173 lb)   08/17/21 74 8 kg (165 lb)        Temp Readings from Last 3 Encounters:   02/02/22 (!) 97 2 °F (36 2 °C)   01/13/22 97 8 °F (36 6 °C)   08/17/21 99 4 °F (37 4 °C)        BP Readings from Last 3 Encounters:   02/02/22 128/76   01/13/22 154/90   08/17/21 108/70         Pulse Readings from Last 3 Encounters:   02/02/22 94   01/13/22 (!) 112   08/17/21 76     @LASTSAO2(3)@

## 2022-02-02 NOTE — PROGRESS NOTES
Daily Note     Today's date: 2022  Patient name: Jeffry Mari  : 1975  MRN: [id number]  Referring provider: JACIEL Morton  Dx:   Encounter Diagnosis     ICD-10-CM    1  Chest wall pain following surgery  R07 89     G89 18    2  Decreased functional mobility and endurance  Z74 09                   Subjective: Patient reports having soreness in her shoulder following LV  Objective: See treatment diary below      Assessment: Tolerated treatment well  Patient would benefit from continued PT      Plan: Continue per plan of care        Precautions: BCA      Manuals            PROM  8'                                                  Neuro Re-Ed                          TB Row  YTB x 20                                                                            Ther Ex             Pulleys  5'           Doorway St 5 x :20 5 x :20           Wall Climbs 5 x :10 5 x :10           Supine cane haircut  5 x :10                                                               Ther Activity             SBS 10 x :05 10 x :05                        Gait Training                                       Modalities

## 2022-02-07 ENCOUNTER — OFFICE VISIT (OUTPATIENT)
Dept: PHYSICAL THERAPY | Facility: CLINIC | Age: 47
End: 2022-02-07
Payer: COMMERCIAL

## 2022-02-07 DIAGNOSIS — Z74.09 DECREASED FUNCTIONAL MOBILITY AND ENDURANCE: ICD-10-CM

## 2022-02-07 DIAGNOSIS — G89.18 CHEST WALL PAIN FOLLOWING SURGERY: Primary | ICD-10-CM

## 2022-02-07 DIAGNOSIS — R07.89 CHEST WALL PAIN FOLLOWING SURGERY: Primary | ICD-10-CM

## 2022-02-07 PROCEDURE — 97140 MANUAL THERAPY 1/> REGIONS: CPT

## 2022-02-07 PROCEDURE — 97112 NEUROMUSCULAR REEDUCATION: CPT

## 2022-02-07 NOTE — PROGRESS NOTES
Daily Note     Today's date: 2022  Patient name: Michelle Calle  : 1975  MRN: [id number]  Referring provider: JACIEL Salinas  Dx:   Encounter Diagnosis     ICD-10-CM    1  Chest wall pain following surgery  R07 89     G89 18                   Subjective: Mild soreness following last treatment session      Objective: See treatment diary below      Assessment: Tolerated treatment well  Patient experienced L shoulder pain with supine ER wand stretch  No complaints with remainder of treatment  Plan: Continue per plan of care  Precautions: BCA      Manuals           PROM  8' 8                                                 Neuro Re-Ed                          TB Row  YTB x 20 YTB x 15          TB extension   YTB x 15                                                               Ther Ex             Pulleys  5' 5          Doorway St 5 x :20 5 x :20 5 x 20"            Wall Climbs 5 x :10 5 x :10 5 x 10"           Supine shoulder flexion   AAROM 10" x 5          Supine cane haircut  5 x :10 p!           Bicep curls   2#  x 10           Rows   2# x 10           Tricep extension   1# x 10                        Ther Activity             SBS 10 x :05 10 x :05 5"                       Gait Training                                       Modalities

## 2022-02-09 ENCOUNTER — OFFICE VISIT (OUTPATIENT)
Dept: PHYSICAL THERAPY | Facility: CLINIC | Age: 47
End: 2022-02-09
Payer: COMMERCIAL

## 2022-02-09 DIAGNOSIS — R07.89 CHEST WALL PAIN FOLLOWING SURGERY: Primary | ICD-10-CM

## 2022-02-09 DIAGNOSIS — G89.18 CHEST WALL PAIN FOLLOWING SURGERY: Primary | ICD-10-CM

## 2022-02-09 DIAGNOSIS — Z74.09 DECREASED FUNCTIONAL MOBILITY AND ENDURANCE: ICD-10-CM

## 2022-02-09 PROCEDURE — 97110 THERAPEUTIC EXERCISES: CPT | Performed by: PHYSICAL THERAPIST

## 2022-02-09 PROCEDURE — 97530 THERAPEUTIC ACTIVITIES: CPT | Performed by: PHYSICAL THERAPIST

## 2022-02-09 PROCEDURE — 97140 MANUAL THERAPY 1/> REGIONS: CPT | Performed by: PHYSICAL THERAPIST

## 2022-02-09 NOTE — PROGRESS NOTES
Daily Note     Today's date: 2022  Patient name: Eden Jimenez  : 1975  MRN: [id number]  Referring provider: JAICEL Rodriguez  Dx:   Encounter Diagnosis     ICD-10-CM    1  Chest wall pain following surgery  R07 89     G89 18    2  Decreased functional mobility and endurance  Z74 09                   Subjective: Patient reports that she does continue to have weakness in the LUE  She tolerated exercises well LV  Objective: See treatment diary below      Assessment: Tolerated treatment well  Patient would benefit from continued PT      Plan: Continue per plan of care  Precautions: BCA      Manuals          PROM  8' 8 8                                                Neuro Re-Ed                          TB Row  YTB x 20 YTB x 15 YTB 2x10         TB extension   YTB x 15  YTB 2 x 10                                                              Ther Ex             Pulleys  5' 5 5'         Doorway St 5 x :20 5 x :20 5 x 20"   5 x 20"         Wall Climbs 5 x :10 5 x :10 5 x 10"  5 x :10         Supine shoulder flexion   AAROM 10" x 5 5 x :10         Supine cane haircut  5 x :10 p!           Bicep curls   2#  x 10  2# x 15         Rows   2# x 10  2# x 15         Tricep extension   1# x 10  2# x 15                      Ther Activity             SBS 10 x :05 10 x :05 5" hep                      Gait Training             TM    10'                      Modalities

## 2022-02-14 ENCOUNTER — OFFICE VISIT (OUTPATIENT)
Dept: PHYSICAL THERAPY | Facility: CLINIC | Age: 47
End: 2022-02-14
Payer: COMMERCIAL

## 2022-02-14 DIAGNOSIS — R07.89 CHEST WALL PAIN FOLLOWING SURGERY: Primary | ICD-10-CM

## 2022-02-14 DIAGNOSIS — Z74.09 DECREASED FUNCTIONAL MOBILITY AND ENDURANCE: ICD-10-CM

## 2022-02-14 DIAGNOSIS — G89.18 CHEST WALL PAIN FOLLOWING SURGERY: Primary | ICD-10-CM

## 2022-02-14 PROCEDURE — 97110 THERAPEUTIC EXERCISES: CPT

## 2022-02-14 PROCEDURE — 97112 NEUROMUSCULAR REEDUCATION: CPT

## 2022-02-14 PROCEDURE — 97140 MANUAL THERAPY 1/> REGIONS: CPT

## 2022-02-14 NOTE — PROGRESS NOTES
Daily Note     Today's date: 2022  Patient name: Beverly De SouzaB: 1975  MRN: [id number]  Referring provider: JACIEL Garibya  Dx:   Encounter Diagnosis     ICD-10-CM    1  Chest wall pain following surgery  R07 89     G89 18    2  Decreased functional mobility and endurance  Z74 09                   Subjective: Patient denies a significant change in status since last treatment session  Objective: See treatment diary below      Assessment: Tolerated treatment well  Patient exhibited good technique with therapeutic exercises      Plan: Continue per plan of care        Precautions: BCA      Manuals         PROM  8' 8 8 8                                               Neuro Re-Ed                          TB Row  YTB x 20 YTB x 15 YTB 2x10 RTB x 10         TB extension   YTB x 15  YTB 2 x 10  RTB x 10                                                             Ther Ex             Pulleys  5' 5 5' 5        Doorway St 5 x :20 5 x :20 5 x 20"   5 x 20" 5 x 20"        Wall Climbs 5 x :10 5 x :10 5 x 10"  5 x :10  5 x 10"        Supine shoulder flexion   AAROM 10" x 5 5 x :10 5 x 10"         Supine cane haircut  5 x :10 p!  5 x 10 "        Bicep curls   2#  x 10  2# x 15 2# 2 x 10         Rows   2# x 10  2# x 15 2# x 15        Tricep extension   1# x 10  2# x 15 2# x 15                     Ther Activity             SBS 10 x :05 10 x :05 5" hep hep                     Gait Training             TM    10' 10                      Modalities

## 2022-02-16 ENCOUNTER — OFFICE VISIT (OUTPATIENT)
Dept: PHYSICAL THERAPY | Facility: CLINIC | Age: 47
End: 2022-02-16
Payer: COMMERCIAL

## 2022-02-16 DIAGNOSIS — G89.18 CHEST WALL PAIN FOLLOWING SURGERY: Primary | ICD-10-CM

## 2022-02-16 DIAGNOSIS — R07.89 CHEST WALL PAIN FOLLOWING SURGERY: Primary | ICD-10-CM

## 2022-02-16 PROCEDURE — 97530 THERAPEUTIC ACTIVITIES: CPT | Performed by: PHYSICAL THERAPIST

## 2022-02-16 PROCEDURE — 97110 THERAPEUTIC EXERCISES: CPT | Performed by: PHYSICAL THERAPIST

## 2022-02-16 PROCEDURE — 97112 NEUROMUSCULAR REEDUCATION: CPT | Performed by: PHYSICAL THERAPIST

## 2022-02-16 NOTE — PROGRESS NOTES
Daily Note and Discharge    Today's date: 2022  Patient name: Jennie Thomson  : 1975  MRN: [id number]  Referring provider: JACIEL Diehl  Dx:   Encounter Diagnosis     ICD-10-CM    1  Chest wall pain following surgery  R07 89     G89 18            Patient attended physical therapy for the strength ABCs program   She made excellent progress in increasing her ROM and becoming independent with her HEP  She is going to continue her exercise program on her own  Thank you for your referral          Subjective: Patient reports that she still feels more fatigue in the tricep with exercises  She also notes that she feels her ROM is improving  Objective: See treatment diary below      Assessment: Tolerated treatment well   Patient would benefit from continued PT      Plan: DC with strength ABCs hep     Precautions: BCA      Manuals        PROM  8' 8 8 8 np       Hep rev                                       Neuro Re-Ed                          TB Row  YTB x 20 YTB x 15 YTB 2x10 RTB x 10  RTB x 20       TB extension   YTB x 15  YTB 2 x 10  RTB x 10  RTB x 20                                                           Ther Ex             Pulleys  5' 5 5' 5 5'       Doorway St 5 x :20 5 x :20 5 x 20"   5 x 20" 5 x 20" 5 x :20       Wall Climbs 5 x :10 5 x :10 5 x 10"  5 x :10  5 x 10" 5 x :20       Supine shoulder flexion   AAROM 10" x 5 5 x :10 5 x 10"  5 x :10       Supine cane haircut  5 x :10 p!  5 x 10 " 5 x :20       Bicep curls   2#  x 10  2# x 15 2# 2 x 10  3#  X 20       Rows   2# x 10  2# x 15 2# x 15 2# x 20       Tricep extension   1# x 10  2# x 15 2# x 15 2# x 20                    Ther Activity             SBS 10 x :05 10 x :05 5" hep hep                     Gait Training             TM    10' 10  10'                    Modalities

## 2022-03-16 ENCOUNTER — HOSPITAL ENCOUNTER (OUTPATIENT)
Dept: RADIOLOGY | Facility: HOSPITAL | Age: 47
Discharge: HOME | End: 2022-03-16
Payer: COMMERCIAL

## 2022-03-16 VITALS — WEIGHT: 175 LBS | HEIGHT: 62 IN | BODY MASS INDEX: 32.2 KG/M2

## 2022-03-16 DIAGNOSIS — Z17.0 MALIGNANT NEOPLASM OF UPPER-INNER QUADRANT OF LEFT BREAST IN FEMALE, ESTROGEN RECEPTOR POSITIVE (HCC): ICD-10-CM

## 2022-03-16 DIAGNOSIS — C50.212 MALIGNANT NEOPLASM OF UPPER-INNER QUADRANT OF LEFT BREAST IN FEMALE, ESTROGEN RECEPTOR POSITIVE (HCC): ICD-10-CM

## 2022-03-16 PROCEDURE — G0279 TOMOSYNTHESIS, MAMMO: HCPCS

## 2022-03-16 PROCEDURE — 77066 DX MAMMO INCL CAD BI: CPT

## 2022-03-29 ENCOUNTER — TELEPHONE (OUTPATIENT)
Dept: HEMATOLOGY ONCOLOGY | Facility: CLINIC | Age: 47
End: 2022-03-29

## 2022-03-29 NOTE — TELEPHONE ENCOUNTER
LMOM explaining diagnostic mammograms obtained for 5 years after breast cancer diagnosis   Encouraged pt to call back with further questions

## 2022-03-29 NOTE — TELEPHONE ENCOUNTER
CALL RETURN FORM   Reason for patient call? Patient called in regarding mammograms    Patient is getting billed for mammograms because they are diagnostic not preventative    Wants to know if mammograms from now on will be diagnostic   Patient's primary oncologist? Cee Rodriguez   Name of person the patient was calling for? Cee Rodriguez   Any additional information to add, if applicable? Informed patient that the message will be forwarded to the team and someone will get back to them as soon as possible    Did you relay this information to the patient?  Yes

## 2022-04-08 ENCOUNTER — TELEPHONE (OUTPATIENT)
Dept: FAMILY MEDICINE CLINIC | Facility: CLINIC | Age: 47
End: 2022-04-08

## 2022-04-08 DIAGNOSIS — U07.1 COVID-19: Primary | ICD-10-CM

## 2022-04-08 NOTE — TELEPHONE ENCOUNTER
----- Message from Jani Gaming MA sent at 4/8/2022 10:33 AM EDT -----  Regarding: FW: Positive COVID at-home test results    ----- Message -----  From: Azalea Farah  Sent: 4/8/2022   9:08 AM EDT  To: THE [facility]  Subject: Positive COVID at-home test results              Hi Dr Li Loya  I just wanted to let you know that I have now tested positive on an at-home test for COVID (see attached picture of test results)  I'm assuming I'll be getting the same Paxlovid meds, but my main symptoms are really a very sore throat, a fever, and some body aches  I'm not really congested or coughing, other then due to the sore throat  Please let me know if there is anything specific I need to do, and how long I should stay leyda after starting the meds  Thank you

## 2022-04-08 NOTE — TELEPHONE ENCOUNTER
4/8/2022 10:52 AM Called patient regarding her COVID 19 diagnosis    Her symptoms started on 4/5/22  She initially had a sore throat and the felt worse last night  She woke up with a fever  Advised to stay home for 5 days and then wear a mask for the next 5 days  Emergency use authorization discussed      Roseanne Wang DO

## 2022-04-25 ENCOUNTER — TELEPHONE (OUTPATIENT)
Dept: SURGICAL ONCOLOGY | Facility: CLINIC | Age: 47
End: 2022-04-25

## 2022-04-25 NOTE — TELEPHONE ENCOUNTER
Called patient and left a message to r/s appt with Roseanna Hawkins on 7/21/22 to be at [facility] with [address]

## 2022-04-29 ENCOUNTER — CLINICAL SUPPORT (OUTPATIENT)
Dept: RADIATION ONCOLOGY | Facility: HOSPITAL | Age: 47
End: 2022-04-29
Attending: RADIOLOGY
Payer: COMMERCIAL

## 2022-04-29 VITALS
DIASTOLIC BLOOD PRESSURE: 74 MMHG | BODY MASS INDEX: 31.86 KG/M2 | OXYGEN SATURATION: 98 % | WEIGHT: 172.8 LBS | TEMPERATURE: 97.7 F | RESPIRATION RATE: 18 BRPM | SYSTOLIC BLOOD PRESSURE: 116 MMHG | HEART RATE: 86 BPM

## 2022-04-29 DIAGNOSIS — Z17.0 MALIGNANT NEOPLASM OF UPPER-INNER QUADRANT OF LEFT BREAST IN FEMALE, ESTROGEN RECEPTOR POSITIVE (HCC): Primary | ICD-10-CM

## 2022-04-29 DIAGNOSIS — C50.212 MALIGNANT NEOPLASM OF UPPER-INNER QUADRANT OF LEFT BREAST IN FEMALE, ESTROGEN RECEPTOR POSITIVE (HCC): Primary | ICD-10-CM

## 2022-04-29 PROCEDURE — G0463 HOSPITAL OUTPT CLINIC VISIT: HCPCS | Performed by: RADIOLOGY

## 2022-04-29 PROCEDURE — 99211 OFF/OP EST MAY X REQ PHY/QHP: CPT | Performed by: RADIOLOGY

## 2022-04-29 PROCEDURE — 99213 OFFICE O/P EST LOW 20 MIN: CPT | Performed by: RADIOLOGY

## 2022-04-29 NOTE — PROGRESS NOTES
Follow-up - Radiation Oncology   Roxana Kumar 1975 50 y o  female [id number]      History of Present Illness   Cancer Staging  Malignant neoplasm of upper-inner quadrant of left breast in female, estrogen receptor positive ([facility] [address] )  Staging form: Breast, AJCC 8th Edition  - Clinical: Stage IA (cT1c, cN0, cM0, G1, ER+, AR+, HER2-) - Signed by Berenice Brito MD on 2019  Histologic grading system: 3 grade system  Laterality: Left      Roxana Kumar is a 50 y o   female with a history of invasive lobular carcinoma of the left breast status post lumpectomy and sentinel node biopsy with negative margins, Stage IA (cT1c, cN0, cM0, G1, ER+, AR+, HER2-)    She met with Dr Josy Mullen and has been on Tamoxifen since 2019      Today, she returns for routine scheduled follow-up after completing left breast radiation on 2019  Interval History:  22 - Surg OncTrena  No complaints on exam  Referring pt to the strength ABC program  Follow up in 6 months        22 - Sanjana Aguayo  No evidence of recurrent disease  Pt to remain on Tamoxifen  Follow up in 1 year        22 - Mammo diagnostic bilateral w 3d & cad  FINDINGS:   LEFT  B) POST-SURGICAL FINDING: There are post-surgical findings from a previous lumpectomy with radiation seen in the upper central region of the left breast in the middle depth  Compared to the previous study, there are no significant changes  Right  There are no suspicious masses, grouped microcalcifications or areas of unexplained architectural distortion  The skin and nipple areolar complex are unremarkable  IMPRESSION:   Stable therapeutic changes left breast   No evidence for malignancy            Upcomin22 - Surg Onc, [place]  23 - Hem Sanjana Ambriz    Upon interview, she endorses the above history  She did have mild diminished ROM of the left shoulder and she has completed physical therapy    She denies new breast masses, nipple discharge or skin changes  She has occasional twinges of discomfort near the surgical site  She has dry skin across her face  She recently recovered from a mild COVID infection  She continues tamoxifen      Historical Information   Oncology History   Malignant neoplasm of upper-inner quadrant of left breast in female, estrogen receptor positive ([facility] [address] )   6/7/2019 Biopsy    Left breast ultrasound-guided biopsy  11 o'clock, 4 cm from nipple  Invasive lobular carcinoma  Grade 1  ER 90  KS 45  HER2 1+     6/17/2019 Genetic Testing    BRCA negative  [facility]     7/30/2019 Surgery    Left breast lumpectomy with sentinel lymph node biopsy  Invasive lobular carcinoma  Grade 1  1 1 cm  0/3 Lymph nodes  Margins negative  Anatomic/Prognostic Stage IA     8/14/2019 -  Cancer Staged    Staging form: Breast, AJCC 8th Edition  - Clinical: Stage IA (cT1c, cN0, cM0, G1, ER+, KS+, HER2-) - Signed by Sherry Michaud MD on 8/14/2019  Laterality: Left  Histologic grading system: 3 grade system       8/27/2019 - 9/25/2019 Radiation    Course: C1    Plan ID Energy Fractions Dose per Fraction (cGy) Dose Correction (cGy) Total Dose Delivered (cGy) Elapsed Days   BH L Breast 6X 16 / 16 266 0 4,256 22   BH L Brst e 12E 5 / 5 200 0 1,000 6      Treatment dates:  C1: 8/27/2019 - 9/25/2019 8/2019 -  Hormone Therapy    Tamoxifen 20 mg daily  With Dr Saurabh Bermudez         Past Medical History:   Diagnosis Date    Adhesive capsulitis of right shoulder     Resolved 11/15/2016     Asthma     Benign paroxysmal vertigo, unspecified ear     Resolved 11/15/2016     BRCA gene mutation negative 06/2019    [facility]    Breast cancer ([facility] [address] ) 07/30/2019    Cellulitis of leg, except foot     Resolved 12/10/2014     Hx of radiation therapy 08/01/2019    Lymphadenitis     Resolved 12/10/2014     Snoring      Past Surgical History:   Procedure Laterality Date    BREAST BIOPSY Left 2019    BREAST LUMPECTOMY Left 07/30/2019    Procedure: BREAST LUMPECTOMY; BREAST NEEDLE LOCALIZATION (NEEDLE LOC AT 0800); Surgeon: Anthony Garcia MD;  [facility];  Service: Surgical Oncology    INTRAUTERINE DEVICE INSERTION  2019    LYMPH NODE BIOPSY Left 07/30/2019    Procedure: SENTINEL LYMPH NODE BIOPSY; LYMPHATIC MAPPING WITH BLUE DYE AND RADIOACTIVE DYE (INJECT AT 0930 BY DR LEAVITT IN THE OR); Surgeon: Anthony Garcia MD;  [facility];  Service: Surgical Oncology    MAMMO NEEDLE LOCALIZATION LEFT (ALL INC) Left 07/30/2019    SALPINGECTOMY Bilateral 03/2021    SHOULDER SURGERY Right     Last assessed 12/29/2015     US GUIDED BREAST BIOPSY LEFT COMPLETE Left 06/07/2019    WISDOM TOOTH EXTRACTION         Social History   Social History     Substance and Sexual Activity   Alcohol Use No     Social History     Substance and Sexual Activity   Drug Use No     Social History     Tobacco Use   Smoking Status Never Smoker   Smokeless Tobacco Never Used         Meds/Allergies     Current Outpatient Medications:     albuterol (ProAir HFA) 90 mcg/act inhaler, Inhale 2 puffs every 4 (four) hours as needed for wheezing, Disp: 18 g, Rfl: 1    cetirizine (ZyrTEC) 10 MG chewable tablet, Chew 10 mg daily as needed , Disp: , Rfl:     Cholecalciferol (VITAMIN D3) 1000 units CHEW, Chew 1 tablet daily, Disp: , Rfl:     fluticasone (FLONASE) 50 mcg/act nasal spray, 1 spray into each nostril daily as needed , Disp: , Rfl:     multivitamin (THERAGRAN) TABS, Take 1 tablet by mouth daily, Disp: , Rfl:     rizatriptan (MAXALT-MLT) 10 MG disintegrating tablet, DISSOLVE ONE TABLET IN MOUTH at onset of headache  May repeat in two hours if ineffective, Disp: 12 tablet, Rfl: 0    tamoxifen (NOLVADEX) 20 mg tablet, Take 1 tablet (20 mg total) by mouth daily, Disp: 90 tablet, Rfl: 1    TURMERIC CURCUMIN PO, Take by mouth daily, Disp: , Rfl:   No Known Allergies      Review of Systems   Constitutional: Negative  HENT: Negative  Eyes: Negative  Respiratory: Negative  Cardiovascular: Negative  Gastrointestinal: Negative  Endocrine: Negative  Genitourinary: Negative  Musculoskeletal:        Continues with PT exercises at home, denies pain or stiffness in left breast   Skin:        Dry skin on face/scalp area   Allergic/Immunologic: Negative  Neurological: Negative  Hematological: Negative  Psychiatric/Behavioral: Negative  OBJECTIVE:   /74 (BP Location: Left arm)   Pulse 86   Temp 97 7 °F (36 5 °C) (Temporal)   Resp 18   Wt 78 4 kg (172 lb 12 8 oz)   SpO2 98%   BMI 31 86 kg/m²   Pain Assessment:  0  Karnofsky: 100 - Fully active, able to carry on all pre-disease performed without restriction    Physical Exam   The patient presents today in no apparent distress  Sclera anicteric  No palpable cervical or supraclavicular lymphadenopathy  Breathing comfortably on room air  Breast examination performed in the supine and seated positions with chaperone present  The right breast is within normal limits  The left breast is soft, nontender, without visible or palpable suspicious findings  No axillary lymphadenopathy  No lymphedema  RESULTS    Lab Results:   Recent Results (from the past 672 hour(s))   Covid at Home Test Kit    Collection Time: 04/08/22 10:33 AM   Result Value Ref Range    EXT SARS-CoV-2 Ag Home Testing Positive (Patient Reported) (A) Negative (Patient Reported)       Imaging Studies:No results found  Assessment/Plan:  No orders of the defined types were placed in this encounter  Angela Prater is a 50y o  year old female with a history of invasive lobular carcinoma of the left breast status post lumpectomy and sentinel node biopsy with negative margins, Stage IA (cT1c, cN0, cM0, G1, ER+, MS+, HER2-)    She completed adjuvant radiation on 9/25/2019  She continues regular follow up with medical oncology and her surgeon  She remains without clinical or radiographic evidence of recurrent disease    She will continue tamoxifen and discuss dry skin at her next follow up with Dr Sara Laird  She will continue exercises provided by PT  I have requested follow up in 1 year and she will call with questions or concerns in the interim  Shahid Wan MD  4/29/2022,9:27 AM    Portions of the record may have been created with voice recognition software   Occasional wrong word or "sound a like" substitutions may have occurred due to the inherent limitations of voice recognition software   Read the chart carefully and recognize, using context, where substitutions have occurred

## 2022-04-29 NOTE — PROGRESS NOTES
Jocelyn Smith 1975 is a 50 y o  female with history of invasive lobular carcinoma of the left breast status post lumpectomy and sentinel node biopsy with negative margins, Stage IA (cT1c, cN0, cM0, G1, ER+, MS+, HER2-)  She met with Dr Meredith Yusuf and has been on Tamoxifen since 2019  Today, she returns for routine scheduled follow-up after completing left breast radiation on 2019  The pt was last seen in radiation on 21 - [facility], [facility]  No complaints on exam  Referring pt to the strength ABC program  Follow up in 6 months      22 - Hem Meredith Ambriz  No evidence of recurrent disease  Pt to remain on Tamoxifen  Follow up in 1 year      22 - Mammo diagnostic bilateral w 3d & cad  FINDINGS:   LEFT  B) POST-SURGICAL FINDING: There are post-surgical findings from a previous lumpectomy with radiation seen in the upper central region of the left breast in the middle depth  Compared to the previous study, there are no significant changes  Right  There are no suspicious masses, grouped microcalcifications or areas of unexplained architectural distortion  The skin and nipple areolar complex are unremarkable  IMPRESSION:   Stable therapeutic changes left breast   No evidence for malignancy          Upcomin22 - [facility], [place]  23 - [facility] History   Malignant neoplasm of upper-inner quadrant of left breast in female, estrogen receptor positive ([facility] [address] )   2019 Biopsy    Left breast ultrasound-guided biopsy  11 o'clock, 4 cm from nipple  Invasive lobular carcinoma  Grade 1  ER 90  MS 45  HER2 1+     2019 Genetic Testing    BRCA negative  [facility]     2019 Surgery    Left breast lumpectomy with sentinel lymph node biopsy  Invasive lobular carcinoma  Grade 1  1 1 cm  0/3 Lymph nodes  Margins negative  Anatomic/Prognostic Stage IA     2019 -  Cancer Staged    Staging form: Breast, AJCC 8th Edition  - Clinical: Stage IA (cT1c, cN0, cM0, G1, ER+, DC+, HER2-) - Signed by Florecita Jj MD on 8/14/2019  Laterality: Left  Histologic grading system: 3 grade system       8/27/2019 - 9/25/2019 Radiation    Course: C1    Plan ID Energy Fractions Dose per Fraction (cGy) Dose Correction (cGy) Total Dose Delivered (cGy) Elapsed Days   BH L Breast 6X 16 / 16 266 0 4,256 22   BH L Brst e 12E 5 / 5 200 0 1,000 6      Treatment dates:  C1: 8/27/2019 - 9/25/2019 8/2019 -  Hormone Therapy    Tamoxifen 20 mg daily  With Dr Alcides Rose         Review of Systems:  Review of Systems   Constitutional: Negative  HENT: Negative  Eyes: Negative  Respiratory: Negative  Cardiovascular: Negative  Gastrointestinal: Negative  Endocrine: Negative  Genitourinary: Negative  Musculoskeletal:        Continues with PT exercises at home, denies pain or stiffness in left breast   Skin:        Dry skin on face/scalp area   Allergic/Immunologic: Negative  Neurological: Negative  Hematological: Negative  Psychiatric/Behavioral: Negative          Clinical Trial: no    Covid Vaccine Status: vaccinated     Health Maintenance   Topic Date Due    Hepatitis C Screening  Never done    HIV Screening  Never done    Cervical Cancer Screening  Never done    Colorectal Cancer Screening  Never done    BMI: Followup Plan  08/17/2022    Annual Physical  08/17/2022    BMI: Adult  03/16/2023    Breast Cancer Screening: Mammogram  03/16/2023    Depression Screening  04/29/2023    DTaP,Tdap,and Td Vaccines (3 - Td or Tdap) 08/17/2031    Pneumococcal Vaccine: Pediatrics (0 to 5 Years) and At-Risk Patients (6 to 59 Years) (2 of 2 - PPSV23) 02/23/2038    Influenza Vaccine  Completed    COVID-19 Vaccine  Completed    HIB Vaccine  Aged Out    Hepatitis B Vaccine  Aged Out    IPV Vaccine  Aged Out    Hepatitis A Vaccine  Aged Out    Meningococcal ACWY Vaccine  Aged Out    HPV Vaccine  Aged Out     Patient Active Problem List   Diagnosis    Allergic rhinitis    Dense breasts    Migraine headache    Mild intermittent asthma    Motion sickness    Vitamin D deficiency    Malignant neoplasm of upper-inner quadrant of left breast in female, estrogen receptor positive ([facility] [address] )    Use of tamoxifen (Nolvadex)    Abnormal glucose     Past Medical History:   Diagnosis Date    Adhesive capsulitis of right shoulder     Resolved 11/15/2016     Asthma     Benign paroxysmal vertigo, unspecified ear     Resolved 11/15/2016     BRCA gene mutation negative 06/2019    [facility]    Breast cancer ([facility] [address] ) 07/30/2019    Cellulitis of leg, except foot     Resolved 12/10/2014     Hx of radiation therapy 08/01/2019    Lymphadenitis     Resolved 12/10/2014     Snoring      Past Surgical History:   Procedure Laterality Date    BREAST BIOPSY Left 2019    BREAST LUMPECTOMY Left 07/30/2019    Procedure: BREAST LUMPECTOMY; BREAST NEEDLE LOCALIZATION (NEEDLE LOC AT 0800); Surgeon: Dale Tucker MD;  [facility];  Service: Surgical Oncology    INTRAUTERINE DEVICE INSERTION  2019    LYMPH NODE BIOPSY Left 07/30/2019    Procedure: SENTINEL LYMPH NODE BIOPSY; LYMPHATIC MAPPING WITH BLUE DYE AND RADIOACTIVE DYE (INJECT AT 0930 BY DR LEAVITT IN THE OR);   Surgeon: Dale Tucker MD;  [facility];  Service: Surgical Oncology    MAMMO NEEDLE LOCALIZATION LEFT (ALL INC) Left 07/30/2019    SALPINGECTOMY Bilateral 03/2021    SHOULDER SURGERY Right     Last assessed 12/29/2015     US GUIDED BREAST BIOPSY LEFT COMPLETE Left 06/07/2019    WISDOM TOOTH EXTRACTION       Family History   Problem Relation Age of Onset    Alzheimer's disease Mother    Vale Dionisio COPD Father     No Known Problems Sister     No Known Problems Daughter     Alzheimer's disease Maternal Grandmother     No Known Problems Maternal Grandfather     No Known Problems Paternal Grandmother     No Known Problems Paternal Grandfather     No Known Problems Brother     No Known Problems Brother     No Known Problems Brother     No Known Problems Daughter      Social History     Socioeconomic History    Marital status: /Civil Union     Spouse name: Not on file    Number of children: 3    Years of education: Not on file    Highest education level: Not on file   Occupational History    Not on file   Tobacco Use    Smoking status: Never Smoker    Smokeless tobacco: Never Used   Vaping Use    Vaping Use: Never used   Substance and Sexual Activity    Alcohol use: No    Drug use: No    Sexual activity: Yes     Partners: Male   Other Topics Concern    Not on file   Social History Narrative    Not on file     Social Determinants of Health     Financial Resource Strain: Not on file   Food Insecurity: Not on file   Transportation Needs: Not on file   Physical Activity: Not on file   Stress: Not on file   Social Connections: Not on file   Intimate Partner Violence: Not on file   Housing Stability: Not on file       Current Outpatient Medications:     albuterol (ProAir HFA) 90 mcg/act inhaler, Inhale 2 puffs every 4 (four) hours as needed for wheezing, Disp: 18 g, Rfl: 1    cetirizine (ZyrTEC) 10 MG chewable tablet, Chew 10 mg daily as needed , Disp: , Rfl:     Cholecalciferol (VITAMIN D3) 1000 units CHEW, Chew 1 tablet daily, Disp: , Rfl:     fluticasone (FLONASE) 50 mcg/act nasal spray, 1 spray into each nostril daily as needed , Disp: , Rfl:     multivitamin (THERAGRAN) TABS, Take 1 tablet by mouth daily, Disp: , Rfl:     rizatriptan (MAXALT-MLT) 10 MG disintegrating tablet, DISSOLVE ONE TABLET IN MOUTH at onset of headache    May repeat in two hours if ineffective, Disp: 12 tablet, Rfl: 0    tamoxifen (NOLVADEX) 20 mg tablet, Take 1 tablet (20 mg total) by mouth daily, Disp: 90 tablet, Rfl: 1    TURMERIC CURCUMIN PO, Take by mouth daily, Disp: , Rfl:   No Known Allergies  Vitals:    04/29/22 0859   BP: 116/74   BP Location: Left arm   Pulse: 86   Resp: 18   Temp: 97 7 °F (36 5 °C)   TempSrc: Temporal   SpO2: 98%   Weight: 78 4 kg (172 lb 12 8 oz)

## 2022-06-14 NOTE — TELEPHONE ENCOUNTER
Called patient and rescheduled appt to be seen with Jonathan Isabel at [facility] on 7/28/22

## 2022-07-28 ENCOUNTER — OFFICE VISIT (OUTPATIENT)
Dept: SURGICAL ONCOLOGY | Facility: CLINIC | Age: 47
End: 2022-07-28
Payer: COMMERCIAL

## 2022-07-28 VITALS
RESPIRATION RATE: 16 BRPM | SYSTOLIC BLOOD PRESSURE: 120 MMHG | HEART RATE: 98 BPM | BODY MASS INDEX: 32.02 KG/M2 | DIASTOLIC BLOOD PRESSURE: 72 MMHG | OXYGEN SATURATION: 98 % | HEIGHT: 62 IN | WEIGHT: 174 LBS | TEMPERATURE: 97.5 F

## 2022-07-28 DIAGNOSIS — C50.212 MALIGNANT NEOPLASM OF UPPER-INNER QUADRANT OF LEFT BREAST IN FEMALE, ESTROGEN RECEPTOR POSITIVE (HCC): Primary | ICD-10-CM

## 2022-07-28 DIAGNOSIS — Z17.0 MALIGNANT NEOPLASM OF UPPER-INNER QUADRANT OF LEFT BREAST IN FEMALE, ESTROGEN RECEPTOR POSITIVE (HCC): Primary | ICD-10-CM

## 2022-07-28 DIAGNOSIS — Z79.810 USE OF TAMOXIFEN (NOLVADEX): ICD-10-CM

## 2022-07-28 PROCEDURE — 99214 OFFICE O/P EST MOD 30 MIN: CPT

## 2022-07-28 NOTE — PROGRESS NOTES
Surgical Oncology Follow Up       [address] [facility]  [facility] [place]  [address] [address]    Jocelyn Smith  1975  [id number]  [address] [facility]  [facility] [place]  [address] [address]    Chief Complaint   Patient presents with    Other     Office visit       Assessment/Plan:  1  Malignant neoplasm of upper-inner quadrant of left breast in female, estrogen receptor positive ([facility] [address] )  - 6 month follow up    2  Use of tamoxifen (Nolvadex)  - continue use per medical oncology       Discussion/Summary: Pt is a 50year old female presenting today for a 6 month follow up for left breast cancer diagnosed in June 2019  Pathology revealed invasive lobular carcinoma ER/ND positive, HER2 negative  She underwent genetic testing which was negative  She had a left breast lumpectomy sentinel node biopsy with Dr Suly Cueva completed whole breast radiation therapy  She is currently on tamoxifen  She had a bilateral diagnostic mammogram on 03/16/2022 which was BI-RADS 2 category 3 density  There were no concerns on her clinical breast exam  I will see the patient back in 6 months or sooner should the need arise  She was instructed to call with any questions or concerns prior to this time  All questions were answered today        History of Present Illness:     Oncology History   Malignant neoplasm of upper-inner quadrant of left breast in female, estrogen receptor positive ([facility] [address] )   6/7/2019 Biopsy    Left breast ultrasound-guided biopsy  11 o'clock, 4 cm from nipple  Invasive lobular carcinoma  Grade 1  ER 90  ND 45  HER2 1+     6/17/2019 Genetic Testing    BRCA negative  [facility]     7/30/2019 Surgery    Left breast lumpectomy with sentinel lymph node biopsy  Invasive lobular carcinoma  Grade 1  1 1 cm  0/3 Lymph nodes  Margins negative  Anatomic/Prognostic Stage IA     8/14/2019 -  Cancer Staged    Staging form: Breast, AJCC 8th Edition  - Clinical: Stage IA (cT1c, cN0, cM0, G1, ER+, TN+, HER2-) - Signed by Emma Singh MD on 8/14/2019  Laterality: Left  Histologic grading system: 3 grade system       8/27/2019 - 9/25/2019 Radiation    Course: C1    Plan ID Energy Fractions Dose per Fraction (cGy) Dose Correction (cGy) Total Dose Delivered (cGy) Elapsed Days   BH L Breast 6X 16 / 16 266 0 4,256 22   BH L Brst e 12E 5 / 5 200 0 1,000 6      Treatment dates:  C1: 8/27/2019 - 9/25/2019 8/2019 -  Hormone Therapy    Tamoxifen 20 mg daily  With Dr Shima Stinson          -Interval History:  Pt is a 50year old female presenting today for a 6 month follow up for left breast cancer diagnosed in June 2019  She is currently on tamoxifen  She had a bilateral diagnostic mammogram on 03/16/2022 which was BI-RADS 2 category 3 density  Patient denies changes on her breast exam  She denies persistent headaches, bone pain, back pain, shortness of breath, or abdominal pain  Review of Systems:  Review of Systems   Constitutional: Negative for activity change, appetite change, fatigue and unexpected weight change  Respiratory: Negative for cough and shortness of breath  Cardiovascular: Negative for chest pain  Gastrointestinal: Negative for abdominal pain, diarrhea, nausea and vomiting  Endocrine: Negative for heat intolerance  Musculoskeletal: Negative for arthralgias, back pain and myalgias  Skin: Negative for rash  Neurological: Negative for weakness and headaches  Hematological: Negative for adenopathy         Patient Active Problem List   Diagnosis    Allergic rhinitis    Dense breasts    Migraine headache    Mild intermittent asthma    Motion sickness    Vitamin D deficiency    Malignant neoplasm of upper-inner quadrant of left breast in female, estrogen receptor positive ([facility] [address] )    Use of tamoxifen (Nolvadex)    Abnormal glucose     Past Medical History:   Diagnosis Date    Adhesive capsulitis of right shoulder     Resolved 11/15/2016     Asthma     Benign paroxysmal vertigo, unspecified ear     Resolved 11/15/2016     BRCA gene mutation negative 06/2019    [facility]    Breast cancer ([address] ) 07/30/2019    Cellulitis of leg, except foot     Resolved 12/10/2014     Hx of radiation therapy 08/01/2019    Lymphadenitis     Resolved 12/10/2014     Snoring      Past Surgical History:   Procedure Laterality Date    BREAST BIOPSY Left 2019    BREAST LUMPECTOMY Left 07/30/2019    Procedure: BREAST LUMPECTOMY; BREAST NEEDLE LOCALIZATION (NEEDLE LOC AT 0800); Surgeon: Javier Bryant MD;  [facility];  Service: Surgical Oncology    INTRAUTERINE DEVICE INSERTION  2019    LYMPH NODE BIOPSY Left 07/30/2019    Procedure: SENTINEL LYMPH NODE BIOPSY; LYMPHATIC MAPPING WITH BLUE DYE AND RADIOACTIVE DYE (INJECT AT 0930 BY DR LEAVITT IN THE OR);   Surgeon: Javier Bryant MD;  [facility];  Service: Surgical Oncology    MAMMO NEEDLE LOCALIZATION LEFT (ALL INC) Left 07/30/2019    SALPINGECTOMY Bilateral 03/2021    SHOULDER SURGERY Right     Last assessed 12/29/2015     US GUIDED BREAST BIOPSY LEFT COMPLETE Left 06/07/2019    WISDOM TOOTH EXTRACTION       Family History   Problem Relation Age of Onset    Alzheimer's disease Mother     COPD Father     No Known Problems Sister     No Known Problems Daughter     Alzheimer's disease Maternal Grandmother     No Known Problems Maternal Grandfather     No Known Problems Paternal Grandmother     No Known Problems Paternal Grandfather     No Known Problems Brother     No Known Problems Brother     No Known Problems Brother     No Known Problems Daughter      Social History     Socioeconomic History    Marital status: /Civil Union     Spouse name: Not on file    Number of children: 3    Years of education: Not on file    Highest education level: Not on file   Occupational History    Not on file   Tobacco Use    Smoking status: Never Smoker    Smokeless tobacco: Never Used   Vaping Use    Vaping Use: Never used   Substance and Sexual Activity    Alcohol use: No    Drug use: No    Sexual activity: Yes     Partners: Male   Other Topics Concern    Not on file   Social History Narrative    Not on file     Social Determinants of Health     Financial Resource Strain: Not on file   Food Insecurity: Not on file   Transportation Needs: Not on file   Physical Activity: Not on file   Stress: Not on file   Social Connections: Not on file   Intimate Partner Violence: Not on file   Housing Stability: Not on file       Current Outpatient Medications:     albuterol (ProAir HFA) 90 mcg/act inhaler, Inhale 2 puffs every 4 (four) hours as needed for wheezing, Disp: 18 g, Rfl: 1    cetirizine (ZyrTEC) 10 MG chewable tablet, Chew 10 mg daily as needed , Disp: , Rfl:     Cholecalciferol (VITAMIN D3) 1000 units CHEW, Chew 1 tablet daily, Disp: , Rfl:     fluticasone (FLONASE) 50 mcg/act nasal spray, 1 spray into each nostril daily as needed , Disp: , Rfl:     multivitamin (THERAGRAN) TABS, Take 1 tablet by mouth daily, Disp: , Rfl:     rizatriptan (MAXALT-MLT) 10 MG disintegrating tablet, DISSOLVE ONE TABLET IN MOUTH at onset of headache  May repeat in two hours if ineffective, Disp: 12 tablet, Rfl: 0    tamoxifen (NOLVADEX) 20 mg tablet, Take 1 tablet (20 mg total) by mouth daily, Disp: 90 tablet, Rfl: 1    TURMERIC CURCUMIN PO, Take by mouth daily (Patient not taking: Reported on 7/28/2022), Disp: , Rfl:   No Known Allergies  Vitals:    07/28/22 0806   BP: 120/72   Pulse: 98   Resp: 16   Temp: 97 5 °F (36 4 °C)   SpO2: 98%       Physical Exam  Constitutional:       General: She is not in acute distress  Appearance: Normal appearance  Cardiovascular:      Rate and Rhythm: Normal rate and regular rhythm  Pulses: Normal pulses  Heart sounds: Normal heart sounds     Pulmonary:      Effort: Pulmonary effort is normal       Breath sounds: Normal breath sounds  Chest:      Chest wall: No mass  Breasts:      Right: No swelling, bleeding, inverted nipple, mass, nipple discharge, skin change, tenderness, axillary adenopathy or supraclavicular adenopathy  Left: No swelling, bleeding, inverted nipple, mass, nipple discharge, skin change, tenderness, axillary adenopathy or supraclavicular adenopathy  Comments: Left breast lumpectomy scar  No masses, nodularity, skin changes, nipple changes or discharge, or adenopathy appreciated on physical exam      Abdominal:      General: Abdomen is flat  Palpations: Abdomen is soft  Lymphadenopathy:      Upper Body:      Right upper body: No supraclavicular, axillary or pectoral adenopathy  Left upper body: No supraclavicular, axillary or pectoral adenopathy  Skin:     General: Skin is warm  Neurological:      General: No focal deficit present  Mental Status: She is alert and oriented to person, place, and time  Psychiatric:         Mood and Affect: Mood normal          Behavior: Behavior normal            Results:    Imaging  No results found  I reviewed the above imaging data  Advance Care Planning/Advance Directives:  Discussed disease status, cancer treatment plans and/or cancer treatment goals with the patient

## 2022-09-06 ENCOUNTER — OFFICE VISIT (OUTPATIENT)
Dept: FAMILY MEDICINE CLINIC | Facility: CLINIC | Age: 47
End: 2022-09-06
Payer: COMMERCIAL

## 2022-09-06 VITALS
OXYGEN SATURATION: 99 % | RESPIRATION RATE: 18 BRPM | HEIGHT: 62 IN | TEMPERATURE: 98 F | DIASTOLIC BLOOD PRESSURE: 76 MMHG | SYSTOLIC BLOOD PRESSURE: 116 MMHG | HEART RATE: 84 BPM | WEIGHT: 174 LBS | BODY MASS INDEX: 32.02 KG/M2

## 2022-09-06 DIAGNOSIS — Z00.00 ANNUAL PHYSICAL EXAM: Primary | ICD-10-CM

## 2022-09-06 DIAGNOSIS — Z12.11 COLON CANCER SCREENING: ICD-10-CM

## 2022-09-06 DIAGNOSIS — Z11.59 NEED FOR HEPATITIS C SCREENING TEST: ICD-10-CM

## 2022-09-06 DIAGNOSIS — Z13.0 SCREENING FOR DEFICIENCY ANEMIA: ICD-10-CM

## 2022-09-06 DIAGNOSIS — Z13.6 SCREENING FOR CARDIOVASCULAR CONDITION: ICD-10-CM

## 2022-09-06 DIAGNOSIS — E55.9 VITAMIN D DEFICIENCY: ICD-10-CM

## 2022-09-06 DIAGNOSIS — R73.09 ABNORMAL GLUCOSE: ICD-10-CM

## 2022-09-06 PROCEDURE — 3725F SCREEN DEPRESSION PERFORMED: CPT | Performed by: FAMILY MEDICINE

## 2022-09-06 PROCEDURE — 99396 PREV VISIT EST AGE 40-64: CPT | Performed by: FAMILY MEDICINE

## 2022-09-06 NOTE — PROGRESS NOTES
[id number]    NAME: Natalie Smith  AGE: 50 y o  SEX: female  : 1975     DATE: 2022     Assessment and Plan:     Problem List Items Addressed This Visit     Abnormal glucose    Relevant Orders    Hemoglobin A1C    Vitamin D deficiency    Relevant Orders    Vitamin D 25 hydroxy      Other Visit Diagnoses     Annual physical exam    -  Primary    Need for hepatitis C screening test        Relevant Orders    Hepatitis C Antibody ([facility])    Colon cancer screening        Relevant Orders    Ambulatory referral for colonoscopy    Screening for cardiovascular condition        Relevant Orders    Comprehensive metabolic panel    Lipid Panel with Direct LDL reflex    Screening for deficiency anemia        Relevant Orders    CBC          Immunizations and preventive care screenings were discussed with patient today  Appropriate education was printed on patient's after visit summary  Counseling:  Exercise: the importance of regular exercise/physical activity was discussed  Recommend exercise 3-5 times per week for at least 30 minutes  Return in about 1 year (around 2023) for Annual physical      Chief Complaint:     Chief Complaint   Patient presents with    Physical Exam     [id number]      History of Present Illness:     Adult Annual Physical   Patient here for a comprehensive physical exam  The patient reports following with gyn  Diet and Physical Activity  Diet/Nutrition: well balanced diet  Exercise: no formal exercise  Depression Screening  PHQ-2/9 Depression Screening    Little interest or pleasure in doing things: 0 - not at all  Feeling down, depressed, or hopeless: 0 - not at all  PHQ-2 Score: 0  PHQ-2 Interpretation: Negative depression screen       General Health  Sleep: sleeps well  Hearing: normal - bilateral   Vision: wears glasses  Dental: regular dental visits         /GYN Health  Patient is: perimenopausal  Following with gyn     Review of Systems:     Review of Systems   Constitutional: Negative  Respiratory: Negative  Cardiovascular: Negative  Past Medical History:     Past Medical History:   Diagnosis Date    Adhesive capsulitis of right shoulder     Resolved 11/15/2016     Asthma     Benign paroxysmal vertigo, unspecified ear     Resolved 11/15/2016     BRCA gene mutation negative 06/2019    [facility]    Breast cancer ([address] ) 07/30/2019    Cellulitis of leg, except foot     Resolved 12/10/2014     Hx of radiation therapy 08/01/2019    Lymphadenitis     Resolved 12/10/2014     Snoring       Past Surgical History:     Past Surgical History:   Procedure Laterality Date    BREAST BIOPSY Left 2019    BREAST LUMPECTOMY Left 07/30/2019    Procedure: BREAST LUMPECTOMY; BREAST NEEDLE LOCALIZATION (NEEDLE LOC AT 0800); Surgeon: Khris Baer MD;  [facility];  Service: Surgical Oncology    INTRAUTERINE DEVICE INSERTION  2019    LYMPH NODE BIOPSY Left 07/30/2019    Procedure: SENTINEL LYMPH NODE BIOPSY; LYMPHATIC MAPPING WITH BLUE DYE AND RADIOACTIVE DYE (INJECT AT 0930 BY DR LEAVITT IN THE OR);   Surgeon: Khris Baer MD;  [facility];  Service: Surgical Oncology    MAMMO NEEDLE LOCALIZATION LEFT (ALL INC) Left 07/30/2019    SALPINGECTOMY Bilateral 03/2021    SHOULDER SURGERY Right     Last assessed 12/29/2015     US GUIDED BREAST BIOPSY LEFT COMPLETE Left 06/07/2019    WISDOM TOOTH EXTRACTION        Social History:     Social History     Socioeconomic History    Marital status: /Civil Union     Spouse name: None    Number of children: 3    Years of education: None    Highest education level: None   Occupational History    None   Tobacco Use    Smoking status: Never Smoker    Smokeless tobacco: Never Used   Vaping Use    Vaping Use: Never used   Substance and Sexual Activity    Alcohol use: Yes     Comment: rare    Drug use: No    Sexual activity: Yes     Partners: Male   Other Topics Concern    None   Social History Narrative    None     Social Determinants of Health     Financial Resource Strain: Not on file   Food Insecurity: Not on file   Transportation Needs: Not on file   Physical Activity: Not on file   Stress: Not on file   Social Connections: Not on file   Intimate Partner Violence: Not on file   Housing Stability: Not on file      Family History:     Family History   Problem Relation Age of Onset    Alzheimer's disease Mother     COPD Father     No Known Problems Sister     No Known Problems Daughter     Alzheimer's disease Maternal Grandmother     No Known Problems Maternal Grandfather     No Known Problems Paternal Grandmother     No Known Problems Paternal Grandfather     No Known Problems Brother     No Known Problems Brother     No Known Problems Brother     No Known Problems Daughter       Current Medications:     Current Outpatient Medications   Medication Sig Dispense Refill    albuterol (ProAir HFA) 90 mcg/act inhaler Inhale 2 puffs every 4 (four) hours as needed for wheezing 18 g 1    cetirizine (ZyrTEC) 10 MG chewable tablet Chew 10 mg daily as needed       Cholecalciferol (VITAMIN D3) 1000 units CHEW Chew 1 tablet daily      fluticasone (FLONASE) 50 mcg/act nasal spray 1 spray into each nostril daily as needed       multivitamin (THERAGRAN) TABS Take 1 tablet by mouth daily      rizatriptan (MAXALT-MLT) 10 MG disintegrating tablet DISSOLVE ONE TABLET IN MOUTH at onset of headache  May repeat in two hours if ineffective 12 tablet 0    tamoxifen (NOLVADEX) 20 mg tablet Take 1 tablet (20 mg total) by mouth daily 90 tablet 1     No current facility-administered medications for this visit        Allergies:     No Known Allergies   Physical Exam:     /76   Pulse 84   Temp 98 °F (36 7 °C)   Resp 18   Ht 5' 2" (1 575 m)   Wt 78 9 kg (174 lb)   LMP 07/26/2022 (Exact Date)   SpO2 99%   BMI 31 83 kg/m²     Physical Exam  Vitals and nursing note reviewed  Constitutional:       Appearance: She is well-developed  HENT:      Head: Normocephalic and atraumatic  Right Ear: External ear normal       Left Ear: External ear normal       Nose: Nose normal    Cardiovascular:      Rate and Rhythm: Normal rate and regular rhythm  Heart sounds: Normal heart sounds  No murmur heard  No friction rub  Pulmonary:      Effort: No respiratory distress  Breath sounds: Normal breath sounds  No wheezing or rales  Abdominal:      Palpations: Abdomen is soft  Tenderness: There is no abdominal tenderness  Musculoskeletal:      Right lower leg: No edema  Left lower leg: No edema  Neurological:      Mental Status: She is oriented to person, place, and time  Cranial Nerves: No cranial nerve deficit           Visual Acuity Screening    Right eye Left eye Both eyes   Without correction:      With correction: 20/20 20/20 [address] [address], [address]

## 2022-09-06 NOTE — PATIENT INSTRUCTIONS

## 2022-10-14 LAB
25(OH)D3+25(OH)D2 SERPL-MCNC: 33.9 NG/ML (ref 30–100)
ALBUMIN SERPL-MCNC: 4.1 G/DL (ref 3.8–4.8)
ALBUMIN/GLOB SERPL: 1.7 {RATIO} (ref 1.2–2.2)
ALP SERPL-CCNC: 41 IU/L (ref 44–121)
ALT SERPL-CCNC: 16 IU/L (ref 0–32)
AST SERPL-CCNC: 17 IU/L (ref 0–40)
BILIRUB SERPL-MCNC: 0.3 MG/DL (ref 0–1.2)
BUN SERPL-MCNC: 9 MG/DL (ref 6–24)
BUN/CREAT SERPL: 16 (ref 9–23)
CALCIUM SERPL-MCNC: 9.2 MG/DL (ref 8.7–10.2)
CHLORIDE SERPL-SCNC: 108 MMOL/L (ref 96–106)
CHOLEST SERPL-MCNC: 172 MG/DL (ref 100–199)
CO2 SERPL-SCNC: 20 MMOL/L (ref 20–29)
CREAT SERPL-MCNC: 0.57 MG/DL (ref 0.57–1)
EGFR: 111 ML/MIN/1.73
ERYTHROCYTE [DISTWIDTH] IN BLOOD BY AUTOMATED COUNT: 12.5 % (ref 11.7–15.4)
EST. AVERAGE GLUCOSE BLD GHB EST-MCNC: 108 MG/DL
GLOBULIN SER-MCNC: 2.4 G/DL (ref 1.5–4.5)
GLUCOSE SERPL-MCNC: 97 MG/DL (ref 70–99)
HBA1C MFR BLD: 5.4 % (ref 4.8–5.6)
HCT VFR BLD AUTO: 35.5 % (ref 34–46.6)
HCV AB S/CO SERPL IA: <0.1 S/CO RATIO (ref 0–0.9)
HDLC SERPL-MCNC: 77 MG/DL
HGB BLD-MCNC: 11.9 G/DL (ref 11.1–15.9)
LDLC SERPL CALC-MCNC: 74 MG/DL (ref 0–99)
LDLC/HDLC SERPL: 1 RATIO (ref 0–3.2)
MCH RBC QN AUTO: 30.2 PG (ref 26.6–33)
MCHC RBC AUTO-ENTMCNC: 33.5 G/DL (ref 31.5–35.7)
MCV RBC AUTO: 90 FL (ref 79–97)
MICRODELETION SYND BLD/T FISH: NORMAL
PLATELET # BLD AUTO: 245 X10E3/UL (ref 150–450)
POTASSIUM SERPL-SCNC: 4.5 MMOL/L (ref 3.5–5.2)
PROT SERPL-MCNC: 6.5 G/DL (ref 6–8.5)
RBC # BLD AUTO: 3.94 X10E6/UL (ref 3.77–5.28)
SL AMB VLDL CHOLESTEROL CALC: 21 MG/DL (ref 5–40)
SODIUM SERPL-SCNC: 144 MMOL/L (ref 134–144)
TRIGL SERPL-MCNC: 123 MG/DL (ref 0–149)
WBC # BLD AUTO: 6.3 X10E3/UL (ref 3.4–10.8)

## 2022-10-15 ENCOUNTER — TELEPHONE (OUTPATIENT)
Dept: FAMILY MEDICINE CLINIC | Facility: CLINIC | Age: 47
End: 2022-10-15

## 2022-10-15 DIAGNOSIS — J45.20 MILD INTERMITTENT ASTHMA WITHOUT COMPLICATION: Primary | ICD-10-CM

## 2022-10-15 RX ORDER — BENZONATATE 200 MG/1
200 CAPSULE ORAL 2 TIMES DAILY PRN
Qty: 20 CAPSULE | Refills: 0 | Status: SHIPPED | OUTPATIENT
Start: 2022-10-15

## 2022-10-15 NOTE — TELEPHONE ENCOUNTER
10/15/2022 12:33 PM she has been out in nature and has been coughing for the past week  Home COVID test is negative        Will treat with tessalon pearls    Message complete  [facility]

## 2022-10-15 NOTE — TELEPHONE ENCOUNTER
Pt called asking if Dr Abhilash Noel could call in a cough medicine for her   She is kept up at night with coughing and has a headache because of the coughing as well, she also has asthma and does not want to go in to an asthma attack

## 2022-11-04 ENCOUNTER — PREP FOR PROCEDURE (OUTPATIENT)
Dept: GASTROENTEROLOGY | Facility: CLINIC | Age: 47
End: 2022-11-04

## 2022-11-04 ENCOUNTER — TELEPHONE (OUTPATIENT)
Dept: GASTROENTEROLOGY | Facility: CLINIC | Age: 47
End: 2022-11-04

## 2022-11-04 DIAGNOSIS — Z12.11 SCREENING FOR COLON CANCER: Primary | ICD-10-CM

## 2022-11-04 NOTE — TELEPHONE ENCOUNTER
Scheduled date of colonoscopy (as of today):12/14/22  Physician performing colonoscopy:Dr Aziza Saul  [facility]  Bowel prep reviewed with patient:Miralax, dulcolax, gatorade  Instructions reviewed with patient by:gerson  Clearances: n/a

## 2022-12-14 ENCOUNTER — ANESTHESIA (OUTPATIENT)
Dept: GASTROENTEROLOGY | Facility: AMBULARY SURGERY CENTER | Age: 47
End: 2022-12-14

## 2022-12-14 ENCOUNTER — HOSPITAL ENCOUNTER (OUTPATIENT)
Dept: GASTROENTEROLOGY | Facility: AMBULARY SURGERY CENTER | Age: 47
Setting detail: OUTPATIENT SURGERY
Discharge: HOME | End: 2022-12-14
Attending: INTERNAL MEDICINE

## 2022-12-14 ENCOUNTER — ANESTHESIA EVENT (OUTPATIENT)
Dept: GASTROENTEROLOGY | Facility: AMBULARY SURGERY CENTER | Age: 47
End: 2022-12-14

## 2022-12-14 VITALS
HEART RATE: 76 BPM | DIASTOLIC BLOOD PRESSURE: 70 MMHG | HEIGHT: 62 IN | WEIGHT: 175 LBS | OXYGEN SATURATION: 99 % | SYSTOLIC BLOOD PRESSURE: 107 MMHG | RESPIRATION RATE: 21 BRPM | BODY MASS INDEX: 32.2 KG/M2 | TEMPERATURE: 97.8 F

## 2022-12-14 DIAGNOSIS — Z12.11 SCREENING FOR COLON CANCER: ICD-10-CM

## 2022-12-14 RX ORDER — LIDOCAINE HYDROCHLORIDE 10 MG/ML
INJECTION, SOLUTION EPIDURAL; INFILTRATION; INTRACAUDAL; PERINEURAL AS NEEDED
Status: DISCONTINUED | OUTPATIENT
Start: 2022-12-14 | End: 2022-12-14

## 2022-12-14 RX ORDER — SODIUM CHLORIDE, SODIUM LACTATE, POTASSIUM CHLORIDE, CALCIUM CHLORIDE 600; 310; 30; 20 MG/100ML; MG/100ML; MG/100ML; MG/100ML
INJECTION, SOLUTION INTRAVENOUS CONTINUOUS PRN
Status: DISCONTINUED | OUTPATIENT
Start: 2022-12-14 | End: 2022-12-14

## 2022-12-14 RX ORDER — PROPOFOL 10 MG/ML
INJECTION, EMULSION INTRAVENOUS AS NEEDED
Status: DISCONTINUED | OUTPATIENT
Start: 2022-12-14 | End: 2022-12-14

## 2022-12-14 RX ADMIN — PROPOFOL 20 MG: 10 INJECTION, EMULSION INTRAVENOUS at 13:36

## 2022-12-14 RX ADMIN — PROPOFOL 60 MG: 10 INJECTION, EMULSION INTRAVENOUS at 13:28

## 2022-12-14 RX ADMIN — PROPOFOL 30 MG: 10 INJECTION, EMULSION INTRAVENOUS at 13:32

## 2022-12-14 RX ADMIN — SODIUM CHLORIDE, SODIUM LACTATE, POTASSIUM CHLORIDE, AND CALCIUM CHLORIDE: .6; .31; .03; .02 INJECTION, SOLUTION INTRAVENOUS at 13:16

## 2022-12-14 RX ADMIN — LIDOCAINE HYDROCHLORIDE 50 MG: 10 INJECTION, SOLUTION EPIDURAL; INFILTRATION; INTRACAUDAL at 13:26

## 2022-12-14 RX ADMIN — PROPOFOL 140 MG: 10 INJECTION, EMULSION INTRAVENOUS at 13:26

## 2022-12-14 NOTE — H&P
History and Physical - [facility]  Shyam Armijo 50 y o  female MRN: [id number]    HPI: Shyam Armijo is a 50y o  year old female who presents with colon cancer screening  Review of Systems    Historical Information   Past Medical History:   Diagnosis Date   • Adhesive capsulitis of right shoulder     Resolved 11/15/2016    • Asthma    • Benign paroxysmal vertigo, unspecified ear     Resolved 11/15/2016    • BRCA gene mutation negative 06/2019    [facility]   • Breast cancer ([address] ) 07/30/2019   • Cellulitis of leg, except foot     Resolved 12/10/2014    • Hx of radiation therapy 08/01/2019   • Lymphadenitis     Resolved 12/10/2014    • Snoring      Past Surgical History:   Procedure Laterality Date   • BREAST BIOPSY Left 2019   • BREAST LUMPECTOMY Left 07/30/2019    Procedure: BREAST LUMPECTOMY; BREAST NEEDLE LOCALIZATION (NEEDLE LOC AT 0800); Surgeon: Jefe Wilder MD;  [facility];  Service: Surgical Oncology   • INTRAUTERINE DEVICE INSERTION  2019   • LYMPH NODE BIOPSY Left 07/30/2019    Procedure: SENTINEL LYMPH NODE BIOPSY; LYMPHATIC MAPPING WITH BLUE DYE AND RADIOACTIVE DYE (INJECT AT 0930 BY DR LEAVITT IN THE OR);   Surgeon: Jefe Wilder MD;  [facility];  Service: Surgical Oncology   • MAMMO NEEDLE LOCALIZATION LEFT (ALL INC) Left 07/30/2019   • SALPINGECTOMY Bilateral 03/2021   • SHOULDER SURGERY Right     Last assessed 12/29/2015    • US GUIDED BREAST BIOPSY LEFT COMPLETE Left 06/07/2019   • WISDOM TOOTH EXTRACTION       Social History   Social History     Substance and Sexual Activity   Alcohol Use Yes    Comment: rare     Social History     Substance and Sexual Activity   Drug Use No     Social History     Tobacco Use   Smoking Status Never   Smokeless Tobacco Never     Family History   Problem Relation Age of Onset   • Alzheimer's disease Mother    • COPD Father    • No Known Problems Sister    • No Known Problems Daughter    • Alzheimer's disease Maternal Grandmother    • No Known Problems Maternal Grandfather    • No Known Problems Paternal Grandmother    • No Known Problems Paternal Grandfather    • No Known Problems Brother    • No Known Problems Brother    • No Known Problems Brother    • No Known Problems Daughter        Meds/Allergies     (Not in a hospital admission)      No Known Allergies    Objective     /86   Pulse 96   Temp 97 6 °F (36 4 °C) (Temporal)   Resp 19   Ht 5' 2" (1 575 m)   Wt 79 4 kg (175 lb)   SpO2 96%   BMI 32 01 kg/m²       PHYSICAL EXAM    Gen: NAD  CV: RRR  CHEST: Clear  ABD: soft, NT/ND  EXT: no edema  Neuro: AAO      ASSESSMENT/PLAN:  This is a 50y o  year old female here for colon cancer screening         PLAN:   Procedure: colonosocpy

## 2022-12-14 NOTE — ANESTHESIA POSTPROCEDURE EVALUATION
Post-Op Assessment Note    CV Status:  Stable  Pain Score: 0    Pain management: adequate     Mental Status:  Awake and alert   Hydration Status:  Stable   PONV Controlled:  None   Airway Patency:  Patent and adequate      Post Op Vitals Reviewed: Yes      Staff: CRNA, Anesthesiologist         No notable events documented      BP   107/69   Temp      Pulse  81   Resp   16   SpO2   99%

## 2022-12-14 NOTE — ANESTHESIA PREPROCEDURE EVALUATION
Procedure:  COLONOSCOPY    Relevant Problems   ANESTHESIA   (+) Motion sickness      CARDIO   (+) Migraine headache      GYN   (+) Malignant neoplasm of upper-inner quadrant of left breast in female, estrogen receptor positive (HCC)      NEURO/PSYCH   (+) Migraine headache      PULMONARY   (+) Mild intermittent asthma        Physical Exam    Airway    Mallampati score: III  TM Distance: >3 FB  Neck ROM: full     Dental   No notable dental hx     Cardiovascular  Cardiovascular exam normal    Pulmonary  Pulmonary exam normal     Other Findings        Anesthesia Plan  ASA Score- 2     Anesthesia Type- IV sedation with anesthesia with ASA Monitors  Additional Monitors:   Airway Plan:           Plan Factors-Exercise tolerance (METS): >4 METS  Chart reviewed  Patient summary reviewed  Patient is not a current smoker  Induction- intravenous  Postoperative Plan-     Informed Consent- Anesthetic plan and risks discussed with patient

## 2023-01-13 ENCOUNTER — TELEPHONE (OUTPATIENT)
Dept: SURGICAL ONCOLOGY | Facility: CLINIC | Age: 48
End: 2023-01-13

## 2023-01-13 NOTE — TELEPHONE ENCOUNTER
Called and left message for patient informing we have received her request for records and they've been faxed over to [facility]  Left call back number for medical records service in case she has any questions

## 2023-02-07 ENCOUNTER — OFFICE VISIT (OUTPATIENT)
Dept: SURGICAL ONCOLOGY | Facility: CLINIC | Age: 48
End: 2023-02-07

## 2023-02-07 VITALS
RESPIRATION RATE: 16 BRPM | HEART RATE: 81 BPM | BODY MASS INDEX: 33.31 KG/M2 | HEIGHT: 62 IN | OXYGEN SATURATION: 98 % | WEIGHT: 181 LBS | SYSTOLIC BLOOD PRESSURE: 110 MMHG | TEMPERATURE: 98.5 F | DIASTOLIC BLOOD PRESSURE: 70 MMHG

## 2023-02-07 DIAGNOSIS — Z17.0 MALIGNANT NEOPLASM OF UPPER-INNER QUADRANT OF LEFT BREAST IN FEMALE, ESTROGEN RECEPTOR POSITIVE (HCC): Primary | ICD-10-CM

## 2023-02-07 DIAGNOSIS — Z79.810 USE OF TAMOXIFEN (NOLVADEX): ICD-10-CM

## 2023-02-07 DIAGNOSIS — C50.212 MALIGNANT NEOPLASM OF UPPER-INNER QUADRANT OF LEFT BREAST IN FEMALE, ESTROGEN RECEPTOR POSITIVE (HCC): Primary | ICD-10-CM

## 2023-02-07 NOTE — PROGRESS NOTES
Surgical Oncology Follow Up       [address]  [facility] [place]  [address] [facility] [place] [address]    Jocelyn Smith  1975  [id number]  [address] [facility] [facility]  [facility] [place]  [address] [address]    Chief Complaint   Patient presents with   • Other     Office visit       Assessment/Plan:  1  Malignant neoplasm of upper-inner quadrant of left breast in female, estrogen receptor positive ([address] )  - 6 month follow up  - Mammo diagnostic bilateral w 3d & cad; Future    2  Use of tamoxifen (Nolvadex)  - continue use per medical oncology       Discussion/Summary: Pt is a 50year old female presenting today for a 6 month follow up for left breast cancer diagnosed in June 2019  Pathology revealed invasive lobular carcinoma ER/CT positive, HER2 negative  She underwent genetic testing which was negative  She had a left breast lumpectomy sentinel node biopsy with Dr Jaspal Eli and completed whole breast radiation therapy  She is currently on tamoxifen  She is due for a mammogram next month  She had questions about reconstruction for loss of volume of left breast secondary to lumpectomy  She is going to call insurance to ask about coverage  There were no concerns on her cbe  I will see the patient back in 6 months or sooner should the need arise  She was instructed to call with any questions or concerns prior to this time  All questions were answered today          History of Present Illness:     Oncology History   Malignant neoplasm of upper-inner quadrant of left breast in female, estrogen receptor positive ([facility] [address] )   6/7/2019 Biopsy    Left breast ultrasound-guided biopsy  11 o'clock, 4 cm from nipple  Invasive lobular carcinoma  Grade 1  ER 90  CT 45  HER2 1+     6/17/2019 Genetic Testing    BRCA negative  [facility]     7/30/2019 Surgery    Left breast lumpectomy with sentinel lymph node biopsy  Invasive lobular carcinoma  Grade 1  1 1 cm  0/3 Lymph nodes  Margins negative  Anatomic/Prognostic Stage IA     8/14/2019 -  Cancer Staged    Staging form: Breast, AJCC 8th Edition  - Clinical: Stage IA (cT1c, cN0, cM0, G1, ER+, HI+, HER2-) - Signed by Lieutenant Jon MD on 8/14/2019  Laterality: Left  Histologic grading system: 3 grade system       8/27/2019 - 9/25/2019 Radiation    Course: C1    Plan ID Energy Fractions Dose per Fraction (cGy) Dose Correction (cGy) Total Dose Delivered (cGy) Elapsed Days   BH L Breast 6X 16 / 16 266 0 4,256 22   BH L Brst e 12E 5 / 5 200 0 1,000 6      Treatment dates:  C1: 8/27/2019 - 9/25/2019 8/2019 -  Hormone Therapy    Tamoxifen 20 mg daily  With Dr Gregorio Love          -Interval History:  Pt is a 50year old female presenting today for a 6 month follow up for left breast cancer diagnosed in June 2019  She is currently on tamoxifen  She is due for a mammogram next month  Patient denies changes on her breast exam  She denies persistent headaches, bone pain, back pain, shortness of breath, or abdominal pain  Review of Systems:  Review of Systems   Constitutional: Negative for activity change, appetite change, fatigue and unexpected weight change  Respiratory: Negative for cough and shortness of breath  Cardiovascular: Negative for chest pain  Gastrointestinal: Negative for abdominal pain, diarrhea, nausea and vomiting  Endocrine: Positive for heat intolerance  Musculoskeletal: Negative for arthralgias, back pain and myalgias  Skin: Negative for rash  Neurological: Negative for weakness and headaches  Hematological: Negative for adenopathy         Patient Active Problem List   Diagnosis   • Allergic rhinitis   • Dense breasts   • Migraine headache   • Mild intermittent asthma   • Motion sickness   • Vitamin D deficiency   • Malignant neoplasm of upper-inner quadrant of left breast in female, estrogen receptor positive ([address] )   • Use of tamoxifen (Nolvadex)   • Abnormal glucose     Past Medical History:   Diagnosis Date   • Adhesive capsulitis of right shoulder     Resolved 11/15/2016    • Asthma    • Benign paroxysmal vertigo, unspecified ear     Resolved 11/15/2016    • BRCA gene mutation negative 06/2019    [facility]   • Breast cancer [address] [address] ) 07/30/2019   • Cellulitis of leg, except foot     Resolved 12/10/2014    • Hx of radiation therapy 08/01/2019   • Lymphadenitis     Resolved 12/10/2014    • Snoring      Past Surgical History:   Procedure Laterality Date   • BREAST BIOPSY Left 2019   • BREAST LUMPECTOMY Left 07/30/2019    Procedure: BREAST LUMPECTOMY; BREAST NEEDLE LOCALIZATION (NEEDLE LOC AT 0800); Surgeon: Evi Parsons MD;  [facility];  Service: Surgical Oncology   • INTRAUTERINE DEVICE INSERTION  2019   • LYMPH NODE BIOPSY Left 07/30/2019    Procedure: SENTINEL LYMPH NODE BIOPSY; LYMPHATIC MAPPING WITH BLUE DYE AND RADIOACTIVE DYE (INJECT AT 0930 BY DR LEAVITT IN THE OR);   Surgeon: Evi Parsons MD;  [facility];  Service: Surgical Oncology   • MAMMO NEEDLE LOCALIZATION LEFT (ALL INC) Left 07/30/2019   • SALPINGECTOMY Bilateral 03/2021   • SHOULDER SURGERY Right     Last assessed 12/29/2015    • US GUIDED BREAST BIOPSY LEFT COMPLETE Left 06/07/2019   • WISDOM TOOTH EXTRACTION       Family History   Problem Relation Age of Onset   • Alzheimer's disease Mother    • COPD Father    • No Known Problems Sister    • No Known Problems Daughter    • Alzheimer's disease Maternal Grandmother    • No Known Problems Maternal Grandfather    • No Known Problems Paternal Grandmother    • No Known Problems Paternal Grandfather    • No Known Problems Brother    • No Known Problems Brother    • No Known Problems Brother    • No Known Problems Daughter      Social History     Socioeconomic History   • Marital status: /Civil Union     Spouse name: Not on file   • Number of children: 3   • Years of education: Not on file   • Highest education level: Not on file   Occupational History   • Not on file   Tobacco Use   • Smoking status: Never   • Smokeless tobacco: Never   Vaping Use   • Vaping Use: Never used   Substance and Sexual Activity   • Alcohol use: Yes     Comment: rare   • Drug use: No   • Sexual activity: Yes     Partners: Male   Other Topics Concern   • Not on file   Social History Narrative   • Not on file     Social Determinants of Health     Financial Resource Strain: Not on file   Food Insecurity: Not on file   Transportation Needs: Not on file   Physical Activity: Not on file   Stress: Not on file   Social Connections: Not on file   Intimate Partner Violence: Not on file   Housing Stability: Not on file       Current Outpatient Medications:   •  albuterol (ProAir HFA) 90 mcg/act inhaler, Inhale 2 puffs every 4 (four) hours as needed for wheezing, Disp: 18 g, Rfl: 1  •  benzonatate (TESSALON) 200 MG capsule, Take 1 capsule (200 mg total) by mouth 2 (two) times a day as needed for cough, Disp: 20 capsule, Rfl: 0  •  cetirizine (ZyrTEC) 10 MG chewable tablet, Chew 10 mg daily as needed , Disp: , Rfl:   •  Cholecalciferol (VITAMIN D3) 1000 units CHEW, Chew 1 tablet daily, Disp: , Rfl:   •  fluticasone (FLONASE) 50 mcg/act nasal spray, 1 spray into each nostril daily as needed , Disp: , Rfl:   •  multivitamin (THERAGRAN) TABS, Take 1 tablet by mouth daily, Disp: , Rfl:   •  rizatriptan (MAXALT-MLT) 10 MG disintegrating tablet, DISSOLVE ONE TABLET IN MOUTH at onset of headache  May repeat in two hours if ineffective, Disp: 12 tablet, Rfl: 0  •  tamoxifen (NOLVADEX) 20 mg tablet, TAKE ONE TABLET BY MOUTH ONE TIME DAILY, Disp: 90 tablet, Rfl: 1  No Known Allergies  Vitals:    02/07/23 0804   BP: 110/70   Pulse: 81   Resp: 16   Temp: 98 5 °F (36 9 °C)   SpO2: 98%       Physical Exam  Constitutional:       General: She is not in acute distress  Appearance: Normal appearance  Cardiovascular:      Rate and Rhythm: Normal rate and regular rhythm  Pulses: Normal pulses  Heart sounds: Normal heart sounds  Pulmonary:      Effort: Pulmonary effort is normal       Breath sounds: Normal breath sounds  Chest:      Chest wall: No mass  Breasts:     Right: No swelling, bleeding, inverted nipple, mass, nipple discharge, skin change or tenderness  Left: No swelling, bleeding, inverted nipple, mass, nipple discharge, skin change or tenderness  Comments: Left breast lumpectomy scar with sln bx scar  No masses, nodularity, skin changes, nipple changes or discharge, or adenopathy appreciated on physical exam      Abdominal:      General: Abdomen is flat  Palpations: Abdomen is soft  Lymphadenopathy:      Upper Body:      Right upper body: No supraclavicular, axillary or pectoral adenopathy  Left upper body: No supraclavicular, axillary or pectoral adenopathy  Skin:     General: Skin is warm  Neurological:      General: No focal deficit present  Mental Status: She is alert and oriented to person, place, and time  Psychiatric:         Mood and Affect: Mood normal          Behavior: Behavior normal            Results:    Imaging  No results found  I reviewed the above imaging data  Advance Care Planning/Advance Directives:  Discussed disease status, cancer treatment plans and/or cancer treatment goals with the patient

## 2023-02-08 ENCOUNTER — OFFICE VISIT (OUTPATIENT)
Dept: HEMATOLOGY ONCOLOGY | Facility: CLINIC | Age: 48
End: 2023-02-08

## 2023-02-08 VITALS
OXYGEN SATURATION: 98 % | SYSTOLIC BLOOD PRESSURE: 118 MMHG | RESPIRATION RATE: 22 BRPM | BODY MASS INDEX: 32.76 KG/M2 | HEART RATE: 81 BPM | WEIGHT: 178 LBS | HEIGHT: 62 IN | DIASTOLIC BLOOD PRESSURE: 78 MMHG | TEMPERATURE: 98.3 F

## 2023-02-08 DIAGNOSIS — Z17.0 MALIGNANT NEOPLASM OF UPPER-INNER QUADRANT OF LEFT BREAST IN FEMALE, ESTROGEN RECEPTOR POSITIVE (HCC): Primary | ICD-10-CM

## 2023-02-08 DIAGNOSIS — C50.212 MALIGNANT NEOPLASM OF UPPER-INNER QUADRANT OF LEFT BREAST IN FEMALE, ESTROGEN RECEPTOR POSITIVE (HCC): Primary | ICD-10-CM

## 2023-02-08 NOTE — PROGRESS NOTES
Hematology / Oncology Outpatient Follow Up Note    Nasima Batista 50 y o  female ODC:[birth year]00 PXV:[id number]     Date:  2/8/2023    Assessment / Plan:  A 56-year-old premenopausal woman with stage IA left breast cancer, grade 1, invasive lobular histology, ER 90 % positive, NH 40 % positive, HER2 negative disease   She underwent lumpectomy and sentinel lymph node biopsy, resulting in DIEGO   She is negative for BRCA gene mutation   She is currently on adjuvant hormonal therapy with tamoxifen with no toxicity  She has no evidence recurrent disease based on her symptoms and physical examinations  I recommended her to continue tamoxifen 20 mg daily  She is in agreement with my recommendation  I will see her again in a year for routine follow-up        Subjective:      HPI:  A 56-year-old premenopausal woman was found to have abnormality in her left breast based on a screening mammography   She underwent left breast biopsy in June 7, 2019 which showed invasive lobular carcinoma, grade 1  This was ER 90 % positive, NH 40 % positive, HER2 negative disease   She underwent genetic testing which was negative for BRCA gene mutation   She underwent lumpectomy and sentinel lymph node biopsy by Dr Shyanne Brewster in July 30, 2019  She had 11 millimeter of invasive lobular carcinoma, grade 1   Lymphovascular invasion was not present   3 sentinel lymph nodes were all negative for metastatic disease   She presents today to discuss adjuvant treatment options   She has no breast related symptomatology except minor soreness in the left axilla   She denied any pain   She has no respiratory symptoms   She has stable weight   She denied fever, chills or night sweats   She has no family history of breast cancer  Omid Loose is a lifetime never smoker   Her performance status is normal              Interval History:   A 50year-old premenopausal woman with stage IA left breast cancer, grade 1, invasive lobular histology, ER 90 % positive, NH 40 % positive, HER2 negative disease   She underwent lumpectomy and sentinel lymph node biopsy, resulting in DIEGO   She is negative for BRCA gene mutation   Since August 2019, she has been on adjuvant hormonal therapy with tamoxifen  She presents today for routine follow-up  She feels well  She has intermittent episodes of hot flashes  Her menstrual cycles are irregular  Approximately 4-5 cycles in the past year  She denied any pain or respiratory symptoms  Her weight is stable  She has normal performance status         Objective:      Primary Diagnosis:     1  Left breast cancer, stage IA (pT1c, pN0, M0) grade 1, invasive lobular histology, ER 90 %, NV 40 %, HER2 negative disease   Diagnosed in July 2019       2  BRCA gene mutation negative      Cancer Staging:  Cancer Staging  Malignant neoplasm of upper-inner quadrant of left breast in female, estrogen receptor positive ([address] )  Staging form: Breast, AJCC 8th Edition  - Clinical: Stage IA (cT1c, cN0, cM0, G1, ER+, NV+, HER2-) - Signed by Aden Joya MD on 8/14/2019  Laterality: Left  Histologic grading system: 3 grade system           Previous Hematologic/ Oncologic Treatment:            Current Hematologic/ Oncologic Treatment:       Adjuvant hormonal therapy with tamoxifen since August 2019       Disease Status:      DIEGO status post lumpectomy and sentinel lymph node biopsy      Test Results:     Pathology:     1 1cm of invasive lobular carcinoma, grade 1, lymphovascular invasion was absent   3 sentinel lymph nodes were negative for metastatic disease   ER 90 % positive, NV 40 % positive, HER2 negative disease   Stage IA (pT1c, pN0, M0)     Radiology:       Mammography in March 2021 was benign  BI-RADS 2      Laboratory:     See below  ROS: Review of Systems   All other systems reviewed and are negative  Imaging: No results found        Labs:   Lab Results   Component Value Date    WBC 6 3 10/13/2022    HGB 11 9 10/13/2022    HCT 35 5 10/13/2022    MCV 90 10/13/2022     10/13/2022     Lab Results   Component Value Date     10/26/2017    K 4 5 10/13/2022     (H) 10/13/2022    CO2 20 10/13/2022    BUN 9 10/13/2022    CREATININE 0 57 10/13/2022    GLUCOSE 93 10/26/2017    CALCIUM 9 6 10/26/2017    AST 17 10/13/2022    ALT 16 10/13/2022    ALKPHOS 37 (L) 10/26/2017    PROT 7 1 10/26/2017    BILITOT 0 4 10/26/2017    EGFR 111 10/13/2022         Current Medications: Reviewed  Allergies: Reviewed  PMH/FH/SH:  Reviewed      Vital Sign:    Body surface area is 1 82 meters squared      Wt Readings from Last 3 Encounters:   02/08/23 80 7 kg (178 lb)   02/07/23 82 1 kg (181 lb)   12/14/22 79 4 kg (175 lb)        Temp Readings from Last 3 Encounters:   02/08/23 98 3 °F (36 8 °C)   02/07/23 98 5 °F (36 9 °C) (Temporal)   12/14/22 97 8 °F (36 6 °C) (Temporal)        BP Readings from Last 3 Encounters:   02/08/23 118/78   02/07/23 110/70   12/14/22 107/70         Pulse Readings from Last 3 Encounters:   02/08/23 81   02/07/23 81   12/14/22 76     @LASTSAO2(3)@

## 2023-02-15 ENCOUNTER — TELEPHONE (OUTPATIENT)
Dept: HEMATOLOGY ONCOLOGY | Facility: CLINIC | Age: 48
End: 2023-02-15

## 2023-02-15 DIAGNOSIS — Z98.890 HISTORY OF LUMPECTOMY: Primary | ICD-10-CM

## 2023-02-15 NOTE — TELEPHONE ENCOUNTER
LM for patient that Jaylen Edwards is referring her to Dr Erika Jimenez and her phone number is [phone] when she is ready to call and schedule a Consult appt

## 2023-03-13 ENCOUNTER — CONSULT (OUTPATIENT)
Dept: PLASTIC SURGERY | Facility: CLINIC | Age: 48
End: 2023-03-13

## 2023-03-13 VITALS
DIASTOLIC BLOOD PRESSURE: 76 MMHG | HEIGHT: 62 IN | SYSTOLIC BLOOD PRESSURE: 125 MMHG | BODY MASS INDEX: 32.76 KG/M2 | WEIGHT: 178 LBS

## 2023-03-13 DIAGNOSIS — Z98.890 HISTORY OF LUMPECTOMY: ICD-10-CM

## 2023-03-15 ENCOUNTER — TELEPHONE (OUTPATIENT)
Dept: PLASTIC SURGERY | Facility: CLINIC | Age: 48
End: 2023-03-15

## 2023-03-16 ENCOUNTER — PREP FOR PROCEDURE (OUTPATIENT)
Dept: PLASTIC SURGERY | Facility: CLINIC | Age: 48
End: 2023-03-16

## 2023-03-16 DIAGNOSIS — Z85.3 PERSONAL HISTORY OF BREAST CANCER: Primary | ICD-10-CM

## 2023-03-16 DIAGNOSIS — Z98.890 HISTORY OF LUMPECTOMY: ICD-10-CM

## 2023-03-21 ENCOUNTER — OFFICE VISIT (OUTPATIENT)
Dept: PLASTIC SURGERY | Facility: CLINIC | Age: 48
End: 2023-03-21

## 2023-03-21 DIAGNOSIS — Z98.890 HISTORY OF LUMPECTOMY: Primary | ICD-10-CM

## 2023-03-25 NOTE — PROGRESS NOTES
Assessment and Plan:  Ms Max Blackburn is a 46 y o  female presenting with breast asymmetry s/p left lumpectomy, XRT    We discussed the procedure, risks, benefits, alternatives and postoperative instructions and expectations  Informed consent obtained  Will perform fat grafting bilaterally and mastopexy on the right  L bra  All patient's questions were answered and she voiced understanding  History of Present Illness:   Ms Max Blackburn is a 46 y o  female presenting with asymmetry s/p left lumpectomy, XRT  No changes since initial consult  Review of Systems:  A 12 point ROS was performed and negative except per HPI  Past Medical History:  Past Medical History:   Diagnosis Date   • Adhesive capsulitis of right shoulder     Resolved 11/15/2016    • Asthma    • Benign paroxysmal vertigo, unspecified ear     Resolved 11/15/2016    • BRCA gene mutation negative 06/2019    [facility]   • Breast cancer ([facility] [address] ) 07/30/2019   • Cellulitis of leg, except foot     Resolved 12/10/2014    • Hx of radiation therapy 08/01/2019   • Lymphadenitis     Resolved 12/10/2014    • Snoring        Past Surgical History:  Past Surgical History:   Procedure Laterality Date   • BREAST BIOPSY Left 2019   • BREAST LUMPECTOMY Left 07/30/2019    Procedure: BREAST LUMPECTOMY; BREAST NEEDLE LOCALIZATION (NEEDLE LOC AT 0800); Surgeon: Anthony Chowdary MD;  [facility];  Service: Surgical Oncology   • INTRAUTERINE DEVICE INSERTION  2019   • LYMPH NODE BIOPSY Left 07/30/2019    Procedure: SENTINEL LYMPH NODE BIOPSY; LYMPHATIC MAPPING WITH BLUE DYE AND RADIOACTIVE DYE (INJECT AT 0930 BY DR LEAVITT IN THE OR);   Surgeon: Anthony Chowdary MD;  [facility];  Service: Surgical Oncology   • MAMMO NEEDLE LOCALIZATION LEFT (ALL INC) Left 07/30/2019   • SALPINGECTOMY Bilateral 03/2021   • SHOULDER SURGERY Right     Last assessed 12/29/2015    • US GUIDED BREAST BIOPSY LEFT COMPLETE Left 06/07/2019   • WISDOM TOOTH EXTRACTION         Social History:  Social History     Tobacco Use   • Smoking status: Never   • Smokeless tobacco: Never   Vaping Use   • Vaping Use: Never used   Substance Use Topics   • Alcohol use: Yes     Comment: rare   • Drug use: No       Family History:  Family History   Problem Relation Age of Onset   • Alzheimer's disease Mother    • COPD Father    • No Known Problems Sister    • No Known Problems Daughter    • Alzheimer's disease Maternal Grandmother    • No Known Problems Maternal Grandfather    • No Known Problems Paternal Grandmother    • No Known Problems Paternal Grandfather    • No Known Problems Brother    • No Known Problems Brother    • No Known Problems Brother    • No Known Problems Daughter        Allergies:  No Known Allergies    Medications:  Current Outpatient Medications on File Prior to Visit   Medication Sig Dispense Refill   • albuterol (ProAir HFA) 90 mcg/act inhaler Inhale 2 puffs every 4 (four) hours as needed for wheezing 18 g 1   • benzonatate (TESSALON) 200 MG capsule Take 1 capsule (200 mg total) by mouth 2 (two) times a day as needed for cough 20 capsule 0   • cetirizine (ZyrTEC) 10 MG chewable tablet Chew 10 mg daily as needed      • Cholecalciferol (VITAMIN D3) 1000 units CHEW Chew 1 tablet daily     • fluticasone (FLONASE) 50 mcg/act nasal spray 1 spray into each nostril daily as needed      • multivitamin (THERAGRAN) TABS Take 1 tablet by mouth daily     • rizatriptan (MAXALT-MLT) 10 MG disintegrating tablet DISSOLVE ONE TABLET IN MOUTH at onset of headache  May repeat in two hours if ineffective 12 tablet 0   • tamoxifen (NOLVADEX) 20 mg tablet TAKE ONE TABLET BY MOUTH ONE TIME DAILY 90 tablet 1     No current facility-administered medications on file prior to visit  Physical Examination:  There were no vitals taken for this visit  Estimated body mass index is 32 56 kg/m² as calculated from the following:    Height as of 3/13/23: 5' 2" (1 575 m)  Weight as of 3/13/23: 80 7 kg (178 lb)    General: NAD, well appearing, AAOx3  HEENT: NCAT, EOMI, MMM, supple  Resp: Nonlabored  Heart: RRR  Abdomen: Soft, ND, NT  Extremities/MSK: no LE edema, no obvious deficits in ROM  Neuro: grossly intact with no obvious deficits  Skin: no obvious lesions or rashes  Breast:no palpable mass, no palpable axillary lymphadenopathy, grade I ptosis (slightly increased on right c/w left), right larger c/w left, mild skin hyperpigmentation of left, well healed superior lumpectomy incision with corresponding concavity      Venita Lloyd, DO  Plastic and Reconstructive Surgery

## 2023-03-30 ENCOUNTER — HOSPITAL ENCOUNTER (OUTPATIENT)
Dept: RADIOLOGY | Facility: HOSPITAL | Age: 48
Discharge: HOME | End: 2023-03-30

## 2023-03-30 VITALS — HEIGHT: 62 IN | BODY MASS INDEX: 32.76 KG/M2 | WEIGHT: 178 LBS

## 2023-03-30 DIAGNOSIS — C50.212 MALIGNANT NEOPLASM OF UPPER-INNER QUADRANT OF LEFT BREAST IN FEMALE, ESTROGEN RECEPTOR POSITIVE (HCC): ICD-10-CM

## 2023-03-30 DIAGNOSIS — Z17.0 MALIGNANT NEOPLASM OF UPPER-INNER QUADRANT OF LEFT BREAST IN FEMALE, ESTROGEN RECEPTOR POSITIVE (HCC): ICD-10-CM

## 2023-04-01 LAB
BASOPHILS # BLD AUTO: 0.1 X10E3/UL (ref 0–0.2)
BASOPHILS NFR BLD AUTO: 1 %
BUN SERPL-MCNC: 9 MG/DL (ref 6–24)
BUN/CREAT SERPL: 17 (ref 9–23)
CALCIUM SERPL-MCNC: 9.2 MG/DL (ref 8.7–10.2)
CHLORIDE SERPL-SCNC: 105 MMOL/L (ref 96–106)
CO2 SERPL-SCNC: 24 MMOL/L (ref 20–29)
CREAT SERPL-MCNC: 0.52 MG/DL (ref 0.57–1)
EGFR: 113 ML/MIN/1.73
EOSINOPHIL # BLD AUTO: 0.2 X10E3/UL (ref 0–0.4)
EOSINOPHIL NFR BLD AUTO: 3 %
ERYTHROCYTE [DISTWIDTH] IN BLOOD BY AUTOMATED COUNT: 12.4 % (ref 11.7–15.4)
GLUCOSE SERPL-MCNC: 93 MG/DL (ref 70–99)
HCT VFR BLD AUTO: 37.2 % (ref 34–46.6)
HGB BLD-MCNC: 12.2 G/DL (ref 11.1–15.9)
IMM GRANULOCYTES # BLD: 0 X10E3/UL (ref 0–0.1)
IMM GRANULOCYTES NFR BLD: 0 %
LYMPHOCYTES # BLD AUTO: 2.8 X10E3/UL (ref 0.7–3.1)
LYMPHOCYTES NFR BLD AUTO: 39 %
MCH RBC QN AUTO: 30.3 PG (ref 26.6–33)
MCHC RBC AUTO-ENTMCNC: 32.8 G/DL (ref 31.5–35.7)
MCV RBC AUTO: 92 FL (ref 79–97)
MONOCYTES # BLD AUTO: 0.7 X10E3/UL (ref 0.1–0.9)
MONOCYTES NFR BLD AUTO: 10 %
NEUTROPHILS # BLD AUTO: 3.4 X10E3/UL (ref 1.4–7)
NEUTROPHILS NFR BLD AUTO: 47 %
PLATELET # BLD AUTO: 255 X10E3/UL (ref 150–450)
POTASSIUM SERPL-SCNC: 4.5 MMOL/L (ref 3.5–5.2)
RBC # BLD AUTO: 4.03 X10E6/UL (ref 3.77–5.28)
SODIUM SERPL-SCNC: 141 MMOL/L (ref 134–144)
WBC # BLD AUTO: 7.3 X10E3/UL (ref 3.4–10.8)

## 2023-04-07 NOTE — PRE-PROCEDURE INSTRUCTIONS
Pre-Surgery Instructions:   Medication Instructions   • albuterol (ProAir HFA) 90 mcg/act inhaler Uses PRN- OK to take day of surgery   • benzonatate (TESSALON) 200 MG capsule Uses PRN- OK to take day of surgery   • BIOTIN W/ VITAMINS C & E PO instructed to hold   • cetirizine (ZyrTEC) 10 MG chewable tablet Uses PRN- OK to take day of surgery   • Cholecalciferol (VITAMIN D3) 1000 units CHEW instructed to hold   • fluticasone (FLONASE) 50 mcg/act nasal spray Uses PRN- OK to take day of surgery   • Multiple Vitamins-Minerals (AIRBORNE PO) instructed to hold   • multivitamin (THERAGRAN) TABS instructed to hold   • rizatriptan (MAXALT-MLT) 10 MG disintegrating tablet Uses PRN- OK to take day of surgery   • tamoxifen (NOLVADEX) 20 mg tablet instructed pt to take the night before surgery as usual unless Dr Shree Garcia calls her and tells her to hold it (msg sent to the office)    [facility] preop instructions reviewed with pt  Pt has surgical soap

## 2023-04-27 ENCOUNTER — CLINICAL SUPPORT (OUTPATIENT)
Dept: RADIATION ONCOLOGY | Facility: HOSPITAL | Age: 48
End: 2023-04-27
Attending: RADIOLOGY

## 2023-04-27 ENCOUNTER — RADIATION ONCOLOGY FOLLOW-UP (OUTPATIENT)
Dept: RADIATION ONCOLOGY | Facility: HOSPITAL | Age: 48
End: 2023-04-27
Attending: RADIOLOGY

## 2023-04-27 VITALS
SYSTOLIC BLOOD PRESSURE: 118 MMHG | HEART RATE: 89 BPM | RESPIRATION RATE: 18 BRPM | TEMPERATURE: 97.4 F | DIASTOLIC BLOOD PRESSURE: 84 MMHG | OXYGEN SATURATION: 98 % | HEIGHT: 61 IN | WEIGHT: 181 LBS | BODY MASS INDEX: 34.17 KG/M2

## 2023-04-27 DIAGNOSIS — C50.212 MALIGNANT NEOPLASM OF UPPER-INNER QUADRANT OF LEFT BREAST IN FEMALE, ESTROGEN RECEPTOR POSITIVE (HCC): Primary | ICD-10-CM

## 2023-04-27 DIAGNOSIS — Z17.0 MALIGNANT NEOPLASM OF UPPER-INNER QUADRANT OF LEFT BREAST IN FEMALE, ESTROGEN RECEPTOR POSITIVE (HCC): Primary | ICD-10-CM

## 2023-04-27 NOTE — PROGRESS NOTES
Elena HERNDON Sarah 1975 is a 46 y o  female  with invasive lobular carcinoma of the left breast status post lumpectomy and sentinel node biopsy with negative margins, Stage IA (cT1c, cN0, cM0, G1, ER+, NJ+, HER2-)  She met with Dr Austyn August and has been on Tamoxifen since 2019  Today, she returns for routine scheduled follow-up after completing left breast radiation on 2019  The pt was last seen in radiation on 22 - Surg OncKaty  Pt remains on Tamoxifen  No concerns on exam    23 - Surg OncKaty  Pt to have mammogram next month  Pt had some questions in regards to reconstruction - pt to check with insurance  No concerns on exam    23 - Hem OncKelvin  No evidence of recurrent disease  Pt to remain on Tamoxifen  Follow up in 1 year    23 - Mika Slade  Discussed fat grafting  as subpectoral implant is not feasible   Small crescentic mastopexy will improve symmetry      23 - Mammo diagnostic bilateral w 3d & cad   FINDINGS:   LEFT  B) POST-SURGICAL FINDING: There are post-surgical findings from a previous lumpectomy with radiation seen in the upper central region of the left breast in the middle depth  Compared to the previous study, there are no significant changes  Right  There are no suspicious masses, grouped microcalcifications or areas of unexplained architectural distortion  The skin and nipple areolar complex are unremarkable  IMPRESSION:   Stable therapeutic changes left breast   No evidence for malignancy    ASSESSMENT/BI-RADS CATEGORY:  Left: 2 - Benign  Right: 1 - Negative  Overall: 2 - Benign    Upcomin23 - Fat Grafting to b/l breasts   24 - Hem Arelis Drain           Oncology History   Malignant neoplasm of upper-inner quadrant of left breast in female, estrogen receptor positive ([facility] [address] )   2019 Biopsy    Left breast ultrasound-guided biopsy  11 o'clock, 4 cm from nipple  Invasive lobular carcinoma  Grade 1  ER 90  UT 45  HER2 1+     6/17/2019 Genetic Testing    BRCA negative  [facility]     7/30/2019 Surgery    Left breast lumpectomy with sentinel lymph node biopsy  Invasive lobular carcinoma  Grade 1  1 1 cm  0/3 Lymph nodes  Margins negative  Anatomic/Prognostic Stage IA     8/14/2019 -  Cancer Staged    Staging form: Breast, AJCC 8th Edition  - Clinical: Stage IA (cT1c, cN0, cM0, G1, ER+, UT+, HER2-) - Signed by Carl Busby MD on 8/14/2019  Laterality: Left  Histologic grading system: 3 grade system       8/27/2019 - 9/25/2019 Radiation    Course: C1    Plan ID Energy Fractions Dose per Fraction (cGy) Dose Correction (cGy) Total Dose Delivered (cGy) Elapsed Days   BH L Breast 6X 16 / 16 266 0 4,256 22   BH L Brst e 12E 5 / 5 200 0 1,000 6      Treatment dates:  C1: 8/27/2019 - 9/25/2019 8/2019 -  Hormone Therapy    Tamoxifen 20 mg daily  With Dr Nuñez Bolus         Review of Systems:  Review of Systems   Constitutional: Negative  HENT: Negative  Eyes:        Wears glasses   Respiratory: Negative  Cardiovascular: Negative  Gastrointestinal: Negative  Endocrine: Negative  Genitourinary: Negative  Musculoskeletal: Negative  Skin: Negative  Allergic/Immunologic: Negative  Neurological: Positive for dizziness (Associated with migraines) and headaches (Migraines)  Hematological: Negative  Psychiatric/Behavioral: Negative          Clinical Trial: no        Health Maintenance   Topic Date Due   • HIV Screening  Never done   • Cervical Cancer Screening  Never done   • Pneumococcal Vaccine: Pediatrics (0 to 5 Years) and At-Risk Patients (6 to 59 Years) (2 - PCV) 09/17/2011   • BMI: Followup Plan  08/17/2022   • Influenza Vaccine (1) 09/01/2022   • Breast Cancer Survivorship  Never done   • Colorectal Surgery Screening  Never done   • Onc DXA Scan  Never done   • ONC Physical Therapy Referral  Never done   • Annual Physical  09/06/2023   • Breast Cancer Screening: Mammogram  03/30/2024   • BMI: Adult  04/07/2024   • Depression Screening  04/27/2024   • Colorectal Cancer Screening  12/13/2027   • DTaP,Tdap,and Td Vaccines (3 - Td or Tdap) 08/17/2031   • Hepatitis C Screening  Completed   • COVID-19 Vaccine  Completed   • HIB Vaccine  Aged Out   • IPV Vaccine  Aged Out   • Hepatitis A Vaccine  Aged Out   • Meningococcal ACWY Vaccine  Aged Out   • HPV Vaccine  Aged Out     Patient Active Problem List   Diagnosis   • Allergic rhinitis   • Dense breasts   • Migraine headache   • Mild intermittent asthma   • Motion sickness   • Vitamin D deficiency   • Malignant neoplasm of upper-inner quadrant of left breast in female, estrogen receptor positive ([facility] [address] )   • Use of tamoxifen (Nolvadex)   • Abnormal glucose   • History of lumpectomy     Past Medical History:   Diagnosis Date   • Adhesive capsulitis of right shoulder     Resolved 11/15/2016    • Asthma    • Benign paroxysmal vertigo, unspecified ear     Resolved 11/15/2016    • BRCA gene mutation negative 06/2019    [facility]   • Breast cancer ([facility] [address] ) 07/30/2019    left   • Cellulitis of leg, except foot     Resolved 12/10/2014    • Hx of radiation therapy 08/01/2019   • Lymphadenitis     Resolved 12/10/2014    • Migraine    • Snoring    • Wears contact lenses    • Wears glasses      Past Surgical History:   Procedure Laterality Date   • BREAST BIOPSY Left 2019   • BREAST LUMPECTOMY Left 07/30/2019    Procedure: BREAST LUMPECTOMY; BREAST NEEDLE LOCALIZATION (NEEDLE LOC AT 0800); Surgeon: Katia Ferguson MD;  [facility];  Service: Surgical Oncology   • COLONOSCOPY     • INTRAUTERINE DEVICE INSERTION  2019   • LYMPH NODE BIOPSY Left 07/30/2019    Procedure: SENTINEL LYMPH NODE BIOPSY; LYMPHATIC MAPPING WITH BLUE DYE AND RADIOACTIVE DYE (INJECT AT 0930 BY DR LEAVITT IN THE OR);   Surgeon: Katia Ferguson MD;  [facility];  Service: Surgical Oncology   • MAMMO NEEDLE LOCALIZATION LEFT (ALL INC) Left 07/30/2019   • SALPINGECTOMY Bilateral 03/2021   • SHOULDER SURGERY Right     Last assessed 12/29/2015    • US GUIDED BREAST BIOPSY LEFT COMPLETE Left 06/07/2019   • WISDOM TOOTH EXTRACTION       Family History   Problem Relation Age of Onset   • Alzheimer's disease Mother    • COPD Father    • No Known Problems Sister    • No Known Problems Daughter    • Alzheimer's disease Maternal Grandmother    • No Known Problems Maternal Grandfather    • No Known Problems Paternal Grandmother    • No Known Problems Paternal Grandfather    • No Known Problems Brother    • No Known Problems Brother    • No Known Problems Brother    • No Known Problems Daughter      Social History     Socioeconomic History   • Marital status: /Civil Union     Spouse name: Not on file   • Number of children: 3   • Years of education: Not on file   • Highest education level: Not on file   Occupational History   • Not on file   Tobacco Use   • Smoking status: Never   • Smokeless tobacco: Never   Vaping Use   • Vaping Use: Never used   Substance and Sexual Activity   • Alcohol use: Yes     Comment: rare   • Drug use: No   • Sexual activity: Yes     Partners: Male   Other Topics Concern   • Not on file   Social History Narrative   • Not on file     Social Determinants of Health     Financial Resource Strain: Not on file   Food Insecurity: Not on file   Transportation Needs: Not on file   Physical Activity: Not on file   Stress: Not on file   Social Connections: Not on file   Intimate Partner Violence: Not on file   Housing Stability: Not on file       Current Outpatient Medications:   •  albuterol (ProAir HFA) 90 mcg/act inhaler, Inhale 2 puffs every 4 (four) hours as needed for wheezing, Disp: 18 g, Rfl: 1  •  benzonatate (TESSALON) 200 MG capsule, Take 1 capsule (200 mg total) by mouth 2 (two) times a day as needed for cough, Disp: 20 capsule, Rfl: 0  •  Biotin w/ Vitamins C & E 1250-7 5-7 5 MCG-MG-UNT CHEW, , Disp: , Rfl:   •  cetirizine (ZyrTEC) 10 MG chewable tablet, "Chew 10 mg daily as needed , Disp: , Rfl:   •  Cholecalciferol (VITAMIN D3) 1000 units CHEW, Chew 1 tablet daily, Disp: , Rfl:   •  fluticasone (FLONASE) 50 mcg/act nasal spray, 1 spray into each nostril daily as needed , Disp: , Rfl:   •  Multiple Vitamins-Minerals (AIRBORNE PO), Take 1 tablet by mouth in the morning, Disp: , Rfl:   •  multivitamin (THERAGRAN) TABS, Take 1 tablet by mouth daily, Disp: , Rfl:   •  rizatriptan (MAXALT-MLT) 10 mg disintegrating tablet, Take 1 tablet (10 mg total) by mouth daily as needed for migraine Take at the onset of migraine; if symptoms continue or return, may take another dose at least 2 hours after first dose   Take no more than 2 doses in a day , Disp: 12 tablet, Rfl: 1  •  tamoxifen (NOLVADEX) 20 mg tablet, TAKE ONE TABLET BY MOUTH ONE TIME DAILY, Disp: 90 tablet, Rfl: 1  •  BIOTIN W/ VITAMINS C & E PO, Take 1 capsule by mouth in the morning (Patient not taking: Reported on 4/27/2023), Disp: , Rfl:   No Known Allergies  Vitals:    04/27/23 1019   BP: 118/84   BP Location: Right arm   Patient Position: Sitting   Cuff Size: Standard   Pulse: 89   Resp: 18   Temp: (!) 97 4 °F (36 3 °C)   TempSrc: Temporal   SpO2: 98%   Weight: 82 1 kg (181 lb)   Height: 5' 1\" (1 549 m)      Pain Score: 0-No pain  "

## 2023-04-27 NOTE — PROGRESS NOTES
Follow-up - Radiation Oncology   Polly Ross 1975 46 y o  female [id number]      History of Present Illness   Cancer Staging   Malignant neoplasm of upper-inner quadrant of left breast in female, estrogen receptor positive ([facility] [address] )  Staging form: Breast, AJCC 8th Edition  - Clinical: Stage IA (cT1c, cN0, cM0, G1, ER+, WA+, HER2-) - Signed by Wilian Maharaj MD on 8/14/2019  Histologic grading system: 3 grade system  Laterality: Left      Polly Ross is a 46y o  year old female with a history of invasive lobular carcinoma of the left breast status post lumpectomy and sentinel node biopsy with negative margins, Stage IA (cT1c, cN0, cM0, G1, ER+, WA+, HER2-)    She met with Dr Den Monte and has been on Tamoxifen since August 2019  Interval History:     07/28/22 - Surg OncKaty  Pt remains on Tamoxifen  No concerns on exam     02/07/23 - Surg Onc, Zablotney  Pt to have mammogram next month  Pt had some questions in regards to reconstruction - pt to check with insurance  No concerns on exam     02/08/23 - Hem OncGina  No evidence of recurrent disease  Pt to remain on Tamoxifen  Follow up in 1 year     03/13/23 - PlasticsPrema  Discussed fat grafting  as subpectoral implant is not feasible   Small crescentic mastopexy will improve symmetry        03/30/23 - Mammo diagnostic bilateral w 3d & cad   FINDINGS:   LEFT  B) POST-SURGICAL FINDING: There are post-surgical findings from a previous lumpectomy with radiation seen in the upper central region of the left breast in the middle depth  Compared to the previous study, there are no significant changes       Right  There are no suspicious masses, grouped microcalcifications or areas of unexplained architectural distortion  The skin and nipple areolar complex are unremarkable  IMPRESSION:   Stable therapeutic changes left breast   No evidence for malignancy    ASSESSMENT/BI-RADS CATEGORY:  Left: 2 - Benign  Right: 1 - Negative  Overall: 2 - Benign     Upcomin23 - Fat Grafting to b/l breasts   24 - Hem OncSanjana    Upon interview, she reports a migraine  She denies new breast masses, nipple discharge or skin changes  She is without additional acute concerns          Historical Information   Oncology History   Malignant neoplasm of upper-inner quadrant of left breast in female, estrogen receptor positive ([facility] [address] )   2019 Biopsy    Left breast ultrasound-guided biopsy  11 o'clock, 4 cm from nipple  Invasive lobular carcinoma  Grade 1  ER 90  NM 45  HER2 1+     2019 Genetic Testing    BRCA negative  [employer]     2019 Surgery    Left breast lumpectomy with sentinel lymph node biopsy  Invasive lobular carcinoma  Grade 1  1 1 cm  0/3 Lymph nodes  Margins negative  Anatomic/Prognostic Stage IA     2019 -  Cancer Staged    Staging form: Breast, AJCC 8th Edition  - Clinical: Stage IA (cT1c, cN0, cM0, G1, ER+, NM+, HER2-) - Signed by Kaley Montez MD on 2019  Laterality: Left  Histologic grading system: 3 grade system       2019 - 2019 Radiation    Course: C1    Plan ID Energy Fractions Dose per Fraction (cGy) Dose Correction (cGy) Total Dose Delivered (cGy) Elapsed Days   BH L Breast 6X 16 /  266 0 4,256 22   BH L Brst e 12E 5 /  200 0 1,000 6      Treatment dates:  C1: 2019 - 2019 -  Hormone Therapy    Tamoxifen 20 mg daily  With Dr Roni Urbina         Past Medical History:   Diagnosis Date   • Adhesive capsulitis of right shoulder     Resolved 11/15/2016    • Asthma    • Benign paroxysmal vertigo, unspecified ear     Resolved 11/15/2016    • BRCA gene mutation negative 2019    [employer]   • Breast cancer ([facility] [address] ) 2019    left   • Cellulitis of leg, except foot     Resolved 12/10/2014    • Hx of radiation therapy 2019   • Lymphadenitis     Resolved 12/10/2014    • Migraine    • Snoring    • Wears contact lenses    • Wears glasses      Past Surgical History:   Procedure Laterality Date   • BREAST BIOPSY Left 2019   • BREAST LUMPECTOMY Left 07/30/2019    Procedure: BREAST LUMPECTOMY; BREAST NEEDLE LOCALIZATION (NEEDLE LOC AT 0800); Surgeon: Roxy Garcia MD;  [facility];  Service: Surgical Oncology   • COLONOSCOPY     • INTRAUTERINE DEVICE INSERTION  2019   • LYMPH NODE BIOPSY Left 07/30/2019    Procedure: SENTINEL LYMPH NODE BIOPSY; LYMPHATIC MAPPING WITH BLUE DYE AND RADIOACTIVE DYE (INJECT AT 0930 BY DR LEAVITT IN THE OR);   Surgeon: Roxy Garcia MD;  [facility];  Service: Surgical Oncology   • MAMMO NEEDLE LOCALIZATION LEFT (ALL INC) Left 07/30/2019   • SALPINGECTOMY Bilateral 03/2021   • SHOULDER SURGERY Right     Last assessed 12/29/2015    • US GUIDED BREAST BIOPSY LEFT COMPLETE Left 06/07/2019   • WISDOM TOOTH EXTRACTION         Social History   Social History     Substance and Sexual Activity   Alcohol Use Yes    Comment: rare     Social History     Substance and Sexual Activity   Drug Use No     Social History     Tobacco Use   Smoking Status Never   Smokeless Tobacco Never         Meds/Allergies     Current Outpatient Medications:   •  albuterol (ProAir HFA) 90 mcg/act inhaler, Inhale 2 puffs every 4 (four) hours as needed for wheezing, Disp: 18 g, Rfl: 1  •  benzonatate (TESSALON) 200 MG capsule, Take 1 capsule (200 mg total) by mouth 2 (two) times a day as needed for cough, Disp: 20 capsule, Rfl: 0  •  Biotin w/ Vitamins C & E 1250-7 5-7 5 MCG-MG-UNT CHEW, , Disp: , Rfl:   •  cetirizine (ZyrTEC) 10 MG chewable tablet, Chew 10 mg daily as needed , Disp: , Rfl:   •  Cholecalciferol (VITAMIN D3) 1000 units CHEW, Chew 1 tablet daily, Disp: , Rfl:   •  fluticasone (FLONASE) 50 mcg/act nasal spray, 1 spray into each nostril daily as needed , Disp: , Rfl:   •  Multiple Vitamins-Minerals (AIRBORNE PO), Take 1 tablet by mouth in the morning, Disp: , Rfl:   •  multivitamin (THERAGRAN) TABS, Take 1 tablet by mouth daily, Disp: , Rfl:   •  rizatriptan (MAXALT-MLT) 10 "mg disintegrating tablet, Take 1 tablet (10 mg total) by mouth daily as needed for migraine Take at the onset of migraine; if symptoms continue or return, may take another dose at least 2 hours after first dose  Take no more than 2 doses in a day , Disp: 12 tablet, Rfl: 1  •  tamoxifen (NOLVADEX) 20 mg tablet, TAKE ONE TABLET BY MOUTH ONE TIME DAILY, Disp: 90 tablet, Rfl: 1  •  BIOTIN W/ VITAMINS C & E PO, Take 1 capsule by mouth in the morning (Patient not taking: Reported on 4/27/2023), Disp: , Rfl:   No Known Allergies      Review of Systems   Constitutional: Negative  HENT: Negative  Eyes:        Wears glasses   Respiratory: Negative  Cardiovascular: Negative  Gastrointestinal: Negative  Endocrine: Negative  Genitourinary: Negative  Musculoskeletal: Negative  Skin: Negative  Allergic/Immunologic: Negative  Neurological: Positive for dizziness (Associated with migraines) and headaches (Migraines)  Hematological: Negative  Psychiatric/Behavioral: Negative  OBJECTIVE:   /84 (BP Location: Right arm, Patient Position: Sitting, Cuff Size: Standard)   Pulse 89   Temp (!) 97 4 °F (36 3 °C) (Temporal)   Resp 18   Ht 5' 1\" (1 549 m)   Wt 82 1 kg (181 lb)   SpO2 98%   BMI 34 20 kg/m²   Pain Assessment:  0  Karnofsky: 90 - Able to carry on normal activity; minor signs or symptoms of disease     Physical Exam   The patient presents today in no apparent distress   Sclera anicteric   No palpable cervical or supraclavicular lymphadenopathy   Breathing comfortably on room air   Breast examination performed in the supine and seated positions with chaperone present    The right breast is within normal limits   The left breast is soft, nontender, without visible or palpable suspicious findings   No axillary lymphadenopathy   No lymphedema             RESULTS    Lab Results:   Recent Results (from the past 672 hour(s))   Basic metabolic panel    Collection Time: 03/31/23  7:22 " AM   Result Value Ref Range    Glucose, Random 93 70 - 99 mg/dL    BUN 9 6 - 24 mg/dL    Creatinine 0 52 (L) 0 57 - 1 00 mg/dL    eGFR 113 >59 mL/min/1 73    SL AMB BUN/CREATININE RATIO 17 9 - 23    Sodium 141 134 - 144 mmol/L    Potassium 4 5 3 5 - 5 2 mmol/L    Chloride 105 96 - 106 mmol/L    CO2 24 20 - 29 mmol/L    CALCIUM 9 2 8 7 - 10 2 mg/dL   CBC and differential    Collection Time: 03/31/23  7:22 AM   Result Value Ref Range    White Blood Cell Count 7 3 3 4 - 10 8 x10E3/uL    Red Blood Cell Count 4 03 3 77 - 5 28 x10E6/uL    Hemoglobin 12 2 11 1 - 15 9 g/dL    HCT 37 2 34 0 - 46 6 %    MCV 92 79 - 97 fL    MCH 30 3 26 6 - 33 0 pg    MCHC 32 8 31 5 - 35 7 g/dL    RDW 12 4 11 7 - 15 4 %    Platelet Count 536 247 - 450 x10E3/uL    Neutrophils 47 Not Estab  %    Lymphocytes 39 Not Estab  %    Monocytes 10 Not Estab  %    Eosinophils 3 Not Estab  %    Basophils PCT 1 Not Estab  %    Neutrophils (Absolute) 3 4 1 4 - 7 0 x10E3/uL    Lymphocytes (Absolute) 2 8 0 7 - 3 1 x10E3/uL    Monocytes (Absolute) 0 7 0 1 - 0 9 x10E3/uL    Eosinophils (Absolute) 0 2 0 0 - 0 4 x10E3/uL    Basophils ABS 0 1 0 0 - 0 2 x10E3/uL    Immature Granulocytes 0 Not Estab  %    Immature Granulocytes (Absolute) 0 0 0 0 - 0 1 x10E3/uL       Imaging Studies:Mammo diagnostic bilateral w 3d & cad    Result Date: 3/30/2023  Narrative: DIAGNOSIS: Malignant neoplasm of upper-inner quadrant of left breast in female, estrogen receptor positive ([facility] [address] ) TECHNIQUE: Digital diagnostic mammography was performed  Computer Aided Detection (CAD) analyzed all applicable images   COMPARISONS: Prior breast imaging dated: 03/16/2022, 03/12/2021, 03/11/2020, 07/30/2019, 07/30/2019, 06/07/2019, 06/07/2019, 05/24/2019, 05/08/2019, 11/23/2018, 12/28/2017, 11/22/2017, 11/22/2017, 11/25/2016, 11/25/2016, 12/24/2015, 12/24/2015, 12/24/2015, 12/24/2015, 11/27/2015, 11/27/2015, 11/26/2014, and 11/26/2013 RELEVANT HISTORY: Family Breast Cancer History: No known family history of breast cancer  Family Medical History: No known relevant family medical history  Personal History: Hormone history includes birth control and tamoxifen  Surgical history includes breast biopsy and lumpectomy  Medical history includes breast cancer  RISK ASSESSMENT: Tyrer-zick risk assessment reporting was suppressed due to the patient's history and/or demographic factors  TISSUE DENSITY: The breasts are heterogeneously dense, which may obscure small masses  INDICATION: Sanjay Stephens is a 46 y o  female presenting for annual  FINDINGS: LEFT B) POST-SURGICAL FINDING: There are post-surgical findings from a previous lumpectomy with radiation seen in the upper central region of the left breast in the middle depth  Compared to the previous study, there are no significant changes  Right There are no suspicious masses, grouped microcalcifications or areas of unexplained architectural distortion  The skin and nipple areolar complex are unremarkable  Impression:  Stable therapeutic changes left breast   No evidence for malignancy  ASSESSMENT/BI-RADS CATEGORY: Left: 2 - Benign Right: 1 - Negative Overall: 2 - Benign RECOMMENDATION:      - Diagnostic mammogram in 1 year for both breasts  Workstation ID: [id number]          Assessment/Plan:  No orders of the defined types were placed in this encounter  Sanjay Stephens is a 46 y o  female with a history of invasive lobular carcinoma of the left breast status post lumpectomy and sentinel node biopsy with negative margins, Stage IA (cT1c, cN0, cM0, G1, ER+, [place]+, HER2-)    She completed adjuvant radiation on 9/25/2019  She continues regular follow up with medical oncology and her surgeon  She remains without clinical or radiographic evidence of recurrent disease  She is undergoing fat grafting through plastic surgery, Dr Anastasiya Low  I have requested follow up in 1 year and she will call with questions or concerns in the interim        Suleman Roberts "MD  4/27/2023,2:14 PM    Portions of the record may have been created with voice recognition software   Occasional wrong word or \"sound a like\" substitutions may have occurred due to the inherent limitations of voice recognition software   Read the chart carefully and recognize, using context, where substitutions have occurred        "

## 2023-04-28 ENCOUNTER — APPOINTMENT (OUTPATIENT)
Dept: RADIATION ONCOLOGY | Facility: HOSPITAL | Age: 48
End: 2023-04-28

## 2023-05-10 DIAGNOSIS — Z98.890 HISTORY OF LUMPECTOMY: Primary | ICD-10-CM

## 2023-05-10 RX ORDER — TRAMADOL HYDROCHLORIDE 50 MG/1
50 TABLET ORAL EVERY 6 HOURS PRN
Qty: 20 TABLET | Refills: 0 | Status: SHIPPED | OUTPATIENT
Start: 2023-05-10

## 2023-05-10 RX ORDER — IBUPROFEN 800 MG/1
800 TABLET ORAL EVERY 8 HOURS PRN
Qty: 15 TABLET | Refills: 0 | Status: SHIPPED | OUTPATIENT
Start: 2023-05-10

## 2023-05-10 RX ORDER — CEPHALEXIN 500 MG/1
500 CAPSULE ORAL EVERY 12 HOURS SCHEDULED
Qty: 14 CAPSULE | Refills: 0 | Status: SHIPPED | OUTPATIENT
Start: 2023-05-10 | End: 2023-05-17

## 2023-05-10 RX ORDER — OXYCODONE HYDROCHLORIDE 5 MG/1
5 TABLET ORAL EVERY 6 HOURS PRN
Qty: 10 TABLET | Refills: 0 | Status: SHIPPED | OUTPATIENT
Start: 2023-05-10

## 2023-05-10 RX ORDER — SULFAMETHOXAZOLE AND TRIMETHOPRIM 800; 160 MG/1; MG/1
1 TABLET ORAL EVERY 12 HOURS SCHEDULED
Qty: 14 TABLET | Refills: 0 | Status: SHIPPED | OUTPATIENT
Start: 2023-05-10 | End: 2023-05-17

## 2023-05-10 RX ORDER — GABAPENTIN 300 MG/1
300 CAPSULE ORAL 3 TIMES DAILY
Qty: 15 CAPSULE | Refills: 0 | Status: SHIPPED | OUTPATIENT
Start: 2023-05-10 | End: 2023-05-15

## 2023-05-10 RX ORDER — SENNOSIDES 8.6 MG
650 CAPSULE ORAL EVERY 6 HOURS
Qty: 20 TABLET | Refills: 0 | Status: SHIPPED | OUTPATIENT
Start: 2023-05-10 | End: 2023-05-15

## 2023-05-10 NOTE — H&P
H&P - Plastic Surgery   Patrizia Hyde 46 y o  female MRN: [id number]  Unit/Bed#:  Encounter: [id number]           Assessment:  Personal history of breast cancer [Z85 3]       History of lumpectomy [Z98 890]  Plan:   FAT GRAFTING TO BILATERAL BREASTS (Bilateral: Breast)       RIGHT CRESCENTRIC MASTOPEXY (Right: Breast)        HPI:   Patrizia Hyde is a 46y o  year old female who presented to Dr Harvey Neri with breast asymmetry s/p left lumpectomy, XRT     They discussed the procedure, risks, benefits, alternatives and postoperative instructions and expectations   Informed consent was  obtained  She will perform fat grafting bilaterally and mastopexy on the right  All patient's questions were answered and she voiced understanding  REVIEW OF SYSTEMS    GENERAL/CONSTITUTIONAL: The patient denies fever, fatigue, weakness, weight gain or weight loss  HEAD, EYES, EARS, NOSE AND THROAT: Eyes - The patient denies pain, redness, loss of vision, double or blurred vision and denies wearing glasses  The patient denies ringing in the ears, nosebleeds sinusitis, post nasal drip  Also denies frequent sore throats, hoarseness, painful swallowing  CARDIOVASCULAR: The patient denies chest pain, irregular heartbeats, palpitations, shortness of breath, heart murmurs, high blood pressure, cramps in his legs with walking, pain in his feet or toes at night or varicose veins  RESPIRATORY: The patient denies chronic cough, wheezing or night sweats  GASTROINTESTINAL: The patient denies decreased appetite, nausea, vomiting, diarrhea, constipation, blood in the stools  GENITOURINARY: The patient denies difficult urination, pain or burning with urination, blood in the urine  MUSCULOSKELETAL: The patient denies arm, thigh or calf cramps  No joint or muscle pain  No muscle weakness or tenderness  No joint swelling, neck pain, back pain or major orthopedic injuries    SKIN AND BREASTS: see hpi The patient denies easy bruising, skin redness, skin rash, hives  NEUROLOGIC: The patient denies headache, dizziness, fainting, memory loss  PSYCHIATRIC: The patient denies depression anxiety  ENDOCRINE: The patient denies intolerance to hot or cold temperature, flushing, fingernail changes, increased thirst, increased salt intake or decreased sexual desire  HEMATOLOGIC/LYMPHATIC: The patient denies anemia, bleeding tendency or clotting tendency  ALLERGIC/IMMUNOLOGIC: The patient denies rhinitis, asthma, skin sensitivity, latex allergies or sensitivity  Historical Information   Past Medical History:   Diagnosis Date   • Adhesive capsulitis of right shoulder     Resolved 11/15/2016    • Asthma    • Benign paroxysmal vertigo, unspecified ear     Resolved 11/15/2016    • BRCA gene mutation negative 06/2019    [employer]   • Breast cancer ([facility] [address] ) 07/30/2019    left   • Cellulitis of leg, except foot     Resolved 12/10/2014    • Hx of radiation therapy 08/01/2019   • Lymphadenitis     Resolved 12/10/2014    • Migraine    • Snoring    • Wears contact lenses    • Wears glasses      Past Surgical History:   Procedure Laterality Date   • BREAST BIOPSY Left 2019   • BREAST LUMPECTOMY Left 07/30/2019    Procedure: BREAST LUMPECTOMY; BREAST NEEDLE LOCALIZATION (NEEDLE LOC AT 0800); Surgeon: Mckayla Charles MD;  [facility];  Service: Surgical Oncology   • COLONOSCOPY     • INTRAUTERINE DEVICE INSERTION  2019   • LYMPH NODE BIOPSY Left 07/30/2019    Procedure: SENTINEL LYMPH NODE BIOPSY; LYMPHATIC MAPPING WITH BLUE DYE AND RADIOACTIVE DYE (INJECT AT 0930 BY DR LEAVITT IN THE OR);   Surgeon: Mckayla Charles MD;  [facility];  Service: Surgical Oncology   • MAMMO NEEDLE LOCALIZATION LEFT (ALL INC) Left 07/30/2019   • SALPINGECTOMY Bilateral 03/2021   • SHOULDER SURGERY Right     Last assessed 12/29/2015    • US GUIDED BREAST BIOPSY LEFT COMPLETE Left 06/07/2019   • WISDOM TOOTH EXTRACTION       Social History   Social History     Substance and Sexual Activity   Alcohol Use Yes    Comment: rare     Social History     Substance and Sexual Activity   Drug Use No     Social History     Tobacco Use   Smoking Status Never   Smokeless Tobacco Never     Family History:   Family History   Problem Relation Age of Onset   • Alzheimer's disease Mother    • COPD Father    • No Known Problems Sister    • No Known Problems Daughter    • Alzheimer's disease Maternal Grandmother    • No Known Problems Maternal Grandfather    • No Known Problems Paternal Grandmother    • No Known Problems Paternal Grandfather    • No Known Problems Brother    • No Known Problems Brother    • No Known Problems Brother    • No Known Problems Daughter        Meds/Allergies   No current facility-administered medications for this encounter      Current Outpatient Medications:   •  albuterol (ProAir HFA) 90 mcg/act inhaler, Inhale 2 puffs every 4 (four) hours as needed for wheezing, Disp: 18 g, Rfl: 1  •  benzonatate (TESSALON) 200 MG capsule, Take 1 capsule (200 mg total) by mouth 2 (two) times a day as needed for cough, Disp: 20 capsule, Rfl: 0  •  BIOTIN W/ VITAMINS C & E PO, Take 1 capsule by mouth in the morning (Patient not taking: Reported on 4/27/2023), Disp: , Rfl:   •  cetirizine (ZyrTEC) 10 MG chewable tablet, Chew 10 mg daily as needed , Disp: , Rfl:   •  Cholecalciferol (VITAMIN D3) 1000 units CHEW, Chew 1 tablet daily, Disp: , Rfl:   •  fluticasone (FLONASE) 50 mcg/act nasal spray, 1 spray into each nostril daily as needed , Disp: , Rfl:   •  Multiple Vitamins-Minerals (AIRBORNE PO), Take 1 tablet by mouth in the morning, Disp: , Rfl:   •  multivitamin (THERAGRAN) TABS, Take 1 tablet by mouth daily, Disp: , Rfl:   •  tamoxifen (NOLVADEX) 20 mg tablet, TAKE ONE TABLET BY MOUTH ONE TIME DAILY, Disp: 90 tablet, Rfl: 1  •  acetaminophen (TYLENOL) 650 mg CR tablet, Take 1 tablet (650 mg total) by mouth every 6 (six) hours for 5 days, Disp: 20 tablet, Rfl: 0  •  Biotin w/ Vitamins C & E "1250-7 5-7 5 MCG-MG-UNT CHEW, , Disp: , Rfl:   •  cephalexin (KEFLEX) 500 mg capsule, Take 1 capsule (500 mg total) by mouth every 12 (twelve) hours for 7 days, Disp: 14 capsule, Rfl: 0  •  gabapentin (Neurontin) 300 mg capsule, Take 1 capsule (300 mg total) by mouth 3 (three) times a day for 5 days, Disp: 15 capsule, Rfl: 0  •  ibuprofen (MOTRIN) 800 mg tablet, Take 1 tablet (800 mg total) by mouth every 8 (eight) hours as needed for mild pain (DO NOT BEGIN UNTIL 48 HOURS AFTER SURGERY), Disp: 15 tablet, Rfl: 0  •  oxyCODONE (Roxicodone) 5 immediate release tablet, Take 1 tablet (5 mg total) by mouth every 6 (six) hours as needed for moderate pain Max Daily Amount: 20 mg, Disp: 10 tablet, Rfl: 0  •  rizatriptan (MAXALT-MLT) 10 mg disintegrating tablet, Take 1 tablet (10 mg total) by mouth daily as needed for migraine Take at the onset of migraine; if symptoms continue or return, may take another dose at least 2 hours after first dose  Take no more than 2 doses in a day , Disp: 12 tablet, Rfl: 1  •  sulfamethoxazole-trimethoprim (BACTRIM DS) 800-160 mg per tablet, Take 1 tablet by mouth every 12 (twelve) hours for 7 days, Disp: 14 tablet, Rfl: 0  •  traMADol (Ultram) 50 mg tablet, Take 1 tablet (50 mg total) by mouth every 6 (six) hours as needed for moderate pain, Disp: 20 tablet, Rfl: 0      Objective     Estimated body mass index is 32 56 kg/m² as calculated from the following:    Height as of 3/13/23: 5' 2\" (1 575 m)  Weight as of 3/13/23: 80 7 kg (178 lb)    General: NAD, well appearing, AAOx3  HEENT: NCAT, EOMI, MMM, supple  Resp: Nonlabored  Heart: RRR  Abdomen: Soft, ND, NT  Extremities/MSK: no LE edema, no obvious deficits in ROM  Neuro: grossly intact with no obvious deficits  Skin: no obvious lesions or rashes  Breast:no palpable mass, no palpable axillary lymphadenopathy, grade I ptosis (slightly increased on right c/w left), right larger c/w left, mild skin hyperpigmentation of left, well healed " superior lumpectomy incision with corresponding concavity    Lab Results:   Lab Results   Component Value Date    WBC 7 3 03/31/2023    HGB 12 2 03/31/2023    HCT 37 2 03/31/2023    MCV 92 03/31/2023     03/31/2023          No results found for: TISSUECULT, WOUNDCULT      Imaging Studies:   No results found  EKG, Pathology, and Other Studies:   Lab Results   Component Value Date/Time    [facility]  07/30/2019 09:36 AM     A  Left breast, wire-guided & oriented lumpectomy:  - Invasive lobular carcinoma - see Note  * [place] histologic grade 1 of 3 (total score = 3+1+1 = 5 of 9)    -- glandular (acinar) tubular differentiation < 10%, score 3    -- nuclear pleomorphism grade 1 of 3, score 1    -- mitotic rate < 3/mm2, score 1    * invasive carcinoma is multifocally present surrounding prior biopsy site, largest focus measures 1 1 cm in maximal diameter (A12-1 + A13-1) out of at least four (4) foci, measuring between 1 & 11 millimeters  * overlying skin is present, appears benign and uninvolved by invasive carcinoma  * all lumpectomy margins appear uninvolved by invasive carcinoma, inferior margin is the closest margin and is uninvolved by invasive carcinoma by ~ 1 millimeter (A8-1)  - Ductal carcinoma in-situ (DCIS): not identified  - Lobular carcinoma in situ: extensively present, multifocal, present in 11 (eleven) of 25 (twenty-five) slides  * LCIS is usual type, nuclear grade 1 of 3, without necrosis   * all lumpectomy margins appear uninvolved by LCIS  - Lymphovascular invasion: Not identified  - Microcalcifications: Absent  - Best representative tumor block: A12  - Sufficient tumor present for:   * [place] Mammaprint/Blueprint (1 cm2 of invasive tumor in aggregate): Yes     * MI Profile/Foundation One (at least 5 x 5 mm of tumor): Yes   - Additional pathologic findings:  proliferative fibrocystic changes, without atypia, including usual ductal hyperplasia, sclerosing adenosis & fibroadenomatoid stromal hyperplasia  B  Left breast lumpectomy new margin superior:  - No invasive carcinoma seen  - Specimen is benign breast glandular tissue  - New excision margins are checked and appear free of neoplasia  C  Left breast lumpectomy new margin inferior:  - No invasive carcinoma seen  - Intraductal micropapilloma (C3-1)  - Proliferative fibrocystic changes, with atypia, including lobular neoplasia (at least atypical lobular hyperplasia) involving sclerosing adenosis (C2-1, C3-1) and columnar cell change; lobular neoplasia is present at new inferior margin (C3-1)  D  Left breast lumpectomy new margin medial:  - Invasive lobular carcinoma (D1-1), 1 millimeter diameter focus; the new medial margin is uninvolved by invasive carcinoma by ~ 1 millimeter - see Note  - Lobular neoplasia (at least atypical lobular hyperplasia) is present; new medial margin in uninvolved by lobular neoplasia  E  Left breast lumpectomy new margin lateral:  - No invasive carcinoma seen  - Specimen is benign breast glandular tissue  - New excision margins are checked and appear free of neoplasia  F  Left axillary sentinel lymph node:  - Lymph node tissue is negative for metastatic carcinoma (0/1), confirmed by pancytokeratinAE-1/AE-3 immunohistochemical stains  G  Left axillary additional hilario tissue:  - Two lymph nodes are negative for metastatic carcinoma (0/2), confirmed by pancytokeratinAE-1/AE-3 immunohistochemical stains      Interpretation performed at [facility], [address] [place] intake or output data in the 24 hours ending 05/10/23 1755    Invasive Devices     None                 VTE Prophylaxis: Sequential compression device (Venodyne)

## 2023-05-11 ENCOUNTER — ANESTHESIA (OUTPATIENT)
Dept: PERIOP | Facility: HOSPITAL | Age: 48
End: 2023-05-11

## 2023-05-11 ENCOUNTER — ANESTHESIA EVENT (OUTPATIENT)
Dept: PERIOP | Facility: HOSPITAL | Age: 48
End: 2023-05-11

## 2023-05-11 ENCOUNTER — HOSPITAL ENCOUNTER (OUTPATIENT)
Facility: HOSPITAL | Age: 48
Setting detail: OUTPATIENT SURGERY
Discharge: HOME | End: 2023-05-11
Attending: STUDENT IN AN ORGANIZED HEALTH CARE EDUCATION/TRAINING PROGRAM | Admitting: STUDENT IN AN ORGANIZED HEALTH CARE EDUCATION/TRAINING PROGRAM

## 2023-05-11 VITALS
SYSTOLIC BLOOD PRESSURE: 164 MMHG | TEMPERATURE: 97 F | HEART RATE: 72 BPM | OXYGEN SATURATION: 97 % | HEIGHT: 61 IN | RESPIRATION RATE: 16 BRPM | DIASTOLIC BLOOD PRESSURE: 79 MMHG | WEIGHT: 177 LBS | BODY MASS INDEX: 33.42 KG/M2

## 2023-05-11 DIAGNOSIS — Z98.890 HISTORY OF LUMPECTOMY: ICD-10-CM

## 2023-05-11 DIAGNOSIS — Z85.3 PERSONAL HISTORY OF BREAST CANCER: ICD-10-CM

## 2023-05-11 LAB
EXT PREGNANCY TEST URINE: NEGATIVE
EXT. CONTROL: NORMAL

## 2023-05-11 RX ORDER — ONDANSETRON 2 MG/ML
INJECTION INTRAMUSCULAR; INTRAVENOUS AS NEEDED
Status: DISCONTINUED | OUTPATIENT
Start: 2023-05-11 | End: 2023-05-11

## 2023-05-11 RX ORDER — PROMETHAZINE HYDROCHLORIDE 25 MG/ML
12.5 INJECTION, SOLUTION INTRAMUSCULAR; INTRAVENOUS ONCE AS NEEDED
Status: DISCONTINUED | OUTPATIENT
Start: 2023-05-11 | End: 2023-05-11 | Stop reason: HOSPADM

## 2023-05-11 RX ORDER — GABAPENTIN 300 MG/1
300 CAPSULE ORAL ONCE
Status: COMPLETED | OUTPATIENT
Start: 2023-05-11 | End: 2023-05-11

## 2023-05-11 RX ORDER — ROCURONIUM BROMIDE 10 MG/ML
INJECTION, SOLUTION INTRAVENOUS AS NEEDED
Status: DISCONTINUED | OUTPATIENT
Start: 2023-05-11 | End: 2023-05-11

## 2023-05-11 RX ORDER — PROPOFOL 10 MG/ML
INJECTION, EMULSION INTRAVENOUS CONTINUOUS PRN
Status: DISCONTINUED | OUTPATIENT
Start: 2023-05-11 | End: 2023-05-11

## 2023-05-11 RX ORDER — MIDAZOLAM HYDROCHLORIDE 2 MG/2ML
INJECTION, SOLUTION INTRAMUSCULAR; INTRAVENOUS AS NEEDED
Status: DISCONTINUED | OUTPATIENT
Start: 2023-05-11 | End: 2023-05-11

## 2023-05-11 RX ORDER — HYDROMORPHONE HCL IN WATER/PF 6 MG/30 ML
0.2 PATIENT CONTROLLED ANALGESIA SYRINGE INTRAVENOUS
Status: DISCONTINUED | OUTPATIENT
Start: 2023-05-11 | End: 2023-05-11 | Stop reason: HOSPADM

## 2023-05-11 RX ORDER — NEOSTIGMINE METHYLSULFATE 1 MG/ML
INJECTION INTRAVENOUS AS NEEDED
Status: DISCONTINUED | OUTPATIENT
Start: 2023-05-11 | End: 2023-05-11

## 2023-05-11 RX ORDER — OXYCODONE HYDROCHLORIDE 5 MG/1
5 TABLET ORAL ONCE AS NEEDED
Status: DISCONTINUED | OUTPATIENT
Start: 2023-05-11 | End: 2023-05-11 | Stop reason: HOSPADM

## 2023-05-11 RX ORDER — DEXAMETHASONE SODIUM PHOSPHATE 10 MG/ML
INJECTION, SOLUTION INTRAMUSCULAR; INTRAVENOUS AS NEEDED
Status: DISCONTINUED | OUTPATIENT
Start: 2023-05-11 | End: 2023-05-11

## 2023-05-11 RX ORDER — LIDOCAINE HYDROCHLORIDE 10 MG/ML
INJECTION, SOLUTION EPIDURAL; INFILTRATION; INTRACAUDAL; PERINEURAL AS NEEDED
Status: DISCONTINUED | OUTPATIENT
Start: 2023-05-11 | End: 2023-05-11

## 2023-05-11 RX ORDER — MINERAL OIL
OIL (ML) MISCELLANEOUS AS NEEDED
Status: DISCONTINUED | OUTPATIENT
Start: 2023-05-11 | End: 2023-05-11 | Stop reason: HOSPADM

## 2023-05-11 RX ORDER — ACETAMINOPHEN 325 MG/1
975 TABLET ORAL ONCE
Status: COMPLETED | OUTPATIENT
Start: 2023-05-11 | End: 2023-05-11

## 2023-05-11 RX ORDER — SODIUM CHLORIDE, SODIUM LACTATE, POTASSIUM CHLORIDE, CALCIUM CHLORIDE 600; 310; 30; 20 MG/100ML; MG/100ML; MG/100ML; MG/100ML
125 INJECTION, SOLUTION INTRAVENOUS CONTINUOUS
Status: DISCONTINUED | OUTPATIENT
Start: 2023-05-11 | End: 2023-05-11 | Stop reason: HOSPADM

## 2023-05-11 RX ORDER — GLYCOPYRROLATE 0.2 MG/ML
INJECTION INTRAMUSCULAR; INTRAVENOUS AS NEEDED
Status: DISCONTINUED | OUTPATIENT
Start: 2023-05-11 | End: 2023-05-11

## 2023-05-11 RX ORDER — PROPOFOL 10 MG/ML
INJECTION, EMULSION INTRAVENOUS AS NEEDED
Status: DISCONTINUED | OUTPATIENT
Start: 2023-05-11 | End: 2023-05-11

## 2023-05-11 RX ORDER — ENOXAPARIN SODIUM 100 MG/ML
40 INJECTION SUBCUTANEOUS ONCE
Status: COMPLETED | OUTPATIENT
Start: 2023-05-11 | End: 2023-05-11

## 2023-05-11 RX ORDER — CEFAZOLIN SODIUM 2 G/50ML
2000 SOLUTION INTRAVENOUS ONCE
Status: COMPLETED | OUTPATIENT
Start: 2023-05-11 | End: 2023-05-11

## 2023-05-11 RX ORDER — FENTANYL CITRATE 50 UG/ML
INJECTION, SOLUTION INTRAMUSCULAR; INTRAVENOUS AS NEEDED
Status: DISCONTINUED | OUTPATIENT
Start: 2023-05-11 | End: 2023-05-11

## 2023-05-11 RX ORDER — DEXMEDETOMIDINE HYDROCHLORIDE 100 UG/ML
INJECTION, SOLUTION INTRAVENOUS AS NEEDED
Status: DISCONTINUED | OUTPATIENT
Start: 2023-05-11 | End: 2023-05-11

## 2023-05-11 RX ORDER — FENTANYL CITRATE/PF 50 MCG/ML
25 SYRINGE (ML) INJECTION
Status: DISCONTINUED | OUTPATIENT
Start: 2023-05-11 | End: 2023-05-11 | Stop reason: HOSPADM

## 2023-05-11 RX ORDER — SODIUM CHLORIDE, SODIUM LACTATE, POTASSIUM CHLORIDE, AND CALCIUM CHLORIDE .6; .31; .03; .02 G/100ML; G/100ML; G/100ML; G/100ML
IRRIGANT IRRIGATION AS NEEDED
Status: DISCONTINUED | OUTPATIENT
Start: 2023-05-11 | End: 2023-05-11 | Stop reason: HOSPADM

## 2023-05-11 RX ORDER — HYDROMORPHONE HCL/PF 1 MG/ML
SYRINGE (ML) INJECTION AS NEEDED
Status: DISCONTINUED | OUTPATIENT
Start: 2023-05-11 | End: 2023-05-11

## 2023-05-11 RX ORDER — CLINDAMYCIN PHOSPHATE 150 MG/ML
INJECTION, SOLUTION INTRAVENOUS AS NEEDED
Status: DISCONTINUED | OUTPATIENT
Start: 2023-05-11 | End: 2023-05-11 | Stop reason: HOSPADM

## 2023-05-11 RX ORDER — DIPHENHYDRAMINE HYDROCHLORIDE 50 MG/ML
12.5 INJECTION INTRAMUSCULAR; INTRAVENOUS ONCE AS NEEDED
Status: DISCONTINUED | OUTPATIENT
Start: 2023-05-11 | End: 2023-05-11 | Stop reason: HOSPADM

## 2023-05-11 RX ADMIN — LIDOCAINE HYDROCHLORIDE 50 MG: 10 INJECTION, SOLUTION EPIDURAL; INFILTRATION; INTRACAUDAL; PERINEURAL at 11:38

## 2023-05-11 RX ADMIN — HYDROMORPHONE HYDROCHLORIDE 0.5 MG: 1 INJECTION, SOLUTION INTRAMUSCULAR; INTRAVENOUS; SUBCUTANEOUS at 12:21

## 2023-05-11 RX ADMIN — FENTANYL CITRATE 50 MCG: 50 INJECTION, SOLUTION INTRAMUSCULAR; INTRAVENOUS at 11:34

## 2023-05-11 RX ADMIN — MIDAZOLAM 2 MG: 1 INJECTION INTRAMUSCULAR; INTRAVENOUS at 11:34

## 2023-05-11 RX ADMIN — FENTANYL CITRATE 50 MCG: 50 INJECTION, SOLUTION INTRAMUSCULAR; INTRAVENOUS at 11:57

## 2023-05-11 RX ADMIN — SODIUM CHLORIDE, SODIUM LACTATE, POTASSIUM CHLORIDE, AND CALCIUM CHLORIDE: .6; .31; .03; .02 INJECTION, SOLUTION INTRAVENOUS at 12:30

## 2023-05-11 RX ADMIN — NEOSTIGMINE METHYLSULFATE 3 MG: 1 INJECTION INTRAVENOUS at 13:24

## 2023-05-11 RX ADMIN — CEFAZOLIN SODIUM 2000 MG: 2 SOLUTION INTRAVENOUS at 11:34

## 2023-05-11 RX ADMIN — HYDROMORPHONE HYDROCHLORIDE 0.5 MG: 1 INJECTION, SOLUTION INTRAMUSCULAR; INTRAVENOUS; SUBCUTANEOUS at 12:27

## 2023-05-11 RX ADMIN — DEXMEDETOMIDINE HYDROCHLORIDE 8 MCG: 100 INJECTION, SOLUTION INTRAVENOUS at 12:32

## 2023-05-11 RX ADMIN — DEXMEDETOMIDINE HYDROCHLORIDE 8 MCG: 100 INJECTION, SOLUTION INTRAVENOUS at 12:46

## 2023-05-11 RX ADMIN — GABAPENTIN 300 MG: 300 CAPSULE ORAL at 10:21

## 2023-05-11 RX ADMIN — DEXMEDETOMIDINE HYDROCHLORIDE 4 MCG: 100 INJECTION, SOLUTION INTRAVENOUS at 13:10

## 2023-05-11 RX ADMIN — PROPOFOL 75 MCG/KG/MIN: 10 INJECTION, EMULSION INTRAVENOUS at 11:44

## 2023-05-11 RX ADMIN — ACETAMINOPHEN 975 MG: 325 TABLET ORAL at 10:18

## 2023-05-11 RX ADMIN — ENOXAPARIN SODIUM 40 MG: 40 INJECTION SUBCUTANEOUS at 10:24

## 2023-05-11 RX ADMIN — PROPOFOL 170 MG: 10 INJECTION, EMULSION INTRAVENOUS at 11:38

## 2023-05-11 RX ADMIN — GLYCOPYRROLATE 0.4 MG: 0.4 INJECTION INTRAMUSCULAR; INTRAVENOUS at 13:24

## 2023-05-11 RX ADMIN — SODIUM CHLORIDE, SODIUM LACTATE, POTASSIUM CHLORIDE, AND CALCIUM CHLORIDE 125 ML/HR: .6; .31; .03; .02 INJECTION, SOLUTION INTRAVENOUS at 11:05

## 2023-05-11 RX ADMIN — DEXAMETHASONE SODIUM PHOSPHATE 10 MG: 10 INJECTION, SOLUTION INTRAMUSCULAR; INTRAVENOUS at 11:39

## 2023-05-11 RX ADMIN — ROCURONIUM BROMIDE 50 MG: 10 INJECTION, SOLUTION INTRAVENOUS at 11:39

## 2023-05-11 RX ADMIN — ONDANSETRON 4 MG: 2 INJECTION INTRAMUSCULAR; INTRAVENOUS at 13:02

## 2023-05-11 NOTE — DISCHARGE INSTR - AVS FIRST PAGE
Surgery Date: 5/11/2023                Patient: Severa Horsfall  Surgeon: Ludwig Perez, DO     Postoperative Instructions   Liposuction/Fat Grafting    Dressings:  [x] Skin glue was applied to your incision over absorbable sutures  You may feel small pieces of suture at the ends of your incision  [x] Remove garment the morning following your surgery and bathe as directed  [x] No dressings are required but you may cover the incision with gauze for comfort  [x] Wear your compression garment at all times except while bathing  [] Other instructions:     Bathing:  [x] Shower 24 hours after surgery  Allow soap and water to gently wash over the incision  No scrubbing  Gently pat dry and apply dressing as needed/instructed above  [x] No submerging incision in bathtub, pool, hot tub and/or lake  Activity:  [x] No heavy lifting (> 10lbs)  [x] No strenuous exercise  [x] Walking is mandatory daily  [x] No driving until off pain medications and you have resumed full range of motion  [] Other instructions:     Diet and Medication:  [x] Resume diet as tolerated  High protein diet is important for healing  Remain well hydrated with water and minimize sodium intake  [x] Resume preoperative medications  [x] Pain medications as prescribed  You may also begin to use ibuprofen 48 hours after surgery  [x] Finish all antibiotics as prescribed  [x] Apply ice to area as needed for pain  Do not place ice directly on skin  Do not use heat  [] Other instructions: It is expected to have some bruising, swelling and oozing at the incision site and of the surrounding area  If there is more than you expect, an enlarging area or you suspect an infection, please call the office  Some patients may experience a low-grade fever after surgery  If it is above 100 4, please call the office      If you do not have a postoperative office appointment scheduled, please call the office today and let the staff know you need to be seen in 7-10  days  Please call [phone] with any questions, concerns or changes

## 2023-05-11 NOTE — INTERVAL H&P NOTE
H&P reviewed  After examining the patient I find no changes in the patients condition since the H&P had been written      Vitals:    05/11/23 1005   BP: 115/74   Pulse: 93   Resp: 18   Temp: 97 7 °F (36 5 °C)   SpO2: 97%

## 2023-05-11 NOTE — NURSING NOTE
Patient requesting to go home  Discharge instructions given previously  Dressings/bra/binder remain clean/dry/intact

## 2023-05-11 NOTE — ANESTHESIA PREPROCEDURE EVALUATION
Procedure:  FAT GRAFTING TO BILATERAL BREASTS (Bilateral: Breast)  RIGHT CRESCENTRIC MASTOPEXY (Right: Breast)    Relevant Problems   ANESTHESIA   (+) Motion sickness      CARDIO   (+) Migraine headache      GYN   (+) Malignant neoplasm of upper-inner quadrant of left breast in female, estrogen receptor positive (HCC)      NEURO/PSYCH   (+) Migraine headache      PULMONARY   (+) Mild intermittent asthma      Respiratory   (+) Allergic rhinitis      Other   (+) History of lumpectomy   (+) Use of tamoxifen (Nolvadex)   (+) Vitamin D deficiency       Latest Reference Range & Units 05/11/23 10:06   PREGNANCY TEST URINE Negative  Negative     Normal sinus rhythm  Incomplete right bundle branch block  No previous ECGs available  Confirmed by Kari Matthew ([id number]) on 7/3/2019 7:53:00 AM    Physical Exam    Airway    Mallampati score: I  TM Distance: >3 FB  Neck ROM: full     Dental   No notable dental hx     Cardiovascular      Pulmonary      Other Findings        Anesthesia Plan  ASA Score- 2     Anesthesia Type- general with ASA Monitors  Additional Monitors:   Airway Plan: ETT  Plan Factors-Exercise tolerance (METS): >4 METS  Chart reviewed  Existing labs reviewed  Patient summary reviewed  Patient is not a current smoker  Patient did not smoke on day of surgery  Induction- intravenous  Postoperative Plan- Plan for postoperative opioid use  Planned trial extubation    Informed Consent- Anesthetic plan and risks discussed with patient  I personally reviewed this patient with the CRNA  Discussed and agreed on the Anesthesia Plan with the CRNA  Ramila Ko

## 2023-05-11 NOTE — NURSING NOTE
Patient returned to [facility] via litter  Awake/alert  Skin warm/dry  Respirations easy/unlabored  Pain 2/10  Abdominal binder/surgical bra and dressings clean/dry/intact  Patient and  seen by Dr Kevin Tse

## 2023-05-11 NOTE — ANESTHESIA POSTPROCEDURE EVALUATION
Post-Op Assessment Note    CV Status:  Stable  Pain Score: 0    Pain management: adequate     Mental Status:  Alert and awake   Hydration Status:  Euvolemic   PONV Controlled:  Controlled   Airway Patency:  Patent      Post Op Vitals Reviewed: Yes      Staff: CRNA, Anesthesiologist         No notable events documented      BP   115/69   Temp   97   Pulse  70   Resp   20   SpO2   100

## 2023-05-12 NOTE — OP NOTE
OPERATIVE REPORT  PATIENT NAME: Oscar Mckeon    :  1975  MRN: [id number]  Pt Location:  OR ROOM 08    SURGERY DATE: 2023    Surgeon(s) and Role:     * Crystal Willis DO - Primary     * Daina Myles - Assisting    Preop Diagnosis:  Personal history of breast cancer [Z85 3]  History of lumpectomy [Z98 890]    Post-Op Diagnosis Codes:     * Personal history of breast cancer [Z85 3]     * History of lumpectomy [Z98 890]    Procedure(s):  Bilateral - FAT GRAFTING TO BILATERAL BREASTS  Right - RIGHT CRESCENTRIC MASTOPEXY    Specimen(s):  ID Type Source Tests Collected by Time Destination   1 : RIGHT BREAST SKIN Tissue Breast, Right TISSUE EXAM Crystal Willis DO 2023 1318        Estimated Blood Loss:   25 ml    Drains:  * No LDAs found *    Anesthesia Type:   General - TIVA    Operative Indications:  45 yo female presents with asymmetry following left lumpectomy and XRT    Operative Findings:  350 ml of lipoaspirate transferred to left breast  250 ml of lipoaspirate transferred to right breast  Right crescentric mastopexy    Complications:   None    Procedure and Technique:  The patient was seen preoperatively   The procedure, risks, benefits and alternatives were discussed   Informed consent was obtained   The patient was site marked preoperatively   The patient was brought to the operating room where she was positioned supine with all of her pressure points appropriately padded   Anesthesia commenced   A timeout was performed and verified      The patient was prepped and draped in usual sterile fashion   I first turned my attention to the abdomen   Stab incisions were made and tumescent solution was infiltrated    After adequate time to set, a #3 and #4 mercedes cannular were used to perform suction assisted lipectomy   Once lipoaspirate was obtained, the incisions were closed using 4-0 monocryl   The lipoaspirate was decanted and purified using the [employer] system   This lipoaspirate was then transferred into the right and left mastectomy breasts   The stab incisions were closed using 4-0 monocryl   The transferred fat was molded and smoothed   The right NAC remained inferiorly displaced compared with the left and thus I decided to perform a mastopexy  A crescentric incision was made and that skin and tissue was removed  hmeostasis was obtained  The incision was closed using 3-0 monocryl for the deep tissue and dermis and 4-0 PDS to approximate the skin  The incisions were cleansed and glue was applied   Once dry, the surgical bra and binder were placed      The instrument, sponge and needle count was correct an verified prior to completion of the case      The patient emerged from anesthesia and was transferred to the recovery room in stable condition  Patient Disposition:  PACU         SIGNATURE: Sharon Chu DO  DATE: May 12, 2023  TIME: 7:35 AM      No qualified plastic surgery resident was available for the case  The [facility] provided assistance with retraction and suturing

## 2023-05-19 ENCOUNTER — OFFICE VISIT (OUTPATIENT)
Dept: PLASTIC SURGERY | Facility: CLINIC | Age: 48
End: 2023-05-19

## 2023-05-19 DIAGNOSIS — C50.212 MALIGNANT NEOPLASM OF UPPER-INNER QUADRANT OF LEFT BREAST IN FEMALE, ESTROGEN RECEPTOR POSITIVE (HCC): Primary | ICD-10-CM

## 2023-05-19 DIAGNOSIS — Z17.0 MALIGNANT NEOPLASM OF UPPER-INNER QUADRANT OF LEFT BREAST IN FEMALE, ESTROGEN RECEPTOR POSITIVE (HCC): Primary | ICD-10-CM

## 2023-05-19 NOTE — PROGRESS NOTES
Assessment/Plan:     Pt is a 46 YOF who is s/p fat grafting to her bilateral breasts and right mastopexy with Dr Carine Sanchez on 5/11/23  Please see HPI  Patient returns to the office today for a first post operative visit  Patient has expected post operative soreness and ecchymosis but no other issues  Patient will continue to wear a supportive bra at all times  She should begin massage to her abdomen and may begin massage to her breast incisions next week  She was advised to keep the areas well moisturized  She will return to the office next week for suture removal and then in approximately 3 week for a scar check  Diagnoses and all orders for this visit:    Malignant neoplasm of upper-inner quadrant of left breast in female, estrogen receptor positive ([facility] [address] )          Subjective:     Patient ID: Aguilar Hawkins is a 46 y o  female  HPI    Patient reports no issues or concerns except as above  Review of Systems    See HPI     Objective:     Physical Exam      All incisions are clean, dry and intact  Steri strips intact and in place  Expected post operative ecchymosis and edema  Breast are grossly symmetric

## 2023-05-24 ENCOUNTER — OFFICE VISIT (OUTPATIENT)
Dept: PLASTIC SURGERY | Facility: CLINIC | Age: 48
End: 2023-05-24

## 2023-05-24 DIAGNOSIS — Z98.890 HISTORY OF LUMPECTOMY: ICD-10-CM

## 2023-05-24 DIAGNOSIS — Z17.0 MALIGNANT NEOPLASM OF UPPER-INNER QUADRANT OF LEFT BREAST IN FEMALE, ESTROGEN RECEPTOR POSITIVE (HCC): ICD-10-CM

## 2023-05-24 DIAGNOSIS — C50.212 MALIGNANT NEOPLASM OF UPPER-INNER QUADRANT OF LEFT BREAST IN FEMALE, ESTROGEN RECEPTOR POSITIVE (HCC): ICD-10-CM

## 2023-05-24 DIAGNOSIS — N64.89 BREAST ASYMMETRY: Primary | ICD-10-CM

## 2023-05-24 NOTE — PROGRESS NOTES
Assessment/Plan:      Diagnoses and all orders for this visit:    Breast asymmetry  Malignant neoplasm of upper-inner quadrant of left breast in female, estrogen receptor positive ([facility] [address] )  History of lumpectomy    Pt is here for a post-op visit  She has a history of left lumpectomy, XRT in 2019  She has noted involution of her left lumpectomy sight since surgery  She underwent bilateral fat grafting with right concentric mastopexy on 5/11  She states she is doing well  She denies any significant pain at this time  Steri-strips were removed  We will see her back in 2 weeks for scar check  Subjective:      Patient ID: Irene Reed is a 46 y o  female  HPI     Patient is here for postop visit  She has a history of left lumpectomy, XRT in 2019  She has noted involution of her left lumpectomy sight since surgery  She underwent bilateral fat grafting with right concentric mastopexy on 5/11  She states she is doing well  She denies any significant pain at this time        Patient Active Problem List   Diagnosis   • Allergic rhinitis   • Dense breasts   • Migraine headache   • Mild intermittent asthma   • Motion sickness   • Vitamin D deficiency   • Malignant neoplasm of upper-inner quadrant of left breast in female, estrogen receptor positive [facility] )   • Use of tamoxifen (Nolvadex)   • Abnormal glucose   • History of lumpectomy     No Known Allergies  Current Outpatient Medications on File Prior to Visit   Medication Sig   • albuterol (ProAir HFA) 90 mcg/act inhaler Inhale 2 puffs every 4 (four) hours as needed for wheezing   • benzonatate (TESSALON) 200 MG capsule Take 1 capsule (200 mg total) by mouth 2 (two) times a day as needed for cough   • Biotin w/ Vitamins C & E 1250-7 5-7 5 MCG-MG-UNT CHEW    • BIOTIN W/ VITAMINS C & E PO Take 1 capsule by mouth in the morning (Patient not taking: Reported on 4/27/2023)   • cetirizine (ZyrTEC) 10 MG chewable tablet Chew 10 mg daily as needed    • Cholecalciferol (VITAMIN D3) 1000 units CHEW Chew 1 tablet daily   • fluticasone (FLONASE) 50 mcg/act nasal spray 1 spray into each nostril daily as needed    • gabapentin (Neurontin) 300 mg capsule Take 1 capsule (300 mg total) by mouth 3 (three) times a day for 5 days   • ibuprofen (MOTRIN) 800 mg tablet Take 1 tablet (800 mg total) by mouth every 8 (eight) hours as needed for mild pain (DO NOT BEGIN UNTIL 48 HOURS AFTER SURGERY)   • Multiple Vitamins-Minerals (AIRBORNE PO) Take 1 tablet by mouth in the morning   • multivitamin (THERAGRAN) TABS Take 1 tablet by mouth daily   • oxyCODONE (Roxicodone) 5 immediate release tablet Take 1 tablet (5 mg total) by mouth every 6 (six) hours as needed for moderate pain Max Daily Amount: 20 mg   • rizatriptan (MAXALT-MLT) 10 mg disintegrating tablet Take 1 tablet (10 mg total) by mouth daily as needed for migraine Take at the onset of migraine; if symptoms continue or return, may take another dose at least 2 hours after first dose  Take no more than 2 doses in a day  • tamoxifen (NOLVADEX) 20 mg tablet TAKE ONE TABLET BY MOUTH ONE TIME DAILY   • traMADol (Ultram) 50 mg tablet Take 1 tablet (50 mg total) by mouth every 6 (six) hours as needed for moderate pain     No current facility-administered medications on file prior to visit       Family History   Problem Relation Age of Onset   • Alzheimer's disease Mother    • COPD Father    • No Known Problems Sister    • No Known Problems Daughter    • Alzheimer's disease Maternal Grandmother    • No Known Problems Maternal Grandfather    • No Known Problems Paternal Grandmother    • No Known Problems Paternal Grandfather    • No Known Problems Brother    • No Known Problems Brother    • No Known Problems Brother    • No Known Problems Daughter      Past Medical History:   Diagnosis Date   • Adhesive capsulitis of right shoulder     Resolved 11/15/2016    • Asthma    • Benign paroxysmal vertigo, unspecified ear     Resolved 11/15/2016    • BRCA gene mutation negative 06/2019    [facility]   • Breast cancer ([address] ) 07/30/2019    left   • Cellulitis of leg, except foot     Resolved 12/10/2014    • Hx of radiation therapy 08/01/2019   • Lymphadenitis     Resolved 12/10/2014    • Migraine    • Snoring    • Wears contact lenses    • Wears glasses      Social History     Socioeconomic History   • Marital status: /Civil Union     Spouse name: None   • Number of children: 3   • Years of education: None   • Highest education level: None   Occupational History   • None   Tobacco Use   • Smoking status: Never   • Smokeless tobacco: Never   Vaping Use   • Vaping Use: Never used   Substance and Sexual Activity   • Alcohol use: Yes     Comment: rare   • Drug use: No   • Sexual activity: Yes     Partners: Male   Other Topics Concern   • None   Social History Narrative   • None     Social Determinants of Health     Financial Resource Strain: Not on file   Food Insecurity: Not on file   Transportation Needs: Not on file   Physical Activity: Not on file   Stress: Not on file   Social Connections: Not on file   Intimate Partner Violence: Not on file   Housing Stability: Not on file     Past Surgical History:   Procedure Laterality Date   • BREAST BIOPSY Left 2019   • BREAST LUMPECTOMY Left 07/30/2019    Procedure: BREAST LUMPECTOMY; BREAST NEEDLE LOCALIZATION (NEEDLE LOC AT 0800); Surgeon: Sid Stallworth MD;  Location: [facility];  Service: Surgical Oncology   • COLONOSCOPY     • INTRAUTERINE DEVICE INSERTION  2019   • LYMPH NODE BIOPSY Left 07/30/2019    Procedure: SENTINEL LYMPH NODE BIOPSY; LYMPHATIC MAPPING WITH BLUE DYE AND RADIOACTIVE DYE (INJECT AT 0930 BY DR LEAVITT IN THE OR);   Surgeon: Sid Stallworth MD;  Location: [facility];  Service: Surgical Oncology   • MAMMO NEEDLE LOCALIZATION LEFT (ALL INC) Left 07/30/2019   • RI GRAFTING OF AUTOLOGOUS FAT BY LIPO 50 CC OR LESS Bilateral 5/11/2023    Procedure: FAT GRAFTING TO BILATERAL BREASTS;  Surgeon: Victoriano Corbin DO;  Location: [address] [place] [address] OR;  Service: Plastics   • [place] MASTOPEXY Right 5/11/2023    Procedure: RIGHT CRESCENTRIC MASTOPEXY;  Surgeon: Victoriano Corbin DO;  Location: [address] [place] [address] OR;  Service: Plastics   • SALPINGECTOMY Bilateral 03/2021   • SHOULDER SURGERY Right     Last assessed 12/29/2015    • US GUIDED BREAST BIOPSY LEFT COMPLETE Left 06/07/2019   • WISDOM TOOTH EXTRACTION           Review of Systems   All other systems reviewed and are negative  Objective: There were no vitals taken for this visit  Physical Exam  Constitutional:       Appearance: She is well-developed  HENT:      Head: Normocephalic and atraumatic  Eyes:      Conjunctiva/sclera: Conjunctivae normal    Pulmonary:      Effort: Pulmonary effort is normal       Comments: Chest: Breast appear symmetric; Incision C/D/I; Minimal ecchymosis  Steri-strips were removed & sutures trimmed  See picture in media  Abdominal:      Palpations: Abdomen is soft  Tenderness: There is no abdominal tenderness  Musculoskeletal:         General: Normal range of motion  Cervical back: Normal range of motion  Skin:     General: Skin is warm and dry  Neurological:      Mental Status: She is alert and oriented to person, place, and time     Psychiatric:         Mood and Affect: Mood normal          Behavior: Behavior normal

## 2023-06-08 ENCOUNTER — OFFICE VISIT (OUTPATIENT)
Dept: PLASTIC SURGERY | Facility: CLINIC | Age: 48
End: 2023-06-08

## 2023-06-08 DIAGNOSIS — C50.212 MALIGNANT NEOPLASM OF UPPER-INNER QUADRANT OF LEFT BREAST IN FEMALE, ESTROGEN RECEPTOR POSITIVE (HCC): Primary | ICD-10-CM

## 2023-06-08 DIAGNOSIS — Z17.0 MALIGNANT NEOPLASM OF UPPER-INNER QUADRANT OF LEFT BREAST IN FEMALE, ESTROGEN RECEPTOR POSITIVE (HCC): Primary | ICD-10-CM

## 2023-06-08 PROCEDURE — 99024 POSTOP FOLLOW-UP VISIT: CPT | Performed by: PHYSICIAN ASSISTANT

## 2023-06-08 NOTE — PROGRESS NOTES
"Assessment/Plan:     Pt is a 46 YOF who is s/p fat grafting to her bilateral breasts and right mastopexy with Dr Juju Hurley on 5/11/23  Please see HPI  Patient returns to the office today for routine postoperative check  She reports that she has \"hard\" areas around her left NAC, and left upper outer quadrant of her breast   This is the patient's radiated side  Discussed with Dr Juju Hurley, this is within normal limits to radiated tissue  The patient will continue with vigorous massage to the area  She will return to the office in 3 to 4 weeks for routine postoperative check or sooner with any questions or concerns  We will consider further imaging/ evaluation as needed at that time         There are no diagnoses linked to this encounter  Subjective:     Patient ID: Devan Jay is a 46 y o  female  HPI     Patient reports concerns about hardness of her left breast as above  She reports that right breast is soft and supple  Review of Systems    See HPI    Objective:     Physical Exam      All incisions are clean, dry and intact  Right breast is soft and supple  Left breast danny-NAC firm edema noted    There is an approximately 1 5 cm x 1 5 cm palpable firm nodule in the upper outer quadrant of the left breast   "

## 2023-06-12 DIAGNOSIS — T75.3XXD MOTION SICKNESS, SUBSEQUENT ENCOUNTER: Primary | ICD-10-CM

## 2023-06-12 RX ORDER — SCOLOPAMINE TRANSDERMAL SYSTEM 1 MG/1
1 PATCH, EXTENDED RELEASE TRANSDERMAL
Qty: 4 PATCH | Refills: 0 | Status: SHIPPED | OUTPATIENT
Start: 2023-06-12

## 2023-06-25 DIAGNOSIS — C50.212 MALIGNANT NEOPLASM OF UPPER-INNER QUADRANT OF LEFT BREAST IN FEMALE, ESTROGEN RECEPTOR POSITIVE (HCC): ICD-10-CM

## 2023-06-25 DIAGNOSIS — Z17.0 MALIGNANT NEOPLASM OF UPPER-INNER QUADRANT OF LEFT BREAST IN FEMALE, ESTROGEN RECEPTOR POSITIVE (HCC): ICD-10-CM

## 2023-06-26 RX ORDER — TAMOXIFEN CITRATE 20 MG/1
TABLET ORAL
Qty: 90 TABLET | Refills: 1 | Status: SHIPPED | OUTPATIENT
Start: 2023-06-26

## 2023-07-12 ENCOUNTER — OFFICE VISIT (OUTPATIENT)
Dept: PLASTIC SURGERY | Facility: CLINIC | Age: 48
End: 2023-07-12

## 2023-07-12 DIAGNOSIS — N64.89 BREAST ASYMMETRY: Primary | ICD-10-CM

## 2023-07-12 PROCEDURE — 99024 POSTOP FOLLOW-UP VISIT: CPT | Performed by: STUDENT IN AN ORGANIZED HEALTH CARE EDUCATION/TRAINING PROGRAM

## 2023-07-12 NOTE — PROGRESS NOTES
Assessment and Plan:  Jocelyn Smith 46 y.o. female s/p fat grafting    Healing well. Continue massage and moisturization. RTC in 4 months or as needed should any issues arise. Subjective:  Doing well. Denies issues. Objective:  NAD, AAOx3  Incision C/D/I, small areas of fibrosis/fat that are improving with massage, mostly on left but one medial area on right    Venita Lloyd, DO  Plastic and Reconstructive Surgery

## 2023-08-14 ENCOUNTER — OFFICE VISIT (OUTPATIENT)
Dept: SURGICAL ONCOLOGY | Facility: CLINIC | Age: 48
End: 2023-08-14
Payer: COMMERCIAL

## 2023-08-14 VITALS
BODY MASS INDEX: 34.93 KG/M2 | DIASTOLIC BLOOD PRESSURE: 76 MMHG | HEART RATE: 90 BPM | TEMPERATURE: 96.9 F | RESPIRATION RATE: 20 BRPM | HEIGHT: 61 IN | OXYGEN SATURATION: 98 % | WEIGHT: 185 LBS | SYSTOLIC BLOOD PRESSURE: 128 MMHG

## 2023-08-14 DIAGNOSIS — C50.212 MALIGNANT NEOPLASM OF UPPER-INNER QUADRANT OF LEFT BREAST IN FEMALE, ESTROGEN RECEPTOR POSITIVE (HCC): Primary | ICD-10-CM

## 2023-08-14 DIAGNOSIS — Z17.0 MALIGNANT NEOPLASM OF UPPER-INNER QUADRANT OF LEFT BREAST IN FEMALE, ESTROGEN RECEPTOR POSITIVE (HCC): Primary | ICD-10-CM

## 2023-08-14 DIAGNOSIS — Z79.810 USE OF TAMOXIFEN (NOLVADEX): ICD-10-CM

## 2023-08-14 PROCEDURE — 99214 OFFICE O/P EST MOD 30 MIN: CPT

## 2023-08-14 NOTE — PROGRESS NOTES
Surgical Oncology Follow Up       [address] [facility] SURGICAL ONCOLOGY [place]  [address] [facility] [address]  [facility] [address]    Jocelyn HERNDON Sarah  1975  [id number]  [address] [facility] SURGICAL ONCOLOGY [facility] [address]    Chief Complaint   Patient presents with   • Follow-up       Assessment/Plan:  1. Malignant neoplasm of upper-inner quadrant of left breast in female, estrogen receptor positive ([address])  - 6 month follow up  - Mammo diagnostic bilateral w 3d & cad; Future    2. Use of tamoxifen (Nolvadex)  - continue use per medical oncology      Discussion/Summary: Pt is a 46year old female presenting today for a 6 month follow up for left breast cancer diagnosed in June 2019. Pathology revealed invasive lobular carcinoma ER/CT positive, HER2 negative. She underwent genetic testing which was negative.  She had a left breast lumpectomy sentinel node biopsy with Dr. Carlos Rahman and completed whole breast radiation therapy. Viola Pfeiffer is currently on tamoxifen. She had a bilateral screening mammogram on 3/30/2023 which is BI-RADS 2 category 3 density. Patient underwent fat grafting with Dr. Mily Pool in May of this year. She has a few areas of fat necrosis on the left lateral aspect of the left breast and medial right breast. There were no concerns on cbe. I will see the patient back in 6 months or sooner should the need arise. She was instructed to call with any questions or concerns prior to this time. All questions were answered today.           History of Present Illness:     Oncology History   Malignant neoplasm of upper-inner quadrant of left breast in female, estrogen receptor positive ([address])   6/7/2019 Biopsy    Left breast ultrasound-guided biopsy  11 o'clock, 4 cm from nipple  Invasive lobular carcinoma  Grade 1  ER 90  CT 45  HER2 1+     6/17/2019 Genetic Testing    BRCA negative  [facility]     7/30/2019 Surgery    Left breast lumpectomy with sentinel lymph node biopsy  Invasive lobular carcinoma  Grade 1  1.1 cm  0/3 Lymph nodes  Margins negative  Anatomic/Prognostic Stage IA     8/14/2019 -  Cancer Staged    Staging form: Breast, AJCC 8th Edition  - Clinical: Stage IA (cT1c, cN0, cM0, G1, ER+, RI+, HER2-) - Signed by Onelia Araujo MD on 8/14/2019  Laterality: Left  Histologic grading system: 3 grade system       8/27/2019 - 9/25/2019 Radiation    Course: C1    Plan ID Energy Fractions Dose per Fraction (cGy) Dose Correction (cGy) Total Dose Delivered (cGy) Elapsed Days   BH L Breast 6X 16 / 16 266 0 4,256 22   BH L Brst e 12E 5 / 5 200 0 1,000 6      Treatment dates:  C1: 8/27/2019 - 9/25/2019 8/2019 -  Hormone Therapy    Tamoxifen 20 mg daily  With Dr. Sol Carey          -Interval History:  Pt is a 46year old female presenting today for a 6 month follow up for left breast cancer diagnosed in June 2019. She is currently on tamoxifen. She had a bilateral screening mammogram on 3/30/2023 which is BI-RADS 2 category 3 density. Patient denies changes on her breast exam. She denies persistent headaches, bone pain, back pain, shortness of breath, or abdominal pain. Review of Systems:  Review of Systems   Constitutional: Negative for activity change, appetite change, fatigue and unexpected weight change. Respiratory: Negative for cough and shortness of breath. Cardiovascular: Negative for chest pain. Gastrointestinal: Negative for abdominal pain, diarrhea, nausea and vomiting. Endocrine: Negative for heat intolerance. Musculoskeletal: Negative for arthralgias, back pain and myalgias. Skin: Negative for rash. Neurological: Negative for weakness and headaches. Hematological: Negative for adenopathy.        Patient Active Problem List   Diagnosis   • Allergic rhinitis   • Dense breasts   • Migraine headache   • Mild intermittent asthma   • Motion sickness   • Vitamin D deficiency   • Malignant neoplasm of upper-inner quadrant of left breast in female, estrogen receptor positive ([address])   • Use of tamoxifen (Nolvadex)   • Abnormal glucose   • History of lumpectomy   • Breast asymmetry     Past Medical History:   Diagnosis Date   • Adhesive capsulitis of right shoulder     Resolved 11/15/2016    • Asthma    • Benign paroxysmal vertigo, unspecified ear     Resolved 11/15/2016    • BRCA gene mutation negative 06/2019    [facility]   • Breast cancer ([address]) 07/30/2019    left   • Cellulitis of leg, except foot     Resolved 12/10/2014    • Hx of radiation therapy 08/01/2019   • Lymphadenitis     Resolved 12/10/2014    • Migraine    • Snoring    • Wears contact lenses    • Wears glasses      Past Surgical History:   Procedure Laterality Date   • BREAST BIOPSY Left 2019   • BREAST LUMPECTOMY Left 07/30/2019    Procedure: BREAST LUMPECTOMY; BREAST NEEDLE LOCALIZATION (NEEDLE LOC AT 0800); Surgeon: Jayson Rai MD;  [facility];  Service: Surgical Oncology   • COLONOSCOPY     • INTRAUTERINE DEVICE INSERTION  2019   • LYMPH NODE BIOPSY Left 07/30/2019    Procedure: SENTINEL LYMPH NODE BIOPSY; LYMPHATIC MAPPING WITH BLUE DYE AND RADIOACTIVE DYE (INJECT AT 0930 BY DR LEAVITT IN THE OR);   Surgeon: Jayson Rai MD;  [facility];  Service: Surgical Oncology   • MAMMO NEEDLE LOCALIZATION LEFT (ALL INC) Left 07/30/2019   • CA GRAFTING OF AUTOLOGOUS FAT BY LIPO 50 CC OR LESS Bilateral 5/11/2023    Procedure: FAT GRAFTING TO BILATERAL BREASTS;  Surgeon: Janel Snell DO;  Location: [address] [place] [address] MAIN OR;  Service: Plastics   • CA MASTOPEXY Right 5/11/2023    Procedure: RIGHT CRESCENTRIC MASTOPEXY;  Surgeon: Janel Snell DO;  Location:  MAIN OR;  Service: Plastics   • SALPINGECTOMY Bilateral 03/2021   • SHOULDER SURGERY Right     Last assessed 12/29/2015    • US GUIDED BREAST BIOPSY LEFT COMPLETE Left 06/07/2019   • WISDOM TOOTH EXTRACTION       Family History   Problem Relation Age of Onset   • Alzheimer's disease Mother • COPD Father    • No Known Problems Sister    • No Known Problems Daughter    • Alzheimer's disease Maternal Grandmother    • No Known Problems Maternal Grandfather    • No Known Problems Paternal Grandmother    • No Known Problems Paternal Grandfather    • No Known Problems Brother    • No Known Problems Brother    • No Known Problems Brother    • No Known Problems Daughter      Social History     Socioeconomic History   • Marital status: /Civil Union     Spouse name: Not on file   • Number of children: 3   • Years of education: Not on file   • Highest education level: Not on file   Occupational History   • Not on file   Tobacco Use   • Smoking status: Never   • Smokeless tobacco: Never   Vaping Use   • Vaping Use: Never used   Substance and Sexual Activity   • Alcohol use: Yes     Comment: rare   • Drug use: No   • Sexual activity: Yes     Partners: Male   Other Topics Concern   • Not on file   Social History Narrative   • Not on file     Social Determinants of Health     Financial Resource Strain: Not on file   Food Insecurity: Not on file   Transportation Needs: Not on file   Physical Activity: Not on file   Stress: Not on file   Social Connections: Not on file   Intimate Partner Violence: Not on file   Housing Stability: Not on file       Current Outpatient Medications:   •  albuterol (ProAir HFA) 90 mcg/act inhaler, Inhale 2 puffs every 4 (four) hours as needed for wheezing, Disp: 18 g, Rfl: 1  •  benzonatate (TESSALON) 200 MG capsule, Take 1 capsule (200 mg total) by mouth 2 (two) times a day as needed for cough, Disp: 20 capsule, Rfl: 0  •  Biotin w/ Vitamins C & E 1250-7.5-7.5 MCG-MG-UNT CHEW, , Disp: , Rfl:   •  cetirizine (ZyrTEC) 10 MG chewable tablet, Chew 10 mg daily as needed , Disp: , Rfl:   •  Cholecalciferol (VITAMIN D3) 1000 units CHEW, Chew 1 tablet daily, Disp: , Rfl:   •  fluticasone (FLONASE) 50 mcg/act nasal spray, 1 spray into each nostril daily as needed , Disp: , Rfl:   • Multiple Vitamins-Minerals (AIRBORNE PO), Take 1 tablet by mouth in the morning, Disp: , Rfl:   •  rizatriptan (MAXALT-MLT) 10 mg disintegrating tablet, Take 1 tablet (10 mg total) by mouth daily as needed for migraine Take at the onset of migraine; if symptoms continue or return, may take another dose at least 2 hours after first dose. Take no more than 2 doses in a day., Disp: 12 tablet, Rfl: 1  •  tamoxifen (NOLVADEX) 20 mg tablet, TAKE 1 TABLET BY MOUTH DAILY, Disp: 90 tablet, Rfl: 1  •  BIOTIN W/ VITAMINS C & E PO, Take 1 capsule by mouth in the morning (Patient not taking: Reported on 4/27/2023), Disp: , Rfl:   •  gabapentin (Neurontin) 300 mg capsule, Take 1 capsule (300 mg total) by mouth 3 (three) times a day for 5 days (Patient not taking: Reported on 8/14/2023), Disp: 15 capsule, Rfl: 0  •  ibuprofen (MOTRIN) 800 mg tablet, Take 1 tablet (800 mg total) by mouth every 8 (eight) hours as needed for mild pain (DO NOT BEGIN UNTIL 48 HOURS AFTER SURGERY) (Patient not taking: Reported on 8/14/2023), Disp: 15 tablet, Rfl: 0  •  multivitamin (THERAGRAN) TABS, Take 1 tablet by mouth daily (Patient not taking: Reported on 8/14/2023), Disp: , Rfl:   •  oxyCODONE (Roxicodone) 5 immediate release tablet, Take 1 tablet (5 mg total) by mouth every 6 (six) hours as needed for moderate pain Max Daily Amount: 20 mg, Disp: 10 tablet, Rfl: 0  •  scopolamine (TRANSDERM-SCOP) 1 mg/3 days TD 72 hr patch, Place 1 patch on the skin over 72 hours every third day (Patient not taking: Reported on 8/14/2023), Disp: 4 patch, Rfl: 0  •  traMADol (Ultram) 50 mg tablet, Take 1 tablet (50 mg total) by mouth every 6 (six) hours as needed for moderate pain (Patient not taking: Reported on 8/14/2023), Disp: 20 tablet, Rfl: 0  No Known Allergies  Vitals:    08/14/23 0801   BP: 128/76   Pulse: 90   Resp: 20   Temp: (!) 96.9 °F (36.1 °C)   SpO2: 98%       Physical Exam  Constitutional:       General: She is not in acute distress. Appearance: Normal appearance. Cardiovascular:      Rate and Rhythm: Normal rate and regular rhythm. Pulses: Normal pulses. Heart sounds: Normal heart sounds. Pulmonary:      Effort: Pulmonary effort is normal.      Breath sounds: Normal breath sounds. Chest:      Chest wall: No mass. Breasts:     Right: No swelling, bleeding, inverted nipple, mass, nipple discharge, skin change or tenderness. Left: No swelling, bleeding, inverted nipple, mass, nipple discharge, skin change or tenderness. Abdominal:      General: Abdomen is flat. Palpations: Abdomen is soft. Lymphadenopathy:      Upper Body:      Right upper body: No supraclavicular, axillary or pectoral adenopathy. Left upper body: No supraclavicular, axillary or pectoral adenopathy. Skin:     General: Skin is warm. Neurological:      General: No focal deficit present. Mental Status: She is alert and oriented to person, place, and time. Psychiatric:         Mood and Affect: Mood normal.         Behavior: Behavior normal.           Results:    Imaging  No results found. I reviewed the above imaging data. Advance Care Planning/Advance Directives:  Discussed disease status, cancer treatment plans and/or cancer treatment goals with the patient.

## 2023-09-07 ENCOUNTER — OFFICE VISIT (OUTPATIENT)
Dept: FAMILY MEDICINE CLINIC | Facility: CLINIC | Age: 48
End: 2023-09-07
Payer: COMMERCIAL

## 2023-09-07 VITALS
HEART RATE: 77 BPM | WEIGHT: 181 LBS | TEMPERATURE: 97.3 F | RESPIRATION RATE: 18 BRPM | HEIGHT: 62 IN | OXYGEN SATURATION: 98 % | BODY MASS INDEX: 33.31 KG/M2 | DIASTOLIC BLOOD PRESSURE: 80 MMHG | SYSTOLIC BLOOD PRESSURE: 120 MMHG

## 2023-09-07 DIAGNOSIS — Z13.6 SCREENING FOR CARDIOVASCULAR CONDITION: ICD-10-CM

## 2023-09-07 DIAGNOSIS — Z00.00 ANNUAL PHYSICAL EXAM: Primary | ICD-10-CM

## 2023-09-07 DIAGNOSIS — Z13.0 SCREENING FOR DEFICIENCY ANEMIA: ICD-10-CM

## 2023-09-07 DIAGNOSIS — R73.09 ABNORMAL GLUCOSE: ICD-10-CM

## 2023-09-07 PROCEDURE — 99396 PREV VISIT EST AGE 40-64: CPT | Performed by: FAMILY MEDICINE

## 2023-09-07 NOTE — PROGRESS NOTES
[address]    NAME: Rodo Smith  AGE: 46 y.o. SEX: female  : 1975     DATE: 2023     Assessment and Plan:     Problem List Items Addressed This Visit     Abnormal glucose    Relevant Orders    Hemoglobin A1C   Other Visit Diagnoses     Annual physical exam    -  Primary    Screening for cardiovascular condition        Relevant Orders    Comprehensive metabolic panel    Lipid Panel with Direct LDL reflex    Screening for deficiency anemia        Relevant Orders    CBC          Immunizations and preventive care screenings were discussed with patient today. Appropriate education was printed on patient's after visit summary. Counseling:  Dental Health: discussed importance of regular tooth brushing, flossing, and dental visits. Exercise: the importance of regular exercise/physical activity was discussed. Recommend exercise 3-5 times per week for at least 30 minutes. Had her shingles vaccine done at [facility]         Return in about 1 year (around 2024) for Annual physical.     Chief Complaint:     Chief Complaint   Patient presents with   • Physical Exam     [id number]      History of Present Illness:     Adult Annual Physical   Patient here for a comprehensive physical exam. The patient reports that she is follow with plastic surgery for her breast reconstruction. Diet and Physical Activity  Diet/Nutrition: well balanced diet. Exercise: no formal exercise. Depression Screening  PHQ-2/9 Depression Screening    Little interest or pleasure in doing things: 0 - not at all  Feeling down, depressed, or hopeless: 0 - not at all  PHQ-2 Score: 0  PHQ-2 Interpretation: Negative depression screen       General Health  Sleep: sleeps well. Hearing: normal - bilateral.  Vision: wears glasses. Dental: regular dental visits. /GYN Health  Has gyn     Review of Systems:     Review of Systems   Constitutional: Negative. Respiratory: Negative. Cardiovascular: Negative. Past Medical History:     Past Medical History:   Diagnosis Date   • Adhesive capsulitis of right shoulder     Resolved 11/15/2016    • Asthma    • Benign paroxysmal vertigo, unspecified ear     Resolved 11/15/2016    • BRCA gene mutation negative 06/2019    [facility]   • Breast cancer ([address]) 07/30/2019    left   • Cellulitis of leg, except foot     Resolved 12/10/2014    • Hx of radiation therapy 08/01/2019   • Lymphadenitis     Resolved 12/10/2014    • Migraine    • Snoring    • Wears contact lenses    • Wears glasses       Past Surgical History:     Past Surgical History:   Procedure Laterality Date   • BREAST BIOPSY Left 2019   • BREAST LUMPECTOMY Left 07/30/2019    Procedure: BREAST LUMPECTOMY; BREAST NEEDLE LOCALIZATION (NEEDLE LOC AT 0800); Surgeon: Pelon Forbes MD;  [facility];  Service: Surgical Oncology   • COLONOSCOPY     • INTRAUTERINE DEVICE INSERTION  2019   • LYMPH NODE BIOPSY Left 07/30/2019    Procedure: SENTINEL LYMPH NODE BIOPSY; LYMPHATIC MAPPING WITH BLUE DYE AND RADIOACTIVE DYE (INJECT AT 0930 BY DR LEAVITT IN THE OR);   Surgeon: Pelon Forbes MD;  [facility];  Service: Surgical Oncology   • MAMMO NEEDLE LOCALIZATION LEFT (ALL INC) Left 07/30/2019   • UT GRAFTING OF AUTOLOGOUS FAT BY LIPO 50 CC OR LESS Bilateral 5/11/2023    Procedure: FAT GRAFTING TO BILATERAL BREASTS;  Surgeon: Matye Redmond DO;  Location: [address] [place] [address] OR;  Service: Plastics   • UT MASTOPEXY Right 5/11/2023    Procedure: RIGHT CRESCENTRIC MASTOPEXY;  Surgeon: Mayte Redmond DO;  Location: [address] [place] [address] OR;  Service: Plastics   • SALPINGECTOMY Bilateral 03/2021   • SHOULDER SURGERY Right     Last assessed 12/29/2015    • US GUIDED BREAST BIOPSY LEFT COMPLETE Left 06/07/2019   • WISDOM TOOTH EXTRACTION        Social History:     Social History     Socioeconomic History   • Marital status: /Civil Union     Spouse name: None   • Number of children: 3   • Years of education: None   • Highest education level: None   Occupational History   • None   Tobacco Use   • Smoking status: Never   • Smokeless tobacco: Never   Vaping Use   • Vaping Use: Never used   Substance and Sexual Activity   • Alcohol use: Yes     Comment: rare   • Drug use: No   • Sexual activity: Yes     Partners: Male   Other Topics Concern   • None   Social History Narrative   • None     Social Determinants of Health     Financial Resource Strain: Not on file   Food Insecurity: Not on file   Transportation Needs: Not on file   Physical Activity: Not on file   Stress: Not on file   Social Connections: Not on file   Intimate Partner Violence: Not on file   Housing Stability: Not on file      Family History:     Family History   Problem Relation Age of Onset   • Alzheimer's disease Mother    • COPD Father    • No Known Problems Sister    • No Known Problems Daughter    • Alzheimer's disease Maternal Grandmother    • No Known Problems Maternal Grandfather    • No Known Problems Paternal Grandmother    • No Known Problems Paternal Grandfather    • No Known Problems Brother    • No Known Problems Brother    • No Known Problems Brother    • No Known Problems Daughter       Current Medications:     Current Outpatient Medications   Medication Sig Dispense Refill   • albuterol (ProAir HFA) 90 mcg/act inhaler Inhale 2 puffs every 4 (four) hours as needed for wheezing 18 g 1   • Biotin w/ Vitamins C & E 1250-7.5-7.5 MCG-MG-UNT CHEW      • cetirizine (ZyrTEC) 10 MG chewable tablet Chew 10 mg daily as needed      • Cholecalciferol (VITAMIN D3) 1000 units CHEW Chew 1 tablet daily     • fluticasone (FLONASE) 50 mcg/act nasal spray 1 spray into each nostril daily as needed      • Multiple Vitamins-Minerals (AIRBORNE PO) Take 1 tablet by mouth in the morning     • rizatriptan (MAXALT-MLT) 10 mg disintegrating tablet Take 1 tablet (10 mg total) by mouth daily as needed for migraine Take at the onset of migraine; if symptoms continue or return, may take another dose at least 2 hours after first dose. Take no more than 2 doses in a day. 12 tablet 1   • tamoxifen (NOLVADEX) 20 mg tablet TAKE 1 TABLET BY MOUTH DAILY 90 tablet 1   • BIOTIN W/ VITAMINS C & E PO Take 1 capsule by mouth in the morning (Patient not taking: Reported on 4/27/2023)     • multivitamin (THERAGRAN) TABS Take 1 tablet by mouth daily (Patient not taking: Reported on 8/14/2023)       No current facility-administered medications for this visit. Allergies:     No Known Allergies   Physical Exam:     /80   Pulse 77   Temp (!) 97.3 °F (36.3 °C)   Resp 18   Ht 5' 2" (1.575 m)   Wt 82.1 kg (181 lb)   LMP 06/12/2023 (Approximate)   SpO2 98%   BMI 33.11 kg/m²     Physical Exam  Vitals and nursing note reviewed. Constitutional:       Appearance: She is well-developed. HENT:      Head: Normocephalic and atraumatic. Right Ear: External ear normal.      Left Ear: External ear normal.      Nose: Nose normal.   Cardiovascular:      Rate and Rhythm: Normal rate and regular rhythm. Heart sounds: Normal heart sounds. No murmur heard. No friction rub. Pulmonary:      Effort: No respiratory distress. Breath sounds: Normal breath sounds. No wheezing or rales. Abdominal:      Palpations: Abdomen is soft. Tenderness: There is no abdominal tenderness. Musculoskeletal:      Right lower leg: No edema. Left lower leg: No edema. Neurological:      Mental Status: She is oriented to person, place, and time. Cranial Nerves: No cranial nerve deficit.         Vision Screening    Right eye Left eye Both eyes   Without correction      With correction 20/20 20/20 [address] [facility]

## 2023-09-08 ENCOUNTER — TELEPHONE (OUTPATIENT)
Dept: FAMILY MEDICINE CLINIC | Facility: CLINIC | Age: 48
End: 2023-09-08

## 2023-09-16 LAB
ALBUMIN SERPL-MCNC: 4.1 G/DL (ref 3.9–4.9)
ALBUMIN/GLOB SERPL: 1.8 {RATIO} (ref 1.2–2.2)
ALP SERPL-CCNC: 41 IU/L (ref 44–121)
ALT SERPL-CCNC: 13 IU/L (ref 0–32)
AST SERPL-CCNC: 14 IU/L (ref 0–40)
BILIRUB SERPL-MCNC: 0.3 MG/DL (ref 0–1.2)
BUN SERPL-MCNC: 12 MG/DL (ref 6–24)
BUN/CREAT SERPL: 21 (ref 9–23)
CALCIUM SERPL-MCNC: 9.2 MG/DL (ref 8.7–10.2)
CHLORIDE SERPL-SCNC: 105 MMOL/L (ref 96–106)
CHOLEST SERPL-MCNC: 187 MG/DL (ref 100–199)
CO2 SERPL-SCNC: 22 MMOL/L (ref 20–29)
CREAT SERPL-MCNC: 0.57 MG/DL (ref 0.57–1)
EGFR: 111 ML/MIN/1.73
ERYTHROCYTE [DISTWIDTH] IN BLOOD BY AUTOMATED COUNT: 12.7 % (ref 11.7–15.4)
EST. AVERAGE GLUCOSE BLD GHB EST-MCNC: 117 MG/DL
GLOBULIN SER-MCNC: 2.3 G/DL (ref 1.5–4.5)
GLUCOSE SERPL-MCNC: 97 MG/DL (ref 70–99)
HBA1C MFR BLD: 5.7 % (ref 4.8–5.6)
HCT VFR BLD AUTO: 36.1 % (ref 34–46.6)
HDLC SERPL-MCNC: 79 MG/DL
HGB BLD-MCNC: 12.1 G/DL (ref 11.1–15.9)
LDLC SERPL CALC-MCNC: 74 MG/DL (ref 0–99)
LDLC/HDLC SERPL: 0.9 RATIO (ref 0–3.2)
MCH RBC QN AUTO: 29.6 PG (ref 26.6–33)
MCHC RBC AUTO-ENTMCNC: 33.5 G/DL (ref 31.5–35.7)
MCV RBC AUTO: 88 FL (ref 79–97)
MICRODELETION SYND BLD/T FISH: NORMAL
PLATELET # BLD AUTO: 272 X10E3/UL (ref 150–450)
POTASSIUM SERPL-SCNC: 4.3 MMOL/L (ref 3.5–5.2)
PROT SERPL-MCNC: 6.4 G/DL (ref 6–8.5)
RBC # BLD AUTO: 4.09 X10E6/UL (ref 3.77–5.28)
SL AMB VLDL CHOLESTEROL CALC: 34 MG/DL (ref 5–40)
SODIUM SERPL-SCNC: 142 MMOL/L (ref 134–144)
TRIGL SERPL-MCNC: 213 MG/DL (ref 0–149)
WBC # BLD AUTO: 7.7 X10E3/UL (ref 3.4–10.8)

## 2023-11-10 ENCOUNTER — OFFICE VISIT (OUTPATIENT)
Dept: PLASTIC SURGERY | Facility: CLINIC | Age: 48
End: 2023-11-10
Payer: COMMERCIAL

## 2023-11-10 DIAGNOSIS — N64.89 BREAST ASYMMETRY: Primary | ICD-10-CM

## 2023-11-10 PROCEDURE — 99214 OFFICE O/P EST MOD 30 MIN: CPT | Performed by: STUDENT IN AN ORGANIZED HEALTH CARE EDUCATION/TRAINING PROGRAM

## 2023-11-13 ENCOUNTER — TELEPHONE (OUTPATIENT)
Dept: PLASTIC SURGERY | Facility: CLINIC | Age: 48
End: 2023-11-13

## 2023-11-15 ENCOUNTER — PREP FOR PROCEDURE (OUTPATIENT)
Dept: PLASTIC SURGERY | Facility: CLINIC | Age: 48
End: 2023-11-15

## 2023-11-15 DIAGNOSIS — Z85.3 HISTORY OF BREAST CANCER: Primary | ICD-10-CM

## 2023-11-18 NOTE — PROGRESS NOTES
Patient seen and examined. Still with mild assymetry although must improved from pre-first stage. Mild scar fibrosis/firmness remains from fat transfer. Continue massage. Will plan for second round of fat transfer. Booking form created. Will schedule at patient's convenience. Venita Lloyd, DO  Plastic and Reconstructive Surgery

## 2023-12-09 ENCOUNTER — PATIENT MESSAGE (OUTPATIENT)
Dept: FAMILY MEDICINE CLINIC | Facility: CLINIC | Age: 48
End: 2023-12-09

## 2023-12-09 ENCOUNTER — HOSPITAL ENCOUNTER (INPATIENT)
Facility: HOSPITAL | Age: 48
LOS: 1 days | Discharge: HOME | DRG: 419 | End: 2023-12-10
Attending: EMERGENCY MEDICINE | Admitting: SURGERY
Payer: COMMERCIAL

## 2023-12-09 DIAGNOSIS — K81.9 CHOLECYSTITIS: Primary | ICD-10-CM

## 2023-12-09 DIAGNOSIS — R11.0 NAUSEA: ICD-10-CM

## 2023-12-09 LAB
BASOPHILS # BLD AUTO: 0.04 THOUSANDS/ÂΜL (ref 0–0.1)
BASOPHILS NFR BLD AUTO: 1 % (ref 0–1)
EOSINOPHIL # BLD AUTO: 0.13 THOUSAND/ÂΜL (ref 0–0.61)
EOSINOPHIL NFR BLD AUTO: 2 % (ref 0–6)
ERYTHROCYTE [DISTWIDTH] IN BLOOD BY AUTOMATED COUNT: 13.2 % (ref 11.6–15.1)
EXT PREGNANCY TEST URINE: NEGATIVE
EXT. CONTROL: NORMAL
HCT VFR BLD AUTO: 38.3 % (ref 34.8–46.1)
HGB BLD-MCNC: 11.9 G/DL (ref 11.5–15.4)
IMM GRANULOCYTES # BLD AUTO: 0.02 THOUSAND/UL (ref 0–0.2)
IMM GRANULOCYTES NFR BLD AUTO: 0 % (ref 0–2)
LYMPHOCYTES # BLD AUTO: 2.06 THOUSANDS/ÂΜL (ref 0.6–4.47)
LYMPHOCYTES NFR BLD AUTO: 24 % (ref 14–44)
MCH RBC QN AUTO: 29.3 PG (ref 26.8–34.3)
MCHC RBC AUTO-ENTMCNC: 31.1 G/DL (ref 31.4–37.4)
MCV RBC AUTO: 94 FL (ref 82–98)
MONOCYTES # BLD AUTO: 0.73 THOUSAND/ÂΜL (ref 0.17–1.22)
MONOCYTES NFR BLD AUTO: 8 % (ref 4–12)
NEUTROPHILS # BLD AUTO: 5.7 THOUSANDS/ÂΜL (ref 1.85–7.62)
NEUTS SEG NFR BLD AUTO: 65 % (ref 43–75)
NRBC BLD AUTO-RTO: 0 /100 WBCS
PLATELET # BLD AUTO: 237 THOUSANDS/UL (ref 149–390)
PMV BLD AUTO: 8.8 FL (ref 8.9–12.7)
RBC # BLD AUTO: 4.06 MILLION/UL (ref 3.81–5.12)
WBC # BLD AUTO: 8.68 THOUSAND/UL (ref 4.31–10.16)

## 2023-12-09 PROCEDURE — 36415 COLL VENOUS BLD VENIPUNCTURE: CPT

## 2023-12-09 PROCEDURE — 85025 COMPLETE CBC W/AUTO DIFF WBC: CPT

## 2023-12-09 PROCEDURE — C9113 INJ PANTOPRAZOLE SODIUM, VIA: HCPCS

## 2023-12-09 PROCEDURE — 96374 THER/PROPH/DIAG INJ IV PUSH: CPT

## 2023-12-09 PROCEDURE — 93005 ELECTROCARDIOGRAM TRACING: CPT

## 2023-12-09 PROCEDURE — 80053 COMPREHEN METABOLIC PANEL: CPT

## 2023-12-09 PROCEDURE — 99285 EMERGENCY DEPT VISIT HI MDM: CPT

## 2023-12-09 PROCEDURE — 81025 URINE PREGNANCY TEST: CPT

## 2023-12-09 PROCEDURE — 83690 ASSAY OF LIPASE: CPT

## 2023-12-09 PROCEDURE — 96375 TX/PRO/DX INJ NEW DRUG ADDON: CPT

## 2023-12-09 PROCEDURE — 84484 ASSAY OF TROPONIN QUANT: CPT

## 2023-12-09 RX ORDER — MAGNESIUM HYDROXIDE/ALUMINUM HYDROXICE/SIMETHICONE 120; 1200; 1200 MG/30ML; MG/30ML; MG/30ML
30 SUSPENSION ORAL ONCE
Status: COMPLETED | OUTPATIENT
Start: 2023-12-09 | End: 2023-12-09

## 2023-12-09 RX ORDER — ONDANSETRON 2 MG/ML
4 INJECTION INTRAMUSCULAR; INTRAVENOUS ONCE
Status: COMPLETED | OUTPATIENT
Start: 2023-12-09 | End: 2023-12-09

## 2023-12-09 RX ORDER — PANTOPRAZOLE SODIUM 40 MG/10ML
40 INJECTION, POWDER, LYOPHILIZED, FOR SOLUTION INTRAVENOUS ONCE
Status: COMPLETED | OUTPATIENT
Start: 2023-12-09 | End: 2023-12-09

## 2023-12-09 RX ADMIN — PANTOPRAZOLE SODIUM 40 MG: 40 INJECTION, POWDER, FOR SOLUTION INTRAVENOUS at 23:36

## 2023-12-09 RX ADMIN — ALUMINUM HYDROXIDE, MAGNESIUM HYDROXIDE, AND DIMETHICONE 30 ML: 200; 20; 200 SUSPENSION ORAL at 23:37

## 2023-12-09 RX ADMIN — ONDANSETRON 4 MG: 2 INJECTION INTRAMUSCULAR; INTRAVENOUS at 23:37

## 2023-12-10 ENCOUNTER — ANESTHESIA EVENT (INPATIENT)
Dept: PERIOP | Facility: HOSPITAL | Age: 48
DRG: 419 | End: 2023-12-10
Payer: COMMERCIAL

## 2023-12-10 ENCOUNTER — ANESTHESIA (INPATIENT)
Dept: PERIOP | Facility: HOSPITAL | Age: 48
DRG: 419 | End: 2023-12-10
Payer: COMMERCIAL

## 2023-12-10 ENCOUNTER — APPOINTMENT (EMERGENCY)
Dept: CT IMAGING | Facility: HOSPITAL | Age: 48
DRG: 419 | End: 2023-12-10
Payer: COMMERCIAL

## 2023-12-10 VITALS
SYSTOLIC BLOOD PRESSURE: 116 MMHG | OXYGEN SATURATION: 95 % | RESPIRATION RATE: 17 BRPM | HEIGHT: 66 IN | TEMPERATURE: 98.1 F | BODY MASS INDEX: 29.73 KG/M2 | WEIGHT: 185 LBS | DIASTOLIC BLOOD PRESSURE: 82 MMHG | HEART RATE: 79 BPM

## 2023-12-10 PROBLEM — K81.0 ACUTE CHOLECYSTITIS: Status: ACTIVE | Noted: 2023-12-10

## 2023-12-10 LAB
ALBUMIN SERPL BCP-MCNC: 3.6 G/DL (ref 3.5–5)
ALBUMIN SERPL BCP-MCNC: 4 G/DL (ref 3.5–5)
ALP SERPL-CCNC: 33 U/L (ref 34–104)
ALP SERPL-CCNC: 39 U/L (ref 34–104)
ALT SERPL W P-5'-P-CCNC: 11 U/L (ref 7–52)
ALT SERPL W P-5'-P-CCNC: 11 U/L (ref 7–52)
ANION GAP SERPL CALCULATED.3IONS-SCNC: 5 MMOL/L
ANION GAP SERPL CALCULATED.3IONS-SCNC: 5 MMOL/L
AST SERPL W P-5'-P-CCNC: 12 U/L (ref 13–39)
AST SERPL W P-5'-P-CCNC: 14 U/L (ref 13–39)
ATRIAL RATE: 73 BPM
BASOPHILS # BLD AUTO: 0.04 THOUSANDS/ÂΜL (ref 0–0.1)
BASOPHILS NFR BLD AUTO: 1 % (ref 0–1)
BILIRUB SERPL-MCNC: 0.17 MG/DL (ref 0.2–1)
BILIRUB SERPL-MCNC: 0.29 MG/DL (ref 0.2–1)
BUN SERPL-MCNC: 11 MG/DL (ref 5–25)
BUN SERPL-MCNC: 8 MG/DL (ref 5–25)
CALCIUM SERPL-MCNC: 8.4 MG/DL (ref 8.4–10.2)
CALCIUM SERPL-MCNC: 8.9 MG/DL (ref 8.4–10.2)
CARDIAC TROPONIN I PNL SERPL HS: 2 NG/L
CHLORIDE SERPL-SCNC: 107 MMOL/L (ref 96–108)
CHLORIDE SERPL-SCNC: 108 MMOL/L (ref 96–108)
CO2 SERPL-SCNC: 26 MMOL/L (ref 21–32)
CO2 SERPL-SCNC: 28 MMOL/L (ref 21–32)
CREAT SERPL-MCNC: 0.51 MG/DL (ref 0.6–1.3)
CREAT SERPL-MCNC: 0.52 MG/DL (ref 0.6–1.3)
EOSINOPHIL # BLD AUTO: 0.15 THOUSAND/ÂΜL (ref 0–0.61)
EOSINOPHIL NFR BLD AUTO: 2 % (ref 0–6)
ERYTHROCYTE [DISTWIDTH] IN BLOOD BY AUTOMATED COUNT: 13.3 % (ref 11.6–15.1)
GFR SERPL CREATININE-BSD FRML MDRD: 111 ML/MIN/1.73SQ M
GFR SERPL CREATININE-BSD FRML MDRD: 112 ML/MIN/1.73SQ M
GLUCOSE SERPL-MCNC: 123 MG/DL (ref 65–140)
GLUCOSE SERPL-MCNC: 99 MG/DL (ref 65–140)
HCT VFR BLD AUTO: 35.7 % (ref 34.8–46.1)
HGB BLD-MCNC: 11.3 G/DL (ref 11.5–15.4)
IMM GRANULOCYTES # BLD AUTO: 0.01 THOUSAND/UL (ref 0–0.2)
IMM GRANULOCYTES NFR BLD AUTO: 0 % (ref 0–2)
LIPASE SERPL-CCNC: 16 U/L (ref 11–82)
LYMPHOCYTES # BLD AUTO: 2.9 THOUSANDS/ÂΜL (ref 0.6–4.47)
LYMPHOCYTES NFR BLD AUTO: 40 % (ref 14–44)
MCH RBC QN AUTO: 29.5 PG (ref 26.8–34.3)
MCHC RBC AUTO-ENTMCNC: 31.7 G/DL (ref 31.4–37.4)
MCV RBC AUTO: 93 FL (ref 82–98)
MONOCYTES # BLD AUTO: 0.63 THOUSAND/ÂΜL (ref 0.17–1.22)
MONOCYTES NFR BLD AUTO: 9 % (ref 4–12)
NEUTROPHILS # BLD AUTO: 3.45 THOUSANDS/ÂΜL (ref 1.85–7.62)
NEUTS SEG NFR BLD AUTO: 48 % (ref 43–75)
NRBC BLD AUTO-RTO: 0 /100 WBCS
P AXIS: 56 DEGREES
PLATELET # BLD AUTO: 228 THOUSANDS/UL (ref 149–390)
PMV BLD AUTO: 8.8 FL (ref 8.9–12.7)
POTASSIUM SERPL-SCNC: 3.4 MMOL/L (ref 3.5–5.3)
POTASSIUM SERPL-SCNC: 3.8 MMOL/L (ref 3.5–5.3)
PR INTERVAL: 158 MS
PROT SERPL-MCNC: 6.2 G/DL (ref 6.4–8.4)
PROT SERPL-MCNC: 6.8 G/DL (ref 6.4–8.4)
QRS AXIS: 96 DEGREES
QRSD INTERVAL: 92 MS
QT INTERVAL: 422 MS
QTC INTERVAL: 464 MS
RBC # BLD AUTO: 3.83 MILLION/UL (ref 3.81–5.12)
SODIUM SERPL-SCNC: 138 MMOL/L (ref 135–147)
SODIUM SERPL-SCNC: 141 MMOL/L (ref 135–147)
T WAVE AXIS: 53 DEGREES
VENTRICULAR RATE: 73 BPM
WBC # BLD AUTO: 7.18 THOUSAND/UL (ref 4.31–10.16)

## 2023-12-10 PROCEDURE — 88304 TISSUE EXAM BY PATHOLOGIST: CPT | Performed by: PATHOLOGY

## 2023-12-10 PROCEDURE — 96375 TX/PRO/DX INJ NEW DRUG ADDON: CPT

## 2023-12-10 PROCEDURE — 85025 COMPLETE CBC W/AUTO DIFF WBC: CPT | Performed by: STUDENT IN AN ORGANIZED HEALTH CARE EDUCATION/TRAINING PROGRAM

## 2023-12-10 PROCEDURE — 99285 EMERGENCY DEPT VISIT HI MDM: CPT | Performed by: EMERGENCY MEDICINE

## 2023-12-10 PROCEDURE — 74177 CT ABD & PELVIS W/CONTRAST: CPT

## 2023-12-10 PROCEDURE — 0FT44ZZ RESECTION OF GALLBLADDER, PERCUTANEOUS ENDOSCOPIC APPROACH: ICD-10-PCS | Performed by: SURGERY

## 2023-12-10 PROCEDURE — 80053 COMPREHEN METABOLIC PANEL: CPT | Performed by: STUDENT IN AN ORGANIZED HEALTH CARE EDUCATION/TRAINING PROGRAM

## 2023-12-10 PROCEDURE — 47562 LAPAROSCOPIC CHOLECYSTECTOMY: CPT | Performed by: SURGERY

## 2023-12-10 PROCEDURE — 36415 COLL VENOUS BLD VENIPUNCTURE: CPT | Performed by: STUDENT IN AN ORGANIZED HEALTH CARE EDUCATION/TRAINING PROGRAM

## 2023-12-10 PROCEDURE — 99223 1ST HOSP IP/OBS HIGH 75: CPT | Performed by: SURGERY

## 2023-12-10 PROCEDURE — G1004 CDSM NDSC: HCPCS

## 2023-12-10 RX ORDER — SODIUM CHLORIDE, SODIUM GLUCONATE, SODIUM ACETATE, POTASSIUM CHLORIDE, MAGNESIUM CHLORIDE, SODIUM PHOSPHATE, DIBASIC, AND POTASSIUM PHOSPHATE .53; .5; .37; .037; .03; .012; .00082 G/100ML; G/100ML; G/100ML; G/100ML; G/100ML; G/100ML; G/100ML
125 INJECTION, SOLUTION INTRAVENOUS CONTINUOUS
Status: DISCONTINUED | OUTPATIENT
Start: 2023-12-10 | End: 2023-12-10

## 2023-12-10 RX ORDER — CEFAZOLIN SODIUM 2 G/50ML
2000 SOLUTION INTRAVENOUS
Status: COMPLETED | OUTPATIENT
Start: 2023-12-11 | End: 2023-12-10

## 2023-12-10 RX ORDER — MAGNESIUM HYDROXIDE 1200 MG/15ML
LIQUID ORAL AS NEEDED
Status: DISCONTINUED | OUTPATIENT
Start: 2023-12-10 | End: 2023-12-10 | Stop reason: HOSPADM

## 2023-12-10 RX ORDER — FENTANYL CITRATE/PF 50 MCG/ML
50 SYRINGE (ML) INJECTION
Status: DISCONTINUED | OUTPATIENT
Start: 2023-12-10 | End: 2023-12-10 | Stop reason: HOSPADM

## 2023-12-10 RX ORDER — DEXAMETHASONE SODIUM PHOSPHATE 10 MG/ML
INJECTION, SOLUTION INTRAMUSCULAR; INTRAVENOUS AS NEEDED
Status: DISCONTINUED | OUTPATIENT
Start: 2023-12-10 | End: 2023-12-10

## 2023-12-10 RX ORDER — SODIUM CHLORIDE, SODIUM LACTATE, POTASSIUM CHLORIDE, CALCIUM CHLORIDE 600; 310; 30; 20 MG/100ML; MG/100ML; MG/100ML; MG/100ML
INJECTION, SOLUTION INTRAVENOUS CONTINUOUS PRN
Status: DISCONTINUED | OUTPATIENT
Start: 2023-12-10 | End: 2023-12-10

## 2023-12-10 RX ORDER — KETOROLAC TROMETHAMINE 30 MG/ML
15 INJECTION, SOLUTION INTRAMUSCULAR; INTRAVENOUS ONCE
Status: COMPLETED | OUTPATIENT
Start: 2023-12-10 | End: 2023-12-10

## 2023-12-10 RX ORDER — HYDROMORPHONE HCL IN WATER/PF 6 MG/30 ML
0.2 PATIENT CONTROLLED ANALGESIA SYRINGE INTRAVENOUS
Status: DISCONTINUED | OUTPATIENT
Start: 2023-12-10 | End: 2023-12-10 | Stop reason: HOSPADM

## 2023-12-10 RX ORDER — PROPOFOL 10 MG/ML
INJECTION, EMULSION INTRAVENOUS AS NEEDED
Status: DISCONTINUED | OUTPATIENT
Start: 2023-12-10 | End: 2023-12-10

## 2023-12-10 RX ORDER — OXYCODONE HYDROCHLORIDE 10 MG/1
10 TABLET ORAL EVERY 4 HOURS PRN
Status: DISCONTINUED | OUTPATIENT
Start: 2023-12-10 | End: 2023-12-10 | Stop reason: HOSPADM

## 2023-12-10 RX ORDER — HEPARIN SODIUM 5000 [USP'U]/ML
5000 INJECTION, SOLUTION INTRAVENOUS; SUBCUTANEOUS EVERY 8 HOURS SCHEDULED
Status: DISCONTINUED | OUTPATIENT
Start: 2023-12-10 | End: 2023-12-10 | Stop reason: HOSPADM

## 2023-12-10 RX ORDER — FENTANYL CITRATE 50 UG/ML
INJECTION, SOLUTION INTRAMUSCULAR; INTRAVENOUS AS NEEDED
Status: DISCONTINUED | OUTPATIENT
Start: 2023-12-10 | End: 2023-12-10

## 2023-12-10 RX ORDER — HYDROMORPHONE HCL/PF 1 MG/ML
SYRINGE (ML) INJECTION AS NEEDED
Status: DISCONTINUED | OUTPATIENT
Start: 2023-12-10 | End: 2023-12-10

## 2023-12-10 RX ORDER — ROCURONIUM BROMIDE 10 MG/ML
INJECTION, SOLUTION INTRAVENOUS AS NEEDED
Status: DISCONTINUED | OUTPATIENT
Start: 2023-12-10 | End: 2023-12-10

## 2023-12-10 RX ORDER — BUPIVACAINE HYDROCHLORIDE 5 MG/ML
INJECTION, SOLUTION EPIDURAL; INTRACAUDAL AS NEEDED
Status: DISCONTINUED | OUTPATIENT
Start: 2023-12-10 | End: 2023-12-10 | Stop reason: HOSPADM

## 2023-12-10 RX ORDER — METRONIDAZOLE 500 MG/100ML
500 INJECTION, SOLUTION INTRAVENOUS EVERY 8 HOURS
Status: DISCONTINUED | OUTPATIENT
Start: 2023-12-10 | End: 2023-12-10

## 2023-12-10 RX ORDER — OXYCODONE HYDROCHLORIDE 5 MG/1
5 TABLET ORAL EVERY 4 HOURS PRN
Status: DISCONTINUED | OUTPATIENT
Start: 2023-12-10 | End: 2023-12-10 | Stop reason: HOSPADM

## 2023-12-10 RX ORDER — METRONIDAZOLE 500 MG/100ML
500 INJECTION, SOLUTION INTRAVENOUS
Status: COMPLETED | OUTPATIENT
Start: 2023-12-11 | End: 2023-12-10

## 2023-12-10 RX ORDER — ONDANSETRON 2 MG/ML
4 INJECTION INTRAMUSCULAR; INTRAVENOUS EVERY 6 HOURS PRN
Status: DISCONTINUED | OUTPATIENT
Start: 2023-12-10 | End: 2023-12-10 | Stop reason: HOSPADM

## 2023-12-10 RX ORDER — ACETAMINOPHEN 325 MG/1
975 TABLET ORAL EVERY 6 HOURS PRN
Status: DISCONTINUED | OUTPATIENT
Start: 2023-12-10 | End: 2023-12-10 | Stop reason: HOSPADM

## 2023-12-10 RX ORDER — ONDANSETRON 2 MG/ML
4 INJECTION INTRAMUSCULAR; INTRAVENOUS ONCE AS NEEDED
Status: DISCONTINUED | OUTPATIENT
Start: 2023-12-10 | End: 2023-12-10 | Stop reason: HOSPADM

## 2023-12-10 RX ORDER — LIDOCAINE HYDROCHLORIDE 10 MG/ML
INJECTION, SOLUTION EPIDURAL; INFILTRATION; INTRACAUDAL; PERINEURAL AS NEEDED
Status: DISCONTINUED | OUTPATIENT
Start: 2023-12-10 | End: 2023-12-10

## 2023-12-10 RX ORDER — OXYCODONE HYDROCHLORIDE 5 MG/1
5 TABLET ORAL EVERY 6 HOURS PRN
Qty: 25 TABLET | Refills: 0 | Status: SHIPPED | OUTPATIENT
Start: 2023-12-10 | End: 2023-12-12

## 2023-12-10 RX ORDER — MIDAZOLAM HYDROCHLORIDE 2 MG/2ML
INJECTION, SOLUTION INTRAMUSCULAR; INTRAVENOUS AS NEEDED
Status: DISCONTINUED | OUTPATIENT
Start: 2023-12-10 | End: 2023-12-10

## 2023-12-10 RX ADMIN — DEXMEDETOMIDINE HYDROCHLORIDE 8 MCG: 100 INJECTION, SOLUTION INTRAVENOUS at 14:42

## 2023-12-10 RX ADMIN — KETOROLAC TROMETHAMINE 15 MG: 30 INJECTION, SOLUTION INTRAMUSCULAR; INTRAVENOUS at 02:38

## 2023-12-10 RX ADMIN — SODIUM CHLORIDE, SODIUM GLUCONATE, SODIUM ACETATE, POTASSIUM CHLORIDE, MAGNESIUM CHLORIDE, SODIUM PHOSPHATE, DIBASIC, AND POTASSIUM PHOSPHATE 125 ML/HR: .53; .5; .37; .037; .03; .012; .00082 INJECTION, SOLUTION INTRAVENOUS at 05:06

## 2023-12-10 RX ADMIN — FENTANYL CITRATE 50 MCG: 50 INJECTION INTRAMUSCULAR; INTRAVENOUS at 14:18

## 2023-12-10 RX ADMIN — FENTANYL CITRATE 50 MCG: 50 INJECTION INTRAMUSCULAR; INTRAVENOUS at 13:48

## 2023-12-10 RX ADMIN — DEXAMETHASONE SODIUM PHOSPHATE 10 MG: 10 INJECTION INTRAMUSCULAR; INTRAVENOUS at 13:44

## 2023-12-10 RX ADMIN — PROPOFOL 40 MG: 10 INJECTION, EMULSION INTRAVENOUS at 13:47

## 2023-12-10 RX ADMIN — DEXMEDETOMIDINE HYDROCHLORIDE 8 MCG: 100 INJECTION, SOLUTION INTRAVENOUS at 14:18

## 2023-12-10 RX ADMIN — SODIUM CHLORIDE, SODIUM LACTATE, POTASSIUM CHLORIDE, AND CALCIUM CHLORIDE: .6; .31; .03; .02 INJECTION, SOLUTION INTRAVENOUS at 13:35

## 2023-12-10 RX ADMIN — LIDOCAINE HYDROCHLORIDE 50 MG: 10 INJECTION, SOLUTION EPIDURAL; INFILTRATION; INTRACAUDAL at 13:44

## 2023-12-10 RX ADMIN — DEXMEDETOMIDINE HYDROCHLORIDE 8 MCG: 100 INJECTION, SOLUTION INTRAVENOUS at 14:33

## 2023-12-10 RX ADMIN — SODIUM CHLORIDE, SODIUM LACTATE, POTASSIUM CHLORIDE, AND CALCIUM CHLORIDE: .6; .31; .03; .02 INJECTION, SOLUTION INTRAVENOUS at 15:49

## 2023-12-10 RX ADMIN — METRONIDAZOLE 500 MG: 500 INJECTION, SOLUTION INTRAVENOUS at 05:25

## 2023-12-10 RX ADMIN — MIDAZOLAM 2 MG: 1 INJECTION INTRAMUSCULAR; INTRAVENOUS at 13:35

## 2023-12-10 RX ADMIN — METRONIDAZOLE: 500 INJECTION, SOLUTION INTRAVENOUS at 13:54

## 2023-12-10 RX ADMIN — PROPOFOL 160 MG: 10 INJECTION, EMULSION INTRAVENOUS at 13:42

## 2023-12-10 RX ADMIN — CEFTRIAXONE SODIUM 1000 MG: 10 INJECTION, POWDER, FOR SOLUTION INTRAVENOUS at 05:11

## 2023-12-10 RX ADMIN — ROCURONIUM BROMIDE 50 MG: 10 INJECTION, SOLUTION INTRAVENOUS at 13:42

## 2023-12-10 RX ADMIN — SUGAMMADEX 168 MG: 100 INJECTION, SOLUTION INTRAVENOUS at 15:20

## 2023-12-10 RX ADMIN — CEFAZOLIN SODIUM 2000 MG: 2 SOLUTION INTRAVENOUS at 13:44

## 2023-12-10 RX ADMIN — HEPARIN SODIUM 5000 UNITS: 5000 INJECTION INTRAVENOUS; SUBCUTANEOUS at 05:13

## 2023-12-10 RX ADMIN — HYDROMORPHONE HYDROCHLORIDE 0.5 MG: 1 INJECTION, SOLUTION INTRAMUSCULAR; INTRAVENOUS; SUBCUTANEOUS at 14:37

## 2023-12-10 RX ADMIN — DEXMEDETOMIDINE HYDROCHLORIDE 8 MCG: 100 INJECTION, SOLUTION INTRAVENOUS at 14:07

## 2023-12-10 RX ADMIN — IOHEXOL 85 ML: 350 INJECTION, SOLUTION INTRAVENOUS at 00:21

## 2023-12-10 RX ADMIN — DEXMEDETOMIDINE HYDROCHLORIDE 8 MCG: 100 INJECTION, SOLUTION INTRAVENOUS at 14:56

## 2023-12-10 NOTE — ANESTHESIA PROCEDURE NOTES
Anesthesia Notable Event  No anethesia notable event occurred.     Date/Time: 12/10/2023 5:03 PM    Performed by: Atif Montesinos MD  Authorized by: Atif Montesinos MD

## 2023-12-10 NOTE — OP NOTE
OPERATIVE REPORT  PATIENT NAME: Hugo Smith    :  1975  MRN: [id number]  Pt Location: [facility] OR ROOM 03    SURGERY DATE: 12/10/2023    Surgeon(s) and Role:     * Jp Knapp MD - Primary     * Sharon Merlos, DO - Assisting     * babberly, DO - Assisting    Preop Diagnosis:  Cholecystitis [K81.9]    Post-Op Diagnosis Codes:     * Cholecystitis [K81.9]    Procedure(s):  CHOLECYSTECTOMY LAPAROSCOPIC    Specimen(s):  ID Type Source Tests Collected by Time Destination   1 :  Tissue Gallbladder TISSUE EXAM Jp Knapp MD 12/10/2023 1447        Estimated Blood Loss:   Minimal    Drains:  * No LDAs found *    Anesthesia Type:   General    Operative Indications:  Cholecystitis [K81.9]    Operative Findings:  Acute calculous cholecystitis  Edematous gallbladder wall   Normal biliary anatomy     Complications:   None    Procedure and Technique:  The patient was brought to the operating room and identified verbally and via wristband. She was transferred to the operating room table and positioned supine. General endotracheal anesthesia was induced successfully. The patient's abdomen was prepped and draped in the usual sterile fashion. Pre-operative antibiotics were administered. A time out was performed confirming the patient, procedure, and site. All parties were in agreement. Attention was turned to the abdomen. Infraumbilical curvilinear incision was made with an 11 blade scalpel. Dissection was deepened through the subcutaneous tissue bluntly with a Kocher grasper. The umbilical stalk was grasped and elevated with a Milroy Rm. The fascia was incised with an 11 blade scalpel. A 5 mm trocar was inserted through this incision. CO2 insufflation was initiated, which the patient tolerated well. The laparoscope was inserted and the surrounding intra-abdominal structures were inspected for injury upon entry, none were appreciated. Patient was positioned appropriately.   A 10 mm epigastric trocar was placed under laparoscopic visualization. 2 additional 5 mm right upper quadrant trocars were placed under laparoscopic visualization. The gallbladder was grasped with a locking grasper    And retracted cephalad above the liver. The infundibulum of the gallbladder was retracted laterally within the distal grasper,   Exposing the gallbladder neck. The peritoneum overlying the gallbladder neck was incised superficially with hook electrocautery. There combination of blunt dissection with a [place] grasper and the suction , the cystic duct and cystic artery were skeletonized circumferentially. The critical view of safety was obtained with 2 tubular structures entering the gallbladder, and the cystic plate in the background. The cystic duct and cystic artery were clipped x 3 and incised with laparoscopic scissors so that 2 clips remained in the abdomen. The gallbladder was dissected free of the liver using hook electrocautery. Hemostasis was achieved with hook electrocautery and laparoscopic clips. The gallbladder was placed in the Endo Catch bag and removed from the abdomen. The suprahepatic space was suctioned and irrigated until clear effluent returned. The clips were visualized and noted to be intact and hemostatic. The trocars were then removed from the abdomen. The 10 mm epigastric fascial incision was closed with an 0 Vicryl suture on a UR 6. The skin of each incision was closed with 4-0 Monocryl suture and covered with Exofin skin glue. The patient was then allowed to awaken, extubated, and transferred to the PACU having tolerated the procedure well. All instrument, needle, and sponge counts were correct at the end of the case. Radiofrequency detection was negative.     Dr. Pedro Khan was present for the entire procedure      Patient Disposition:  PACU         SIGNATURE: Maurisio Combs,   DATE: December 10, 2023  TIME: 3:29 PM

## 2023-12-10 NOTE — ED PROVIDER NOTES
History  Chief Complaint   Patient presents with    Abdominal Pain     Patient c/o epigastric pain starting on Wednesday. Took one dose of pepcid on Thursday and improved on Friday. Epigastric pain has returned without relief from OTC  medications     The patient is a 46year old female who presents to ED with her  for evaluation of epigastric pain. PMHx: breast cancer on tamoxifen, asthma. Pt states epigastric pain started 3 days ago. She states she had a tele-health consult 2 days ago and was instructed to take Pepcid AC after which she had relief of pain yesterday but pain came back today after sh ate white pizza. She tried Pepcid 40 mg and Tums without relief of pain. Pain is described as constant in the upper abdomen, worst in the epigastrium, rated 9/10, and accompanied by nausea. She states pain is exacerbated by sitting and bending at the waist. Denies fever, chills, hematemesis, hematochezia, dysuria, chest pain, SOB, dizziness, weakness, paresthesia, rash        Prior to Admission Medications   Prescriptions Last Dose Informant Patient Reported? Taking?    BIOTIN W/ VITAMINS C & E PO  Self Yes No   Sig: Take 1 capsule by mouth in the morning   Patient not taking: Reported on 2023   Biotin w/ Vitamins C & E 1250-7.5-7.5 MCG-MG-UNT CHEW   Yes No   Cholecalciferol (VITAMIN D3) 1000 units CHEW  Self Yes No   Sig: Chew 1 tablet daily   Multiple Vitamins-Minerals (AIRBORNE PO)  Self Yes No   Sig: Take 1 tablet by mouth in the morning   albuterol (ProAir HFA) 90 mcg/act inhaler  Self No No   Sig: Inhale 2 puffs every 4 (four) hours as needed for wheezing   cetirizine (ZyrTEC) 10 MG chewable tablet  Self Yes No   Sig: Chew 10 mg daily as needed    fluticasone (FLONASE) 50 mcg/act nasal spray  Self Yes No   Si spray into each nostril daily as needed    multivitamin (THERAGRAN) TABS  Self Yes No   Sig: Take 1 tablet by mouth daily   Patient not taking: Reported on 2023   rizatriptan (MAXALT-MLT) 10 mg disintegrating tablet   No No   Sig: Take 1 tablet (10 mg total) by mouth daily as needed for migraine Take at the onset of migraine; if symptoms continue or return, may take another dose at least 2 hours after first dose. Take no more than 2 doses in a day. tamoxifen (NOLVADEX) 20 mg tablet   No No   Sig: TAKE 1 TABLET BY MOUTH DAILY      Facility-Administered Medications: None       Past Medical History:   Diagnosis Date    Adhesive capsulitis of right shoulder     Resolved 11/15/2016     Asthma     Benign paroxysmal vertigo, unspecified ear     Resolved 11/15/2016     BRCA gene mutation negative 06/2019    [facility]    Breast cancer ([address]) 07/30/2019    left    Cellulitis of leg, except foot     Resolved 12/10/2014     Hx of radiation therapy 08/01/2019    Lymphadenitis     Resolved 12/10/2014     Migraine     Snoring     Wears contact lenses     Wears glasses        Past Surgical History:   Procedure Laterality Date    BREAST BIOPSY Left 2019    BREAST LUMPECTOMY Left 07/30/2019    Procedure: BREAST LUMPECTOMY; BREAST NEEDLE LOCALIZATION (NEEDLE LOC AT 0800); Surgeon: Morgan Kenny MD;  Location[facility];  Service: Surgical Oncology    COLONOSCOPY      INTRAUTERINE DEVICE INSERTION  2019    LYMPH NODE BIOPSY Left 07/30/2019    Procedure: SENTINEL LYMPH NODE BIOPSY; LYMPHATIC MAPPING WITH BLUE DYE AND RADIOACTIVE DYE (INJECT AT 0930 BY DR LEAVITT IN THE OR);   Surgeon: Morgan Kenny MD;  Location[facility];  Service: Surgical Oncology    MAMMO NEEDLE LOCALIZATION LEFT (ALL INC) Left 07/30/2019    UT GRAFTING OF AUTOLOGOUS FAT BY LIPO 50 CC OR LESS Bilateral 5/11/2023    Procedure: FAT GRAFTING TO BILATERAL BREASTS;  Surgeon: Andreia Childs DO;  Location: [address] [place] [address] MAIN OR;  Service: Plastics    UT MASTOPEXY Right 5/11/2023    Procedure: RIGHT CRESCENTRIC MASTOPEXY;  Surgeon: Andreia Childs DO;  Location:  MAIN OR;  Service: Plastics    SALPINGECTOMY Bilateral 03/2021    SHOULDER SURGERY Right Last assessed 12/29/2015     US GUIDED BREAST BIOPSY LEFT COMPLETE Left 06/07/2019    WISDOM TOOTH EXTRACTION         Family History   Problem Relation Age of Onset    Alzheimer's disease Mother     COPD Father     No Known Problems Sister     No Known Problems Daughter     Alzheimer's disease Maternal Grandmother     No Known Problems Maternal Grandfather     No Known Problems Paternal Grandmother     No Known Problems Paternal Grandfather     No Known Problems Brother     No Known Problems Brother     No Known Problems Brother     No Known Problems Daughter      I have reviewed and agree with the history as documented. E-Cigarette/Vaping    E-Cigarette Use Never User      E-Cigarette/Vaping Substances    Nicotine No     THC No     CBD No     Flavoring No     Other No     Unknown No      Social History     Tobacco Use    Smoking status: Never    Smokeless tobacco: Never   Vaping Use    Vaping Use: Never used   Substance Use Topics    Alcohol use: Yes     Comment: rare    Drug use: No        Review of Systems   Constitutional:  Positive for appetite change. Negative for fever. HENT:  Negative for congestion, rhinorrhea, sore throat, tinnitus and trouble swallowing. Eyes:  Negative for pain and visual disturbance. Respiratory:  Negative for cough, chest tightness and shortness of breath. Cardiovascular:  Negative for chest pain, palpitations and leg swelling. Gastrointestinal:  Positive for abdominal pain and nausea. Negative for anal bleeding, blood in stool and diarrhea. Genitourinary:  Negative for difficulty urinating, dysuria, pelvic pain and vaginal bleeding. Musculoskeletal:  Negative for back pain, gait problem, myalgias and neck pain. Skin:  Negative for rash and wound. Allergic/Immunologic: Negative for environmental allergies and food allergies. Neurological:  Negative for dizziness, syncope, weakness and headaches. Hematological:  Does not bruise/bleed easily.        Physical Exam  ED Triage Vitals   Temperature Pulse Respirations Blood Pressure SpO2   12/09/23 2235 12/09/23 2233 12/09/23 2233 12/09/23 2233 12/09/23 2233   98.6 °F (37 °C) 78 16 (!) 187/109 100 %      Temp Source Heart Rate Source Patient Position - Orthostatic VS BP Location FiO2 (%)   12/09/23 2235 12/09/23 2233 12/09/23 2233 12/09/23 2233 --   Oral Monitor Lying Right arm       Pain Score       12/09/23 2233       8             Orthostatic Vital Signs  Vitals:    12/10/23 0000 12/10/23 0210 12/10/23 0504 12/10/23 0730   BP: 139/75 169/98 136/90 136/90   Pulse: 76 89 74 65   Patient Position - Orthostatic VS: Lying Lying Lying        Physical Exam  Vitals and nursing note reviewed. Constitutional:       Appearance: She is well-developed. HENT:      Head: Normocephalic and atraumatic. Mouth/Throat:      Mouth: Mucous membranes are moist.      Pharynx: Oropharynx is clear. No oropharyngeal exudate. Eyes:      Conjunctiva/sclera: Conjunctivae normal.      Pupils: Pupils are equal, round, and reactive to light. Cardiovascular:      Rate and Rhythm: Normal rate and regular rhythm. Pulses: Normal pulses. Heart sounds: Normal heart sounds. Pulmonary:      Effort: Pulmonary effort is normal. No respiratory distress. Breath sounds: Normal breath sounds. No stridor. No wheezing, rhonchi or rales. Chest:      Chest wall: No tenderness. Abdominal:      General: Abdomen is flat. Bowel sounds are normal.      Palpations: Abdomen is soft. Tenderness: There is abdominal tenderness in the right upper quadrant, epigastric area and left upper quadrant. There is no right CVA tenderness, left CVA tenderness or rebound. Musculoskeletal:         General: No swelling, tenderness, deformity or signs of injury. Cervical back: Normal range of motion and neck supple. Right lower leg: No edema. Left lower leg: No edema. Skin:     General: Skin is warm and dry.       Capillary Refill: Capillary refill takes less than 2 seconds. Neurological:      General: No focal deficit present. Mental Status: She is alert and oriented to person, place, and time. Psychiatric:         Mood and Affect: Mood normal. Mood is not anxious or depressed. Behavior: Behavior normal.         Thought Content:  Thought content normal.         Judgment: Judgment normal.         ED Medications  Medications   multi-electrolyte (PLASMALYTE-A/ISOLYTE-S PH 7.4) IV solution (125 mL/hr Intravenous New Bag 12/10/23 0506)   heparin (porcine) subcutaneous injection 5,000 Units (5,000 Units Subcutaneous Given 12/10/23 0513)   ondansetron (ZOFRAN) injection 4 mg (has no administration in time range)   ceftriaxone (ROCEPHIN) 1 g/50 mL in dextrose IVPB (0 mg Intravenous Stopped 12/10/23 0524)   metroNIDAZOLE (FLAGYL) IVPB (premix) 500 mg 100 mL (0 mg Intravenous Stopped 12/10/23 0555)   metroNIDAZOLE (FLAGYL) IVPB (premix) 500 mg 100 mL (has no administration in time range)   ceFAZolin (ANCEF) IVPB (premix in dextrose) 2,000 mg 50 mL (has no administration in time range)   oxyCODONE (ROXICODONE) IR tablet 5 mg (has no administration in time range)   oxyCODONE (ROXICODONE) immediate release tablet 10 mg (has no administration in time range)   acetaminophen (TYLENOL) tablet 975 mg (has no administration in time range)   HYDROmorphone HCl (DILAUDID) injection 0.2 mg (has no administration in time range)   ondansetron (ZOFRAN) injection 4 mg (4 mg Intravenous Given 12/9/23 2337)   pantoprazole (PROTONIX) injection 40 mg (40 mg Intravenous Given 12/9/23 2336)   aluminum-magnesium hydroxide-simethicone (MAALOX) oral suspension 30 mL (30 mL Oral Given 12/9/23 2337)   iohexol (OMNIPAQUE) 350 MG/ML injection (MULTI-DOSE) 85 mL (85 mL Intravenous Given 12/10/23 0021)   ketorolac (TORADOL) injection 15 mg (15 mg Intravenous Given 12/10/23 0238)       Diagnostic Studies  Results Reviewed       Procedure Component Value Units Date/Time CBC and differential [[id number]]  (Abnormal) Collected: 12/10/23 0722    Lab Status: Final result Specimen: Blood from Arm, Left Updated: 12/10/23 0734     WBC 7.18 Thousand/uL      RBC 3.83 Million/uL      Hemoglobin 11.3 g/dL      Hematocrit 35.7 %      MCV 93 fL      MCH 29.5 pg      MCHC 31.7 g/dL      RDW 13.3 %      MPV 8.8 fL      Platelets 755 Thousands/uL      nRBC 0 /100 WBCs      Neutrophils Relative 48 %      Immat GRANS % 0 %      Lymphocytes Relative 40 %      Monocytes Relative 9 %      Eosinophils Relative 2 %      Basophils Relative 1 %      Neutrophils Absolute 3.45 Thousands/µL      Immature Grans Absolute 0.01 Thousand/uL      Lymphocytes Absolute 2.90 Thousands/µL      Monocytes Absolute 0.63 Thousand/µL      Eosinophils Absolute 0.15 Thousand/µL      Basophils Absolute 0.04 Thousands/µL     Comprehensive metabolic panel [[id number]] Collected: 12/10/23 5873    Lab Status:  In process Specimen: Blood from Arm, Left Updated: 12/10/23 0731    Platelet count [[id number]]     Lab Status: No result Specimen: Blood     HS Troponin 0hr (reflex protocol) [[id number]]  (Normal) Collected: 12/09/23 2335    Lab Status: Final result Specimen: Blood from Arm, Right Updated: 12/10/23 0011     hs TnI 0hr 2 ng/L     Lipase [[id number]]  (Normal) Collected: 12/09/23 2335    Lab Status: Final result Specimen: Blood from Arm, Right Updated: 12/10/23 0009     Lipase 16 u/L     Comprehensive metabolic panel [[id number]]  (Abnormal) Collected: 12/09/23 2335    Lab Status: Final result Specimen: Blood from Arm, Right Updated: 12/10/23 0009     Sodium 138 mmol/L      Potassium 3.8 mmol/L      Chloride 107 mmol/L      CO2 26 mmol/L      ANION GAP 5 mmol/L      BUN 11 mg/dL      Creatinine 0.52 mg/dL      Glucose 123 mg/dL      Calcium 8.9 mg/dL      AST 14 U/L      ALT 11 U/L      Alkaline Phosphatase 39 U/L      Total Protein 6.8 g/dL      Albumin 4.0 g/dL      Total Bilirubin 0.17 mg/dL      eGFR 111 ml/min/1.73sq m Narrative:      National Kidney Disease Foundation guidelines for Chronic Kidney Disease (CKD):     Stage 1 with normal or high GFR (GFR > 90 mL/min/1.73 square meters)    Stage 2 Mild CKD (GFR = 60-89 mL/min/1.73 square meters)    Stage 3A Moderate CKD (GFR = 45-59 mL/min/1.73 square meters)    Stage 3B Moderate CKD (GFR = 30-44 mL/min/1.73 square meters)    Stage 4 Severe CKD (GFR = 15-29 mL/min/1.73 square meters)    Stage 5 End Stage CKD (GFR <15 mL/min/1.73 square meters)  Note: GFR calculation is accurate only with a steady state creatinine    CBC and differential [[id number]]  (Abnormal) Collected: 12/09/23 2335    Lab Status: Final result Specimen: Blood from Arm, Right Updated: 12/09/23 2355     WBC 8.68 Thousand/uL      RBC 4.06 Million/uL      Hemoglobin 11.9 g/dL      Hematocrit 38.3 %      MCV 94 fL      MCH 29.3 pg      MCHC 31.1 g/dL      RDW 13.2 %      MPV 8.8 fL      Platelets 382 Thousands/uL      nRBC 0 /100 WBCs      Neutrophils Relative 65 %      Immat GRANS % 0 %      Lymphocytes Relative 24 %      Monocytes Relative 8 %      Eosinophils Relative 2 %      Basophils Relative 1 %      Neutrophils Absolute 5.70 Thousands/µL      Immature Grans Absolute 0.02 Thousand/uL      Lymphocytes Absolute 2.06 Thousands/µL      Monocytes Absolute 0.73 Thousand/µL      Eosinophils Absolute 0.13 Thousand/µL      Basophils Absolute 0.04 Thousands/µL     POCT pregnancy, urine [[id number]]  (Normal) Resulted: 12/09/23 2328    Lab Status: Final result Updated: 12/09/23 2328     EXT Preg Test, Ur Negative     Control Valid                   CT abdomen pelvis with contrast   Final Result by Homer Lara MD (12/10 0207)      Cholelithiasis with stone at the gallbladder neck. There is gallbladder wall thickening concerning for cholecystitis. The study was marked in [facility] for immediate notification.       Workstation performed: [id number]               Procedures  ECG 12 Lead Documentation Only    Date/Time: 12/9/2023 11:27 PM    Performed by: Breanne Hendricks MD  Authorized by: Breanne Hendricks MD    Patient location:  ED  Previous ECG:     Previous ECG:  Compared to current    Comparison ECG info:  07/02/2019    Comparison to cardiac monitor: Yes    Interpretation:     Interpretation: abnormal    Rate:     ECG rate:  73    ECG rate assessment: normal    Rhythm:     Rhythm: sinus rhythm    Ectopy:     Ectopy: none    QRS:     QRS axis:  Right    QRS intervals:  Normal  Conduction:     Conduction: normal    ST segments:     ST segments:  Normal  T waves:     T waves: normal          ED Course  ED Course as of 12/10/23 0749   Sat Dec 09, 2023   2357 Pain reduced to 5/10 after medications. 8688 WBC: 8.68  WNL   2358 Hemoglobin: 11.9  WNL   2358 CBC and differential(!)  Grossly normal   Sun Dec 10, 2023   0053 Comprehensive metabolic panel(!)  Grossly WNL   0054 Lipase: 16  WNL. Doubt pancreatitis   0055 CBC and differential(!)  Grossly normal. Normal HCG. Normal WBC   0055 hs TnI 0hr: 2  WNL   0218 CT abdomen pelvis with contrast  IMPRESSION:  Cholelithiasis with stone at the gallbladder neck. There is gallbladder wall thickening concerning for cholecystitis. The study was marked in [facility] for immediate notification. 0220 Reached out to surgery to evaluate pt    0348 Surgery was here to see pt. Pt currently resting on stretcher with pain and nausea under control   0351 Lipase: 16                             SBIRT 22yo+      Flowsheet Row Most Recent Value   Initial Alcohol Screen: US AUDIT-C     1. How often do you have a drink containing alcohol? 0 Filed at: 12/09/2023 2235   2. How many drinks containing alcohol do you have on a typical day you are drinking? 0 Filed at: 12/09/2023 2235   3a. Male UNDER 65: How often do you have five or more drinks on one occasion? 0 Filed at: 12/09/2023 2235   3b. FEMALE Any Age, or MALE 65+: How often do you have 4 or more drinks on one occassion?  0 Filed at: 12/09/2023 2235   Audit-C Score 0 Filed at: 12/09/2023 2235   YUSUF: How many times in the past year have you. .. Used an illegal drug or used a prescription medication for non-medical reasons? Never Filed at: 12/09/2023 2235            Wells' Criteria for PE      Flowsheet Row Most Recent Value   Wells' Criteria for PE    Clinical signs and symptoms of DVT 0 Filed at: 12/09/2023 2321   PE is primary diagnosis or equally likely 0 Filed at: 12/09/2023 2321   HR >100 0 Filed at: 12/09/2023 2321   Immobilization at least 3 days or Surgery in the previous 4 weeks 0 Filed at: 12/09/2023 2321   Previous, objectively diagnosed PE or DVT 0 Filed at: 12/09/2023 2321   Hemoptysis 0 Filed at: 12/09/2023 2321   Malignancy with treatment within 6 months or palliative 1 Filed at: 12/09/2023 2321   Wells' Criteria Total 1 Filed at: 12/09/2023 2321              Medical Decision Making  Ddx: Gastritis, gastric ulcer, cholecystitis, cholelithiasis, pancreatitis, ACS, MI,     Low risk per WELLS criteria for PE  Normal EKG, normal trop  CMP and CBC unremarkable. Pain and nausea controlled in ED with Zofran and Toradol. Bedside RUQ ultrasound performed by myself and DR Margeret Carrel noted to have gallstone in gallbladder. CT scan confirmation of cholelithiasis. Surgery consulted and Dr Maricel Wyatt evaluated pt in ED and agreed to surgical admission with plan for cholecystectomy. Amount and/or Complexity of Data Reviewed  Independent Historian: spouse  Labs: ordered. Decision-making details documented in ED Course. Radiology: ordered. Decision-making details documented in ED Course. Risk  OTC drugs. Prescription drug management. Decision regarding hospitalization.           Disposition  Final diagnoses:   Cholecystitis   Nausea     Time reflects when diagnosis was documented in both MDM as applicable and the Disposition within this note       Time User Action Codes Description Comment    12/10/2023  4:36 AM Edson Nesbitt Add [K81.9] Cholecystitis     12/10/2023  4:36 AM Bianka Huang Add [R11.0] Nausea           ED Disposition       ED Disposition   Admit    Condition   Stable    Date/Time   Sun Dec 10, 2023 3501    Comment   Case was discussed with Surgery DR Liam Herring and the patient's admission status was agreed to be Admission Status: observation status to the service of Dr. Virginia Malcolm . Follow-up Information    None         Patient's Medications   Discharge Prescriptions    No medications on file     No discharge procedures on file. PDMP Review       None             ED Provider  Attending physically available and evaluated Porsha German. I managed the patient along with the ED Attending.     Electronically Signed by           Bianka Huang MD  12/10/23 3650

## 2023-12-10 NOTE — ED ATTENDING ATTESTATION
12/9/2023  I, Josh Merritt MD, saw and evaluated the patient. I have discussed the patient with the resident/non-physician practitioner and agree with the resident's/non-physician practitioner's findings, Plan of Care, and MDM as documented in the resident's/non-physician practitioner's note, except where noted. All available labs and Radiology studies were reviewed. I was present for key portions of any procedure(s) performed by the resident/non-physician practitioner and I was immediately available to provide assistance. At this point I agree with the current assessment done in the Emergency Department. I have conducted an independent evaluation of this patient a history and physical is as follows: This is a 78-year-old female without any significantly related past medical history aside from history of breast cancer on tamoxifen, presenting to the ED today for complaint of epigastric pain. She has had epigastric pain for the past 3 days, intermittent. Her pain started 3 days ago, and when it did start she saw her PCP, whom recommended taking an antacid which initially relieved her symptoms. Her pain then came back today, and despite taking an antacid her symptoms have persisted. She denies any fever, chills, sweats, nausea vomiting diarrhea or constipation. She denies any dysuria frequency or hesitancy. She states that is epigastric, 7-8 out of 10 in intensity, without any radiation and without any other associated symptoms. Her exam is remarkable for epigastric tenderness to palpation, without any evidence of peritonitis, no guarding rigidity or other significant abnormalities. Her differential diagnosis includes: Gastritis versus PUD versus pancreatitis versus acute cholecystitis versus other. Patient had a right upper quadrant ultrasound done by myself and my resident showing no evidence for acute cholecystitis. Her CBC, metabolic panel, lipase, troponin all are unremarkable.   Her EKG does not show any evidence of ischemia. She received Protonix, Zofran, Maalox with improvement in her symptoms. She underwent a CT of the abdomen pelvis showing evidence for stone in her gallbladder neck. Patient was then evaluated by surgery, whom accepted to take the patient onto their service without any further orders requested.     ED Course         Critical Care Time  Procedures

## 2023-12-10 NOTE — ANESTHESIA PREPROCEDURE EVALUATION
Procedure:  CHOLECYSTECTOMY LAPAROSCOPIC (Abdomen)    Relevant Problems   ANESTHESIA   (+) Motion sickness      CARDIO   (+) Migraine headache      GYN   (+) Malignant neoplasm of upper-inner quadrant of left breast in female, estrogen receptor positive       NEURO/PSYCH   (+) Migraine headache      PULMONARY   (+) Mild intermittent asthma        Physical Exam    Airway    Mallampati score: II  TM Distance: >3 FB  Neck ROM: full     Dental   No notable dental hx     Cardiovascular  No peripheral edema    Pulmonary   No stridor    Other Findings  post-pubertal.      Anesthesia Plan  ASA Score- 2     Anesthesia Type- general with ASA Monitors. Additional Monitors:     Airway Plan: ETT. Plan Factors-    Chart reviewed. EKG reviewed. Existing labs reviewed. Patient summary reviewed. Induction- intravenous. Postoperative Plan- Plan for postoperative opioid use. Planned trial extubation    Informed Consent- Anesthetic plan and risks discussed with patient. I personally reviewed this patient with the CRNA. Discussed and agreed on the Anesthesia Plan with the CRNA. Shanon Falcon

## 2023-12-10 NOTE — DISCHARGE INSTR - AVS FIRST PAGE
Acute Care Surgery Discharge Instructions    Please follow-up as instructed. If you do not already have a follow-up appointment, please call the office when you leave to schedule an appointment to be seen in 2-3 weeks for post-operative re-evaluation. Activity:  - No lifting greater than 20 pounds or strenuous physical activity or exercise for 2 weeks. - Walking and normal light activities are encouraged. - Normal daily activities including climbing steps are okay. - No driving until no longer using pain medications. Return to work:    - You may return to work in 2 weeks or sooner if you are feeling well enough. Diet:    - You may resume your normal diet. Wound Care:  - May shower daily. No tub baths or swimming until cleared by your surgeon.  - Wash incision gently with soap and water and pat dry. - Do not apply any creams or ointments unless instructed to do so by your surgeon.  - Sis Hanson may apply ice as needed (no longer than 20 minutes at a time) for the first 48 hours. - Bruising is not unusual and will go away with a little time. You may apply a warm, moist compress that may help the bruising resolve quicker. - You may remove the dressings the day after surgery (unless otherwise instructed). Leave any skin tapes (steri-strips) on the incision(s) in place until they fall off on their own. Any new dressings are optional.    Medications:    - You may resume all of your regular medications after discharge unless otherwise instructed. Please refer to your discharge medication list for further details. - Please take the pain medications as directed. - You are encouraged to use non-narcotic pain medications first and whenever possible. Reserve the use of narcotic pain medication for moderate to severe pain not controlled by non-narcotic medications.  - No driving while taking narcotic pain medications. - You may become constipated, especially if taking pain medications.  You may take any over the counter stool softeners or laxatives as needed. Examples: Milk of Magnesia, Colace, Senna. Additional Instructions:  - If you have any questions or concerns after discharge please call the office.  - Call office or return to ER if fever greater than 101, chills, persistent nausea/vomiting, worsening/uncontrollable pain, and/or increasing redness or purulent/foul smelling drainage from incision(s).

## 2023-12-10 NOTE — H&P
H+P - General Surgery   Rodo Smith 46 y.o. female MRN: [id number]  Unit/Bed#: ED-31 Encounter: [id number]    Assessment/Plan     Assessment:  78-year-old female presenting with acute cholecystitis, first episode of pain Wednesday, 12/6, CT showing distended gallbladder with wall thickening and gallstone in the neck    Initially hypertensive, now vital signs stable within normal limits on room air    WBC 8.7  Hemoglobin 11.9  Creatinine 0.52  Lipase 16  Troponin 2    12/10 CT A/P Cholelithiasis. There is a stone at the gallbladder neck. There is gallbladder wall thickening. Plan:  Admit to surgery  OR for laparoscopic cholecystectomy today  N.p.o.  IV fluids  IV antibiotics  Pain control as needed  Antiemetic as needed    History of Present Illness   HPI:  Parag Nielsen is a 46 y.o. female who presents with acute onset epigastric and right upper quadrant pain. Patient reports that she first had an episode of pain on Wednesday following an oily meal.  This was the patient's first episode of pain and lasted a few hours but resolved with going to sleep. Patient reports she felt well Thursday and Friday but then again had acute onset abdominal pain following a fatty dinner on Saturday. This pain persisted and prompted her to come into the emergency department for evaluation. Patient denies nausea vomiting fevers chills and shortness of breath associated with her pain. Patient is having bowel function and last ate at 5 PM on 12/9. Past medical history includes breast cancer in the patient is currently on tamoxifen. Patient denies blood thinner use. Past surgical history includes bilateral salpingectomy. Consults    Review of Systems   Constitutional:  Negative for activity change, chills, fatigue and fever. HENT:  Negative for sore throat. Eyes:  Negative for visual disturbance. Respiratory:  Negative for cough, chest tightness and shortness of breath.     Cardiovascular:  Negative for chest pain, palpitations and leg swelling. Gastrointestinal:  Positive for abdominal pain. Negative for blood in stool, nausea and vomiting. Genitourinary:  Negative for dysuria and hematuria. Skin:  Negative for color change. Neurological:  Negative for syncope. All other systems reviewed and are negative. Historical Information   Past Medical History:   Diagnosis Date    Adhesive capsulitis of right shoulder     Resolved 11/15/2016     Asthma     Benign paroxysmal vertigo, unspecified ear     Resolved 11/15/2016     BRCA gene mutation negative 06/2019    [facility]    Breast cancer ([address]) 07/30/2019    left    Cellulitis of leg, except foot     Resolved 12/10/2014     Hx of radiation therapy 08/01/2019    Lymphadenitis     Resolved 12/10/2014     Migraine     Snoring     Wears contact lenses     Wears glasses      Past Surgical History:   Procedure Laterality Date    BREAST BIOPSY Left 2019    BREAST LUMPECTOMY Left 07/30/2019    Procedure: BREAST LUMPECTOMY; BREAST NEEDLE LOCALIZATION (NEEDLE LOC AT 0800); Surgeon: Tana Brooks MD;  [facility];  Service: Surgical Oncology    COLONOSCOPY      INTRAUTERINE DEVICE INSERTION  2019    LYMPH NODE BIOPSY Left 07/30/2019    Procedure: SENTINEL LYMPH NODE BIOPSY; LYMPHATIC MAPPING WITH BLUE DYE AND RADIOACTIVE DYE (INJECT AT 0930 BY DR LEAVITT IN THE OR);   Surgeon: Tana Brooks MD;  [facility];  Service: Surgical Oncology    MAMMO NEEDLE LOCALIZATION LEFT (ALL INC) Left 07/30/2019    MS GRAFTING OF AUTOLOGOUS FAT BY LIPO 50 CC OR LESS Bilateral 5/11/2023    Procedure: FAT GRAFTING TO BILATERAL BREASTS;  Surgeon: Lisette White DO;  Location: [address] [place] [address] MAIN OR;  Service: Plastics    MS MASTOPEXY Right 5/11/2023    Procedure: RIGHT CRESCENTRIC MASTOPEXY;  Surgeon: Lisette White DO;  Location:  MAIN OR;  Service: Plastics    SALPINGECTOMY Bilateral 03/2021    SHOULDER SURGERY Right     Last assessed 12/29/2015     US GUIDED BREAST BIOPSY LEFT COMPLETE Left 06/07/2019    WISDOM TOOTH EXTRACTION       Social History   Social History     Substance and Sexual Activity   Alcohol Use Yes    Comment: rare     Social History     Substance and Sexual Activity   Drug Use No     E-Cigarette/Vaping    E-Cigarette Use Never User      E-Cigarette/Vaping Substances    Nicotine No     THC No     CBD No     Flavoring No     Other No     Unknown No      Social History     Tobacco Use   Smoking Status Never   Smokeless Tobacco Never     Family History: non-contributory    Meds/Allergies   all current active meds have been reviewed  No Known Allergies    Objective   First Vitals:   Blood Pressure: (!) 187/109 (12/09/23 2233)  Pulse: 78 (12/09/23 2233)  Temperature: 98.6 °F (37 °C) (12/09/23 2235)  Temp Source: Oral (12/09/23 2235)  Respirations: 16 (12/09/23 2233)  Height: 5' 6" (167.6 cm) (12/09/23 2233)  Weight - Scale: 83.9 kg (185 lb) (12/09/23 2233)  SpO2: 100 % (12/09/23 2233)    Current Vitals:   Blood Pressure: 136/90 (12/10/23 0730)  Pulse: 65 (12/10/23 0730)  Temperature: 98.6 °F (37 °C) (12/09/23 2235)  Temp Source: Oral (12/09/23 2235)  Respirations: 18 (12/10/23 0504)  Height: 5' 6" (167.6 cm) (12/09/23 2233)  Weight - Scale: 83.9 kg (185 lb) (12/09/23 2233)  SpO2: 97 % (12/10/23 0730)      Intake/Output Summary (Last 24 hours) at 12/10/2023 0806  Last data filed at 12/10/2023 0555  Gross per 24 hour   Intake 150 ml   Output --   Net 150 ml       Invasive Devices       Peripheral Intravenous Line  Duration             Peripheral IV 12/09/23 Distal;Right;Ventral (anterior) Forearm <1 day    Peripheral IV 12/09/23 Left;Proximal;Ventral (anterior) Forearm <1 day                    Physical Exam:  General: No acute distress  Neuro: alert and oriented  HEENT: moist mucous membranes  CV: Well perfused, regular rate   Lungs: Normal work of breathing, no increased respiratory effort on room air  Abdomen: Soft, nondistended, right upper quadrant tenderness, positive Wilkinson sign  Extremities: No edema, clubbing or cyanosis  Skin: Warm and dry    Lab Results: I have personally reviewed pertinent lab results. Imaging: I have personally reviewed pertinent reports. EKG, Pathology, and Other Studies: I have personally reviewed pertinent reports. Counseling / Coordination of Care  Total floor / unit time spent today 25 minutes. Greater than 50% of total time was spent with the patient and / or family counseling and / or coordination of care.        Nurys Vieira MD  General Surgery PGY1

## 2023-12-10 NOTE — ANESTHESIA POSTPROCEDURE EVALUATION
Post-Op Assessment Note    CV Status:  Stable  Pain Score: 0    Pain management: adequate       Mental Status:  Alert and awake   Hydration Status:  Euvolemic   PONV Controlled:  Controlled   Airway Patency:  Patent     Post Op Vitals Reviewed: Yes      Staff: CRNA               BP   118/57   Temp 98.4   Pulse 81   Resp 18   SpO2 100

## 2023-12-11 ENCOUNTER — TELEPHONE (OUTPATIENT)
Age: 48
End: 2023-12-11

## 2023-12-11 ENCOUNTER — TRANSITIONAL CARE MANAGEMENT (OUTPATIENT)
Dept: FAMILY MEDICINE CLINIC | Facility: CLINIC | Age: 48
End: 2023-12-11

## 2023-12-11 NOTE — TELEPHONE ENCOUNTER
Patient called bc she needs to reschedule her pre op appt bc she just had gall bladder surgery yesterday. Please contact patient, thanks.

## 2023-12-11 NOTE — UTILIZATION REVIEW
Initial Clinical Review    Elective  surgical procedure  Age/Sex: 46 y.o. female who presented thru ED 12/9 with acute onset epigastric and right upper quadrant pain. First episode of pain Wednesday, 12/6, CTA/P  showing distended gallbladder with wall thickening and gallstone in the neck . Pt initially hypertensive . Pt admitted as Inpatient 12/10 by general sx service  Surgery Date: 12/10/23 @ 1406   Procedure: CHOLECYSTECTOMY LAPAROSCOPIC   Anesthesia: general  Operative Findings: Acute calculous cholecystitis  Edematous gallbladder wall   Normal biliary anatomy     POD#0 Progress Note: Pt d/c'd postoperatively on day of surgery . 4 trocar sites to abdomen . Admission Orders: Date/Time/Statement:   Admission Orders (From admission, onward)       Ordered        12/10/23 0453  Inpatient Admission  Once                          Orders Placed This Encounter   Procedures    Inpatient Admission     Standing Status:   Standing     Number of Occurrences:   1     Order Specific Question:   Level of Care     Answer:   Med Surg [16]     Order Specific Question:   Estimated length of stay     Answer:   More than 2 Midnights     Order Specific Question:   Certification     Answer:   I certify that inpatient services are medically necessary for this patient for a duration of greater than two midnights. See H&P and MD Progress Notes for additional information about the patient's course of treatment.      Vital Signs:   Date/Time Temp Pulse Resp BP MAP (mmHg) SpO2 O2 Flow Rate (L/min) O2 Device Cardiac (WDL) Patient Position - Orthostatic VS   12/10/23 16:21:17 98.1 °F (36.7 °C) 79 17 116/82 93 95 % -- -- -- --   12/10/23 1610 97.6 °F (36.4 °C) 78 18 105/59 -- 97 % -- None (Room air) -- --   12/10/23 1600 97.8 °F (36.6 °C) 84 18 115/60 -- 97 % -- None (Room air) -- --   12/10/23 1553 98.4 °F (36.9 °C) 84 18 118/57 -- 100 % 5 L/min Simple mask WDL --   12/10/23 1257 97.9 °F (36.6 °C) 82 20 127/95 -- 99 % -- None (Room air) -- --   12/10/23 0928 -- 74 16 123/82 98 99 % -- None (Room air) -- --   12/10/23 0730 -- 65 -- 136/90 -- 97 % -- None (Room air) -- --   12/10/23 0504 -- 74 18 136/90 109 100 % -- None (Room air) -- Lying   12/10/23 0210 -- 89 18 169/98 126 97 % -- None (Room air) -- Lying   12/10/23 0000 -- 76 18 139/75 102 96 % -- None (Room air) -- Lying   12/09/23 2235 98.6 °F (37 °C) -- -- -- -- -- -- -- -- --   12/09/23 2233 -- 78 16 187/109 Abnormal  156 100 % -- None (Room air) -- Lying       Pertinent Labs/Diagnostic Test Results:    12/9 ECG- Normal sinus rhythm  Rightward axis  CT abdomen pelvis with contrast   Final Result by Chrissy Aleman MD (12/10 0207)      Cholelithiasis with stone at the gallbladder neck. There is gallbladder wall thickening concerning for cholecystitis. The study was marked in [facility] for immediate notification.       Workstation performed: [id number]               Results from last 7 days   Lab Units 12/10/23  0722 12/09/23  2335   WBC Thousand/uL 7.18 8.68   HEMOGLOBIN g/dL 11.3* 11.9   HEMATOCRIT % 35.7 38.3   PLATELETS Thousands/uL 228 237   NEUTROS ABS Thousands/µL 3.45 5.70         Results from last 7 days   Lab Units 12/10/23  0722 12/09/23  2335   SODIUM mmol/L 141 138   POTASSIUM mmol/L 3.4* 3.8   CHLORIDE mmol/L 108 107   CO2 mmol/L 28 26   ANION GAP mmol/L 5 5   BUN mg/dL 8 11   CREATININE mg/dL 0.51* 0.52*   EGFR ml/min/1.73sq m 112 111   CALCIUM mg/dL 8.4 8.9     Results from last 7 days   Lab Units 12/10/23  0722 12/09/23  2335   AST U/L 12* 14   ALT U/L 11 11   ALK PHOS U/L 33* 39   TOTAL PROTEIN g/dL 6.2* 6.8   ALBUMIN g/dL 3.6 4.0   TOTAL BILIRUBIN mg/dL 0.29 0.17*         Results from last 7 days   Lab Units 12/10/23  0722 12/09/23  2335   GLUCOSE RANDOM mg/dL 99 123           Results from last 7 days   Lab Units 12/09/23  2335   HS TNI 0HR ng/L 2                   Results from last 7 days   Lab Units 12/09/23  2335   LIPASE u/L 16             Diet: NPO--->reg  Mobility: Up as pam DVT Prophylaxis: ambulation , SCD's , heparin         ED Treatment:   Medication Administration from 12/09/2023 2226 to 12/10/2023 1250         Date/Time Order Dose Route Action     12/09/2023 2337 EST ondansetron (ZOFRAN) injection 4 mg 4 mg Intravenous Given     12/09/2023 2336 EST pantoprazole (PROTONIX) injection 40 mg 40 mg Intravenous Given     12/09/2023 2337 EST aluminum-magnesium hydroxide-simethicone (MAALOX) oral suspension 30 mL 30 mL Oral Given     12/10/2023 0021 EST iohexol (OMNIPAQUE) 350 MG/ML injection (MULTI-DOSE) 85 mL 85 mL Intravenous Given     12/10/2023 0238 EST ketorolac (TORADOL) injection 15 mg 15 mg Intravenous Given     12/10/2023 0506 EST multi-electrolyte (PLASMALYTE-A/ISOLYTE-S PH 7.4) IV solution 125 mL/hr Intravenous New Bag     12/10/2023 1250 EST heparin (porcine) subcutaneous injection 5,000 Units -- Subcutaneous MAR Hold     12/10/2023 0513 EST heparin (porcine) subcutaneous injection 5,000 Units 5,000 Units Subcutaneous Given     12/10/2023 1250 EST ondansetron (ZOFRAN) injection 4 mg -- Intravenous MAR Hold     12/10/2023 1250 EST ceftriaxone (ROCEPHIN) 1 g/50 mL in dextrose IVPB -- Intravenous MAR Hold     12/10/2023 0524 EST ceftriaxone (ROCEPHIN) 1 g/50 mL in dextrose IVPB 0 mg Intravenous Stopped     12/10/2023 0511 EST ceftriaxone (ROCEPHIN) 1 g/50 mL in dextrose IVPB 1,000 mg Intravenous New Bag     12/10/2023 1250 EST metroNIDAZOLE (FLAGYL) IVPB (premix) 500 mg 100 mL -- Intravenous MAR Hold     12/10/2023 0555 EST metroNIDAZOLE (FLAGYL) IVPB (premix) 500 mg 100 mL 0 mg Intravenous Stopped     12/10/2023 0525 EST metroNIDAZOLE (FLAGYL) IVPB (premix) 500 mg 100 mL 500 mg Intravenous New Bag     12/10/2023 1250 EST metroNIDAZOLE (FLAGYL) IVPB (premix) 500 mg 100 mL -- Intravenous MAR Hold     12/10/2023 1250 EST ceFAZolin (ANCEF) IVPB (premix in dextrose) 2,000 mg 50 mL -- Intravenous MAR Hold     12/10/2023 1250 EST oxyCODONE (ROXICODONE) IR tablet 5 mg -- Oral MAR Hold     12/10/2023 1250 EST oxyCODONE (ROXICODONE) immediate release tablet 10 mg -- Oral MAR Hold     12/10/2023 1250 EST acetaminophen (TYLENOL) tablet 975 mg -- Oral MAR Hold     12/10/2023 1250 EST HYDROmorphone HCl (DILAUDID) injection 0.2 mg -- Intravenous MAR Hold           Scheduled Medications:    ceftriaxone (ROCEPHIN) 1 g/50 mL in dextrose IVPB  Dose: 1,000 mg  Freq: Every 24 hours Route: IV  Last Dose: Stopped (12/10/23 0524)  Start: 12/10/23 0500 End: 12/10/23 1810   Admin Instructions:      Order specific questions:          heparin (porcine) subcutaneous injection 5,000 Units  Dose: 5,000 Units  Freq: Every 8 hours scheduled Route: SC  Start: 12/10/23 0600 End: 12/10/23 2234        metroNIDAZOLE (FLAGYL) IVPB (premix) 500 mg 100 mL  Dose: 500 mg  Freq: Every 8 hours Route: IV  Last Dose: Stopped (12/10/23 0555)  Start: 12/10/23 0500 End: 12/10/23 1810     Continuous IV Infusions:  multi-electrolyte (PLASMALYTE-A/ISOLYTE-S PH 7.4) IV solution  Rate: 125 mL/hr Dose: 125 mL/hr  Freq: Continuous Route: IV  Indications of Use: IV Hydration  Last Dose: 125 mL/hr (12/10/23 0506)  Start: 12/10/23 0500 End: 12/10/23 1528   PRN Meds:    HYDROmorphone HCl (DILAUDID) injection 0.2 mg  Dose: 0.2 mg  Freq: Every 3 hours PRN Route: IV  PRN Reason: breakthrough pain  Start: 12/10/23 0451 End: 12/10/23 2234     ondansetron (ZOFRAN) injection 4 mg  Dose: 4 mg  Freq: Every 6 hours PRN Route: IV  PRN Reasons: nausea,vomiting  Start: 12/10/23 0449 End: 12/10/23 2234   Admin Instructions:         oxyCODONE (ROXICODONE) immediate release tablet 10 mg  Dose: 10 mg  Freq: Every 4 hours PRN Route: PO  PRN Reason: severe pain  Start: 12/10/23 0451 End: 12/10/23 2234   Admin Instructions:         oxyCODONE (ROXICODONE) IR tablet 5 mg  Dose: 5 mg  Freq: Every 4 hours PRN Route: PO  PRN Reason: moderate pain  Start: 12/10/23 0451 End: 12/10/23 2234          Network Utilization Review Department  ATTENTION: Please call with any questions or concerns to [phone] and carefully listen to the prompts so that you are directed to the right person. All voicemails are confidential.   For Discharge needs, contact Care Management DC Support Team at [phone] opt. 2  Send all requests for admission clinical reviews, approved or denied determinations and any other requests to dedicated fax number below belonging to the campus where the patient is receiving treatment.  List of dedicated fax numbers for the Facilities:  [place] DENIALS (Administrative/Medical Necessity) [phone]   DISCHARGE SUPPORT TEAM (NETWORK) [id number] Jak[address] jermain (Maternity/NICU/Pediatrics) [phone]   [address] 1521 [facility] 1000 [facility] [phone] [address] [place] [address] [address] [address] [facility] [address] 1300 [facility] [address] [place] [address] [phone]

## 2023-12-12 ENCOUNTER — OFFICE VISIT (OUTPATIENT)
Dept: FAMILY MEDICINE CLINIC | Facility: CLINIC | Age: 48
End: 2023-12-12
Payer: COMMERCIAL

## 2023-12-12 VITALS
RESPIRATION RATE: 16 BRPM | SYSTOLIC BLOOD PRESSURE: 108 MMHG | HEIGHT: 66 IN | BODY MASS INDEX: 29.47 KG/M2 | HEART RATE: 64 BPM | TEMPERATURE: 98 F | DIASTOLIC BLOOD PRESSURE: 60 MMHG | OXYGEN SATURATION: 99 % | WEIGHT: 183.4 LBS

## 2023-12-12 DIAGNOSIS — K81.0 ACUTE CHOLECYSTITIS: Primary | ICD-10-CM

## 2023-12-12 DIAGNOSIS — J45.20 MILD INTERMITTENT ASTHMA WITHOUT COMPLICATION: ICD-10-CM

## 2023-12-12 DIAGNOSIS — G43.809 OTHER MIGRAINE WITHOUT STATUS MIGRAINOSUS, NOT INTRACTABLE: ICD-10-CM

## 2023-12-12 PROCEDURE — 88304 TISSUE EXAM BY PATHOLOGIST: CPT | Performed by: PATHOLOGY

## 2023-12-12 PROCEDURE — 99495 TRANSJ CARE MGMT MOD F2F 14D: CPT | Performed by: FAMILY MEDICINE

## 2023-12-12 NOTE — PROGRESS NOTES
Assessment & Plan     1. Acute cholecystitis  Comments:  much improved  clear to travel to [facility], clot prevention discussed    2. Mild intermittent asthma without complication  Assessment & Plan:  Stable      3. Other migraine without status migrainosus, not intractable  Assessment & Plan:  Headaches controlled only had mild headache from no caffeine. Return if symptoms worsen or fail to improve. Subjective     Transitional Care Management Review:   Rush Olivares is a 46 y.o. female here for TCM follow up. During the TCM phone call patient stated:  TCM Call     Date and time call was made  12/11/2023 10:10 AM    Hospital care reviewed  Records reviewed    Patient was hospitialized at  [address] [place] [address]    Date of Admission  12/09/23    Date of discharge  12/10/23    Diagnosis  Acute cholecystitis    Disposition  Home    Were the patients medications reviewed and updated  Yes    Current Symptoms  --  Controlled post op lap sal pain relieved by Tylenol/Advil PRN      TCM Call     Post hospital issues  None    Should patient be enrolled in anticoag monitoring? No    Scheduled for follow up? Yes    Patients specialists  Other (comment)    Other specialists names  [facility]    Did you obtain your prescribed medications  Yes    Do you need help managing your prescriptions or medications  No    Is transportation to your appointment needed  Yes    Specify why  She is not cleared to drive    I have advised the patient to call PCP with any new or worsening symptoms  [facility] or [facility] other    Support System  Family    The type of support provided  Physical; Emotional    Do you have social support  Yes, as much as I need    Are you recieving any outpatient services  No    Are you recieving home care services  No    Counseling  Patient    Counseling topics  Activities of daily living;  Importance of RX compliance; patient and family education; instructions for management; Risk factor reduction    Comments  Jermain Nation is aware to call her surgeon if any fevers, uncontrolled pain and if no bowel movements post op- she hasn't moved her bowels yet. Encouraged fluids and ambulate as tolerated to assist in bowel movements JMoyleLPN        She just had her gallbladder out. She is back to eating normally and moved her bowels this morning. Review of Systems    Objective     /60   Pulse 64   Temp 98 °F (36.7 °C) (Tympanic)   Resp 16   Ht 5' 6" (1.676 m)   Wt 83.2 kg (183 lb 6.4 oz)   LMP 12/02/2023 (Approximate)   SpO2 99%   BMI 29.60 kg/m²      Physical Exam  Vitals and nursing note reviewed. Constitutional:       General: She is not in acute distress. Appearance: She is well-developed. HENT:      Head: Normocephalic and atraumatic. Eyes:      Conjunctiva/sclera: Conjunctivae normal.   Cardiovascular:      Rate and Rhythm: Normal rate and regular rhythm. Heart sounds: No murmur heard. Pulmonary:      Effort: Pulmonary effort is normal. No respiratory distress. Breath sounds: Normal breath sounds. Abdominal:      Palpations: Abdomen is soft. Tenderness: There is no abdominal tenderness. Musculoskeletal:         General: No swelling. Cervical back: Neck supple. Skin:     General: Skin is warm and dry. Capillary Refill: Capillary refill takes less than 2 seconds. Neurological:      Mental Status: She is alert.    Psychiatric:         Mood and Affect: Mood normal.       Medications have been reviewed by provider in current encounter    Juventino Mcneal DO

## 2023-12-21 ENCOUNTER — OFFICE VISIT (OUTPATIENT)
Dept: SURGERY | Facility: CLINIC | Age: 48
End: 2023-12-21

## 2023-12-21 DIAGNOSIS — K81.0 ACUTE CHOLECYSTITIS: Primary | ICD-10-CM

## 2023-12-21 PROCEDURE — 99024 POSTOP FOLLOW-UP VISIT: CPT | Performed by: SURGERY

## 2023-12-21 NOTE — PROGRESS NOTES
Assessment/Plan:    Acute cholecystitis  48-year-old female status post laparoscopic cholecystectomy on 12/10/2023, here for follow-up.    Plan:  - The patient's pathology was reviewed, and understanding was acknowledged.  - The patient may return to all normal activities at this time.   - I reiterated the lifting restriction of 20 lb until 4 weeks postoperatively, and advised that there are no restrictions from a dietary perspective   - The patient may return to clinic as needed, and can call with any questions should they arise.       Diagnoses and all orders for this visit:    Acute cholecystitis          Subjective:      Patient ID: Jocelyn Smith is a 48 y.o. female.    Jocelyn Smith is a 48 y.o. female who presents s/p laparoscopic cholecystectomy on 12/10/2023 for follow-up.  Overall, they are doing quite well without significant complaints.  They deny any significant pain, fevers, chills, chest pain, or shortness of breath.  They are tolerating regular diet, and moving their bowels normally.  They have been doing most activities around the house, without restriction or limitation, while abiding by their lifting restrictions.  Surgical pathology was consistent with cholecystitis.          The following portions of the patient's history were reviewed and updated as appropriate: She  has a past medical history of Adhesive capsulitis of right shoulder, Asthma, Benign paroxysmal vertigo, unspecified ear, BRCA gene mutation negative (06/2019), Breast cancer (HCC) (07/30/2019), Cellulitis of leg, except foot, radiation therapy (08/01/2019), Lymphadenitis, Migraine, Snoring, Wears contact lenses, and Wears glasses.  She   Patient Active Problem List    Diagnosis Date Noted    Acute cholecystitis 12/10/2023    Breast asymmetry 05/24/2023    History of lumpectomy 03/13/2023    Abnormal glucose 02/10/2021    Use of tamoxifen (Nolvadex) 06/02/2020    Malignant neoplasm of upper-inner quadrant of left breast in  female, estrogen receptor positive  06/13/2019    Dense breasts 11/15/2016    Mild intermittent asthma 11/15/2016    Migraine headache 12/10/2014    Motion sickness 11/09/2012    Allergic rhinitis 09/04/2012    Vitamin D deficiency 09/04/2012     She  has a past surgical history that includes Shoulder surgery (Right); US guided breast biopsy left complete (Left, 06/07/2019); Intrauterine device insertion (2019); Elk tooth extraction; Mammo needle localization left (all inc) (Left, 07/30/2019); Breast lumpectomy (Left, 07/30/2019); Lymph node biopsy (Left, 07/30/2019); Breast biopsy (Left, 2019); Salpingectomy (Bilateral, 03/2021); Colonoscopy; pr grafting of autologous fat by lipo 50 cc or less (Bilateral, 5/11/2023); pr mastopexy (Right, 5/11/2023); and CHOLECYSTECTOMY LAPAROSCOPIC (N/A, 12/10/2023).  Her family history includes Alzheimer's disease in her maternal grandmother and mother; COPD in her father; No Known Problems in her brother, brother, brother, daughter, daughter, maternal grandfather, paternal grandfather, paternal grandmother, and sister.  She  reports that she has never smoked. She has never used smokeless tobacco. She reports current alcohol use. She reports that she does not use drugs.  Current Outpatient Medications   Medication Sig Dispense Refill    albuterol (ProAir HFA) 90 mcg/act inhaler Inhale 2 puffs every 4 (four) hours as needed for wheezing 18 g 1    Biotin w/ Vitamins C & E 1250-7.5-7.5 MCG-MG-UNT CHEW in the morning      BIOTIN W/ VITAMINS C & E PO Take 1 capsule by mouth in the morning (Patient not taking: Reported on 4/27/2023)      cetirizine (ZyrTEC) 10 MG chewable tablet Chew 10 mg daily as needed       Cholecalciferol (VITAMIN D3) 1000 units CHEW Chew 1 tablet daily      fluticasone (FLONASE) 50 mcg/act nasal spray 1 spray into each nostril daily as needed       Multiple Vitamins-Minerals (AIRBORNE PO) Take 1 tablet by mouth in the morning      multivitamin (THERAGRAN) TABS  Take 1 tablet by mouth daily      rizatriptan (MAXALT-MLT) 10 mg disintegrating tablet Take 1 tablet (10 mg total) by mouth daily as needed for migraine Take at the onset of migraine; if symptoms continue or return, may take another dose at least 2 hours after first dose. Take no more than 2 doses in a day. 12 tablet 1    tamoxifen (NOLVADEX) 20 mg tablet TAKE 1 TABLET BY MOUTH DAILY 90 tablet 1     No current facility-administered medications for this visit.     Current Outpatient Medications on File Prior to Visit   Medication Sig    albuterol (ProAir HFA) 90 mcg/act inhaler Inhale 2 puffs every 4 (four) hours as needed for wheezing    Biotin w/ Vitamins C & E 1250-7.5-7.5 MCG-MG-UNT CHEW in the morning    BIOTIN W/ VITAMINS C & E PO Take 1 capsule by mouth in the morning (Patient not taking: Reported on 4/27/2023)    cetirizine (ZyrTEC) 10 MG chewable tablet Chew 10 mg daily as needed     Cholecalciferol (VITAMIN D3) 1000 units CHEW Chew 1 tablet daily    fluticasone (FLONASE) 50 mcg/act nasal spray 1 spray into each nostril daily as needed     Multiple Vitamins-Minerals (AIRBORNE PO) Take 1 tablet by mouth in the morning    multivitamin (THERAGRAN) TABS Take 1 tablet by mouth daily    rizatriptan (MAXALT-MLT) 10 mg disintegrating tablet Take 1 tablet (10 mg total) by mouth daily as needed for migraine Take at the onset of migraine; if symptoms continue or return, may take another dose at least 2 hours after first dose. Take no more than 2 doses in a day.    tamoxifen (NOLVADEX) 20 mg tablet TAKE 1 TABLET BY MOUTH DAILY     No current facility-administered medications on file prior to visit.     She has No Known Allergies..    Review of Systems   Constitutional:  Negative for chills, fatigue and fever.   HENT:  Negative for ear pain, facial swelling, sinus pressure and sinus pain.    Eyes:  Negative for pain.   Respiratory:  Negative for cough, shortness of breath and wheezing.    Cardiovascular:  Negative for  chest pain.   Gastrointestinal:  Negative for abdominal pain, constipation, diarrhea, nausea and vomiting.   Endocrine: Negative for cold intolerance and heat intolerance.   Genitourinary:  Negative for dysuria and flank pain.   Musculoskeletal:  Negative for back pain and neck pain.   Skin:  Negative for wound.   Neurological:  Negative for syncope, facial asymmetry, light-headedness and numbness.   Psychiatric/Behavioral:  Negative for behavioral problems, confusion and suicidal ideas.          Objective:      LMP 12/02/2023 (Approximate)          Physical Exam  Vitals and nursing note reviewed.   Constitutional:       General: She is not in acute distress.     Appearance: Normal appearance. She is not toxic-appearing.   HENT:      Head: Normocephalic and atraumatic.      Mouth/Throat:      Mouth: Mucous membranes are moist.   Eyes:      Extraocular Movements: Extraocular movements intact.      Pupils: Pupils are equal, round, and reactive to light.   Cardiovascular:      Rate and Rhythm: Normal rate and regular rhythm.      Pulses: Normal pulses.   Pulmonary:      Effort: Pulmonary effort is normal. No respiratory distress.      Breath sounds: Normal breath sounds. No wheezing.   Abdominal:      General: There is no distension.      Palpations: Abdomen is soft. There is no mass.      Tenderness: There is no abdominal tenderness. There is no guarding or rebound.      Hernia: No hernia is present.      Comments: Well-healed laparoscopic port sites, no evidence of hernia.   Musculoskeletal:         General: No swelling or deformity. Normal range of motion.      Cervical back: Normal range of motion and neck supple.      Right lower leg: No edema.      Left lower leg: No edema.   Skin:     General: Skin is warm and dry.      Coloration: Skin is not jaundiced.   Neurological:      General: No focal deficit present.      Mental Status: She is alert and oriented to person, place, and time.   Psychiatric:         Mood  and Affect: Mood normal.         Behavior: Behavior normal.

## 2023-12-21 NOTE — ASSESSMENT & PLAN NOTE
48-year-old female status post laparoscopic cholecystectomy on 12/10/2023, here for follow-up.    Plan:  - The patient's pathology was reviewed, and understanding was acknowledged.  - The patient may return to all normal activities at this time.   - I reiterated the lifting restriction of 20 lb until 4 weeks postoperatively, and advised that there are no restrictions from a dietary perspective   - The patient may return to clinic as needed, and can call with any questions should they arise.

## 2024-01-12 ENCOUNTER — TELEPHONE (OUTPATIENT)
Age: 49
End: 2024-01-12

## 2024-01-12 ENCOUNTER — OFFICE VISIT (OUTPATIENT)
Dept: FAMILY MEDICINE CLINIC | Facility: CLINIC | Age: 49
End: 2024-01-12
Payer: COMMERCIAL

## 2024-01-12 VITALS
TEMPERATURE: 98.5 F | HEART RATE: 52 BPM | DIASTOLIC BLOOD PRESSURE: 66 MMHG | BODY MASS INDEX: 29.09 KG/M2 | WEIGHT: 181 LBS | RESPIRATION RATE: 18 BRPM | SYSTOLIC BLOOD PRESSURE: 112 MMHG | HEIGHT: 66 IN

## 2024-01-12 DIAGNOSIS — R10.13 EPIGASTRIC PAIN: Primary | ICD-10-CM

## 2024-01-12 PROCEDURE — 99214 OFFICE O/P EST MOD 30 MIN: CPT | Performed by: FAMILY MEDICINE

## 2024-01-12 RX ORDER — SUCRALFATE 1 G/1
1 TABLET ORAL 4 TIMES DAILY
Qty: 40 TABLET | Refills: 0 | Status: SHIPPED | OUTPATIENT
Start: 2024-01-12

## 2024-01-12 NOTE — TELEPHONE ENCOUNTER
Patient called in to report moderate stomach discomfort and possible heartburn starting yesterday 1/11/24 and radiates to lower back; feels stomach is empty and acid is churning.. Post cholecystectomy 5 weeks ago. Has taken TUMS and Pepcid 1/11 with no relief and GasX today with no relief. Did not sleep well overnight. SAME DAY OV scheduled with MD PRITESH  today at 4:30 PM for evaluation.

## 2024-01-12 NOTE — PROGRESS NOTES
Assessment/Plan:    No problem-specific Assessment & Plan notes found for this encounter.    Epigastric pain  Suspect gastritis, start carafate  R/o pud, choledocholithiasis(doubt), gib, post op leak, pancreatitis, food borne ilness(doubt), obstruction/perforation(doubt), food intolerance syndrome    Suggest labs and carafate  US if no better  May need gi eval for EGD if ongoing   ER if worse, not acute abdomen on exam today     Diagnoses and all orders for this visit:    Epigastric pain  -     sucralfate (CARAFATE) 1 g tablet; Take 1 tablet (1 g total) by mouth 4 (four) times a day  -     Comprehensive metabolic panel; Future  -     CBC and differential; Future  -     Lipase; Future  -     US abdomen limited; Future  -     Comprehensive metabolic panel  -     CBC and differential  -     Lipase        No follow-ups on file.    Subjective:      Patient ID: Jocelyn Smith is a 48 y.o. female.    Chief Complaint   Patient presents with    Abdominal Pain     X 2 days  Susie Berry CMA     Heartburn       HPI  12/10/23 had nonemergent cholecystectomy due to stones and early cholecystitis  Discharged on 12/10/23  Went to [place]  Went to restaurant locally in [place]  [place], pork, empanada  Symptoms started after that  Epigastrum area  Dull  No n/v/c/d   No blood in stool    Tums and pepcid w/o help  Bagel and cream cheese and a little worse  Gas x tried  No fever    The following portions of the patient's history were reviewed and updated as appropriate: allergies, current medications, past family history, past medical history, past social history, past surgical history and problem list.    Review of Systems   Constitutional:  Negative for chills and fever.         Current Outpatient Medications   Medication Sig Dispense Refill    albuterol (ProAir HFA) 90 mcg/act inhaler Inhale 2 puffs every 4 (four) hours as needed for wheezing 18 g 1    Biotin w/ Vitamins C & E 1250-7.5-7.5 MCG-MG-UNT CHEW in the morning       "cetirizine (ZyrTEC) 10 MG chewable tablet Chew 10 mg daily as needed       Cholecalciferol (VITAMIN D3) 1000 units CHEW Chew 1 tablet daily      fluticasone (FLONASE) 50 mcg/act nasal spray 1 spray into each nostril daily as needed       Multiple Vitamins-Minerals (AIRBORNE PO) Take 1 tablet by mouth in the morning      multivitamin (THERAGRAN) TABS Take 1 tablet by mouth daily      rizatriptan (MAXALT-MLT) 10 mg disintegrating tablet Take 1 tablet (10 mg total) by mouth daily as needed for migraine Take at the onset of migraine; if symptoms continue or return, may take another dose at least 2 hours after first dose. Take no more than 2 doses in a day. 12 tablet 1    sucralfate (CARAFATE) 1 g tablet Take 1 tablet (1 g total) by mouth 4 (four) times a day 40 tablet 0    tamoxifen (NOLVADEX) 20 mg tablet TAKE 1 TABLET BY MOUTH DAILY 90 tablet 1    BIOTIN W/ VITAMINS C & E PO Take 1 capsule by mouth in the morning (Patient not taking: Reported on 4/27/2023)       No current facility-administered medications for this visit.       Objective:    /66   Pulse (!) 52   Temp 98.5 °F (36.9 °C)   Resp 18   Ht 5' 6\" (1.676 m)   Wt 82.1 kg (181 lb)   BMI 29.21 kg/m²        Physical Exam  Vitals and nursing note reviewed.   Constitutional:       General: She is not in acute distress.     Appearance: She is well-developed. She is not ill-appearing.   HENT:      Head: Normocephalic.      Mouth/Throat:      Mouth: Mucous membranes are moist.   Eyes:      General: No scleral icterus.     Conjunctiva/sclera: Conjunctivae normal.   Cardiovascular:      Rate and Rhythm: Normal rate and regular rhythm.      Heart sounds: No murmur heard.  Pulmonary:      Effort: Pulmonary effort is normal. No respiratory distress.      Breath sounds: No wheezing.   Abdominal:      General: Bowel sounds are normal. There is no distension.      Palpations: Abdomen is soft. There is no mass.      Tenderness: There is abdominal tenderness. There " is no guarding or rebound.      Comments: General tenderness but mostly in epigastrum   Musculoskeletal:         General: No deformity.      Cervical back: Neck supple.      Right lower leg: No edema.      Left lower leg: No edema.   Skin:     General: Skin is warm and dry.      Coloration: Skin is not jaundiced or pale.   Neurological:      Mental Status: She is alert.      Motor: No weakness.      Gait: Gait normal.   Psychiatric:         Mood and Affect: Mood normal.         Behavior: Behavior normal.         Thought Content: Thought content normal.                Art New DO

## 2024-01-13 DIAGNOSIS — Z17.0 MALIGNANT NEOPLASM OF UPPER-INNER QUADRANT OF LEFT BREAST IN FEMALE, ESTROGEN RECEPTOR POSITIVE: ICD-10-CM

## 2024-01-13 DIAGNOSIS — C50.212 MALIGNANT NEOPLASM OF UPPER-INNER QUADRANT OF LEFT BREAST IN FEMALE, ESTROGEN RECEPTOR POSITIVE: ICD-10-CM

## 2024-01-14 LAB
ALBUMIN SERPL-MCNC: 4.3 G/DL (ref 3.9–4.9)
ALBUMIN/GLOB SERPL: 1.9 {RATIO} (ref 1.2–2.2)
ALP SERPL-CCNC: 43 IU/L (ref 44–121)
ALT SERPL-CCNC: 13 IU/L (ref 0–32)
AST SERPL-CCNC: 16 IU/L (ref 0–40)
BASOPHILS # BLD AUTO: 0 X10E3/UL (ref 0–0.2)
BASOPHILS NFR BLD AUTO: 1 %
BILIRUB SERPL-MCNC: 0.3 MG/DL (ref 0–1.2)
BUN SERPL-MCNC: 8 MG/DL (ref 6–24)
BUN/CREAT SERPL: 15 (ref 9–23)
CALCIUM SERPL-MCNC: 9.7 MG/DL (ref 8.7–10.2)
CHLORIDE SERPL-SCNC: 103 MMOL/L (ref 96–106)
CO2 SERPL-SCNC: 23 MMOL/L (ref 20–29)
CREAT SERPL-MCNC: 0.53 MG/DL (ref 0.57–1)
EGFR: 113 ML/MIN/1.73
EOSINOPHIL # BLD AUTO: 0.2 X10E3/UL (ref 0–0.4)
EOSINOPHIL NFR BLD AUTO: 2 %
ERYTHROCYTE [DISTWIDTH] IN BLOOD BY AUTOMATED COUNT: 12.9 % (ref 11.7–15.4)
GLOBULIN SER-MCNC: 2.3 G/DL (ref 1.5–4.5)
GLUCOSE SERPL-MCNC: 100 MG/DL (ref 70–99)
HCT VFR BLD AUTO: 39 % (ref 34–46.6)
HGB BLD-MCNC: 12.4 G/DL (ref 11.1–15.9)
IMM GRANULOCYTES # BLD: 0 X10E3/UL (ref 0–0.1)
IMM GRANULOCYTES NFR BLD: 0 %
LIPASE SERPL-CCNC: 19 U/L (ref 14–72)
LYMPHOCYTES # BLD AUTO: 2.8 X10E3/UL (ref 0.7–3.1)
LYMPHOCYTES NFR BLD AUTO: 41 %
MCH RBC QN AUTO: 28.8 PG (ref 26.6–33)
MCHC RBC AUTO-ENTMCNC: 31.8 G/DL (ref 31.5–35.7)
MCV RBC AUTO: 91 FL (ref 79–97)
MONOCYTES # BLD AUTO: 0.7 X10E3/UL (ref 0.1–0.9)
MONOCYTES NFR BLD AUTO: 10 %
NEUTROPHILS # BLD AUTO: 3.2 X10E3/UL (ref 1.4–7)
NEUTROPHILS NFR BLD AUTO: 46 %
PLATELET # BLD AUTO: 268 X10E3/UL (ref 150–450)
POTASSIUM SERPL-SCNC: 4.5 MMOL/L (ref 3.5–5.2)
PROT SERPL-MCNC: 6.6 G/DL (ref 6–8.5)
RBC # BLD AUTO: 4.3 X10E6/UL (ref 3.77–5.28)
SODIUM SERPL-SCNC: 141 MMOL/L (ref 134–144)
WBC # BLD AUTO: 6.9 X10E3/UL (ref 3.4–10.8)

## 2024-01-16 ENCOUNTER — TELEPHONE (OUTPATIENT)
Dept: HEMATOLOGY ONCOLOGY | Facility: CLINIC | Age: 49
End: 2024-01-16

## 2024-01-16 RX ORDER — TAMOXIFEN CITRATE 20 MG/1
TABLET ORAL
Qty: 90 TABLET | Refills: 3 | Status: SHIPPED | OUTPATIENT
Start: 2024-01-16

## 2024-01-16 NOTE — TELEPHONE ENCOUNTER
Appointment Change  Cancel, Reschedule, Change to Virtual      Who are you speaking with? Patient   If it is not the patient, is the caller listed on the communication consent form? N/A   Which provider is the appointment scheduled with? JACIEL Garcia   When was the original appointment scheduled?    Please list date and time 2/19/24 @ 8   At which location is the appointment scheduled to take place? [place]   Was the appointment rescheduled?     Was the appointment changed from an in person visit to a virtual visit?    If so, please list the details of the change. 2/19/24 @ 10   What is the reason for the appointment change? Work meeting        Was STAR transport scheduled? No   Does STAR transport need to be scheduled for the new visit (if applicable) No   Does the patient need an infusion appointment rescheduled? No   Does the patient have an upcoming infusion appointment scheduled? If so, when? No   Is the patient undergoing chemotherapy? No   For appointments cancelled with less than 24 hours:  Was the no-show policy reviewed? N/A

## 2024-01-18 ENCOUNTER — TELEPHONE (OUTPATIENT)
Dept: HEMATOLOGY ONCOLOGY | Facility: CLINIC | Age: 49
End: 2024-01-18

## 2024-01-18 NOTE — TELEPHONE ENCOUNTER
Appointment Change  Cancel, Reschedule, Change to Virtual      Who are you speaking with? Patient   If it is not the patient, is the caller listed on the communication consent form? N/A   Which provider is the appointment scheduled with? Dr. Yoo   When was the original appointment scheduled?    Please list date and time 2/19/24 @ 820   At which location is the appointment scheduled to take place? [place]   Was the appointment rescheduled?     Was the appointment changed from an in person visit to a virtual visit?    If so, please list the details of the change. 3/4/24  @ 140   What is the reason for the appointment change? Work Conflict        Was STAR transport scheduled? No   Does STAR transport need to be scheduled for the new visit (if applicable) No   Does the patient need an infusion appointment rescheduled? No   Does the patient have an upcoming infusion appointment scheduled? If so, when? No   Is the patient undergoing chemotherapy? No   For appointments cancelled with less than 24 hours:  Was the no-show policy reviewed? N/A

## 2024-01-22 ENCOUNTER — HOSPITAL ENCOUNTER (INPATIENT)
Facility: HOSPITAL | Age: 49
LOS: 1 days | Discharge: HOME | DRG: 410 | End: 2024-01-26
Attending: EMERGENCY MEDICINE | Admitting: INTERNAL MEDICINE
Payer: COMMERCIAL

## 2024-01-22 ENCOUNTER — OFFICE VISIT (OUTPATIENT)
Dept: FAMILY MEDICINE CLINIC | Facility: CLINIC | Age: 49
End: 2024-01-22
Payer: COMMERCIAL

## 2024-01-22 VITALS
BODY MASS INDEX: 28.93 KG/M2 | SYSTOLIC BLOOD PRESSURE: 126 MMHG | HEIGHT: 66 IN | HEART RATE: 88 BPM | RESPIRATION RATE: 16 BRPM | TEMPERATURE: 97 F | WEIGHT: 180 LBS | DIASTOLIC BLOOD PRESSURE: 78 MMHG

## 2024-01-22 DIAGNOSIS — R74.01 TRANSAMINITIS: ICD-10-CM

## 2024-01-22 DIAGNOSIS — R10.13 EPIGASTRIC PAIN: Primary | ICD-10-CM

## 2024-01-22 DIAGNOSIS — R17 SERUM TOTAL BILIRUBIN ELEVATED: ICD-10-CM

## 2024-01-22 DIAGNOSIS — R74.01 ELEVATED TRANSAMINASE LEVEL: ICD-10-CM

## 2024-01-22 DIAGNOSIS — M79.18 MYOFASCIAL MUSCLE PAIN: ICD-10-CM

## 2024-01-22 DIAGNOSIS — Z17.0 MALIGNANT NEOPLASM OF UPPER-INNER QUADRANT OF LEFT BREAST IN FEMALE, ESTROGEN RECEPTOR POSITIVE: ICD-10-CM

## 2024-01-22 DIAGNOSIS — C50.212 MALIGNANT NEOPLASM OF UPPER-INNER QUADRANT OF LEFT BREAST IN FEMALE, ESTROGEN RECEPTOR POSITIVE: ICD-10-CM

## 2024-01-22 DIAGNOSIS — K80.50 CHOLEDOCHOLITHIASIS: ICD-10-CM

## 2024-01-22 PROCEDURE — 99285 EMERGENCY DEPT VISIT HI MDM: CPT | Performed by: PHYSICIAN ASSISTANT

## 2024-01-22 PROCEDURE — 96374 THER/PROPH/DIAG INJ IV PUSH: CPT

## 2024-01-22 PROCEDURE — 96375 TX/PRO/DX INJ NEW DRUG ADDON: CPT

## 2024-01-22 PROCEDURE — 99214 OFFICE O/P EST MOD 30 MIN: CPT | Performed by: NURSE PRACTITIONER

## 2024-01-22 PROCEDURE — 81003 URINALYSIS AUTO W/O SCOPE: CPT | Performed by: PHYSICIAN ASSISTANT

## 2024-01-22 PROCEDURE — 99284 EMERGENCY DEPT VISIT MOD MDM: CPT

## 2024-01-22 PROCEDURE — 85025 COMPLETE CBC W/AUTO DIFF WBC: CPT | Performed by: PHYSICIAN ASSISTANT

## 2024-01-22 PROCEDURE — 83690 ASSAY OF LIPASE: CPT | Performed by: PHYSICIAN ASSISTANT

## 2024-01-22 PROCEDURE — 80053 COMPREHEN METABOLIC PANEL: CPT | Performed by: PHYSICIAN ASSISTANT

## 2024-01-22 PROCEDURE — 84484 ASSAY OF TROPONIN QUANT: CPT | Performed by: PHYSICIAN ASSISTANT

## 2024-01-22 PROCEDURE — 36415 COLL VENOUS BLD VENIPUNCTURE: CPT | Performed by: PHYSICIAN ASSISTANT

## 2024-01-22 PROCEDURE — 93005 ELECTROCARDIOGRAM TRACING: CPT

## 2024-01-22 RX ORDER — CYCLOBENZAPRINE HCL 10 MG
10 TABLET ORAL 3 TIMES DAILY PRN
Qty: 20 TABLET | Refills: 0 | Status: SHIPPED | OUTPATIENT
Start: 2024-01-22 | End: 2024-01-30

## 2024-01-22 RX ORDER — ONDANSETRON 2 MG/ML
4 INJECTION INTRAMUSCULAR; INTRAVENOUS ONCE
Status: COMPLETED | OUTPATIENT
Start: 2024-01-23 | End: 2024-01-22

## 2024-01-22 RX ORDER — FAMOTIDINE 10 MG/ML
20 INJECTION, SOLUTION INTRAVENOUS ONCE
Status: COMPLETED | OUTPATIENT
Start: 2024-01-23 | End: 2024-01-22

## 2024-01-22 RX ORDER — MAGNESIUM HYDROXIDE/ALUMINUM HYDROXICE/SIMETHICONE 120; 1200; 1200 MG/30ML; MG/30ML; MG/30ML
30 SUSPENSION ORAL ONCE
Status: COMPLETED | OUTPATIENT
Start: 2024-01-23 | End: 2024-01-23

## 2024-01-22 RX ORDER — KETOROLAC TROMETHAMINE 30 MG/ML
15 INJECTION, SOLUTION INTRAMUSCULAR; INTRAVENOUS ONCE
Status: COMPLETED | OUTPATIENT
Start: 2024-01-23 | End: 2024-01-22

## 2024-01-22 RX ADMIN — FAMOTIDINE 20 MG: 10 INJECTION, SOLUTION INTRAVENOUS at 23:55

## 2024-01-22 RX ADMIN — KETOROLAC TROMETHAMINE 15 MG: 30 INJECTION, SOLUTION INTRAMUSCULAR; INTRAVENOUS at 23:55

## 2024-01-22 RX ADMIN — ONDANSETRON 4 MG: 2 INJECTION INTRAMUSCULAR; INTRAVENOUS at 23:55

## 2024-01-22 NOTE — PROGRESS NOTES
"Assessment/Plan:    Back pain musculoskeletal in nature- recommended moist heat and muscle relaxant up to TID, however discussed that this may make her drowsy.   She will resume Carafate as well as Pepcid for epigastric pain.  To schedule US previously ordered and f/u with GI.     1. Epigastric pain    2. Myofascial muscle pain  -     cyclobenzaprine (FLEXERIL) 10 mg tablet; Take 1 tablet (10 mg total) by mouth 3 (three) times a day as needed for muscle spasms    3. Malignant neoplasm of upper-inner quadrant of left breast in female, estrogen receptor positive           There are no Patient Instructions on file for this visit.    Return if symptoms worsen or fail to improve.    Subjective:      Patient ID: Jocelyn Smith is a 48 y.o. female.    Chief Complaint   Patient presents with   • Back Pain     States that she hurt her back over the weekend when she was carrying a heavy bag of rice. Upper back pain and shoulders. Also, still having some upper abdomen \"gurgling\". [place]       Here today with complaints of back pain that started 2 days ago.  Prior to onset, she lifted a bag of rice over her shoulder.    Taking Advil, Tylenol, and using a heating pad.  Also developed upper abdominal pain, s/p cholecystectomy a month and a half ago.  Was seen in the office a couple of weeks ago with prescribed Carafate.  Symptoms improved after 5 days.  Feels nauseas, but no vomiting.           The following portions of the patient's history were reviewed and updated as appropriate: allergies, current medications, past family history, past medical history, past social history, past surgical history and problem list.    Review of Systems   Constitutional: Negative.  Negative for chills and fever.   Gastrointestinal:  Positive for abdominal pain and nausea. Negative for constipation, diarrhea and vomiting.   Genitourinary: Negative.    Musculoskeletal:  Positive for back pain.         Current Outpatient Medications " "  Medication Sig Dispense Refill   • albuterol (ProAir HFA) 90 mcg/act inhaler Inhale 2 puffs every 4 (four) hours as needed for wheezing 18 g 1   • Biotin w/ Vitamins C & E 1250-7.5-7.5 MCG-MG-UNT CHEW in the morning     • cetirizine (ZyrTEC) 10 MG chewable tablet Chew 10 mg daily as needed      • Cholecalciferol (VITAMIN D3) 1000 units CHEW Chew 1 tablet daily     • cyclobenzaprine (FLEXERIL) 10 mg tablet Take 1 tablet (10 mg total) by mouth 3 (three) times a day as needed for muscle spasms 20 tablet 0   • fluticasone (FLONASE) 50 mcg/act nasal spray 1 spray into each nostril daily as needed      • Multiple Vitamins-Minerals (AIRBORNE PO) Take 1 tablet by mouth in the morning     • multivitamin (THERAGRAN) TABS Take 1 tablet by mouth daily     • rizatriptan (MAXALT-MLT) 10 mg disintegrating tablet Take 1 tablet (10 mg total) by mouth daily as needed for migraine Take at the onset of migraine; if symptoms continue or return, may take another dose at least 2 hours after first dose. Take no more than 2 doses in a day. 12 tablet 1   • sucralfate (CARAFATE) 1 g tablet Take 1 tablet (1 g total) by mouth 4 (four) times a day 40 tablet 0   • tamoxifen (NOLVADEX) 20 mg tablet TAKE 1 TABLET BY MOUTH DAILY 90 tablet 3   • BIOTIN W/ VITAMINS C & E PO Take 1 capsule by mouth in the morning (Patient not taking: Reported on 4/27/2023)       No current facility-administered medications for this visit.       Objective:    /78   Pulse 88   Temp (!) 97 °F (36.1 °C)   Resp 16   Ht 5' 6\" (1.676 m)   Wt 81.6 kg (180 lb)   LMP 01/02/2024 (Exact Date)   BMI 29.05 kg/m²        Physical Exam  Vitals and nursing note reviewed.   Constitutional:       Appearance: Normal appearance. She is well-developed.   Eyes:      Conjunctiva/sclera: Conjunctivae normal.   Cardiovascular:      Rate and Rhythm: Normal rate and regular rhythm.      Pulses: Normal pulses.      Heart sounds: Normal heart sounds. No murmur heard.  Pulmonary:      " Effort: Pulmonary effort is normal.      Breath sounds: Normal breath sounds.   Abdominal:      General: Bowel sounds are normal. There is no distension.      Palpations: Abdomen is soft.      Tenderness: There is abdominal tenderness in the epigastric area. There is no guarding or rebound.   Musculoskeletal:        Back:    Skin:     General: Skin is warm and dry.   Neurological:      Mental Status: She is alert.   Psychiatric:         Mood and Affect: Mood normal.         Behavior: Behavior normal.                JACIEL Hogan

## 2024-01-23 ENCOUNTER — APPOINTMENT (OUTPATIENT)
Dept: MRI IMAGING | Facility: HOSPITAL | Age: 49
DRG: 410 | End: 2024-01-23
Payer: COMMERCIAL

## 2024-01-23 ENCOUNTER — APPOINTMENT (EMERGENCY)
Dept: CT IMAGING | Facility: HOSPITAL | Age: 49
DRG: 410 | End: 2024-01-23
Payer: COMMERCIAL

## 2024-01-23 LAB
ALBUMIN SERPL BCP-MCNC: 3.7 G/DL (ref 3.5–5)
ALBUMIN SERPL BCP-MCNC: 4.4 G/DL (ref 3.5–5)
ALP SERPL-CCNC: 63 U/L (ref 34–104)
ALP SERPL-CCNC: 75 U/L (ref 34–104)
ALT SERPL W P-5'-P-CCNC: 206 U/L (ref 7–52)
ALT SERPL W P-5'-P-CCNC: 211 U/L (ref 7–52)
ANION GAP SERPL CALCULATED.3IONS-SCNC: 6 MMOL/L
ANION GAP SERPL CALCULATED.3IONS-SCNC: 9 MMOL/L
APAP SERPL-MCNC: <2 UG/ML (ref 10–20)
AST SERPL W P-5'-P-CCNC: 210 U/L (ref 13–39)
AST SERPL W P-5'-P-CCNC: 267 U/L (ref 13–39)
BASOPHILS # BLD AUTO: 0.02 THOUSANDS/ÂΜL (ref 0–0.1)
BASOPHILS # BLD AUTO: 0.04 THOUSANDS/ÂΜL (ref 0–0.1)
BASOPHILS NFR BLD AUTO: 0 % (ref 0–1)
BASOPHILS NFR BLD AUTO: 1 % (ref 0–1)
BILIRUB DIRECT SERPL-MCNC: 2.13 MG/DL (ref 0–0.2)
BILIRUB SERPL-MCNC: 2.21 MG/DL (ref 0.2–1)
BILIRUB SERPL-MCNC: 3.09 MG/DL (ref 0.2–1)
BILIRUB UR QL STRIP: NEGATIVE
BUN SERPL-MCNC: 6 MG/DL (ref 5–25)
BUN SERPL-MCNC: 6 MG/DL (ref 5–25)
CALCIUM SERPL-MCNC: 8.2 MG/DL (ref 8.4–10.2)
CALCIUM SERPL-MCNC: 9.3 MG/DL (ref 8.4–10.2)
CARDIAC TROPONIN I PNL SERPL HS: 4 NG/L
CHLORIDE SERPL-SCNC: 104 MMOL/L (ref 96–108)
CHLORIDE SERPL-SCNC: 108 MMOL/L (ref 96–108)
CHOLEST SERPL-MCNC: 155 MG/DL
CLARITY UR: ABNORMAL
CO2 SERPL-SCNC: 25 MMOL/L (ref 21–32)
CO2 SERPL-SCNC: 26 MMOL/L (ref 21–32)
COLOR UR: YELLOW
CREAT SERPL-MCNC: 0.45 MG/DL (ref 0.6–1.3)
CREAT SERPL-MCNC: 0.51 MG/DL (ref 0.6–1.3)
EOSINOPHIL # BLD AUTO: 0.19 THOUSAND/ÂΜL (ref 0–0.61)
EOSINOPHIL # BLD AUTO: 0.2 THOUSAND/ÂΜL (ref 0–0.61)
EOSINOPHIL NFR BLD AUTO: 3 % (ref 0–6)
EOSINOPHIL NFR BLD AUTO: 3 % (ref 0–6)
ERYTHROCYTE [DISTWIDTH] IN BLOOD BY AUTOMATED COUNT: 13.2 % (ref 11.6–15.1)
ERYTHROCYTE [DISTWIDTH] IN BLOOD BY AUTOMATED COUNT: 13.4 % (ref 11.6–15.1)
ETHANOL SERPL-MCNC: <10 MG/DL
GFR SERPL CREATININE-BSD FRML MDRD: 112 ML/MIN/1.73SQ M
GFR SERPL CREATININE-BSD FRML MDRD: 117 ML/MIN/1.73SQ M
GLUCOSE P FAST SERPL-MCNC: 103 MG/DL (ref 65–99)
GLUCOSE SERPL-MCNC: 103 MG/DL (ref 65–140)
GLUCOSE SERPL-MCNC: 120 MG/DL (ref 65–140)
GLUCOSE UR STRIP-MCNC: NEGATIVE MG/DL
HCT VFR BLD AUTO: 35.3 % (ref 34.8–46.1)
HCT VFR BLD AUTO: 40.9 % (ref 34.8–46.1)
HDLC SERPL-MCNC: 72 MG/DL
HGB BLD-MCNC: 11.2 G/DL (ref 11.5–15.4)
HGB BLD-MCNC: 13.1 G/DL (ref 11.5–15.4)
HGB UR QL STRIP.AUTO: NEGATIVE
IMM GRANULOCYTES # BLD AUTO: 0.01 THOUSAND/UL (ref 0–0.2)
IMM GRANULOCYTES # BLD AUTO: 0.03 THOUSAND/UL (ref 0–0.2)
IMM GRANULOCYTES NFR BLD AUTO: 0 % (ref 0–2)
IMM GRANULOCYTES NFR BLD AUTO: 1 % (ref 0–2)
KETONES UR STRIP-MCNC: ABNORMAL MG/DL
LDLC SERPL CALC-MCNC: 65 MG/DL (ref 0–100)
LEUKOCYTE ESTERASE UR QL STRIP: NEGATIVE
LIPASE SERPL-CCNC: 6 U/L (ref 11–82)
LYMPHOCYTES # BLD AUTO: 1.74 THOUSANDS/ÂΜL (ref 0.6–4.47)
LYMPHOCYTES # BLD AUTO: 2.01 THOUSANDS/ÂΜL (ref 0.6–4.47)
LYMPHOCYTES NFR BLD AUTO: 25 % (ref 14–44)
LYMPHOCYTES NFR BLD AUTO: 27 % (ref 14–44)
MAGNESIUM SERPL-MCNC: 2.1 MG/DL (ref 1.9–2.7)
MCH RBC QN AUTO: 29.3 PG (ref 26.8–34.3)
MCH RBC QN AUTO: 29.5 PG (ref 26.8–34.3)
MCHC RBC AUTO-ENTMCNC: 31.7 G/DL (ref 31.4–37.4)
MCHC RBC AUTO-ENTMCNC: 32 G/DL (ref 31.4–37.4)
MCV RBC AUTO: 92 FL (ref 82–98)
MCV RBC AUTO: 92 FL (ref 82–98)
MONOCYTES # BLD AUTO: 0.61 THOUSAND/ÂΜL (ref 0.17–1.22)
MONOCYTES # BLD AUTO: 0.66 THOUSAND/ÂΜL (ref 0.17–1.22)
MONOCYTES NFR BLD AUTO: 8 % (ref 4–12)
MONOCYTES NFR BLD AUTO: 9 % (ref 4–12)
NEUTROPHILS # BLD AUTO: 3.9 THOUSANDS/ÂΜL (ref 1.85–7.62)
NEUTROPHILS # BLD AUTO: 5.24 THOUSANDS/ÂΜL (ref 1.85–7.62)
NEUTS SEG NFR BLD AUTO: 60 % (ref 43–75)
NEUTS SEG NFR BLD AUTO: 63 % (ref 43–75)
NITRITE UR QL STRIP: NEGATIVE
NRBC BLD AUTO-RTO: 0 /100 WBCS
NRBC BLD AUTO-RTO: 0 /100 WBCS
PH UR STRIP.AUTO: 7.5 [PH]
PHOSPHATE SERPL-MCNC: 2.5 MG/DL (ref 2.7–4.5)
PLATELET # BLD AUTO: 228 THOUSANDS/UL (ref 149–390)
PLATELET # BLD AUTO: 274 THOUSANDS/UL (ref 149–390)
PMV BLD AUTO: 8.9 FL (ref 8.9–12.7)
PMV BLD AUTO: 9.1 FL (ref 8.9–12.7)
POTASSIUM SERPL-SCNC: 3.6 MMOL/L (ref 3.5–5.3)
POTASSIUM SERPL-SCNC: 3.7 MMOL/L (ref 3.5–5.3)
PROT SERPL-MCNC: 6.2 G/DL (ref 6.4–8.4)
PROT SERPL-MCNC: 7.6 G/DL (ref 6.4–8.4)
PROT UR STRIP-MCNC: NEGATIVE MG/DL
RBC # BLD AUTO: 3.82 MILLION/UL (ref 3.81–5.12)
RBC # BLD AUTO: 4.44 MILLION/UL (ref 3.81–5.12)
SALICYLATES SERPL-MCNC: <5 MG/DL (ref 3–20)
SODIUM SERPL-SCNC: 139 MMOL/L (ref 135–147)
SODIUM SERPL-SCNC: 139 MMOL/L (ref 135–147)
SP GR UR STRIP.AUTO: 1.01 (ref 1–1.03)
TRIGL SERPL-MCNC: 90 MG/DL
UROBILINOGEN UR STRIP-ACNC: <2 MG/DL
WBC # BLD AUTO: 6.49 THOUSAND/UL (ref 4.31–10.16)
WBC # BLD AUTO: 8.16 THOUSAND/UL (ref 4.31–10.16)

## 2024-01-23 PROCEDURE — 74181 MRI ABDOMEN W/O CONTRAST: CPT

## 2024-01-23 PROCEDURE — 80143 DRUG ASSAY ACETAMINOPHEN: CPT | Performed by: PHYSICIAN ASSISTANT

## 2024-01-23 PROCEDURE — 96375 TX/PRO/DX INJ NEW DRUG ADDON: CPT

## 2024-01-23 PROCEDURE — 96361 HYDRATE IV INFUSION ADD-ON: CPT

## 2024-01-23 PROCEDURE — 36415 COLL VENOUS BLD VENIPUNCTURE: CPT | Performed by: PHYSICIAN ASSISTANT

## 2024-01-23 PROCEDURE — 74177 CT ABD & PELVIS W/CONTRAST: CPT

## 2024-01-23 PROCEDURE — 80061 LIPID PANEL: CPT

## 2024-01-23 PROCEDURE — 96376 TX/PRO/DX INJ SAME DRUG ADON: CPT

## 2024-01-23 PROCEDURE — 80053 COMPREHEN METABOLIC PANEL: CPT

## 2024-01-23 PROCEDURE — 82248 BILIRUBIN DIRECT: CPT

## 2024-01-23 PROCEDURE — 82077 ASSAY SPEC XCP UR&BREATH IA: CPT | Performed by: PHYSICIAN ASSISTANT

## 2024-01-23 PROCEDURE — 84100 ASSAY OF PHOSPHORUS: CPT

## 2024-01-23 PROCEDURE — 85025 COMPLETE CBC W/AUTO DIFF WBC: CPT

## 2024-01-23 PROCEDURE — 80179 DRUG ASSAY SALICYLATE: CPT | Performed by: PHYSICIAN ASSISTANT

## 2024-01-23 PROCEDURE — 99222 1ST HOSP IP/OBS MODERATE 55: CPT | Performed by: INTERNAL MEDICINE

## 2024-01-23 PROCEDURE — 83735 ASSAY OF MAGNESIUM: CPT

## 2024-01-23 PROCEDURE — 99223 1ST HOSP IP/OBS HIGH 75: CPT | Performed by: INTERNAL MEDICINE

## 2024-01-23 RX ORDER — ONDANSETRON 2 MG/ML
4 INJECTION INTRAMUSCULAR; INTRAVENOUS EVERY 4 HOURS PRN
Status: DISCONTINUED | OUTPATIENT
Start: 2024-01-23 | End: 2024-01-26 | Stop reason: HOSPADM

## 2024-01-23 RX ORDER — PANTOPRAZOLE SODIUM 40 MG/1
40 TABLET, DELAYED RELEASE ORAL
Status: DISCONTINUED | OUTPATIENT
Start: 2024-01-23 | End: 2024-01-26 | Stop reason: HOSPADM

## 2024-01-23 RX ORDER — ALBUTEROL SULFATE 90 UG/1
2 AEROSOL, METERED RESPIRATORY (INHALATION) EVERY 4 HOURS PRN
Status: DISCONTINUED | OUTPATIENT
Start: 2024-01-23 | End: 2024-01-26 | Stop reason: HOSPADM

## 2024-01-23 RX ORDER — ACETAMINOPHEN 325 MG/1
650 TABLET ORAL EVERY 6 HOURS PRN
Status: DISCONTINUED | OUTPATIENT
Start: 2024-01-23 | End: 2024-01-26 | Stop reason: HOSPADM

## 2024-01-23 RX ORDER — FLUTICASONE PROPIONATE 50 MCG
2 SPRAY, SUSPENSION (ML) NASAL DAILY
Status: DISCONTINUED | OUTPATIENT
Start: 2024-01-23 | End: 2024-01-26 | Stop reason: HOSPADM

## 2024-01-23 RX ORDER — KETOROLAC TROMETHAMINE 30 MG/ML
15 INJECTION, SOLUTION INTRAMUSCULAR; INTRAVENOUS ONCE
Status: COMPLETED | OUTPATIENT
Start: 2024-01-23 | End: 2024-01-23

## 2024-01-23 RX ORDER — HYDROMORPHONE HCL IN WATER/PF 6 MG/30 ML
0.2 PATIENT CONTROLLED ANALGESIA SYRINGE INTRAVENOUS ONCE
Status: COMPLETED | OUTPATIENT
Start: 2024-01-23 | End: 2024-01-23

## 2024-01-23 RX ORDER — SODIUM CHLORIDE 9 MG/ML
100 INJECTION, SOLUTION INTRAVENOUS CONTINUOUS
Status: DISCONTINUED | OUTPATIENT
Start: 2024-01-23 | End: 2024-01-24

## 2024-01-23 RX ORDER — TAMOXIFEN CITRATE 10 MG/1
20 TABLET ORAL DAILY
Status: DISCONTINUED | OUTPATIENT
Start: 2024-01-23 | End: 2024-01-26 | Stop reason: HOSPADM

## 2024-01-23 RX ORDER — ENOXAPARIN SODIUM 100 MG/ML
40 INJECTION SUBCUTANEOUS DAILY
Status: DISCONTINUED | OUTPATIENT
Start: 2024-01-23 | End: 2024-01-26 | Stop reason: HOSPADM

## 2024-01-23 RX ORDER — SUCRALFATE 1 G/1
1 TABLET ORAL
Status: DISCONTINUED | OUTPATIENT
Start: 2024-01-23 | End: 2024-01-26 | Stop reason: HOSPADM

## 2024-01-23 RX ORDER — HYDROMORPHONE HCL IN WATER/PF 6 MG/30 ML
0.2 PATIENT CONTROLLED ANALGESIA SYRINGE INTRAVENOUS EVERY 2 HOUR PRN
Status: DISCONTINUED | OUTPATIENT
Start: 2024-01-23 | End: 2024-01-26 | Stop reason: HOSPADM

## 2024-01-23 RX ORDER — CYCLOBENZAPRINE HCL 10 MG
10 TABLET ORAL 3 TIMES DAILY PRN
Status: DISCONTINUED | OUTPATIENT
Start: 2024-01-23 | End: 2024-01-26 | Stop reason: HOSPADM

## 2024-01-23 RX ORDER — SENNOSIDES 8.6 MG
1 TABLET ORAL DAILY
Status: DISCONTINUED | OUTPATIENT
Start: 2024-01-23 | End: 2024-01-26 | Stop reason: HOSPADM

## 2024-01-23 RX ADMIN — ALUMINUM HYDROXIDE, MAGNESIUM HYDROXIDE, AND DIMETHICONE 30 ML: 200; 20; 200 SUSPENSION ORAL at 00:01

## 2024-01-23 RX ADMIN — HYDROMORPHONE HYDROCHLORIDE 0.2 MG: 0.2 INJECTION, SOLUTION INTRAMUSCULAR; INTRAVENOUS; SUBCUTANEOUS at 01:28

## 2024-01-23 RX ADMIN — SUCRALFATE 1 G: 1 TABLET ORAL at 11:01

## 2024-01-23 RX ADMIN — KETOROLAC TROMETHAMINE 15 MG: 30 INJECTION, SOLUTION INTRAMUSCULAR; INTRAVENOUS at 16:39

## 2024-01-23 RX ADMIN — SODIUM CHLORIDE 1000 ML: 0.9 INJECTION, SOLUTION INTRAVENOUS at 00:03

## 2024-01-23 RX ADMIN — ONDANSETRON 4 MG: 2 INJECTION INTRAMUSCULAR; INTRAVENOUS at 23:41

## 2024-01-23 RX ADMIN — SUCRALFATE 1 G: 1 TABLET ORAL at 15:47

## 2024-01-23 RX ADMIN — ACETAMINOPHEN 650 MG: 325 TABLET, FILM COATED ORAL at 11:05

## 2024-01-23 RX ADMIN — SODIUM CHLORIDE 100 ML/HR: 0.9 INJECTION, SOLUTION INTRAVENOUS at 06:06

## 2024-01-23 RX ADMIN — KETOROLAC TROMETHAMINE 15 MG: 30 INJECTION, SOLUTION INTRAMUSCULAR; INTRAVENOUS at 01:28

## 2024-01-23 RX ADMIN — IOHEXOL 85 ML: 350 INJECTION, SOLUTION INTRAVENOUS at 00:45

## 2024-01-23 RX ADMIN — TAMOXIFEN CITRATE 20 MG: 10 TABLET, FILM COATED ORAL at 23:30

## 2024-01-23 RX ADMIN — HYDROMORPHONE HYDROCHLORIDE 0.2 MG: 0.2 INJECTION, SOLUTION INTRAMUSCULAR; INTRAVENOUS; SUBCUTANEOUS at 23:37

## 2024-01-23 RX ADMIN — SUCRALFATE 1 G: 1 TABLET ORAL at 21:40

## 2024-01-23 RX ADMIN — PANTOPRAZOLE SODIUM 40 MG: 40 TABLET, DELAYED RELEASE ORAL at 11:01

## 2024-01-23 RX ADMIN — POTASSIUM & SODIUM PHOSPHATES POWDER PACK 280-160-250 MG 1 PACKET: 280-160-250 PACK at 15:47

## 2024-01-23 RX ADMIN — ACETAMINOPHEN 650 MG: 325 TABLET, FILM COATED ORAL at 20:16

## 2024-01-23 NOTE — H&P
[facility]  H&P  Name: Jocelyn Smith 48 y.o. female I MRN: [id number]  Unit/Bed#: ED-18 I Date of Admission: 1/22/2024   Date of Service: 1/23/2024 I Hospital Day: 0      Assessment/Plan   * Epigastric pain  Assessment & Plan  S/p cholecystectomy in 12/23.  Reports feeling fine but around 10 days ago she developed epigastric pain. Has an upcoming appointment with GI this week but recommended by PCP to follow up in the ED due to back pain that developed after she lifted a bag of rice.   Patient says pain 10/20 in the numeric pain scale accompanied by nausea, no vomiting.   Ketorolac, dilaudid, zofran and famotide administrated in the ED, intervention effective.   CT scan of abdomen is negative, blood work shows transaminitis, otherwise unremarkable.   NPO for possible intervention  GI consult in place     History of lumpectomy  Assessment & Plan  Dx with malignant neoplasm of upper-inner quadrant of left breast in female, estrogen receptor positive  in 2019  S/p lumpectomy of the left breast, radiation received  Takes tamoxifen 20 mg PO daily, continue with regimen  Follows up with oncology as outpatient    Mild intermittent asthma  Assessment & Plan  Follows up with PCP for management  Takes Proventil HFA inhaler when needed, continue with regimen  Stable for now    Migraine headache  Assessment & Plan  Suffer from migraines from many years ago   Takes rizatriptan at home, replaced with sumatriptan if needed   Stable for now      Vitamin D deficiency  Assessment & Plan  Takes vitamin D3 daily at home, continue with vitamins while inpatient  Stable for now         VTE Pharmacologic Prophylaxis: VTE Score: 3 Moderate Risk (Score 3-4) - Pharmacological DVT Prophylaxis Ordered: enoxaparin (Lovenox).  Code Status: Level 1 - Full Code   Discussion with family: Updated  () at bedside.    Anticipated Length of Stay: Patient will be admitted on an observation basis with an  anticipated length of stay of less than 2 midnights secondary to epigastric pain .    Total Time Spent on Date of Encounter in care of patient: 75 mins. This time was spent on one or more of the following: performing physical exam; counseling and coordination of care; obtaining or reviewing history; documenting in the medical record; reviewing/ordering tests, medications or procedures; communicating with other healthcare professionals and discussing with patient's family/caregivers.    Chief Complaint: epigastric pain and nausea.    History of Present Illness:  Jocelyn Smith is a 48 y.o. female with a PMH of asthma, breast CA, migraine and hx of radiation therapy  who presents with a new onset of epigastric pain. Patient is s/p cholecystectomy on December/2023. States she has been feeling fine after surgery but around 10 days ago, she lifted a bag of rice and hurt her back and then the pain started to feel more intense. She went to her PCP due to back and epigastric pain  and prescribed carafate and told her to stop taking after 5 days. The patient stopped taking it because she felt better. All of the sudden the pain has returned. Accompanied by nausea. PCP recommended to seek for further evaluation in the ER since pain became 10/10 in the numeric pain scale. In the ER ketorolac, famotidine, dilaudid and zofran was given for patient pain and comfort. CT of the abdomen is negative, blood shows transaminitis , otherwise is normal, VS are stable. The patient reports nausea and epigastric pain. She denies fever, chills, vomiting, diarrhea or any other symptoms than the stated above.     Review of Systems:  Review of Systems   Constitutional: Negative.    HENT: Negative.     Eyes: Negative.    Respiratory: Negative.     Cardiovascular: Negative.    Gastrointestinal:  Positive for abdominal pain and nausea.   Endocrine: Negative.    Genitourinary: Negative.    Skin: Negative.    Allergic/Immunologic: Negative.     Neurological: Negative.    Hematological: Negative.    Psychiatric/Behavioral: Negative.         Past Medical and Surgical History:   Past Medical History:   Diagnosis Date    Adhesive capsulitis of right shoulder     Resolved 11/15/2016     Asthma     Benign paroxysmal vertigo, unspecified ear     Resolved 11/15/2016     BRCA gene mutation negative 06/2019    [facility]    Breast cancer (HCC) 07/30/2019    left    Cellulitis of leg, except foot     Resolved 12/10/2014     Hx of radiation therapy 08/01/2019    Lymphadenitis     Resolved 12/10/2014     Migraine     Snoring     Wears contact lenses     Wears glasses        Past Surgical History:   Procedure Laterality Date    BREAST BIOPSY Left 2019    BREAST LUMPECTOMY Left 07/30/2019    Procedure: BREAST LUMPECTOMY; BREAST NEEDLE LOCALIZATION (NEEDLE LOC AT 0800);  Surgeon: Miguel Angel Steinberg MD;  [facility];  Service: Surgical Oncology    CHOLECYSTECTOMY LAPAROSCOPIC N/A 12/10/2023    Procedure: CHOLECYSTECTOMY LAPAROSCOPIC;  Surgeon: Yuan Humphrey MD;  [facility];  Service: General    COLONOSCOPY      INTRAUTERINE DEVICE INSERTION  2019    LYMPH NODE BIOPSY Left 07/30/2019    Procedure: SENTINEL LYMPH NODE BIOPSY; LYMPHATIC MAPPING WITH BLUE DYE AND RADIOACTIVE DYE (INJECT AT 0930 BY DR STEINBERG IN THE OR);  Surgeon: Miguel Angel Steinberg MD;  [facility];  Service: Surgical Oncology    MAMMO NEEDLE LOCALIZATION LEFT (ALL INC) Left 07/30/2019    NC GRAFTING OF AUTOLOGOUS FAT BY LIPO 50 CC OR LESS Bilateral 5/11/2023    Procedure: FAT GRAFTING TO BILATERAL BREASTS;  Surgeon: Venita Lloyd DO;  Location:  MAIN OR;  Service: Plastics    NC MASTOPEXY Right 5/11/2023    Procedure: RIGHT CRESCENTRIC MASTOPEXY;  Surgeon: Venita Lloyd DO;  Location:  MAIN OR;  Service: Plastics    SALPINGECTOMY Bilateral 03/2021    SHOULDER SURGERY Right     Last assessed 12/29/2015     US GUIDED BREAST BIOPSY LEFT COMPLETE Left 06/07/2019    KATHLEEN  TOOTH EXTRACTION         Meds/Allergies:  Prior to Admission medications    Medication Sig Start Date End Date Taking? Authorizing Provider   albuterol (ProAir HFA) 90 mcg/act inhaler Inhale 2 puffs every 4 (four) hours as needed for wheezing 1/24/22   Bea Almodovar DO   Biotin w/ Vitamins C & E 1250-7.5-7.5 MCG-MG-UNT CHEW in the morning 1/1/22   Historical Provider, MD   BIOTIN W/ VITAMINS C & E PO Take 1 capsule by mouth in the morning  Patient not taking: Reported on 4/27/2023    Historical Provider, MD   cetirizine (ZyrTEC) 10 MG chewable tablet Chew 10 mg daily as needed     Historical Provider, MD   Cholecalciferol (VITAMIN D3) 1000 units CHEW Chew 1 tablet daily    Historical Provider, MD   cyclobenzaprine (FLEXERIL) 10 mg tablet Take 1 tablet (10 mg total) by mouth 3 (three) times a day as needed for muscle spasms 1/22/24   JACIEL Hogan   fluticasone (FLONASE) 50 mcg/act nasal spray 1 spray into each nostril daily as needed     Historical Provider, MD   Multiple Vitamins-Minerals (AIRBORNE PO) Take 1 tablet by mouth in the morning    Historical Provider, MD   multivitamin (THERAGRAN) TABS Take 1 tablet by mouth daily    Historical Provider, MD   rizatriptan (MAXALT-MLT) 10 mg disintegrating tablet Take 1 tablet (10 mg total) by mouth daily as needed for migraine Take at the onset of migraine; if symptoms continue or return, may take another dose at least 2 hours after first dose. Take no more than 2 doses in a day. 4/11/23   Bea Almodovar DO   sucralfate (CARAFATE) 1 g tablet Take 1 tablet (1 g total) by mouth 4 (four) times a day 1/12/24   Art New,    tamoxifen (NOLVADEX) 20 mg tablet TAKE 1 TABLET BY MOUTH DAILY 1/16/24   Taiwo Yoo MD     I have reviewed home medications with patient personally.    Allergies: No Known Allergies    Social History:  Marital Status: /Civil Union   Occupation: works  Patient Pre-hospital Living Situation: Home  Patient Pre-hospital Level of  Mobility: walks  Patient Pre-hospital Diet Restrictions: None  Substance Use History:   Social History     Substance and Sexual Activity   Alcohol Use Yes    Comment: rare     Social History     Tobacco Use   Smoking Status Never    Passive exposure: Never   Smokeless Tobacco Never     Social History     Substance and Sexual Activity   Drug Use No       Family History:  Family History   Problem Relation Age of Onset    Alzheimer's disease Mother     COPD Father     No Known Problems Sister     No Known Problems Daughter     Alzheimer's disease Maternal Grandmother     No Known Problems Maternal Grandfather     No Known Problems Paternal Grandmother     No Known Problems Paternal Grandfather     No Known Problems Brother     No Known Problems Brother     No Known Problems Brother     No Known Problems Daughter        Physical Exam:     Vitals:   Blood Pressure: 121/70 (01/23/24 0200)  Pulse: 78 (01/23/24 0200)  Temperature: 98.2 °F (36.8 °C) (01/22/24 2330)  Temp Source: Oral (01/22/24 2330)  Respirations: 16 (01/23/24 0200)  SpO2: 96 % (01/23/24 0200)    Physical Exam  Vitals and nursing note reviewed.   Constitutional:       General: She is not in acute distress.     Appearance: Normal appearance. She is well-developed.   HENT:      Head: Normocephalic and atraumatic.      Nose: Nose normal.      Mouth/Throat:      Mouth: Mucous membranes are moist.      Pharynx: Oropharynx is clear.   Eyes:      Conjunctiva/sclera: Conjunctivae normal.      Pupils: Pupils are equal, round, and reactive to light.   Cardiovascular:      Rate and Rhythm: Normal rate and regular rhythm.      Pulses: Normal pulses.      Heart sounds: Normal heart sounds. No murmur heard.  Pulmonary:      Effort: Pulmonary effort is normal. No respiratory distress.      Breath sounds: Normal breath sounds.   Abdominal:      General: Bowel sounds are normal.      Palpations: Abdomen is soft.      Tenderness: There is no abdominal tenderness.    Musculoskeletal:         General: No swelling.      Cervical back: Normal range of motion and neck supple.   Skin:     General: Skin is warm and dry.      Capillary Refill: Capillary refill takes less than 2 seconds.   Neurological:      General: No focal deficit present.      Mental Status: She is alert and oriented to person, place, and time. Mental status is at baseline.   Psychiatric:         Mood and Affect: Mood normal.          Additional Data:     Lab Results:  Results from last 7 days   Lab Units 01/22/24  2355   WBC Thousand/uL 8.16   HEMOGLOBIN g/dL 13.1   HEMATOCRIT % 40.9   PLATELETS Thousands/uL 274   NEUTROS PCT % 63   LYMPHS PCT % 25   MONOS PCT % 8   EOS PCT % 3     Results from last 7 days   Lab Units 01/22/24  2355   SODIUM mmol/L 139   POTASSIUM mmol/L 3.7   CHLORIDE mmol/L 104   CO2 mmol/L 26   BUN mg/dL 6   CREATININE mg/dL 0.51*   ANION GAP mmol/L 9   CALCIUM mg/dL 9.3   ALBUMIN g/dL 4.4   TOTAL BILIRUBIN mg/dL 2.21*   ALK PHOS U/L 75   ALT U/L 211*   AST U/L 267*   GLUCOSE RANDOM mg/dL 120                       Lines/Drains:  Invasive Devices       Peripheral Intravenous Line  Duration             Peripheral IV 01/22/24 Left Antecubital <1 day                        Imaging: I have personally reviewed all imaging pertaning this admission.   CT abdomen pelvis with contrast   ED Interpretation by Mathew Vincent PA-C (01/23 0137)   Reading Physician Reading Date Result Priority  Cb Chatterjee MD  [phone] 1/23/2024     Narrative & Impression  CT ABDOMEN AND PELVIS WITH IV CONTRAST     INDICATION: Epigastric pain and nausea.     COMPARISON: 12/10/2023.     TECHNIQUE: CT examination of the abdomen and pelvis was performed. Multiplanar 2D reformatted images were created from the source data.     This examination, like all CT scans performed in the [facility], was performed utilizing techniques to minimize radiation dose exposure, including the use of iterative  reconstruction and automated exposure control. Radiation dose length   product (DLP) for this visit: 744 mGy-cm     IV Contrast: 85 mL of iohexol (OMNIPAQUE)  Enteric Contrast: Not administered.     FINDINGS:     ABDOMEN     LOWER CHEST: Clear lung bases.     LIVER/BILIARY TREE: Hepatic steatosis.     GALLBLADDER: Cholecystectomy.     SPLEEN: Unremarkable.     PANCREAS: Unremarkable.     ADRENAL GLANDS: Unremarkable.     KIDNEYS/UR   ETERS: No pyelonephritis or obstructive uropathy.     STOMACH AND BOWEL: No evidence of bowel obstruction, colitis or diverticulitis.     APPENDIX: No findings to suggest appendicitis.     ABDOMINOPELVIC CAVITY: No ascites. No pneumoperitoneum. No lymphadenopathy.     VESSELS: No abdominal aortic aneurysm.     PELVIS     REPRODUCTIVE ORGANS: No adnexal mass or cyst.     URINARY BLADDER: Unremarkable.     ABDOMINAL WALL/INGUINAL REGIONS: Unremarkable.     BONES: No lytic or blastic lesion.     IMPRESSION:     No evidence of acute intra-abdominal or pelvic process        Workstation performed: [id number]        Final Result by Cb Chatterjee MD (01/23 0127)      No evidence of acute intra-abdominal or pelvic process         Workstation performed: [id number]             EKG and Other Studies Reviewed on Admission:   EKG: NSR. HR 78.on monitor at bedside    ** Please Note: This note has been constructed using a voice recognition system. **

## 2024-01-23 NOTE — CONSULTS
Consultation - GI   Jocelyn Smith 48 y.o. female MRN: [id number]  Unit/Bed#: -01 Encounter: [id number]    Please see attending attestation for formal recommendations     Assessment/Plan     Assessment:  Epigastric pain  Ddx: PUD, choledocholithiasis, pancreatic duct blockage?  Elevated liver enzymes  Hepatocellular vs obstruction seems less likely given normal Alk phos  Does have steatosis on imaging  Labs improving since yesterday  Plan:  Start protonix  Start carafate  Obtain D bili  Will discuss with attending if Endoscopy vs ERCP is warranted inpatient    History of Present Illness   Physician Requesting Consult: Hayley Ferguson MD  Reason for Consult / Principal Problem: epigastric pain  Hx and PE limited by:   HPI: Jocelyn Smith is a 48 y.o. year old female who presents with 2 weeks of epigastric pain. Patient had a cholecystectomy on 12/10/2023. Postoperatively she was doing well, without pain. However 2 weeks ago, she began having persistent epigastric pain. She felt that there was a churning acidic sensation. She denies association with certain foods as she feels the pain pre and post prandially.  She had associated nausea and belching with it. Her PCP prescribed carafate, which helped the pain for about 5 days, then she stopped taking it and the pain returned. She denies any issues with constipation and diarrhea. No blood per rectum nor tarry stools. She received Toradol and dilaudid prior to my interview, before receiving the medication her pain was 8-9/10. Now it is a 2/10 post medication.     She denies any weight loss, fevers, chills, rashes. She denies radiation of the pain. She does have back pain that started this weekend after she carried a heavy bag of rice. This pain has been relieved by muscle relaxers.     Inpatient consult to gastroenterology  Consult performed by: Jenny Stallworth DO  Consult ordered by: JACIEL Sams          Review of Systems   Constitutional:   Negative for appetite change, chills, fatigue and fever.   Cardiovascular:  Negative for chest pain and palpitations.   Gastrointestinal:  Positive for abdominal pain and nausea. Negative for abdominal distention, blood in stool, constipation and vomiting.   Musculoskeletal:  Positive for back pain. Negative for arthralgias.   Skin:  Negative for color change and rash.   All other systems reviewed and are negative.      Historical Information   Past Medical History:   Diagnosis Date    Adhesive capsulitis of right shoulder     Resolved 11/15/2016     Asthma     Benign paroxysmal vertigo, unspecified ear     Resolved 11/15/2016     BRCA gene mutation negative 06/2019    [facility]    Breast cancer (HCC) 07/30/2019    left    Cellulitis of leg, except foot     Resolved 12/10/2014     Hx of radiation therapy 08/01/2019    Lymphadenitis     Resolved 12/10/2014     Migraine     Snoring     Wears contact lenses     Wears glasses      Past Surgical History:   Procedure Laterality Date    BREAST BIOPSY Left 2019    BREAST LUMPECTOMY Left 07/30/2019    Procedure: BREAST LUMPECTOMY; BREAST NEEDLE LOCALIZATION (NEEDLE LOC AT 0800);  Surgeon: Miguel Angel Steinberg MD;  [facility];  Service: Surgical Oncology    CHOLECYSTECTOMY LAPAROSCOPIC N/A 12/10/2023    Procedure: CHOLECYSTECTOMY LAPAROSCOPIC;  Surgeon: Yuan Humphrey MD;  [facility];  Service: General    COLONOSCOPY      INTRAUTERINE DEVICE INSERTION  2019    LYMPH NODE BIOPSY Left 07/30/2019    Procedure: SENTINEL LYMPH NODE BIOPSY; LYMPHATIC MAPPING WITH BLUE DYE AND RADIOACTIVE DYE (INJECT AT 0930 BY DR STEINBERG IN THE OR);  Surgeon: Miguel Angel Steinberg MD;  [facility];  Service: Surgical Oncology    MAMMO NEEDLE LOCALIZATION LEFT (ALL INC) Left 07/30/2019    DE GRAFTING OF AUTOLOGOUS FAT BY LIPO 50 CC OR LESS Bilateral 5/11/2023    Procedure: FAT GRAFTING TO BILATERAL BREASTS;  Surgeon: Venita Lloyd DO;  Location:  [facility] OR;  Service: Plastics    DE  MASTOPEXY Right 5/11/2023    Procedure: RIGHT CRESCENTRIC MASTOPEXY;  Surgeon: Venita Lloyd DO;  Location:  [facility] OR;  Service: Plastics    SALPINGECTOMY Bilateral 03/2021    SHOULDER SURGERY Right     Last assessed 12/29/2015     US GUIDED BREAST BIOPSY LEFT COMPLETE Left 06/07/2019    WISDOM TOOTH EXTRACTION       Social History   Social History     Substance and Sexual Activity   Alcohol Use Yes    Comment: rare     Social History     Substance and Sexual Activity   Drug Use No     E-Cigarette/Vaping    E-Cigarette Use Never User      E-Cigarette/Vaping Substances    Nicotine No     THC No     CBD No     Flavoring No     Other No     Unknown No      Social History     Tobacco Use   Smoking Status Never    Passive exposure: Never   Smokeless Tobacco Never     Family History:   Family History   Problem Relation Age of Onset    Alzheimer's disease Mother     COPD Father     No Known Problems Sister     No Known Problems Daughter     Alzheimer's disease Maternal Grandmother     No Known Problems Maternal Grandfather     No Known Problems Paternal Grandmother     No Known Problems Paternal Grandfather     No Known Problems Brother     No Known Problems Brother     No Known Problems Brother     No Known Problems Daughter        Meds/Allergies   all current active meds have been reviewed    No Known Allergies    Objective       Intake/Output Summary (Last 24 hours) at 1/23/2024 0852  Last data filed at 1/23/2024 0130  Gross per 24 hour   Intake 1000 ml   Output --   Net 1000 ml       Invasive Devices:   Peripheral IV 01/22/24 Left Antecubital (Active)   Site Assessment WDL 01/23/24 0600   Dressing Type Transparent 01/23/24 0600   Line Status Blood return noted;Flushed;Infusing 01/23/24 0600   Dressing Status Clean;Dry;Intact 01/23/24 0600   Dressing Change Due 01/25/24 01/23/24 0600       Physical Exam  Vitals and nursing note reviewed.   Constitutional:       General: She is not in acute distress.      Appearance: She is well-developed. She is not toxic-appearing.   HENT:      Head: Normocephalic and atraumatic.      Mouth/Throat:      Mouth: Mucous membranes are moist.   Eyes:      Extraocular Movements: Extraocular movements intact.      Conjunctiva/sclera: Conjunctivae normal.   Cardiovascular:      Rate and Rhythm: Normal rate and regular rhythm.      Heart sounds: No murmur heard.  Pulmonary:      Effort: Pulmonary effort is normal. No respiratory distress.      Breath sounds: Normal breath sounds.   Abdominal:      General: There is no distension.      Palpations: Abdomen is soft. There is no mass.      Tenderness: There is abdominal tenderness (epigastric). There is no right CVA tenderness, left CVA tenderness, guarding or rebound.   Musculoskeletal:         General: No swelling.      Cervical back: Neck supple.      Right lower leg: No edema.      Left lower leg: No edema.   Skin:     General: Skin is warm and dry.      Capillary Refill: Capillary refill takes less than 2 seconds.      Coloration: Skin is not jaundiced.   Neurological:      Mental Status: She is alert and oriented to person, place, and time.   Psychiatric:         Mood and Affect: Mood normal.         Lab Results: I have personally reviewed pertinent reports.    Imaging Studies: I have personally reviewed pertinent reports.   and I have personally reviewed pertinent films in PACS  EKG, Pathology, and Other Studies: I have personally reviewed pertinent reports.      VTE Prophylaxis: Enoxaparin (Lovenox)    Counseling / Coordination of Care  Total floor / unit time spent today 30 minutes. Greater than 50% of total time was spent with the patient and / or family counseling and / or coordination of care.

## 2024-01-23 NOTE — PLAN OF CARE
Problem: PAIN - ADULT  Goal: Verbalizes/displays adequate comfort level or baseline comfort level  Description: Interventions:  - Encourage patient to monitor pain and request assistance  - Assess pain using appropriate pain scale  - Administer analgesics based on type and severity of pain and evaluate response  - Implement non-pharmacological measures as appropriate and evaluate response  - Consider cultural and social influences on pain and pain management  - Notify physician/advanced practitioner if interventions unsuccessful or patient reports new pain  1/23/2024 0307 by Arpita Caba RN  Outcome: Progressing  1/23/2024 0306 by Arpita Caba RN  Outcome: Progressing     Problem: INFECTION - ADULT  Goal: Absence or prevention of progression during hospitalization  Description: INTERVENTIONS:  - Assess and monitor for signs and symptoms of infection  - Monitor lab/diagnostic results  - Monitor all insertion sites, i.e. indwelling lines, tubes, and drains  - Monitor endotracheal if appropriate and nasal secretions for changes in amount and color  - [place] appropriate cooling/warming therapies per order  - Administer medications as ordered  - Instruct and encourage patient and family to use good hand hygiene technique  - Identify and instruct in appropriate isolation precautions for identified infection/condition  1/23/2024 0307 by Arpita Caba RN  Outcome: Progressing  1/23/2024 0306 by Arpita Caba RN  Outcome: Progressing     Problem: DISCHARGE PLANNING  Goal: Discharge to home or other facility with appropriate resources  Description: INTERVENTIONS:  - Identify barriers to discharge w/patient and caregiver  - Arrange for needed discharge resources and transportation as appropriate  - Identify discharge learning needs (meds, wound care, etc.)  - Arrange for interpretive services to assist at discharge as needed  - Refer to Case Management Department for coordinating discharge planning  if the patient needs post-hospital services based on physician/advanced practitioner order or complex needs related to functional status, cognitive ability, or social support system  1/23/2024 0307 by Arpita Caba RN  Outcome: Progressing  1/23/2024 0306 by Arpita Caba RN  Outcome: Progressing     Problem: Knowledge Deficit  Goal: Patient/family/caregiver demonstrates understanding of disease process, treatment plan, medications, and discharge instructions  Description: Complete learning assessment and assess knowledge base.  Interventions:  - Provide teaching at level of understanding  - Provide teaching via preferred learning methods  1/23/2024 0307 by Arpita Caba RN  Outcome: Progressing  1/23/2024 0306 by Arpita Caba RN  Outcome: Progressing

## 2024-01-23 NOTE — ASSESSMENT & PLAN NOTE
Dx with malignant neoplasm of upper-inner quadrant of left breast in female, estrogen receptor positive  in 2019  S/p lumpectomy of the left breast, radiation received  Takes tamoxifen 20 mg PO daily, continue with regimen  Follows up with oncology as outpatient

## 2024-01-23 NOTE — ASSESSMENT & PLAN NOTE
Follows up with PCP for management  Takes Proventil HFA inhaler when needed, continue with regimen  Stable for now

## 2024-01-23 NOTE — ED PROVIDER NOTES
"History  Chief Complaint   Patient presents with    Epigastric Pain     Started last Wednesday. Was given sulcrafate by PCP and got better but has gotten worse again. Has abdominal US for tomorrow and sees GI next week. Had gall bladder taken out last month     Patient is a 48-year-old female with a history of migraine, surgical history of cholecystectomy 12/10/2023, bilateral salpingectomy that presents to the emergency department with dull aching persistent worsening epigastric pain symptoms for approximately 1 week.  Patient reports associated symptomatology of nausea beginning with a coronary presentation of epigastric pain.  Patient reports that current pain symptoms resemble those previous experience when she had \"my gallbladder pain.\"  Patient with recent visit to PCP and prescribed outpatient ultrasound, considered possible EGD via GI, prescribed sucralfate and outpatient labs.  Patient states that she has finished her sucralfate a couple days prior to coronary presentation and epigastric pain returned at this time, and came to the ED for further evaluation of epigastric pain symptomatology.  Patient does however stated that she had recently eaten some Citizen of [place] food that \"may have given me some trouble.\"  Patient denies palliative factors with provocative factors of pressure to upper abdomen.  Patient has not effective treatment.  Patient denies recent EtOH use.  Patient denies fevers, chills, vomiting, diarrhea, constipation and urinary symptoms.  Patient has recent fall recent trauma.  Patient denies sick contacts recent travel.  Patient denies chest pain and shortness of breath.      History provided by:  Patient   used: No    Epigastric Pain  Associated symptoms: abdominal pain and nausea    Associated symptoms: no back pain, no cough, no dizziness, no fever, no headache, no numbness, no palpitations, no shortness of breath, not vomiting and no weakness        Prior to Admission " Medications   Prescriptions Last Dose Informant Patient Reported? Taking?   BIOTIN W/ VITAMINS C & E PO  Self Yes No   Sig: Take 1 capsule by mouth in the morning   Patient not taking: Reported on 2023   Biotin w/ Vitamins C & E 1250-7.5-7.5 MCG-MG-UNT CHEW  Self Yes No   Sig: in the morning   Cholecalciferol (VITAMIN D3) 1000 units CHEW  Self Yes No   Sig: Chew 1 tablet daily   Multiple Vitamins-Minerals (AIRBORNE PO)  Self Yes No   Sig: Take 1 tablet by mouth in the morning   albuterol (ProAir HFA) 90 mcg/act inhaler  Self No No   Sig: Inhale 2 puffs every 4 (four) hours as needed for wheezing   cetirizine (ZyrTEC) 10 MG chewable tablet  Self Yes No   Sig: Chew 10 mg daily as needed    cyclobenzaprine (FLEXERIL) 10 mg tablet   No No   Sig: Take 1 tablet (10 mg total) by mouth 3 (three) times a day as needed for muscle spasms   fluticasone (FLONASE) 50 mcg/act nasal spray  Self Yes No   Si spray into each nostril daily as needed    multivitamin (THERAGRAN) TABS  Self Yes No   Sig: Take 1 tablet by mouth daily   rizatriptan (MAXALT-MLT) 10 mg disintegrating tablet  Self No No   Sig: Take 1 tablet (10 mg total) by mouth daily as needed for migraine Take at the onset of migraine; if symptoms continue or return, may take another dose at least 2 hours after first dose. Take no more than 2 doses in a day.   sucralfate (CARAFATE) 1 g tablet   No No   Sig: Take 1 tablet (1 g total) by mouth 4 (four) times a day   tamoxifen (NOLVADEX) 20 mg tablet   No No   Sig: TAKE 1 TABLET BY MOUTH DAILY      Facility-Administered Medications: None       Past Medical History:   Diagnosis Date    Adhesive capsulitis of right shoulder     Resolved 11/15/2016     Asthma     Benign paroxysmal vertigo, unspecified ear     Resolved 11/15/2016     BRCA gene mutation negative 2019    [facility]    Breast cancer (HCC) 2019    left    Cellulitis of leg, except foot     Resolved 12/10/2014     Hx of radiation therapy 2019     Lymphadenitis     Resolved 12/10/2014     Migraine     Snoring     Wears contact lenses     Wears glasses        Past Surgical History:   Procedure Laterality Date    BREAST BIOPSY Left 2019    BREAST LUMPECTOMY Left 07/30/2019    Procedure: BREAST LUMPECTOMY; BREAST NEEDLE LOCALIZATION (NEEDLE LOC AT 0800);  Surgeon: Miguel Angel Steinberg MD;  [facility];  Service: Surgical Oncology    CHOLECYSTECTOMY LAPAROSCOPIC N/A 12/10/2023    Procedure: CHOLECYSTECTOMY LAPAROSCOPIC;  Surgeon: Yuan Humphrey MD;  [facility];  Service: General    COLONOSCOPY      INTRAUTERINE DEVICE INSERTION  2019    LYMPH NODE BIOPSY Left 07/30/2019    Procedure: SENTINEL LYMPH NODE BIOPSY; LYMPHATIC MAPPING WITH BLUE DYE AND RADIOACTIVE DYE (INJECT AT 0930 BY DR STEINBERG IN THE OR);  Surgeon: Miguel Angel Steinberg MD;  [facility];  Service: Surgical Oncology    MAMMO NEEDLE LOCALIZATION LEFT (ALL INC) Left 07/30/2019    TN GRAFTING OF AUTOLOGOUS FAT BY LIPO 50 CC OR LESS Bilateral 5/11/2023    Procedure: FAT GRAFTING TO BILATERAL BREASTS;  Surgeon: Venita Lloyd DO;  Location:  MAIN OR;  Service: Plastics    TN MASTOPEXY Right 5/11/2023    Procedure: RIGHT CRESCENTRIC MASTOPEXY;  Surgeon: Venita Lloyd DO;  Location:  MAIN OR;  Service: Plastics    SALPINGECTOMY Bilateral 03/2021    SHOULDER SURGERY Right     Last assessed 12/29/2015     US GUIDED BREAST BIOPSY LEFT COMPLETE Left 06/07/2019    WISDOM TOOTH EXTRACTION         Family History   Problem Relation Age of Onset    Alzheimer's disease Mother     COPD Father     No Known Problems Sister     No Known Problems Daughter     Alzheimer's disease Maternal Grandmother     No Known Problems Maternal Grandfather     No Known Problems Paternal Grandmother     No Known Problems Paternal Grandfather     No Known Problems Brother     No Known Problems Brother     No Known Problems Brother     No Known Problems Daughter      I have reviewed and agree with the  history as documented.    E-Cigarette/Vaping    E-Cigarette Use Never User      E-Cigarette/Vaping Substances    Nicotine No     THC No     CBD No     Flavoring No     Other No     Unknown No      Social History     Tobacco Use    Smoking status: Never     Passive exposure: Never    Smokeless tobacco: Never   Vaping Use    Vaping status: Never Used   Substance Use Topics    Alcohol use: Yes     Comment: rare    Drug use: No       Review of Systems   Constitutional:  Negative for activity change, appetite change, chills and fever.   HENT:  Negative for congestion, postnasal drip, rhinorrhea, sinus pressure, sinus pain, sore throat and tinnitus.    Eyes:  Negative for photophobia and visual disturbance.   Respiratory:  Negative for cough, chest tightness and shortness of breath.    Cardiovascular:  Negative for chest pain and palpitations.   Gastrointestinal:  Positive for abdominal pain and nausea. Negative for constipation, diarrhea and vomiting.   Genitourinary:  Negative for difficulty urinating, dysuria, flank pain, frequency and urgency.   Musculoskeletal:  Negative for back pain, gait problem, neck pain and neck stiffness.   Skin:  Negative for pallor and rash.   Allergic/Immunologic: Negative for environmental allergies and food allergies.   Neurological:  Negative for dizziness, weakness, numbness and headaches.   Psychiatric/Behavioral:  Negative for confusion.    All other systems reviewed and are negative.      Physical Exam  Physical Exam  Vitals and nursing note reviewed.   Constitutional:       General: She is awake.      Appearance: Normal appearance. She is well-developed and normal weight. She is not ill-appearing, toxic-appearing or diaphoretic.      Comments: /82   Pulse 82   Temp 98.2 °F (36.8 °C) (Oral)   Resp 16   LMP 01/02/2024 (Exact Date)   SpO2 97%      HENT:      Head: Normocephalic and atraumatic.      Jaw: There is normal jaw occlusion.      Right Ear: Hearing, tympanic  membrane and external ear normal. No decreased hearing noted. No drainage, swelling or tenderness. No mastoid tenderness.      Left Ear: Hearing, tympanic membrane and external ear normal. No decreased hearing noted. No drainage, swelling or tenderness. No mastoid tenderness.      Nose: Nose normal.      Mouth/Throat:      Lips: Pink.      Mouth: Mucous membranes are moist.      Pharynx: Oropharynx is clear. Uvula midline.   Eyes:      General: Lids are normal. Vision grossly intact. Gaze aligned appropriately.         Right eye: No discharge.         Left eye: No discharge.      Extraocular Movements: Extraocular movements intact.      Conjunctiva/sclera: Conjunctivae normal.      Pupils: Pupils are equal, round, and reactive to light.   Neck:      Vascular: No JVD.      Trachea: Trachea and phonation normal. No tracheal tenderness or tracheal deviation.   Cardiovascular:      Rate and Rhythm: Normal rate and regular rhythm.      Pulses: Normal pulses.           Radial pulses are 2+ on the right side and 2+ on the left side.        Posterior tibial pulses are 2+ on the right side and 2+ on the left side.      Heart sounds: Normal heart sounds.   Pulmonary:      Effort: Pulmonary effort is normal.      Breath sounds: Normal breath sounds and air entry. No stridor. No decreased breath sounds, wheezing, rhonchi or rales.   Chest:      Chest wall: No tenderness.   Abdominal:      General: Abdomen is flat. Bowel sounds are normal. There is no distension.      Palpations: Abdomen is soft. Abdomen is not rigid.      Tenderness: There is abdominal tenderness in the epigastric area. There is no right CVA tenderness, left CVA tenderness, guarding or rebound.   Musculoskeletal:         General: Normal range of motion.      Cervical back: Full passive range of motion without pain, normal range of motion and neck supple. No rigidity. No spinous process tenderness or muscular tenderness. Normal range of motion.   Feet:       Right foot:      Toenail Condition: Right toenails are normal.      Left foot:      Toenail Condition: Left toenails are normal.   Lymphadenopathy:      Head:      Right side of head: No submental, submandibular, tonsillar, preauricular, posterior auricular or occipital adenopathy.      Left side of head: No submental, submandibular, tonsillar, preauricular, posterior auricular or occipital adenopathy.      Cervical: No cervical adenopathy.      Right cervical: No superficial, deep or posterior cervical adenopathy.     Left cervical: No superficial, deep or posterior cervical adenopathy.   Skin:     General: Skin is warm.      Capillary Refill: Capillary refill takes less than 2 seconds.      Findings: No rash.   Neurological:      General: No focal deficit present.      Mental Status: She is alert and oriented to person, place, and time. Mental status is at baseline.      GCS: GCS eye subscore is 4. GCS verbal subscore is 5. GCS motor subscore is 6.      Sensory: No sensory deficit.      Deep Tendon Reflexes: Reflexes are normal and symmetric.      Reflex Scores:       Patellar reflexes are 2+ on the right side and 2+ on the left side.  Psychiatric:         Attention and Perception: Attention normal.         Mood and Affect: Mood normal.         Speech: Speech normal.         Behavior: Behavior normal. Behavior is cooperative.         Thought Content: Thought content normal.         Judgment: Judgment normal.         Vital Signs  ED Triage Vitals [01/22/24 2330]   Temperature Pulse Respirations Blood Pressure SpO2   98.2 °F (36.8 °C) 83 18 142/82 96 %      Temp Source Heart Rate Source Patient Position - Orthostatic VS BP Location FiO2 (%)   Oral Monitor Sitting Right arm --      Pain Score       9           Vitals:    01/23/24 0100 01/23/24 0127 01/23/24 0130 01/23/24 0200   BP: 145/79 126/82 126/82 121/70   Pulse: 80 86 82 78   Patient Position - Orthostatic VS:             Visual Acuity      ED  Medications  Medications   albuterol (PROVENTIL HFA,VENTOLIN HFA) inhaler 2 puff (has no administration in time range)   cyclobenzaprine (FLEXERIL) tablet 10 mg (has no administration in time range)   fluticasone (FLONASE) 50 mcg/act nasal spray 2 spray (has no administration in time range)   multivitamin-minerals (CENTRUM) tablet 1 tablet (has no administration in time range)   tamoxifen (NOLVADEX) tablet 20 mg (has no administration in time range)   sodium chloride 0.9 % infusion (has no administration in time range)   acetaminophen (TYLENOL) tablet 650 mg (has no administration in time range)   HYDROmorphone HCl (DILAUDID) injection 0.2 mg (has no administration in time range)   senna (SENOKOT) tablet 8.6 mg (has no administration in time range)   ondansetron (ZOFRAN) injection 4 mg (has no administration in time range)   enoxaparin (LOVENOX) subcutaneous injection 40 mg (has no administration in time range)   sodium chloride 0.9 % bolus 1,000 mL (0 mL Intravenous Stopped 1/23/24 0130)   aluminum-magnesium hydroxide-simethicone (MAALOX) oral suspension 30 mL (30 mL Oral Given 1/23/24 0001)   Famotidine (PF) (PEPCID) injection 20 mg (20 mg Intravenous Given 1/22/24 2355)   ketorolac (TORADOL) injection 15 mg (15 mg Intravenous Given 1/22/24 2355)   ondansetron (ZOFRAN) injection 4 mg (4 mg Intravenous Given 1/22/24 2355)   iohexol (OMNIPAQUE) 350 MG/ML injection (MULTI-DOSE) 85 mL (85 mL Intravenous Given 1/23/24 0045)   ketorolac (TORADOL) injection 15 mg (15 mg Intravenous Given 1/23/24 0128)   HYDROmorphone HCl (DILAUDID) injection 0.2 mg (0.2 mg Intravenous Given 1/23/24 0128)       Diagnostic Studies  Results Reviewed       Procedure Component Value Units Date/Time    Salicylate level [[id number]]  (Normal) Collected: 01/23/24 0038    Lab Status: Final result Specimen: Blood from Arm, Left Updated: 01/23/24 0122     Salicylate Lvl <5 mg/dL     Acetaminophen level-If concentration is detectable, please discuss  with medical  on call. [[id number]]  (Abnormal) Collected: 01/23/24 0038    Lab Status: Final result Specimen: Blood from Arm, Left Updated: 01/23/24 0122     Acetaminophen Level <2 ug/mL     Ethanol [[id number]]  (Normal) Collected: 01/23/24 0038    Lab Status: Final result Specimen: Blood from Arm, Left Updated: 01/23/24 0102     Ethanol Lvl <10 mg/dL     HS Troponin 0hr (reflex protocol) [[id number]]  (Normal) Collected: 01/22/24 2355    Lab Status: Final result Specimen: Blood from Arm, Left Updated: 01/23/24 0032     hs TnI 0hr 4 ng/L     Comprehensive metabolic panel [[id number]]  (Abnormal) Collected: 01/22/24 2355    Lab Status: Final result Specimen: Blood from Arm, Left Updated: 01/23/24 0025     Sodium 139 mmol/L      Potassium 3.7 mmol/L      Chloride 104 mmol/L      CO2 26 mmol/L      ANION GAP 9 mmol/L      BUN 6 mg/dL      Creatinine 0.51 mg/dL      Glucose 120 mg/dL      Calcium 9.3 mg/dL       U/L       U/L      Alkaline Phosphatase 75 U/L      Total Protein 7.6 g/dL      Albumin 4.4 g/dL      Total Bilirubin 2.21 mg/dL      eGFR 112 ml/min/1.73sq m     Narrative:      National Kidney Disease Foundation guidelines for Chronic Kidney Disease (CKD):     Stage 1 with normal or high GFR (GFR > 90 mL/min/1.73 square meters)    Stage 2 Mild CKD (GFR = 60-89 mL/min/1.73 square meters)    Stage 3A Moderate CKD (GFR = 45-59 mL/min/1.73 square meters)    Stage 3B Moderate CKD (GFR = 30-44 mL/min/1.73 square meters)    Stage 4 Severe CKD (GFR = 15-29 mL/min/1.73 square meters)    Stage 5 End Stage CKD (GFR <15 mL/min/1.73 square meters)  Note: GFR calculation is accurate only with a steady state creatinine    Lipase [[id number]]  (Abnormal) Collected: 01/22/24 2355    Lab Status: Final result Specimen: Blood from Arm, Left Updated: 01/23/24 0025     Lipase 6 u/L     UA w Reflex to Microscopic w Reflex to Culture [[id number]]  (Abnormal) Collected: 01/22/24 8795    Lab Status: Final result  Specimen: Urine, Clean Catch Updated: 01/23/24 0022     Color, UA Yellow     Clarity, UA Turbid     Specific Gravity, UA 1.015     pH, UA 7.5     Leukocytes, UA Negative     Nitrite, UA Negative     Protein, UA Negative mg/dl      Glucose, UA Negative mg/dl      Ketones, UA Trace mg/dl      Urobilinogen, UA <2.0 mg/dl      Bilirubin, UA Negative     Occult Blood, UA Negative    CBC and differential [[id number]] Collected: 01/22/24 8945    Lab Status: Final result Specimen: Blood from Arm, Left Updated: 01/23/24 0012     WBC 8.16 Thousand/uL      RBC 4.44 Million/uL      Hemoglobin 13.1 g/dL      Hematocrit 40.9 %      MCV 92 fL      MCH 29.5 pg      MCHC 32.0 g/dL      RDW 13.2 %      MPV 8.9 fL      Platelets 274 Thousands/uL      nRBC 0 /100 WBCs      Neutrophils Relative 63 %      Immat GRANS % 0 %      Lymphocytes Relative 25 %      Monocytes Relative 8 %      Eosinophils Relative 3 %      Basophils Relative 1 %      Neutrophils Absolute 5.24 Thousands/µL      Immature Grans Absolute 0.01 Thousand/uL      Lymphocytes Absolute 2.01 Thousands/µL      Monocytes Absolute 0.66 Thousand/µL      Eosinophils Absolute 0.20 Thousand/µL      Basophils Absolute 0.04 Thousands/µL                    CT abdomen pelvis with contrast   ED Interpretation by Mathew Vincent PA-C (01/23 0137)   Reading Physician Reading Date Result Priority  Cb Chatterjee MD  [phone] 1/23/2024     Narrative & Impression  CT ABDOMEN AND PELVIS WITH IV CONTRAST     INDICATION: Epigastric pain and nausea.     COMPARISON: 12/10/2023.     TECHNIQUE: CT examination of the abdomen and pelvis was performed. Multiplanar 2D reformatted images were created from the source data.     This examination, like all CT scans performed in the [facility], was performed utilizing techniques to minimize radiation dose exposure, including the use of iterative reconstruction and automated exposure control. Radiation dose length   product (DLP) for  this visit: 744 mGy-cm     IV Contrast: 85 mL of iohexol (OMNIPAQUE)  Enteric Contrast: Not administered.     FINDINGS:     ABDOMEN     LOWER CHEST: Clear lung bases.     LIVER/BILIARY TREE: Hepatic steatosis.     GALLBLADDER: Cholecystectomy.     SPLEEN: Unremarkable.     PANCREAS: Unremarkable.     ADRENAL GLANDS: Unremarkable.     KIDNEYS/UR   ETERS: No pyelonephritis or obstructive uropathy.     STOMACH AND BOWEL: No evidence of bowel obstruction, colitis or diverticulitis.     APPENDIX: No findings to suggest appendicitis.     ABDOMINOPELVIC CAVITY: No ascites. No pneumoperitoneum. No lymphadenopathy.     VESSELS: No abdominal aortic aneurysm.     PELVIS     REPRODUCTIVE ORGANS: No adnexal mass or cyst.     URINARY BLADDER: Unremarkable.     ABDOMINAL WALL/INGUINAL REGIONS: Unremarkable.     BONES: No lytic or blastic lesion.     IMPRESSION:     No evidence of acute intra-abdominal or pelvic process        Workstation performed: [id number]        Final Result by Cb Chatterjee MD (01/23 0127)      No evidence of acute intra-abdominal or pelvic process         Workstation performed: [id number]                    Procedures  ECG 12 Lead Documentation Only    Date/Time: 1/22/2024 11:35 PM    Performed by: Mathew Vincent PA-C  Authorized by: Mathew Vincent PA-C    Indications / Diagnosis:  Epigastric pain  ECG reviewed by me, the ED Provider: yes    Patient location:  ED  Previous ECG:     Previous ECG:  Compared to current    Comparison ECG info:  When compared with ECG of December 9, 2023, no significant changes were noted    Similarity:  No change    Comparison to cardiac monitor: Yes    Interpretation:     Interpretation: normal    Rate:     ECG rate:  81    ECG rate assessment: normal    Rhythm:     Rhythm: sinus rhythm    Ectopy:     Ectopy: none    QRS:     QRS axis:  Normal    QRS intervals:  Normal  Conduction:     Conduction: normal    ST segments:     ST segments:  Normal  T waves:     T waves: normal   "           ED Course  ED Course as of 01/23/24 0228 Tue Jan 23, 2024   0124 AST(!): 267   0124 ALT(!): 211   0124 TOTAL BILIRUBIN(!): 2.21   0157 Comprehensive metabolic panel(!)             HEART Risk Score      Flowsheet Row Most Recent Value   Heart Score Risk Calculator    History 0 Filed at: 01/23/2024 0228   ECG 0 Filed at: 01/23/2024 0228   Age 1 Filed at: 01/23/2024 0228   Risk Factors 1 Filed at: 01/23/2024 0228   Troponin 0 Filed at: 01/23/2024 0228   HEART Score 2 Filed at: 01/23/2024 0228                                        Medical Decision Making  Patient is a 48-year-old female with a history of migraine, surgical history of cholecystectomy 12/10/2023, bilateral salpingectomy that presents emerged from with dull aching persistent worsening epigastric pain symptoms for approximately 1 week.    Patient hemodynamically stable and afebrile  No sirs   , total bilirubin 2.21, negative, panel  No leukocytosis, no banding  Normal lipase  ECG with normal sinus rhythm, negative troponin, doubt ACS component  CT abdomen pelvis with contrast-impression-\"no evidence of acute intra-abdominal pelvic process.\"  Urinalysis not indicative of urinary tract infection  Delivered Multimodal pain control and antinausea in the emergency department; patient demonstrates decrease in presenting gastric pain and nausea ED symptomatology status post medication delivery  Ddx likely and not limited to ACS, pleurisy, pneumonia, choledocholithiasis, acute pancreatitis  I recommend patient be admitted for possible ERCP, GI consult and possible ultrasound gallbladder  Patient demonstrates verbal understanding of all clinical laboratory and imaging findings, admission instructions, follow-up, and verbally agrees with current treatment plan with teach back    *Due to voice recognition software, sound alike and misspelled words may be contained in the documentation*      Amount and/or Complexity of Data Reviewed  Labs: " ordered. Decision-making details documented in ED Course.  Radiology: ordered and independent interpretation performed. Decision-making details documented in ED Course.  ECG/medicine tests: ordered and independent interpretation performed. Decision-making details documented in ED Course.    Risk  OTC drugs.  Prescription drug management.  Decision regarding hospitalization.             Disposition  Final diagnoses:   Elevated transaminase level   Serum total bilirubin elevated   Epigastric pain     Time reflects when diagnosis was documented in both MDM as applicable and the Disposition within this note       Time User Action Codes Description Comment    1/23/2024  1:17 AM Mathew Vincent [R74.01] Elevated transaminase level     1/23/2024  1:17 AM Mathew Vincent [R17] Serum total bilirubin elevated     1/23/2024  1:18 AM Mathew Vincent [R10.13] Epigastric pain     1/23/2024  1:18 AM Mathew Vincent Modify [R74.01] Elevated transaminase level     1/23/2024  1:18 AM Mathew Vincent [R10.13] Epigastric pain           ED Disposition       ED Disposition   Admit    Condition   Stable    Date/Time   Tue Jan 23, 2024 0202    Comment   Case was discussed with slim team and the patient's admission status was agreed to be Admission Status: observation status to the service of Dr. Lozano, internal medicine .               Follow-up Information    None         Patient's Medications   Discharge Prescriptions    No medications on file       No discharge procedures on file.    PDMP Review         Value Time User    PDMP Reviewed  Yes 1/23/2024  2:28 AM Mathew Vincent PA-C            ED Provider  Electronically Signed by             Mathew Vincent PA-C  01/23/24 0229

## 2024-01-23 NOTE — ASSESSMENT & PLAN NOTE
S/p cholecystectomy in 12/23.  Reports feeling fine but around 10 days ago she developed epigastric pain. Has an upcoming appointment with GI this week but recommended by PCP to follow up in the ED due to back pain that developed after she lifted a bag of rice.   Patient says pain 10/20 in the numeric pain scale accompanied by nausea, no vomiting.   Ketorolac, dilaudid, zofran and famotide administrated in the ED, intervention effective.   CT scan of abdomen is negative, blood work shows transaminitis, otherwise unremarkable.   NPO for possible intervention  GI consult in place

## 2024-01-23 NOTE — ASSESSMENT & PLAN NOTE
Suffer from migraines from many years ago   Takes rizatriptan at home, replaced with sumatriptan if needed   Stable for now

## 2024-01-24 ENCOUNTER — ANESTHESIA (OUTPATIENT)
Dept: GASTROENTEROLOGY | Facility: HOSPITAL | Age: 49
DRG: 410 | End: 2024-01-24
Payer: COMMERCIAL

## 2024-01-24 ENCOUNTER — APPOINTMENT (OUTPATIENT)
Dept: RADIOLOGY | Facility: HOSPITAL | Age: 49
DRG: 410 | End: 2024-01-24
Payer: COMMERCIAL

## 2024-01-24 ENCOUNTER — ANESTHESIA EVENT (OUTPATIENT)
Dept: GASTROENTEROLOGY | Facility: HOSPITAL | Age: 49
DRG: 410 | End: 2024-01-24
Payer: COMMERCIAL

## 2024-01-24 ENCOUNTER — APPOINTMENT (OUTPATIENT)
Dept: GASTROENTEROLOGY | Facility: HOSPITAL | Age: 49
DRG: 410 | End: 2024-01-24
Payer: COMMERCIAL

## 2024-01-24 PROBLEM — K80.50 CHOLEDOCHOLITHIASIS: Status: ACTIVE | Noted: 2024-01-12

## 2024-01-24 LAB
ALBUMIN SERPL BCP-MCNC: 3.9 G/DL (ref 3.5–5)
ALP SERPL-CCNC: 76 U/L (ref 34–104)
ALT SERPL W P-5'-P-CCNC: 265 U/L (ref 7–52)
AST SERPL W P-5'-P-CCNC: 200 U/L (ref 13–39)
ATRIAL RATE: 82 BPM
BILIRUB DIRECT SERPL-MCNC: 3.16 MG/DL (ref 0–0.2)
BILIRUB SERPL-MCNC: 4.28 MG/DL (ref 0.2–1)
HAV IGM SER QL: NORMAL
HBV CORE IGM SER QL: NORMAL
HBV SURFACE AG SER QL: NORMAL
HCV AB SER QL: NORMAL
P AXIS: 60 DEGREES
PR INTERVAL: 168 MS
PROT SERPL-MCNC: 6.6 G/DL (ref 6.4–8.4)
QRS AXIS: 78 DEGREES
QRSD INTERVAL: 84 MS
QT INTERVAL: 428 MS
QTC INTERVAL: 500 MS
T WAVE AXIS: 52 DEGREES
VENTRICULAR RATE: 82 BPM

## 2024-01-24 PROCEDURE — BF141ZZ FLUOROSCOPY OF GALLBLADDER, BILE DUCTS AND PANCREATIC DUCTS USING LOW OSMOLAR CONTRAST: ICD-10-PCS | Performed by: INTERNAL MEDICINE

## 2024-01-24 PROCEDURE — 99232 SBSQ HOSP IP/OBS MODERATE 35: CPT | Performed by: PHYSICIAN ASSISTANT

## 2024-01-24 PROCEDURE — 43262 ENDO CHOLANGIOPANCREATOGRAPH: CPT | Performed by: INTERNAL MEDICINE

## 2024-01-24 PROCEDURE — 80074 ACUTE HEPATITIS PANEL: CPT | Performed by: INTERNAL MEDICINE

## 2024-01-24 PROCEDURE — C1769 GUIDE WIRE: HCPCS

## 2024-01-24 PROCEDURE — 74330 X-RAY BILE/PANC ENDOSCOPY: CPT

## 2024-01-24 PROCEDURE — 93005 ELECTROCARDIOGRAM TRACING: CPT

## 2024-01-24 PROCEDURE — 43264 ERCP REMOVE DUCT CALCULI: CPT | Performed by: INTERNAL MEDICINE

## 2024-01-24 PROCEDURE — 80076 HEPATIC FUNCTION PANEL: CPT | Performed by: PHYSICIAN ASSISTANT

## 2024-01-24 PROCEDURE — 0FJB8ZZ INSPECTION OF HEPATOBILIARY DUCT, VIA NATURAL OR ARTIFICIAL OPENING ENDOSCOPIC: ICD-10-PCS | Performed by: INTERNAL MEDICINE

## 2024-01-24 RX ORDER — KETOROLAC TROMETHAMINE 30 MG/ML
15 INJECTION, SOLUTION INTRAMUSCULAR; INTRAVENOUS ONCE
Status: COMPLETED | OUTPATIENT
Start: 2024-01-24 | End: 2024-01-24

## 2024-01-24 RX ORDER — ONDANSETRON 2 MG/ML
INJECTION INTRAMUSCULAR; INTRAVENOUS AS NEEDED
Status: DISCONTINUED | OUTPATIENT
Start: 2024-01-24 | End: 2024-01-24

## 2024-01-24 RX ORDER — PROPOFOL 10 MG/ML
INJECTION, EMULSION INTRAVENOUS AS NEEDED
Status: DISCONTINUED | OUTPATIENT
Start: 2024-01-24 | End: 2024-01-24

## 2024-01-24 RX ORDER — MIDAZOLAM HYDROCHLORIDE 2 MG/2ML
INJECTION, SOLUTION INTRAMUSCULAR; INTRAVENOUS AS NEEDED
Status: DISCONTINUED | OUTPATIENT
Start: 2024-01-24 | End: 2024-01-24

## 2024-01-24 RX ORDER — SUCCINYLCHOLINE/SOD CL,ISO/PF 100 MG/5ML
SYRINGE (ML) INTRAVENOUS AS NEEDED
Status: DISCONTINUED | OUTPATIENT
Start: 2024-01-24 | End: 2024-01-24

## 2024-01-24 RX ORDER — KETOROLAC TROMETHAMINE 30 MG/ML
30 INJECTION, SOLUTION INTRAMUSCULAR; INTRAVENOUS EVERY 8 HOURS SCHEDULED
Status: DISCONTINUED | OUTPATIENT
Start: 2024-01-24 | End: 2024-01-25

## 2024-01-24 RX ORDER — DIPHENHYDRAMINE HYDROCHLORIDE 50 MG/ML
25 INJECTION INTRAMUSCULAR; INTRAVENOUS EVERY 8 HOURS SCHEDULED
Status: DISCONTINUED | OUTPATIENT
Start: 2024-01-24 | End: 2024-01-25

## 2024-01-24 RX ORDER — KETOROLAC TROMETHAMINE 30 MG/ML
INJECTION, SOLUTION INTRAMUSCULAR; INTRAVENOUS AS NEEDED
Status: DISCONTINUED | OUTPATIENT
Start: 2024-01-24 | End: 2024-01-24

## 2024-01-24 RX ORDER — LIDOCAINE HYDROCHLORIDE 20 MG/ML
INJECTION, SOLUTION EPIDURAL; INFILTRATION; INTRACAUDAL; PERINEURAL AS NEEDED
Status: DISCONTINUED | OUTPATIENT
Start: 2024-01-24 | End: 2024-01-24

## 2024-01-24 RX ORDER — FENTANYL CITRATE/PF 50 MCG/ML
25 SYRINGE (ML) INJECTION
Status: DISCONTINUED | OUTPATIENT
Start: 2024-01-24 | End: 2024-01-25

## 2024-01-24 RX ORDER — METOCLOPRAMIDE HYDROCHLORIDE 5 MG/ML
10 INJECTION INTRAMUSCULAR; INTRAVENOUS EVERY 8 HOURS SCHEDULED
Status: DISCONTINUED | OUTPATIENT
Start: 2024-01-24 | End: 2024-01-25

## 2024-01-24 RX ORDER — SUMATRIPTAN 6 MG/.5ML
6 INJECTION, SOLUTION SUBCUTANEOUS
Status: DISCONTINUED | OUTPATIENT
Start: 2024-01-24 | End: 2024-01-26 | Stop reason: HOSPADM

## 2024-01-24 RX ORDER — FENTANYL CITRATE 50 UG/ML
INJECTION, SOLUTION INTRAMUSCULAR; INTRAVENOUS AS NEEDED
Status: DISCONTINUED | OUTPATIENT
Start: 2024-01-24 | End: 2024-01-24

## 2024-01-24 RX ORDER — SODIUM CHLORIDE, SODIUM LACTATE, POTASSIUM CHLORIDE, CALCIUM CHLORIDE 600; 310; 30; 20 MG/100ML; MG/100ML; MG/100ML; MG/100ML
225 INJECTION, SOLUTION INTRAVENOUS CONTINUOUS
Status: DISCONTINUED | OUTPATIENT
Start: 2024-01-24 | End: 2024-01-25

## 2024-01-24 RX ORDER — HYDROMORPHONE HCL/PF 1 MG/ML
0.5 SYRINGE (ML) INJECTION
Status: DISCONTINUED | OUTPATIENT
Start: 2024-01-24 | End: 2024-01-25

## 2024-01-24 RX ORDER — MAGNESIUM SULFATE HEPTAHYDRATE 40 MG/ML
2 INJECTION, SOLUTION INTRAVENOUS
Status: DISCONTINUED | OUTPATIENT
Start: 2024-01-24 | End: 2024-01-25

## 2024-01-24 RX ORDER — SODIUM CHLORIDE, SODIUM LACTATE, POTASSIUM CHLORIDE, CALCIUM CHLORIDE 600; 310; 30; 20 MG/100ML; MG/100ML; MG/100ML; MG/100ML
INJECTION, SOLUTION INTRAVENOUS CONTINUOUS PRN
Status: DISCONTINUED | OUTPATIENT
Start: 2024-01-24 | End: 2024-01-24

## 2024-01-24 RX ORDER — ONDANSETRON 2 MG/ML
4 INJECTION INTRAMUSCULAR; INTRAVENOUS ONCE AS NEEDED
Status: DISCONTINUED | OUTPATIENT
Start: 2024-01-24 | End: 2024-01-25

## 2024-01-24 RX ORDER — INDOMETHACIN 50 MG/1
SUPPOSITORY RECTAL AS NEEDED
Status: COMPLETED | OUTPATIENT
Start: 2024-01-24 | End: 2024-01-24

## 2024-01-24 RX ORDER — DEXAMETHASONE SODIUM PHOSPHATE 10 MG/ML
INJECTION, SOLUTION INTRAMUSCULAR; INTRAVENOUS AS NEEDED
Status: DISCONTINUED | OUTPATIENT
Start: 2024-01-24 | End: 2024-01-24

## 2024-01-24 RX ADMIN — SODIUM CHLORIDE, SODIUM LACTATE, POTASSIUM CHLORIDE, AND CALCIUM CHLORIDE: .6; .31; .03; .02 INJECTION, SOLUTION INTRAVENOUS at 15:41

## 2024-01-24 RX ADMIN — MIDAZOLAM 2 MG: 1 INJECTION INTRAMUSCULAR; INTRAVENOUS at 15:45

## 2024-01-24 RX ADMIN — LIDOCAINE HYDROCHLORIDE 100 MG: 20 INJECTION, SOLUTION EPIDURAL; INFILTRATION; INTRACAUDAL at 15:52

## 2024-01-24 RX ADMIN — ONDANSETRON 4 MG: 2 INJECTION INTRAMUSCULAR; INTRAVENOUS at 09:21

## 2024-01-24 RX ADMIN — PROPOFOL 120 MG: 10 INJECTION, EMULSION INTRAVENOUS at 15:52

## 2024-01-24 RX ADMIN — ENOXAPARIN SODIUM 40 MG: 40 INJECTION SUBCUTANEOUS at 09:59

## 2024-01-24 RX ADMIN — DIPHENHYDRAMINE HYDROCHLORIDE 25 MG: 50 INJECTION, SOLUTION INTRAMUSCULAR; INTRAVENOUS at 22:10

## 2024-01-24 RX ADMIN — KETOROLAC TROMETHAMINE 15 MG: 30 INJECTION, SOLUTION INTRAMUSCULAR; INTRAVENOUS at 11:01

## 2024-01-24 RX ADMIN — Medication 100 MG: at 15:52

## 2024-01-24 RX ADMIN — MAGNESIUM SULFATE HEPTAHYDRATE 2 G: 40 INJECTION, SOLUTION INTRAVENOUS at 11:50

## 2024-01-24 RX ADMIN — IOHEXOL 5 ML: 240 INJECTION, SOLUTION INTRATHECAL; INTRAVASCULAR; INTRAVENOUS; ORAL at 16:13

## 2024-01-24 RX ADMIN — HYDROMORPHONE HYDROCHLORIDE 0.2 MG: 0.2 INJECTION, SOLUTION INTRAMUSCULAR; INTRAVENOUS; SUBCUTANEOUS at 09:21

## 2024-01-24 RX ADMIN — METOCLOPRAMIDE 10 MG: 5 INJECTION, SOLUTION INTRAMUSCULAR; INTRAVENOUS at 15:05

## 2024-01-24 RX ADMIN — FENTANYL CITRATE 50 MCG: 50 INJECTION INTRAMUSCULAR; INTRAVENOUS at 15:52

## 2024-01-24 RX ADMIN — SODIUM CHLORIDE, SODIUM LACTATE, POTASSIUM CHLORIDE, AND CALCIUM CHLORIDE 225 ML/HR: .6; .31; .03; .02 INJECTION, SOLUTION INTRAVENOUS at 22:09

## 2024-01-24 RX ADMIN — TAMOXIFEN CITRATE 20 MG: 10 TABLET, FILM COATED ORAL at 22:07

## 2024-01-24 RX ADMIN — ONDANSETRON 4 MG: 2 INJECTION INTRAMUSCULAR; INTRAVENOUS at 15:45

## 2024-01-24 RX ADMIN — METOCLOPRAMIDE 10 MG: 5 INJECTION, SOLUTION INTRAMUSCULAR; INTRAVENOUS at 22:07

## 2024-01-24 RX ADMIN — KETOROLAC TROMETHAMINE 15 MG: 30 INJECTION, SOLUTION INTRAMUSCULAR; INTRAVENOUS at 15:45

## 2024-01-24 RX ADMIN — INDOMETHACIN 100 MG: 50 SUPPOSITORY RECTAL at 16:02

## 2024-01-24 RX ADMIN — PANTOPRAZOLE SODIUM 40 MG: 40 TABLET, DELAYED RELEASE ORAL at 06:15

## 2024-01-24 RX ADMIN — SUCRALFATE 1 G: 1 TABLET ORAL at 06:15

## 2024-01-24 RX ADMIN — SODIUM CHLORIDE 100 ML/HR: 0.9 INJECTION, SOLUTION INTRAVENOUS at 06:15

## 2024-01-24 RX ADMIN — LIDOCAINE HYDROCHLORIDE 50 MG: 20 INJECTION, SOLUTION EPIDURAL; INFILTRATION; INTRACAUDAL at 16:05

## 2024-01-24 RX ADMIN — DIPHENHYDRAMINE HYDROCHLORIDE 25 MG: 50 INJECTION, SOLUTION INTRAMUSCULAR; INTRAVENOUS at 15:04

## 2024-01-24 RX ADMIN — KETOROLAC TROMETHAMINE 30 MG: 30 INJECTION, SOLUTION INTRAMUSCULAR; INTRAVENOUS at 22:07

## 2024-01-24 RX ADMIN — DEXAMETHASONE SODIUM PHOSPHATE 10 MG: 10 INJECTION INTRAMUSCULAR; INTRAVENOUS at 15:52

## 2024-01-24 RX ADMIN — HYDROMORPHONE HYDROCHLORIDE 0.2 MG: 0.2 INJECTION, SOLUTION INTRAMUSCULAR; INTRAVENOUS; SUBCUTANEOUS at 03:43

## 2024-01-24 RX ADMIN — FENTANYL CITRATE 50 MCG: 50 INJECTION INTRAMUSCULAR; INTRAVENOUS at 15:45

## 2024-01-24 NOTE — ANESTHESIA PREPROCEDURE EVALUATION
Procedure:  ERCP    Relevant Problems   ANESTHESIA   (+) Motion sickness      CARDIO   (+) Migraine headache      GYN   (+) Malignant neoplasm of upper-inner quadrant of left breast in female, estrogen receptor positive       NEURO/PSYCH   (+) Migraine headache      PULMONARY   (+) Mild intermittent asthma      Adhesive capsulitis of right shoulder Resolved 11/15/2016   Benign paroxysmal vertigo, unspecified ear Resolved 11/15/2016   Cellulitis of leg, except foot Resolved 12/10/2014   Lymphadenitis Resolved 12/10/2014   BRCA gene mutation negative [facility]   Asthma    Snoring    Hx of radiation therapy    Breast cancer (HCC) left   Migraine    Wears contact lenses    Wears glasses          Physical Exam    Airway    Mallampati score: II  TM Distance: >3 FB  Neck ROM: full     Dental       Cardiovascular  Cardiovascular exam normal    Pulmonary  Pulmonary exam normal     Other Findings  post-pubertal.      Anesthesia Plan  ASA Score- 2 Emergent    Anesthesia Type- general with ASA Monitors.         Additional Monitors:     Airway Plan: ETT.           Plan Factors-Exercise tolerance (METS): >4 METS.    Chart reviewed. EKG reviewed. Imaging results reviewed. Existing labs reviewed. Patient summary reviewed.                  Induction- intravenous.    Postoperative Plan- Plan for postoperative opioid use. Planned trial extubation    Informed Consent- Anesthetic plan and risks discussed with patient.  I personally reviewed this patient with the CRNA. Discussed and agreed on the Anesthesia Plan with the CRNA..

## 2024-01-24 NOTE — UTILIZATION REVIEW
Initial Clinical Review  OBSERVATION  1/23/24 @ 0203  CONVERTED TO INPATIENT ADMISSION 1/25/24 @ 1543 DUE TO CONTINUED STAY REQUIRED TO EVALUATE AND TREAT PATIENT WITH CHOLEDOCHOLITHIASIS , TRANSAMINITIS WITH ONGOING LABS, MONITOR TOLERANCE TO DIET ADVANCEMENT     Admission: Date/Time/Statement:   Admission Orders (From admission, onward)       Ordered        01/25/24 1543  Inpatient Admission  Once            01/23/24 0203  Place in Observation  Once                          Orders Placed This Encounter   Procedures    Inpatient Admission     Standing Status:   Standing     Number of Occurrences:   1     Order Specific Question:   Level of Care     Answer:   Med Surg [16]     Order Specific Question:   Estimated length of stay     Answer:   More than 2 Midnights     Order Specific Question:   Certification     Answer:   I certify that inpatient services are medically necessary for this patient for a duration of greater than two midnights. See H&P and MD Progress Notes for additional information about the patient's course of treatment.     ED Arrival Information       Expected   -    Arrival   1/22/2024 23:16    Acuity   Urgent              Means of arrival   Walk-In    Escorted by   Self    Service   Hospitalist    Admission type   Emergency              Arrival complaint   Abd Pain             Chief Complaint   Patient presents with    Epigastric Pain     Started last Wednesday. Was given sulcrafate by PCP and got better but has gotten worse again. Has abdominal US for tomorrow and sees GI next week. Had gall bladder taken out last month     Initial Presentation: 48 y.o. female  PMH of asthma, breast CA on tamoxifen,  migraine and hx of radiation therapy  who presents with a new onset of epigastric pain. Patient is s/p cholecystectomy on December/2023. around 10 days ago, she lifted a bag of rice and hurt her back and then the pain started to feel more intense. She went to her PCP due to back and epigastric pain   and prescribed carafate and told her to stop taking after 5 days. The patient stopped taking it because she felt better. All of the sudden the pain has returned, accompanied by nausea. On exam, epigastric are abdominal tenderness, abdomen soft . Labs - elevated LFT's . CT A/P shows nothing acute . PT given ketorolac, famotidine, dilaudid and zofran in ED. Admitted as OBS with epigastric pain- concern for PUD vs possible partial bile duct obstruction/biliary sludge vs biliary duct stone   , abnormal LFT's . Plan - GI consult.PPI, Carafate . NPO .   GI consult-  No biloma seen on CT scan so doubt bile leak as sal was weeks ago.  Concern for retained common bile duct stone vs PUD with recent NSAid use. Plan - Obtain MRI/MRCP , acute viral hep panel , D bili .  FL diet today as pam, NPO after MN .     Date: 1/24  T bili up to 3.09 today from 2.21 on admission, D bili 2.13 .  AST , ALT down trending .Pain, abdominal   8/10 overnight, this am,. Pt receiving prn IV Dilaudid for pain . IVF infusing . Pt noted with CBD stone on MRI / MRCP. PLan for ERCP today for stone removal. NPO . C/O a migraine today- start migraine cocktail .     Date 1/25    S/P ERCP 1/24 @1612- general anesthesia -   Cholangiogram showed common bile duct measuring about 8 to 9 mm with a filling defect distally , stone removal following sphincterotomy and balloon sweeping.   Pt receiving IVF overnight, started CL  yesterday and advanced to surgical soft , lite meal .  No abdominal pain this am . LFT's and T bili downtrending . Continue to trend .  Hgb down to 11.2, likely dilutional given IVF . Monitor CBC. Hasn't had BM since Monday but contributing that to lack of eating. Discontinue migraine cocktail order as patient's headache improved.   GI- advance diet as tolerated and continue to trend LFTs. Agree with Miralax for constipation.      Date 1/26 day 2    Pt on lo fat, lo fiber, lo residue diet . Abdominal pain 4/10 early this am, improved with  tylenol. Hgb stable at 11. LFT's and T bili continue to downtrend on labs today . Pt had bm yesterday .     ED Triage Vitals [01/22/24 2330]   Temperature Pulse Respirations Blood Pressure SpO2   98.2 °F (36.8 °C) 83 18 142/82 96 %      Temp Source Heart Rate Source Patient Position - Orthostatic VS BP Location FiO2 (%)   Oral Monitor Sitting Right arm --      Pain Score       9          Wt Readings from Last 1 Encounters:   01/26/24 81.2 kg (179 lb)     Additional Vital Signs:   Date/Time Temp Pulse Resp BP MAP (mmHg) SpO2   01/26/24 07:54:47 -- 85 14 117/84 95 97 %   01/26/24 07:54:30 -- 79 10 Abnormal  117/84 95 96 %   01/25/24 20:57:11 98.2 °F (36.8 °C) -- 16 138/91 107 --   01/25/24 14:45:05 97.8 °F (36.6 °C) -- 18 130/82 98 --     ate/Time Temp Pulse Resp BP MAP (mmHg) SpO2 O2 Flow Rate (L/min) O2 Device Cardiac (WDL) Patient Position - Orthostatic VS   01/25/24 07:51:23 97.7 °F (36.5 °C) 85 -- -- -- 96 % -- -- -- --   01/25/24 02:57:56 98.1 °F (36.7 °C) 91 18 130/82 98 98 % -- -- -- --   01/24/24 18:53:30 97.8 °F (36.6 °C) 76 12 139/93 108 97 % -- -- -- --   01/24/24 1700 97.9 °F (36.6 °C) 82 20 146/82 109 100 % -- None (Room air) WDL --   01/24/24 1645 -- 66 19 140/79 104 100 % 6 L/min Simple mask -- --   01/24/24 1630 98.1 °F (36.7 °C) 80 18 142/74 -- 100 % 6 L/min Simple mask WDL --   01/24/24 1536 97.1 °F (36.2 °C) Abnormal  90 18 141/73 -- 99 % -- None (Room air) -- --   01/24/24 15:01:38 98.1 °F (36.7 °C) -- 16 139/90 106 -- -- -- -- --     Date/Time Temp Pulse Resp BP MAP (mmHg) SpO2   01/24/24 07:56:45 98 °F (36.7 °C) -- 15 138/96 110 --   01/23/24 15:12:49 98.8 °F (37.1 °C) 95 20 139/95 110 98 %   01/23/24 14:23:18 97.9 °F (36.6 °C) 94 -- 138/96 110 96 %   01/23/24 0843 98.3 °F (36.8 °C) 75 16 125/78 -- 98 %   01/23/24 0600 -- 71 -- 122/79 96 97 %   01/23/24 0500 -- 70 -- 118/70 90 97 %   01/23/24 0400 -- 75 -- 112/72 88 97 %   01/23/24 0300 -- 79 -- 121/76 95 98 %   01/23/24 0200 -- 78 16 121/70 91  96 %   01/23/24 0130 -- 82 16 126/82 99 97 %   01/23/24 0127 -- 86 16 126/82 -- 97 %   01/23/24 0100 -- 80 18 145/79 107 98 %   01/23/24 0015 -- 82 18 156/72 103 97 %     Pertinent Labs/Diagnostic Test Results:    1/22 ECG- ED read   Interpretation: normal    Rate:     ECG rate:  81     ECG rate assessment: normal    Rhythm:     Rhythm: sinus rhythm    Ectopy:     Ectopy: none     1/24 ECG-     FL ERCP biliary and pancreatic   Final Result by Franky Montes MD (01/24 2142)      Choledocholithiasis status post stone removal following sphincterotomy and balloon sweeping.         Workstation performed: [id number]         MRI abdomen wo contrast and mrcp   Final Result by Byron Dalal DO (01/24 0903)      5 mm CBD stone at the ampulla consistent with choledocholithiasis.      Mild CBD dilatation may reflect reservoir effect from prior surgery.      Hepatomegaly with fatty infiltration.      Workstation performed: [id number]         CT abdomen pelvis with contrast   ED Interpretation by Mathew Vincent PA-C (01/23 0137)   Reading Physician Reading Date Result Priority  Cb Chatterjee MD  [phone] 1/23/2024     Narrative & Impression  CT ABDOMEN AND PELVIS WITH IV CONTRAST     INDICATION: Epigastric pain and nausea.     COMPARISON: 12/10/2023.     TECHNIQUE: CT examination of the abdomen and pelvis was performed. Multiplanar 2D reformatted images were created from the source data.     This examination, like all CT scans performed in the [facility], was performed utilizing techniques to minimize radiation dose exposure, including the use of iterative reconstruction and automated exposure control. Radiation dose length   product (DLP) for this visit: 744 mGy-cm     IV Contrast: 85 mL of iohexol (OMNIPAQUE)  Enteric Contrast: Not administered.     FINDINGS:     ABDOMEN     LOWER CHEST: Clear lung bases.     LIVER/BILIARY TREE: Hepatic steatosis.     GALLBLADDER: Cholecystectomy.      SPLEEN: Unremarkable.     PANCREAS: Unremarkable.     ADRENAL GLANDS: Unremarkable.     KIDNEYS/UR   ETERS: No pyelonephritis or obstructive uropathy.     STOMACH AND BOWEL: No evidence of bowel obstruction, colitis or diverticulitis.     APPENDIX: No findings to suggest appendicitis.     ABDOMINOPELVIC CAVITY: No ascites. No pneumoperitoneum. No lymphadenopathy.     VESSELS: No abdominal aortic aneurysm.     PELVIS     REPRODUCTIVE ORGANS: No adnexal mass or cyst.     URINARY BLADDER: Unremarkable.     ABDOMINAL WALL/INGUINAL REGIONS: Unremarkable.     BONES: No lytic or blastic lesion.     IMPRESSION:     No evidence of acute intra-abdominal or pelvic process        Workstation performed: [id number]        Final Result by Cb Chatterjee MD (01/23 0127)      No evidence of acute intra-abdominal or pelvic process         Workstation performed: [id number]               Results from last 7 days   Lab Units 01/26/24 0606 01/25/24  0531 01/23/24 0606 01/22/24  2355   WBC Thousand/uL 7.57 6.87 6.49 8.16   HEMOGLOBIN g/dL 11.0* 11.2* 11.2* 13.1   HEMATOCRIT % 34.6* 35.1 35.3 40.9   PLATELETS Thousands/uL 218 218 228 274   NEUTROS ABS Thousands/µL  --   --  3.90 5.24         Results from last 7 days   Lab Units 01/26/24 0606 01/25/24  0531 01/23/24 0606 01/22/24  2355   SODIUM mmol/L 141 139 139 139   POTASSIUM mmol/L 3.5 3.8 3.6 3.7   CHLORIDE mmol/L 108 109* 108 104   CO2 mmol/L 26 23 25 26   ANION GAP mmol/L 7 7 6 9   BUN mg/dL 8 6 6 6   CREATININE mg/dL 0.49* 0.41* 0.45* 0.51*   EGFR ml/min/1.73sq m 113 120 117 112   CALCIUM mg/dL 8.2* 8.3* 8.2* 9.3   MAGNESIUM mg/dL  --   --  2.1  --    PHOSPHORUS mg/dL  --   --  2.5*  --      Results from last 7 days   Lab Units 01/26/24  0606 01/25/24  0531 01/24/24  0840 01/23/24  0606 01/22/24  2355   AST U/L 79* 97* 200* 210* 267*   ALT U/L 182* 214* 265* 206* 211*   ALK PHOS U/L 55 69 76 63 75   TOTAL PROTEIN g/dL 5.9* 6.2* 6.6 6.2* 7.6   ALBUMIN g/dL 3.5 3.7 3.9 3.7  "4.4   TOTAL BILIRUBIN mg/dL 1.02* 1.27* 4.28* 3.09* 2.21*   BILIRUBIN DIRECT mg/dL  --   --  3.16* 2.13*  --          Results from last 7 days   Lab Units 01/26/24  0606 01/25/24  0531 01/23/24  0606 01/22/24  2355   GLUCOSE RANDOM mg/dL 97 110 103 120             No results found for: \"BETA-HYDROXYBUTYRATE\"                   Results from last 7 days   Lab Units 01/22/24  2355   HS TNI 0HR ng/L 4                                                 Results from last 7 days   Lab Units 01/24/24  0522   HEP B S AG  Non-reactive   HEP C AB  Non-reactive   HEP B C IGM  Non-reactive     Results from last 7 days   Lab Units 01/22/24  2355   LIPASE u/L 6*                 Results from last 7 days   Lab Units 01/22/24  2355   CLARITY UA  Turbid   COLOR UA  Yellow   SPEC GRAV UA  1.015   PH UA  7.5   GLUCOSE UA mg/dl Negative   KETONES UA mg/dl Trace*   BLOOD UA  Negative   PROTEIN UA mg/dl Negative   NITRITE UA  Negative   BILIRUBIN UA  Negative   UROBILINOGEN UA (BE) mg/dl <2.0   LEUKOCYTES UA  Negative                 Results from last 7 days   Lab Units 01/23/24  0038   ETHANOL LVL mg/dL <10   ACETAMINOPHEN LVL ug/mL <2*   SALICYLATE LVL mg/dL <5             ED Treatment:   Medication Administration from 01/22/2024 2316 to 01/23/2024 0826         Date/Time Order Dose Route Action     01/23/2024 0130 EST sodium chloride 0.9 % bolus 1,000 mL 0 mL Intravenous Stopped     01/23/2024 0003 EST sodium chloride 0.9 % bolus 1,000 mL 1,000 mL Intravenous New Bag     01/23/2024 0001 EST aluminum-magnesium hydroxide-simethicone (MAALOX) oral suspension 30 mL 30 mL Oral Given     01/22/2024 2355 EST Famotidine (PF) (PEPCID) injection 20 mg 20 mg Intravenous Given     01/22/2024 2355 EST ketorolac (TORADOL) injection 15 mg 15 mg Intravenous Given     01/22/2024 2355 EST ondansetron (ZOFRAN) injection 4 mg 4 mg Intravenous Given     01/23/2024 0045 EST iohexol (OMNIPAQUE) 350 MG/ML injection (MULTI-DOSE) 85 mL 85 mL Intravenous Given     " 01/23/2024 0128 EST ketorolac (TORADOL) injection 15 mg 15 mg Intravenous Given     01/23/2024 0128 EST HYDROmorphone HCl (DILAUDID) injection 0.2 mg 0.2 mg Intravenous Given     01/23/2024 0606 EST sodium chloride 0.9 % infusion 100 mL/hr Intravenous New Bag          Past Medical History:   Diagnosis Date    Adhesive capsulitis of right shoulder     Resolved 11/15/2016     Asthma     Benign paroxysmal vertigo, unspecified ear     Resolved 11/15/2016     BRCA gene mutation negative 06/2019    [facility]    Breast cancer (HCC) 07/30/2019    left    Cellulitis of leg, except foot     Resolved 12/10/2014     Hx of radiation therapy 08/01/2019    Lymphadenitis     Resolved 12/10/2014     Migraine     Snoring     Wears contact lenses     Wears glasses      Present on Admission:   (Resolved) Choledocholithiasis   Migraine headache   Mild intermittent asthma   Vitamin D deficiency      Admitting Diagnosis: Epigastric abdominal pain [R10.13]  Epigastric pain [R10.13]  Serum total bilirubin elevated [R17]  Elevated transaminase level [R74.01]  Age/Sex: 48 y.o. female  Admission Orders:  Scheduled Medications:  enoxaparin, 40 mg, Subcutaneous, Daily  fluticasone, 2 spray, Each Nare, Daily  multivitamin-minerals, 1 tablet, Oral, Daily  pantoprazole, 40 mg, Oral, Early Morning  senna, 1 tablet, Oral, Daily  sucralfate, 1 g, Oral, 4x Daily (AC & HS)  tamoxifen, 20 mg, Oral, Daily    potassium-sodium phosphates (PHOS-NAK) packet 1 packet  Dose: 1 packet  Freq: 2 times daily with meals Route: PO  Start: 01/23/24 0800 End: 01/24/24 0729   ketorolac (TORADOL) injection 15 mg  Dose: 15 mg  Freq: Once Route: IV x1 1/23 @ 1639, x1 1/24 @ 1101   diphenhydrAMINE (BENADRYL) injection 25 mg  Dose: 25 mg  Freq: Every 8 hours scheduled Route: IV  Start: 01/24/24 1400 End: 01/25/24 0906   ketorolac (TORADOL) injection 30 mg  Dose: 30 mg  Freq: Every 8 hours scheduled Route: IV  Start: 01/24/24 2200 End: 01/25/24 0906   magnesium sulfate 2 g/50  mL IVPB (premix) 2 g  Dose: 2 g  Freq: Every 24 hours scheduled Route: IV  Last Dose: Stopped (01/25/24 1036)  Start: 01/24/24 1115 End: 01/25/24 0906   metoclopramide (REGLAN) injection 10 mg  Dose: 10 mg  Freq: Every 8 hours scheduled Route: IV  Start: 01/24/24 1400 End: 01/25/24 0906   polyethylene glycol (MIRALAX) packet 17 g  Dose: 17 g  Freq: Daily Route: PO  Start: 01/25/24 1115 End: 01/25/24 1401 - pt refused        Continuous IV Infusions:     sodium chloride 0.9 % infusion  Rate: 100 mL/hr Dose: 100 mL/hr  Freq: Continuous Route: IV  Indications of Use: IV Hydration  Last Dose: Stopped (01/25/24 0733)  Start: 01/23/24 0230 End: 01/24/24 2046   lactated ringers infusion  Rate: 225 mL/hr Dose: 225 mL/hr  Freq: Continuous Route: IV  Last Dose: 225 mL/hr (01/24/24 2209)  Start: 01/24/24 2100 End: 01/25/24 0739         PRN Meds:  acetaminophen, 650 mg, Oral, Q6H PRN x2 1/23  x11/26   albuterol, 2 puff, Inhalation, Q4H PRN  cyclobenzaprine, 10 mg, Oral, TID PRN  HYDROmorphone, 0.2 mg, Intravenous, Q2H PRN x1 1/23, x2 1/24   ondansetron, 4 mg, Intravenous, Q4H PRN x1 1/23, x1 1/24   docusate sodium (COLACE) capsule 100 mg  Dose: 100 mg  Freq: 2 times daily PRN Route: PO  PRN Reason: constipation  Start: 01/25/24 1047  x1 1/25       NPO 1/24 @ 0001    IP CONSULT TO GASTROENTEROLOGY    Network Utilization Review Department  ATTENTION: Please call with any questions or concerns to [phone] and carefully listen to the prompts so that you are directed to the right person. All voicemails are confidential.   For Discharge needs, contact Care Management DC Support Team at [phone] opt. 2  Send all requests for admission clinical reviews, approved or denied determinations and any other requests to dedicated fax number below belonging to the campus where the patient is receiving treatment. List of dedicated fax numbers for the Facilities:  FACILITY NAME UR FAX NUMBER   ADMISSION DENIALS (Administrative/Medical  Necessity) [phone]   DISCHARGE SUPPORT TEAM (NETWORK) [phone]   PARENT CHILD HEALTH (Maternity/NICU/Pediatrics) [phone]   [facility] [phone]   [facility] [phone]   Critical access hospital [phone]   [facility] [phone]   [facility] [phone]   [facility] [phone]   [facility] [phone]   [facility] [phone]   [facility] [phone]   Critical access hospital [phone]   [facility] [phone]

## 2024-01-24 NOTE — QUICK NOTE
Patient noted with CBD stone on MRI, worsening LFTs, continues with abdominal pain. Discussed ERCP with patient. Discussed procedure in detail as well as risks such as pancreatitis. Will need to be monitored overnight. Answered all questions. Plan for ERCP this afternoon for stone removal. Continue NPO status

## 2024-01-24 NOTE — PLAN OF CARE
Problem: PAIN - ADULT  Goal: Verbalizes/displays adequate comfort level or baseline comfort level  Description: Interventions:  - Encourage patient to monitor pain and request assistance  - Assess pain using appropriate pain scale  - Administer analgesics based on type and severity of pain and evaluate response  - Implement non-pharmacological measures as appropriate and evaluate response  - Consider cultural and social influences on pain and pain management  - Notify physician/advanced practitioner if interventions unsuccessful or patient reports new pain  Outcome: Progressing     Problem: INFECTION - ADULT  Goal: Absence or prevention of progression during hospitalization  Description: INTERVENTIONS:  - Assess and monitor for signs and symptoms of infection  - Monitor lab/diagnostic results  - Monitor all insertion sites, i.e. indwelling lines, tubes, and drains  - Monitor endotracheal if appropriate and nasal secretions for changes in amount and color  - [place] appropriate cooling/warming therapies per order  - Administer medications as ordered  - Instruct and encourage patient and family to use good hand hygiene technique  - Identify and instruct in appropriate isolation precautions for identified infection/condition  Outcome: Progressing     Problem: DISCHARGE PLANNING  Goal: Discharge to home or other facility with appropriate resources  Description: INTERVENTIONS:  - Identify barriers to discharge w/patient and caregiver  - Arrange for needed discharge resources and transportation as appropriate  - Identify discharge learning needs (meds, wound care, etc.)  - Arrange for interpretive services to assist at discharge as needed  - Refer to Case Management Department for coordinating discharge planning if the patient needs post-hospital services based on physician/advanced practitioner order or complex needs related to functional status, cognitive ability, or social support system  Outcome: Progressing      Problem: Knowledge Deficit  Goal: Patient/family/caregiver demonstrates understanding of disease process, treatment plan, medications, and discharge instructions  Description: Complete learning assessment and assess knowledge base.  Interventions:  - Provide teaching at level of understanding  - Provide teaching via preferred learning methods  Outcome: Progressing

## 2024-01-24 NOTE — PLAN OF CARE
Problem: PAIN - ADULT  Goal: Verbalizes/displays adequate comfort level or baseline comfort level  Description: Interventions:  - Encourage patient to monitor pain and request assistance  - Assess pain using appropriate pain scale  - Administer analgesics based on type and severity of pain and evaluate response  - Implement non-pharmacological measures as appropriate and evaluate response  - Consider cultural and social influences on pain and pain management  - Notify physician/advanced practitioner if interventions unsuccessful or patient reports new pain  Outcome: Progressing     Problem: INFECTION - ADULT  Goal: Absence or prevention of progression during hospitalization  Description: INTERVENTIONS:  - Assess and monitor for signs and symptoms of infection  - Monitor lab/diagnostic results  - Monitor all insertion sites, i.e. indwelling lines, tubes, and drains  - Monitor endotracheal if appropriate and nasal secretions for changes in amount and color  - [place] appropriate cooling/warming therapies per order  - Administer medications as ordered  - Instruct and encourage patient and family to use good hand hygiene technique  - Identify and instruct in appropriate isolation precautions for identified infection/condition  Outcome: Progressing     Problem: DISCHARGE PLANNING  Goal: Discharge to home or other facility with appropriate resources  Description: INTERVENTIONS:  - Identify barriers to discharge w/patient and caregiver  - Arrange for needed discharge resources and transportation as appropriate  - Identify discharge learning needs (meds, wound care, etc.)  - Arrange for interpretive services to assist at discharge as needed  - Refer to Case Management Department for coordinating discharge planning if the patient needs post-hospital services based on physician/advanced practitioner order or complex needs related to functional status, cognitive ability, or social support system  Outcome: Progressing      Problem: Knowledge Deficit  Goal: Patient/family/caregiver demonstrates understanding of disease process, treatment plan, medications, and discharge instructions  Description: Complete learning assessment and assess knowledge base.  Interventions:  - Provide teaching at level of understanding  - Provide teaching via preferred learning methods  Outcome: Progressing

## 2024-01-24 NOTE — ASSESSMENT & PLAN NOTE
Suffer from migraines from many years ago   Takes rizatriptan at home, replaced with sumatriptan if needed   C/O a migraine today. Will start migraine cocktail via headache orderset

## 2024-01-24 NOTE — ANESTHESIA POSTPROCEDURE EVALUATION
Post-Op Assessment Note    CV Status:  Stable  Pain Score: 0    Pain management: adequate       Mental Status:  Awake and alert   Hydration Status:  Stable   PONV Controlled:  None   Airway Patency:  Patent and adequate     Post Op Vitals Reviewed: Yes    No anethesia notable event occurred.    Staff: CRNA, Anesthesiologist               BP   142/74   Temp   98.1   Pulse  77   Resp   16   SpO2   100%

## 2024-01-24 NOTE — ASSESSMENT & PLAN NOTE
S/p cholecystectomy in 12/23.  Reports feeling fine but around 10 days ago she developed epigastric pain. Has an upcoming appointment with GI this week but recommended by PCP to follow up in the ED due to back pain that developed after she lifted a bag of rice.   GI consult appreciated  MRCP shows choledocholithiasis  For ERCP this afternoon

## 2024-01-24 NOTE — ANESTHESIA PROCEDURE NOTES
Anesthesia Notable Event    Date/Time: 1/24/2024 4:23 PM    Performed by: Mar Cameron CRNA  Authorized by: Herb Swain MD

## 2024-01-24 NOTE — PROGRESS NOTES
[facility]  Progress Note  Name: Jocelyn Smith I  MRN: [id number]  Unit/Bed#: W -01 I Date of Admission: 1/22/2024   Date of Service: 1/24/2024  Hospital Day: 0    Assessment/Plan   * Choledocholithiasis  Assessment & Plan  S/p cholecystectomy in 12/23.  Reports feeling fine but around 10 days ago she developed epigastric pain. Has an upcoming appointment with GI this week but recommended by PCP to follow up in the ED due to back pain that developed after she lifted a bag of rice.   GI consult appreciated  MRCP shows choledocholithiasis  For ERCP this afternoon     Migraine headache  Assessment & Plan  Suffer from migraines from many years ago   Takes rizatriptan at home, replaced with sumatriptan if needed   C/O a migraine today. Will start migraine cocktail via headache orderset      Mild intermittent asthma  Assessment & Plan  Follows up with PCP for management  Takes Proventil HFA inhaler when needed, continue with regimen  Stable for now    Vitamin D deficiency  Assessment & Plan  Takes vitamin D3 daily at home, continue with vitamins while inpatient  Stable for now    History of lumpectomy  Assessment & Plan  Dx with malignant neoplasm of upper-inner quadrant of left breast in female, estrogen receptor positive  in 2019  S/p lumpectomy of the left breast, radiation received  Takes tamoxifen 20 mg PO daily, continue with regimen  Follows up with oncology as outpatient             VTE Pharmacologic Prophylaxis: VTE Score: 3 Moderate Risk (Score 3-4) - Pharmacological DVT Prophylaxis Ordered: enoxaparin (Lovenox).    Mobility:   Basic Mobility Inpatient Raw Score: 24  JH-HLM Goal: 8: Walk 250 feet or more  JH-HLM Achieved: 8: Walk 250 feet ot more  HLM Goal achieved. Continue to encourage appropriate mobility.    Patient Centered Rounds: I performed bedside rounds with nursing staff today.   Discussions with Specialists or Other Care Team Provider: GI    Education and  Discussions with Family / Patient: Updated  () at bedside.    Total Time Spent on Date of Encounter in care of patient: 35 mins. This time was spent on one or more of the following: performing physical exam; counseling and coordination of care; obtaining or reviewing history; documenting in the medical record; reviewing/ordering tests, medications or procedures; communicating with other healthcare professionals and discussing with patient's family/caregivers.    Current Length of Stay: 0 day(s)  Current Patient Status: Observation   Certification Statement:  Patient will require another midnight for ERCP. Keep OBS per UR  Discharge Plan: Anticipate discharge tomorrow to home.    Code Status: Level 1 - Full Code    Subjective:   C/O abdominal pain and migraine.    Objective:     Vitals:   Temp (24hrs), Av.1 °F (36.7 °C), Min:98 °F (36.7 °C), Max:98.1 °F (36.7 °C)    Temp:  [98 °F (36.7 °C)-98.1 °F (36.7 °C)] 98.1 °F (36.7 °C)  Resp:  [15-16] 16  BP: (138-139)/(90-96) 139/90  Body mass index is 29.04 kg/m².     Input and Output Summary (last 24 hours):     Intake/Output Summary (Last 24 hours) at 2024 1527  Last data filed at 2024 0900  Gross per 24 hour   Intake 3290 ml   Output --   Net 3290 ml       Physical Exam:   Physical Exam  Constitutional:       Appearance: Normal appearance.   Cardiovascular:      Rate and Rhythm: Normal rate and regular rhythm.      Heart sounds: No murmur heard.  Pulmonary:      Effort: Pulmonary effort is normal.      Breath sounds: Normal breath sounds.   Abdominal:      General: Bowel sounds are normal. There is no distension.      Palpations: Abdomen is soft.      Tenderness: There is abdominal tenderness.   Skin:     General: Skin is warm and dry.   Neurological:      General: No focal deficit present.      Mental Status: She is alert and oriented to person, place, and time.   Psychiatric:         Mood and Affect: Mood normal.          Additional  Data:     Labs:  Results from last 7 days   Lab Units 01/23/24  0606   WBC Thousand/uL 6.49   HEMOGLOBIN g/dL 11.2*   HEMATOCRIT % 35.3   PLATELETS Thousands/uL 228   NEUTROS PCT % 60   LYMPHS PCT % 27   MONOS PCT % 9   EOS PCT % 3     Results from last 7 days   Lab Units 01/24/24  0840 01/23/24  0606   SODIUM mmol/L  --  139   POTASSIUM mmol/L  --  3.6   CHLORIDE mmol/L  --  108   CO2 mmol/L  --  25   BUN mg/dL  --  6   CREATININE mg/dL  --  0.45*   ANION GAP mmol/L  --  6   CALCIUM mg/dL  --  8.2*   ALBUMIN g/dL 3.9 3.7   TOTAL BILIRUBIN mg/dL 4.28* 3.09*   ALK PHOS U/L 76 63   ALT U/L 265* 206*   AST U/L 200* 210*   GLUCOSE RANDOM mg/dL  --  103                       Lines/Drains:  Invasive Devices       Peripheral Intravenous Line  Duration             Peripheral IV 01/22/24 Left Antecubital 1 day                          Imaging: Reviewed radiology reports from this admission including: MRI abdomen/MRCP    Recent Cultures (last 7 days):         Last 24 Hours Medication List:   Current Facility-Administered Medications   Medication Dose Route Frequency Provider Last Rate    acetaminophen  650 mg Oral Q6H PRN JACIEL Sams      albuterol  2 puff Inhalation Q4H PRN JACIEL Sams      cyclobenzaprine  10 mg Oral TID PRN JACIEL Sams      diphenhydrAMINE  25 mg Intravenous Q8H SHERMAN Corley PA-C      enoxaparin  40 mg Subcutaneous Daily JACIEL Sams      fluticasone  2 spray Each Nare Daily JACIEL Sams      HYDROmorphone  0.2 mg Intravenous Q2H PRN JACIEL Sams      ketorolac  30 mg Intravenous Q8H SHERMAN Jenny Corley PA-C      magnesium sulfate  2 g Intravenous Q24H SHERMAN Jenny Corley PA-C 2 g (01/24/24 1150)    metoclopramide  10 mg Intravenous Q8H SHERMAN Jenny Corley PA-C      multivitamin-minerals  1 tablet Oral Daily JACIEL Sams      ondansetron  4 mg Intravenous Q4H PRN JACIEL Sams      pantoprazole  40 mg Oral Early Morning DO jayant Wiley   1 tablet Oral Daily JACIEL Sams      sodium chloride  100 mL/hr Intravenous Continuous JACIEL Sams 100 mL/hr (01/24/24 0615)    sucralfate  1 g Oral 4x Daily (AC & HS) Jenny Stallworth DO      SUMAtriptan  6 mg Subcutaneous Q1H PRN Jenny Corley PA-C      tamoxifen  20 mg Oral Daily JACIEL Sams          Today, Patient Was Seen By: Jenny Corley PA-C    **Please Note: This note may have been constructed using a voice recognition system.**

## 2024-01-24 NOTE — PLAN OF CARE
Problem: PAIN - ADULT  Goal: Verbalizes/displays adequate comfort level or baseline comfort level  Description: Interventions:  - Encourage patient to monitor pain and request assistance  - Assess pain using appropriate pain scale  - Administer analgesics based on type and severity of pain and evaluate response  - Implement non-pharmacological measures as appropriate and evaluate response  - Consider cultural and social influences on pain and pain management  - Notify physician/advanced practitioner if interventions unsuccessful or patient reports new pain  Outcome: Progressing     Problem: INFECTION - ADULT  Goal: Absence or prevention of progression during hospitalization  Description: INTERVENTIONS:  - Assess and monitor for signs and symptoms of infection  - Monitor lab/diagnostic results  - Monitor all insertion sites, i.e. indwelling lines, tubes, and drains  - Monitor endotracheal if appropriate and nasal secretions for changes in amount and color  - [place] appropriate cooling/warming therapies per order  - Administer medications as ordered  - Instruct and encourage patient and family to use good hand hygiene technique  - Identify and instruct in appropriate isolation precautions for identified infection/condition  Outcome: Progressing     Problem: DISCHARGE PLANNING  Goal: Discharge to home or other facility with appropriate resources  Description: INTERVENTIONS:  - Identify barriers to discharge w/patient and caregiver  - Arrange for needed discharge resources and transportation as appropriate  - Identify discharge learning needs (meds, wound care, etc.)  - Arrange for interpretive services to assist at discharge as needed  - Refer to Case Management Department for coordinating discharge planning if the patient needs post-hospital services based on physician/advanced practitioner order or complex needs related to functional status, cognitive ability, or social support system  Outcome: Progressing      Problem: Knowledge Deficit  Goal: Patient/family/caregiver demonstrates understanding of disease process, treatment plan, medications, and discharge instructions  Description: Complete learning assessment and assess knowledge base.  Interventions:  - Provide teaching at level of understanding  - Provide teaching via preferred learning methods  Outcome: Progressing

## 2024-01-25 PROBLEM — D64.9 ANEMIA: Status: ACTIVE | Noted: 2024-01-25

## 2024-01-25 PROBLEM — R74.01 TRANSAMINITIS: Status: ACTIVE | Noted: 2024-01-25

## 2024-01-25 LAB
ALBUMIN SERPL BCP-MCNC: 3.7 G/DL (ref 3.5–5)
ALP SERPL-CCNC: 69 U/L (ref 34–104)
ALT SERPL W P-5'-P-CCNC: 214 U/L (ref 7–52)
ANION GAP SERPL CALCULATED.3IONS-SCNC: 7 MMOL/L
AST SERPL W P-5'-P-CCNC: 97 U/L (ref 13–39)
BILIRUB SERPL-MCNC: 1.27 MG/DL (ref 0.2–1)
BUN SERPL-MCNC: 6 MG/DL (ref 5–25)
CALCIUM SERPL-MCNC: 8.3 MG/DL (ref 8.4–10.2)
CHLORIDE SERPL-SCNC: 109 MMOL/L (ref 96–108)
CO2 SERPL-SCNC: 23 MMOL/L (ref 21–32)
CREAT SERPL-MCNC: 0.41 MG/DL (ref 0.6–1.3)
ERYTHROCYTE [DISTWIDTH] IN BLOOD BY AUTOMATED COUNT: 13.4 % (ref 11.6–15.1)
GFR SERPL CREATININE-BSD FRML MDRD: 120 ML/MIN/1.73SQ M
GLUCOSE SERPL-MCNC: 110 MG/DL (ref 65–140)
HCT VFR BLD AUTO: 35.1 % (ref 34.8–46.1)
HGB BLD-MCNC: 11.2 G/DL (ref 11.5–15.4)
MCH RBC QN AUTO: 29.5 PG (ref 26.8–34.3)
MCHC RBC AUTO-ENTMCNC: 31.9 G/DL (ref 31.4–37.4)
MCV RBC AUTO: 92 FL (ref 82–98)
PLATELET # BLD AUTO: 218 THOUSANDS/UL (ref 149–390)
PMV BLD AUTO: 9.3 FL (ref 8.9–12.7)
POTASSIUM SERPL-SCNC: 3.8 MMOL/L (ref 3.5–5.3)
PROT SERPL-MCNC: 6.2 G/DL (ref 6.4–8.4)
RBC # BLD AUTO: 3.8 MILLION/UL (ref 3.81–5.12)
SODIUM SERPL-SCNC: 139 MMOL/L (ref 135–147)
WBC # BLD AUTO: 6.87 THOUSAND/UL (ref 4.31–10.16)

## 2024-01-25 PROCEDURE — 99232 SBSQ HOSP IP/OBS MODERATE 35: CPT

## 2024-01-25 PROCEDURE — 80053 COMPREHEN METABOLIC PANEL: CPT | Performed by: INTERNAL MEDICINE

## 2024-01-25 PROCEDURE — 85027 COMPLETE CBC AUTOMATED: CPT | Performed by: INTERNAL MEDICINE

## 2024-01-25 PROCEDURE — 93005 ELECTROCARDIOGRAM TRACING: CPT

## 2024-01-25 PROCEDURE — 99232 SBSQ HOSP IP/OBS MODERATE 35: CPT | Performed by: STUDENT IN AN ORGANIZED HEALTH CARE EDUCATION/TRAINING PROGRAM

## 2024-01-25 RX ORDER — POLYETHYLENE GLYCOL 3350 17 G/17G
17 POWDER, FOR SOLUTION ORAL DAILY
Status: DISCONTINUED | OUTPATIENT
Start: 2024-01-25 | End: 2024-01-25

## 2024-01-25 RX ORDER — DOCUSATE SODIUM 100 MG/1
100 CAPSULE, LIQUID FILLED ORAL 2 TIMES DAILY PRN
Status: DISCONTINUED | OUTPATIENT
Start: 2024-01-25 | End: 2024-01-26 | Stop reason: HOSPADM

## 2024-01-25 RX ORDER — POLYETHYLENE GLYCOL 3350 17 G/17G
17 POWDER, FOR SOLUTION ORAL DAILY PRN
Status: DISCONTINUED | OUTPATIENT
Start: 2024-01-25 | End: 2024-01-26 | Stop reason: HOSPADM

## 2024-01-25 RX ORDER — DOCUSATE SODIUM 100 MG/1
100 CAPSULE, LIQUID FILLED ORAL 2 TIMES DAILY
Status: DISCONTINUED | OUTPATIENT
Start: 2024-01-25 | End: 2024-01-25

## 2024-01-25 RX ADMIN — METOCLOPRAMIDE 10 MG: 5 INJECTION, SOLUTION INTRAMUSCULAR; INTRAVENOUS at 06:17

## 2024-01-25 RX ADMIN — DIPHENHYDRAMINE HYDROCHLORIDE 25 MG: 50 INJECTION, SOLUTION INTRAMUSCULAR; INTRAVENOUS at 06:17

## 2024-01-25 RX ADMIN — KETOROLAC TROMETHAMINE 30 MG: 30 INJECTION, SOLUTION INTRAMUSCULAR; INTRAVENOUS at 06:17

## 2024-01-25 RX ADMIN — PANTOPRAZOLE SODIUM 40 MG: 40 TABLET, DELAYED RELEASE ORAL at 06:18

## 2024-01-25 RX ADMIN — DOCUSATE SODIUM 100 MG: 100 CAPSULE, LIQUID FILLED ORAL at 11:43

## 2024-01-25 RX ADMIN — TAMOXIFEN CITRATE 20 MG: 10 TABLET, FILM COATED ORAL at 21:25

## 2024-01-25 NOTE — ASSESSMENT & PLAN NOTE
S/p cholecystectomy in 12/23.  Reports feeling fine but around 10 days ago she developed epigastric pain. Has an upcoming appointment with GI this week but recommended by PCP to follow up in the ED due to back pain that developed after she lifted a bag of rice.   GI consult appreciated  MRCP shows choledocholithiasis  S/p ERCP 1/24  Choledocholithiasis status post stone removal following sphincterotomy and balloon sweeping  Has been tolerating clear liquids. Upgrade diet to solids for lunch.

## 2024-01-25 NOTE — ASSESSMENT & PLAN NOTE
Suffer from migraines from many years ago   Takes rizatriptan at home, replaced with sumatriptan if needed   Discontinue migraine cocktail order as patient's headache improved.

## 2024-01-25 NOTE — PROGRESS NOTES
[facility]  Progress Note  Name: Jocelyn Smith I  MRN: [id number]  Unit/Bed#: W -01 I Date of Admission: 1/22/2024   Date of Service: 1/25/2024 I Hospital Day: 0    Assessment/Plan   * Choledocholithiasis  Assessment & Plan  S/p cholecystectomy in 12/23.  Reports feeling fine but around 10 days ago she developed epigastric pain. Has an upcoming appointment with GI this week but recommended by PCP to follow up in the ED due to back pain that developed after she lifted a bag of rice.   GI consult appreciated  MRCP shows choledocholithiasis  S/p ERCP 1/24  Choledocholithiasis status post stone removal following sphincterotomy and balloon sweeping  Has been tolerating clear liquids. Upgrade diet to solids for lunch.     Anemia  Assessment & Plan  Hemoglobin: 11.2  Likely dilutional given IVF  No active signs or symptoms of bleeding  Continue to monitor     Transaminitis  Assessment & Plan  In the setting of choledocholithiasis  AST, ALT, total bili downtrending  Continue to trend    History of lumpectomy  Assessment & Plan  Dx with malignant neoplasm of upper-inner quadrant of left breast in female, estrogen receptor positive  in 2019  S/p lumpectomy of the left breast, radiation received  Takes tamoxifen 20 mg PO daily, continue with regimen  Follows up with oncology as outpatient    Vitamin D deficiency  Assessment & Plan  Takes vitamin D3 daily at home, continue with vitamins while inpatient  Stable for now    Mild intermittent asthma  Assessment & Plan  Follows up with PCP for management  Takes Proventil HFA inhaler when needed, continue with regimen  Stable for now    Migraine headache  Assessment & Plan  Suffer from migraines from many years ago   Takes rizatriptan at home, replaced with sumatriptan if needed   Discontinue migraine cocktail order as patient's headache improved.          VTE Pharmacologic Prophylaxis: VTE Score: 3 High Risk (Score >/= 5) - Pharmacological DVT  Prophylaxis Ordered: enoxaparin (Lovenox). Sequential Compression Devices Ordered.    Mobility:   Basic Mobility Inpatient Raw Score: 24  JH-HLM Goal: 8: Walk 250 feet or more  JH-HLM Achieved: 8: Walk 250 feet ot more  HLM Goal achieved. Continue to encourage appropriate mobility.    Patient Centered Rounds: I performed bedside rounds with nursing staff today.   Discussions with Specialists or Other Care Team Provider: GI    Education and Discussions with Family / Patient: Updated  () via phone.    Total Time Spent on Date of Encounter in care of patient: This time was spent on one or more of the following: performing physical exam; counseling and coordination of care; obtaining or reviewing history; documenting in the medical record; reviewing/ordering tests, medications or procedures; communicating with other healthcare professionals and discussing with patient's family/caregivers.    Current Length of Stay: 0 day(s)  Current Patient Status: Observation   Certification Statement: The patient will continue to require additional inpatient hospital stay due to continued medical management of choledocholithiasis   Discharge Plan: Anticipate discharge later today or tomorrow to home.    Code Status: Level 1 - Full Code    Subjective:   Patient awake and alert on exam this morning. Denies any abdominal discomfort. Has been tolerating clear liquids without difficulty. Interested in trying solid foods this afternoon. Hasn't had BM since Monday but contributing that to lack of eating. Urinating without difficulty. No additional complaints at this time.    Objective:     Vitals:   Temp (24hrs), Av.8 °F (36.6 °C), Min:97.1 °F (36.2 °C), Max:98.1 °F (36.7 °C)    Temp:  [97.1 °F (36.2 °C)-98.1 °F (36.7 °C)] 97.7 °F (36.5 °C)  HR:  [66-91] 85  Resp:  [12-20] 18  BP: (130-146)/(73-93) 130/82  SpO2:  [96 %-100 %] 96 %  Body mass index is 28.89 kg/m².     Input and Output Summary (last 24 hours):      Intake/Output Summary (Last 24 hours) at 1/25/2024 0900  Last data filed at 1/24/2024 1613  Gross per 24 hour   Intake 500 ml   Output --   Net 500 ml       Physical Exam:   Physical Exam  Constitutional:       General: She is not in acute distress.     Appearance: She is not ill-appearing or toxic-appearing.   HENT:      Mouth/Throat:      Mouth: Mucous membranes are moist.   Eyes:      Conjunctiva/sclera: Conjunctivae normal.   Cardiovascular:      Rate and Rhythm: Normal rate and regular rhythm.      Heart sounds: No murmur heard.     No friction rub. No gallop.   Pulmonary:      Breath sounds: No wheezing, rhonchi or rales.   Abdominal:      General: Bowel sounds are normal.   Musculoskeletal:      Right lower leg: No edema.      Left lower leg: No edema.   Skin:     General: Skin is warm and dry.   Neurological:      Mental Status: Mental status is at baseline.   Psychiatric:         Mood and Affect: Mood normal.          Additional Data:     Labs:  Results from last 7 days   Lab Units 01/25/24  0531 01/23/24  0606   WBC Thousand/uL 6.87 6.49   HEMOGLOBIN g/dL 11.2* 11.2*   HEMATOCRIT % 35.1 35.3   PLATELETS Thousands/uL 218 228   NEUTROS PCT %  --  60   LYMPHS PCT %  --  27   MONOS PCT %  --  9   EOS PCT %  --  3     Results from last 7 days   Lab Units 01/25/24  0531   SODIUM mmol/L 139   POTASSIUM mmol/L 3.8   CHLORIDE mmol/L 109*   CO2 mmol/L 23   BUN mg/dL 6   CREATININE mg/dL 0.41*   ANION GAP mmol/L 7   CALCIUM mg/dL 8.3*   ALBUMIN g/dL 3.7   TOTAL BILIRUBIN mg/dL 1.27*   ALK PHOS U/L 69   ALT U/L 214*   AST U/L 97*   GLUCOSE RANDOM mg/dL 110                       Lines/Drains:  Invasive Devices       Peripheral Intravenous Line  Duration             Peripheral IV 01/22/24 Left Antecubital 2 days                          Imaging: Reviewed radiology reports from this admission including: ERCP    Recent Cultures (last 7 days):         Last 24 Hours Medication List:   Current Facility-Administered  Medications   Medication Dose Route Frequency Provider Last Rate    acetaminophen  650 mg Oral Q6H PRN Linda Farias MD      albuterol  2 puff Inhalation Q4H PRN JACIEL Sams      cyclobenzaprine  10 mg Oral TID PRN JACIEL Sams      diphenhydrAMINE  25 mg Intravenous Q8H [facility] Linda Farias MD      enoxaparin  40 mg Subcutaneous Daily Linda Farias MD      fentaNYL  25 mcg Intravenous Q5 Min PRN Mar Cameron CRNA      fluticasone  2 spray Each Nare Daily JACIEL Sams      HYDROmorphone  0.5 mg Intravenous Q5 Min PRN Mar Cameron CRNA      HYDROmorphone  0.2 mg Intravenous Q2H PRN Linda Farias MD      ketorolac  30 mg Intravenous Q8H [facility] Linda Farias MD      magnesium sulfate  2 g Intravenous Q24H [facility] Linda Farias MD 2 g (01/24/24 1150)    metoclopramide  10 mg Intravenous Q8H [facility] iLnda Farias MD      multivitamin-minerals  1 tablet Oral Daily JACIEL Sams      ondansetron  4 mg Intravenous Q4H PRN Linda Farias MD      ondansetron  4 mg Intravenous Once PRN Mar Cameron CRNA      pantoprazole  40 mg Oral Early Morning Linda Farias MD      senna  1 tablet Oral Daily Linda Farias MD      sucralfate  1 g Oral 4x Daily (AC & HS) Jenny Stallworth DO      SUMAtriptan  6 mg Subcutaneous Q1H PRN Linda Farias MD      tamoxifen  20 mg Oral Daily JACIEL Sams          Today, Patient Was Seen By: Mayte Sethi PA-C    **Please Note: This note may have been constructed using a voice recognition system.**

## 2024-01-25 NOTE — PLAN OF CARE
Problem: PAIN - ADULT  Goal: Verbalizes/displays adequate comfort level or baseline comfort level  Description: Interventions:  - Encourage patient to monitor pain and request assistance  - Assess pain using appropriate pain scale  - Administer analgesics based on type and severity of pain and evaluate response  - Implement non-pharmacological measures as appropriate and evaluate response  - Consider cultural and social influences on pain and pain management  - Notify physician/advanced practitioner if interventions unsuccessful or patient reports new pain  Outcome: Progressing     Problem: INFECTION - ADULT  Goal: Absence or prevention of progression during hospitalization  Description: INTERVENTIONS:  - Assess and monitor for signs and symptoms of infection  - Monitor lab/diagnostic results  - Monitor all insertion sites, i.e. indwelling lines, tubes, and drains  - Monitor endotracheal if appropriate and nasal secretions for changes in amount and color  - [place] appropriate cooling/warming therapies per order  - Administer medications as ordered  - Instruct and encourage patient and family to use good hand hygiene technique  - Identify and instruct in appropriate isolation precautions for identified infection/condition  Outcome: Progressing     Problem: DISCHARGE PLANNING  Goal: Discharge to home or other facility with appropriate resources  Description: INTERVENTIONS:  - Identify barriers to discharge w/patient and caregiver  - Arrange for needed discharge resources and transportation as appropriate  - Identify discharge learning needs (meds, wound care, etc.)  - Arrange for interpretive services to assist at discharge as needed  - Refer to Case Management Department for coordinating discharge planning if the patient needs post-hospital services based on physician/advanced practitioner order or complex needs related to functional status, cognitive ability, or social support system  Outcome: Progressing      Problem: Knowledge Deficit  Goal: Patient/family/caregiver demonstrates understanding of disease process, treatment plan, medications, and discharge instructions  Description: Complete learning assessment and assess knowledge base.  Interventions:  - Provide teaching at level of understanding  - Provide teaching via preferred learning methods  Outcome: Progressing

## 2024-01-25 NOTE — ASSESSMENT & PLAN NOTE
Hemoglobin: 11.2  Likely dilutional given IVF  No active signs or symptoms of bleeding  Continue to monitor

## 2024-01-25 NOTE — PLAN OF CARE
Problem: PAIN - ADULT  Goal: Verbalizes/displays adequate comfort level or baseline comfort level  Description: Interventions:  - Encourage patient to monitor pain and request assistance  - Assess pain using appropriate pain scale  - Administer analgesics based on type and severity of pain and evaluate response  - Implement non-pharmacological measures as appropriate and evaluate response  - Consider cultural and social influences on pain and pain management  - Notify physician/advanced practitioner if interventions unsuccessful or patient reports new pain  Outcome: Progressing     Problem: INFECTION - ADULT  Goal: Absence or prevention of progression during hospitalization  Description: INTERVENTIONS:  - Assess and monitor for signs and symptoms of infection  - Monitor lab/diagnostic results  - Monitor all insertion sites, i.e. indwelling lines, tubes, and drains  - Monitor endotracheal if appropriate and nasal secretions for changes in amount and color  - [place] appropriate cooling/warming therapies per order  - Administer medications as ordered  - Instruct and encourage patient and family to use good hand hygiene technique  - Identify and instruct in appropriate isolation precautions for identified infection/condition  Outcome: Progressing     Problem: DISCHARGE PLANNING  Goal: Discharge to home or other facility with appropriate resources  Description: INTERVENTIONS:  - Identify barriers to discharge w/patient and caregiver  - Arrange for needed discharge resources and transportation as appropriate  - Identify discharge learning needs (meds, wound care, etc.)  - Arrange for interpretive services to assist at discharge as needed  - Refer to Case Management Department for coordinating discharge planning if the patient needs post-hospital services based on physician/advanced practitioner order or complex needs related to functional status, cognitive ability, or social support system  Outcome: Progressing      Problem: Knowledge Deficit  Goal: Patient/family/caregiver demonstrates understanding of disease process, treatment plan, medications, and discharge instructions  Description: Complete learning assessment and assess knowledge base.  Interventions:  - Provide teaching at level of understanding  - Provide teaching via preferred learning methods  Outcome: Progressing

## 2024-01-25 NOTE — PROGRESS NOTES
"Progress Note - Jocelyn Smith 48 y.o. female MRN: [id number]    Unit/Bed#: W -01 Encounter: [id number]    Assessment and Plan:   Principal Problem:    Choledocholithiasis  Active Problems:    Migraine headache    Mild intermittent asthma    Vitamin D deficiency    History of lumpectomy    Transaminitis    Anemia    Choledocholithiasis s/p ERCP and stone removal   POD 1  No nausea, abd pain, vomiting, tolerating clears  Diet advanced to low fiber, low fat  Constipation  Had one BM today  Start prn miralax  Continue senna daily, carafate qd, protonix qd, docusate prn  Mild anemia    ----------------------------------------------------------------------------------------------------------------    Subjective:     Patient was in the restroom this AM. She provided ROS from the restroom - she is not having abdominal pain currently, nor nausea, vomiting. She just had a bowel movement- her first one since earlier this week. This has made her feel more comfortable. She tolerated clear liquid diet last night and is willing to try advanced diet.   She did request stool softener/laxative. Now she is having gurgling in her stomach and loose stool.     Objective:     Vitals: Blood pressure 130/82, pulse 85, temperature 97.7 °F (36.5 °C), resp. rate 18, height 5' 6\" (1.676 m), weight 81.2 kg (179 lb), last menstrual period 01/02/2024, SpO2 96%, not currently breastfeeding.,Body mass index is 28.89 kg/m².      Intake/Output Summary (Last 24 hours) at 1/25/2024 1054  Last data filed at 1/25/2024 0900  Gross per 24 hour   Intake 620 ml   Output --   Net 620 ml       Physical Exam  Constitutional:       General: She is not in acute distress.     Appearance: She is not toxic-appearing.   HENT:      Mouth/Throat:      Mouth: Mucous membranes are moist.      Pharynx: Oropharynx is clear.   Eyes:      Extraocular Movements: Extraocular movements intact.   Cardiovascular:      Rate and Rhythm: Normal rate and regular rhythm.      " "Heart sounds: No murmur heard.  Pulmonary:      Effort: Pulmonary effort is normal. No respiratory distress.      Breath sounds: Normal breath sounds.   Abdominal:      General: Abdomen is flat. Bowel sounds are normal. There is no distension.      Palpations: Abdomen is soft.      Tenderness: There is abdominal tenderness (very mild epigastric).   Musculoskeletal:      Right lower leg: No edema.      Left lower leg: No edema.   Skin:     General: Skin is warm and dry.   Neurological:      Mental Status: She is alert and oriented to person, place, and time.           Invasive Devices       Peripheral Intravenous Line  Duration             Peripheral IV 01/22/24 Left Antecubital 2 days                    Lab Results:  Results from last 7 days   Lab Units 01/25/24  0531 01/23/24  0606   WBC Thousand/uL 6.87 6.49   HEMOGLOBIN g/dL 11.2* 11.2*   HEMATOCRIT % 35.1 35.3   PLATELETS Thousands/uL 218 228   NEUTROS PCT %  --  60   LYMPHS PCT %  --  27   MONOS PCT %  --  9   EOS PCT %  --  3     Results from last 7 days   Lab Units 01/25/24  0531   POTASSIUM mmol/L 3.8   CHLORIDE mmol/L 109*   CO2 mmol/L 23   BUN mg/dL 6   CREATININE mg/dL 0.41*   CALCIUM mg/dL 8.3*   ALK PHOS U/L 69   ALT U/L 214*   AST U/L 97*     Invalid input(s): \"BILI\"      Results from last 7 days   Lab Units 01/22/24  2355   LIPASE u/L 6*       Imaging Studies: I have personally reviewed pertinent imaging studies.    FL ERCP biliary and pancreatic    Result Date: 1/24/2024  Impression: Choledocholithiasis status post stone removal following sphincterotomy and balloon sweeping. Workstation performed: [id number]     ERCP Fluoro    Result Date: 1/24/2024  Impression: No impression generated RECOMMENDATION:  Lactated Ringer at 225 mill per hour, clear liquids later tonight   Linda Farias MD     MRI abdomen wo contrast and mrcp    Result Date: 1/24/2024  Impression: 5 mm CBD stone at the ampulla consistent with choledocholithiasis. Mild CBD dilatation may " reflect reservoir effect from prior surgery. Hepatomegaly with fatty infiltration. Workstation performed: [id number]     CT abdomen pelvis with contrast    Result Date: 1/23/2024  Impression: No evidence of acute intra-abdominal or pelvic process Workstation performed: [id number]           Jenny Stallworth DO

## 2024-01-26 ENCOUNTER — TRANSITIONAL CARE MANAGEMENT (OUTPATIENT)
Dept: FAMILY MEDICINE CLINIC | Facility: CLINIC | Age: 49
End: 2024-01-26

## 2024-01-26 VITALS
RESPIRATION RATE: 14 BRPM | DIASTOLIC BLOOD PRESSURE: 84 MMHG | OXYGEN SATURATION: 97 % | BODY MASS INDEX: 28.77 KG/M2 | WEIGHT: 179 LBS | HEART RATE: 85 BPM | HEIGHT: 66 IN | SYSTOLIC BLOOD PRESSURE: 117 MMHG | TEMPERATURE: 98.2 F

## 2024-01-26 PROBLEM — K80.50 CHOLEDOCHOLITHIASIS: Status: RESOLVED | Noted: 2024-01-12 | Resolved: 2024-01-26

## 2024-01-26 LAB
ALBUMIN SERPL BCP-MCNC: 3.5 G/DL (ref 3.5–5)
ALP SERPL-CCNC: 55 U/L (ref 34–104)
ALT SERPL W P-5'-P-CCNC: 182 U/L (ref 7–52)
ANION GAP SERPL CALCULATED.3IONS-SCNC: 7 MMOL/L
AST SERPL W P-5'-P-CCNC: 79 U/L (ref 13–39)
BILIRUB SERPL-MCNC: 1.02 MG/DL (ref 0.2–1)
BUN SERPL-MCNC: 8 MG/DL (ref 5–25)
CALCIUM SERPL-MCNC: 8.2 MG/DL (ref 8.4–10.2)
CHLORIDE SERPL-SCNC: 108 MMOL/L (ref 96–108)
CO2 SERPL-SCNC: 26 MMOL/L (ref 21–32)
CREAT SERPL-MCNC: 0.49 MG/DL (ref 0.6–1.3)
ERYTHROCYTE [DISTWIDTH] IN BLOOD BY AUTOMATED COUNT: 13.8 % (ref 11.6–15.1)
GFR SERPL CREATININE-BSD FRML MDRD: 113 ML/MIN/1.73SQ M
GLUCOSE SERPL-MCNC: 97 MG/DL (ref 65–140)
HCT VFR BLD AUTO: 34.6 % (ref 34.8–46.1)
HGB BLD-MCNC: 11 G/DL (ref 11.5–15.4)
MCH RBC QN AUTO: 29.5 PG (ref 26.8–34.3)
MCHC RBC AUTO-ENTMCNC: 31.8 G/DL (ref 31.4–37.4)
MCV RBC AUTO: 93 FL (ref 82–98)
PLATELET # BLD AUTO: 218 THOUSANDS/UL (ref 149–390)
PMV BLD AUTO: 9.3 FL (ref 8.9–12.7)
POTASSIUM SERPL-SCNC: 3.5 MMOL/L (ref 3.5–5.3)
PROT SERPL-MCNC: 5.9 G/DL (ref 6.4–8.4)
RBC # BLD AUTO: 3.73 MILLION/UL (ref 3.81–5.12)
SODIUM SERPL-SCNC: 141 MMOL/L (ref 135–147)
WBC # BLD AUTO: 7.57 THOUSAND/UL (ref 4.31–10.16)

## 2024-01-26 PROCEDURE — 85027 COMPLETE CBC AUTOMATED: CPT

## 2024-01-26 PROCEDURE — 80053 COMPREHEN METABOLIC PANEL: CPT

## 2024-01-26 PROCEDURE — 99239 HOSP IP/OBS DSCHRG MGMT >30: CPT

## 2024-01-26 PROCEDURE — 93005 ELECTROCARDIOGRAM TRACING: CPT

## 2024-01-26 RX ADMIN — ACETAMINOPHEN 650 MG: 325 TABLET, FILM COATED ORAL at 05:14

## 2024-01-26 NOTE — DISCHARGE SUMMARY
Former[facility]  Discharge- Jocelyn Smith 1975, 48 y.o. female MRN: [id number]  Unit/Bed#: W -01 Encounter: [id number]  Primary Care Provider: Bea Almodovar DO   Date and time admitted to hospital: 1/22/2024 11:22 PM    * Choledocholithiasis-resolved as of 1/26/2024  Assessment & Plan  S/p cholecystectomy in 12/23.  Reports feeling fine but around 10 days ago she developed epigastric pain. Has an upcoming appointment with GI this week but recommended by PCP to follow up in the ED due to back pain that developed after she lifted a bag of rice.   GI consult appreciated  MRCP shows choledocholithiasis  S/p ERCP 1/24  Choledocholithiasis status post stone removal following sphincterotomy and balloon sweeping  Tolerated solid foods without difficulty.   LFTs, and total bili continue to downtrend.   BMP in 1 week, f/u outpatient with PCP     Anemia  Assessment & Plan  Hemoglobin: 11.2  Likely dilutional given IVF  No active signs or symptoms of bleeding  Continue to monitor     Transaminitis  Assessment & Plan  In the setting of choledocholithiasis  AST, ALT, total bili continues to downtrend   F/u BMP in 1 week     History of lumpectomy  Assessment & Plan  Dx with malignant neoplasm of upper-inner quadrant of left breast in female, estrogen receptor positive  in 2019  S/p lumpectomy of the left breast, radiation received  Takes tamoxifen 20 mg PO daily, continue with regimen  Follows up with oncology as outpatient    Vitamin D deficiency  Assessment & Plan  Takes vitamin D3 daily at home, continue with vitamins while inpatient  Stable for now    Mild intermittent asthma  Assessment & Plan  Follows up with PCP for management  Takes Proventil HFA inhaler when needed, continue with regimen  Stable for now    Migraine headache  Assessment & Plan  Suffer from migraines from many years ago   Continue home regimen as needed          Medical Problems       Resolved Problems  Date Reviewed:  1/26/2024            Resolved    * (Principal) Choledocholithiasis 1/26/2024     Resolved by  Mayte Sethi PA-C        Discharging Physician / Practitioner: Mayte Sethi PA-C  PCP: Bea Almodovar DO  Admission Date:   Admission Orders (From admission, onward)       Ordered        01/25/24 1543  Inpatient Admission  Once            01/23/24 0203  Place in Observation  Once                          Discharge Date: 01/26/24    Consultations During Hospital Stay:  GI    Procedures Performed:   MRCP  ERCP    Significant Findings / Test Results:   FL ERCP biliary and pancreatic 1/24/2024  Impression: Choledocholithiasis status post stone removal following sphincterotomy and balloon sweeping.    ERCP Fluoro 1/24/2024  Impression: No impression generated RECOMMENDATION:  Lactated Ringer at 225 mill per hour, clear liquids later tonight   Linda Farias MD     MRI abdomen wo contrast and mrcp 1/24/2024  Impression: 5 mm CBD stone at the ampulla consistent with choledocholithiasis. Mild CBD dilatation may reflect reservoir effect from prior surgery. Hepatomegaly with fatty infiltration.     CT abdomen pelvis with contrast 1/23/2024  Impression: No evidence of acute intra-abdominal or pelvic process    Incidental Findings:   None    Test Results Pending at Discharge (will require follow up):   None     Outpatient Tests Requested:  BMP in 1 week    Complications: None    Reason for Admission: Epigastric pain with elevated liver enzymes    Hospital Course:   Jocelyn Smith is a 48 y.o. female patient PMH migraines, vitamin D deficiency, anemia, tamoxifen use in setting of malignant neoplasm of left breast, asthma who originally presented to the hospital on 1/22/2024 due to epigastric pain.  Patient with history of cholecystectomy 12/10/2023 presenting with worsening epigastric pain for 1 week.  She was seen outpatient and recommended EGD/ultrasound and GI follow-up however presented to the ED instead.  She was  "evaluated by GI-MRI/MRCP identified 5 mm CBD stone at the ampulla consistent with choledocholithiasis.  Patient was successful ERCP s/p stone removal following sphincterectomy and balloon sweeping.  She was started on clear liquids post ERCP, and further advanced to solids when able.  Patient is feeling better, and is medically stable for discharge at this time.      Please see above list of diagnoses and related plan for additional information.     Condition at Discharge: stable    Discharge Day Visit / Exam:   Subjective: Patient seen walking around on exam this morning.  She states that she is feeling much better.  She is tolerating a solid diet without difficulty.  She is hopeful for discharge today  Vitals: Blood Pressure: 117/84 (01/26/24 0754)  Pulse: 85 (01/26/24 0754)  Temperature: 98.2 °F (36.8 °C) (01/25/24 2057)  Temp Source: Temporal (01/24/24 1536)  Respirations: 14 (01/26/24 0754)  Height: 5' 6\" (167.6 cm) (01/23/24 0300)  Weight - Scale: 81.2 kg (179 lb) (01/26/24 0600)  SpO2: 97 % (01/26/24 0754)  Exam:   Physical Exam  Constitutional:       General: She is not in acute distress.     Appearance: She is not ill-appearing or toxic-appearing.   HENT:      Mouth/Throat:      Mouth: Mucous membranes are moist.   Eyes:      Conjunctiva/sclera: Conjunctivae normal.   Cardiovascular:      Rate and Rhythm: Normal rate and regular rhythm.      Heart sounds: No murmur heard.     No friction rub. No gallop.   Pulmonary:      Breath sounds: No wheezing, rhonchi or rales.   Abdominal:      General: There is no distension.      Tenderness: There is no abdominal tenderness. There is no guarding.   Musculoskeletal:      Right lower leg: No edema.      Left lower leg: No edema.   Skin:     General: Skin is warm and dry.      Capillary Refill: Capillary refill takes less than 2 seconds.          Discussion with Family: Patient declined call to .     Discharge instructions/Information to patient and " family:   See after visit summary for information provided to patient and family.      Provisions for Follow-Up Care:  See after visit summary for information related to follow-up care and any pertinent home health orders.      Mobility at time of Discharge:   Basic Mobility Inpatient Raw Score: 24  JH-HLM Goal: 8: Walk 250 feet or more  JH-HLM Achieved: 8: Walk 250 feet ot more  HLM Goal achieved. Continue to encourage appropriate mobility.     Disposition:   Home    Planned Readmission: No      Discharge Statement:  I spent 55 minutes discharging the patient. This time was spent on the day of discharge. I had direct contact with the patient on the day of discharge. Greater than 50% of the total time was spent examining patient, answering all patient questions, arranging and discussing plan of care with patient as well as directly providing post-discharge instructions.  Additional time then spent on discharge activities.    Discharge Medications:  See after visit summary for reconciled discharge medications provided to patient and/or family.      **Please Note: This note may have been constructed using a voice recognition system**

## 2024-01-26 NOTE — ASSESSMENT & PLAN NOTE
In the setting of choledocholithiasis  AST, ALT, total bili continues to downtrend   F/u BMP in 1 week

## 2024-01-26 NOTE — PLAN OF CARE
Problem: PAIN - ADULT  Goal: Verbalizes/displays adequate comfort level or baseline comfort level  Description: Interventions:  - Encourage patient to monitor pain and request assistance  - Assess pain using appropriate pain scale  - Administer analgesics based on type and severity of pain and evaluate response  - Implement non-pharmacological measures as appropriate and evaluate response  - Consider cultural and social influences on pain and pain management  - Notify physician/advanced practitioner if interventions unsuccessful or patient reports new pain  Outcome: Progressing     Problem: INFECTION - ADULT  Goal: Absence or prevention of progression during hospitalization  Description: INTERVENTIONS:  - Assess and monitor for signs and symptoms of infection  - Monitor lab/diagnostic results  - Monitor all insertion sites, i.e. indwelling lines, tubes, and drains  - Monitor endotracheal if appropriate and nasal secretions for changes in amount and color  - [place] appropriate cooling/warming therapies per order  - Administer medications as ordered  - Instruct and encourage patient and family to use good hand hygiene technique  - Identify and instruct in appropriate isolation precautions for identified infection/condition  Outcome: Progressing     Problem: DISCHARGE PLANNING  Goal: Discharge to home or other facility with appropriate resources  Description: INTERVENTIONS:  - Identify barriers to discharge w/patient and caregiver  - Arrange for needed discharge resources and transportation as appropriate  - Identify discharge learning needs (meds, wound care, etc.)  - Arrange for interpretive services to assist at discharge as needed  - Refer to Case Management Department for coordinating discharge planning if the patient needs post-hospital services based on physician/advanced practitioner order or complex needs related to functional status, cognitive ability, or social support system  Outcome: Progressing      Problem: Knowledge Deficit  Goal: Patient/family/caregiver demonstrates understanding of disease process, treatment plan, medications, and discharge instructions  Description: Complete learning assessment and assess knowledge base.  Interventions:  - Provide teaching at level of understanding  - Provide teaching via preferred learning methods  Outcome: Progressing

## 2024-01-26 NOTE — ASSESSMENT & PLAN NOTE
S/p cholecystectomy in 12/23.  Reports feeling fine but around 10 days ago she developed epigastric pain. Has an upcoming appointment with GI this week but recommended by PCP to follow up in the ED due to back pain that developed after she lifted a bag of rice.   GI consult appreciated  MRCP shows choledocholithiasis  S/p ERCP 1/24  Choledocholithiasis status post stone removal following sphincterotomy and balloon sweeping  Tolerated solid foods without difficulty.   LFTs, and total bili continue to downtrend.   BMP in 1 week, f/u outpatient with PCP

## 2024-01-26 NOTE — PLAN OF CARE
Problem: PAIN - ADULT  Goal: Verbalizes/displays adequate comfort level or baseline comfort level  Description: Interventions:  - Encourage patient to monitor pain and request assistance  - Assess pain using appropriate pain scale  - Administer analgesics based on type and severity of pain and evaluate response  - Implement non-pharmacological measures as appropriate and evaluate response  - Consider cultural and social influences on pain and pain management  - Notify physician/advanced practitioner if interventions unsuccessful or patient reports new pain  Outcome: Progressing     Problem: INFECTION - ADULT  Goal: Absence or prevention of progression during hospitalization  Description: INTERVENTIONS:  - Assess and monitor for signs and symptoms of infection  - Monitor lab/diagnostic results  - Monitor all insertion sites, i.e. indwelling lines, tubes, and drains  - Monitor endotracheal if appropriate and nasal secretions for changes in amount and color  - [place] appropriate cooling/warming therapies per order  - Administer medications as ordered  - Instruct and encourage patient and family to use good hand hygiene technique  - Identify and instruct in appropriate isolation precautions for identified infection/condition  Outcome: Progressing     Problem: DISCHARGE PLANNING  Goal: Discharge to home or other facility with appropriate resources  Description: INTERVENTIONS:  - Identify barriers to discharge w/patient and caregiver  - Arrange for needed discharge resources and transportation as appropriate  - Identify discharge learning needs (meds, wound care, etc.)  - Arrange for interpretive services to assist at discharge as needed  - Refer to Case Management Department for coordinating discharge planning if the patient needs post-hospital services based on physician/advanced practitioner order or complex needs related to functional status, cognitive ability, or social support system  Outcome: Progressing      Problem: Knowledge Deficit  Goal: Patient/family/caregiver demonstrates understanding of disease process, treatment plan, medications, and discharge instructions  Description: Complete learning assessment and assess knowledge base.  Interventions:  - Provide teaching at level of understanding  - Provide teaching via preferred learning methods  Outcome: Progressing

## 2024-01-26 NOTE — DISCHARGE INSTR - AVS FIRST PAGE
Dear Jocelyn Smith,     It was our pleasure to care for you here at [facility].  It is our hope that we were always able to meet and exceed the expected standards for your care during your stay.  You were hospitalized due to choledocholithiasis.  You were cared for on the fourth floor under the service of Mayte Sethi PA-C with the [facility] who covers for your primary care physician (PCP), Bea Almodovar DO, while you were hospitalized.  If you have any questions or concerns related to this hospitalization, you may contact us at .  For follow up, we recommend that you follow up with your primary care physician.  Please review this entire discharge summary as additional general instructions may be provided later as well.  However, at this time we provide for you here, the most important instructions / recommendations at discharge:     CMP ordered for 1 week.  You should follow-up with your PCP with results.   No need to follow up with GI outpatient unless otherwise referred by PCP.      Sincerely,     Mayte Sethi PA-C

## 2024-01-27 LAB
ATRIAL RATE: 76 BPM
ATRIAL RATE: 77 BPM
ATRIAL RATE: 80 BPM
ATRIAL RATE: 81 BPM
ATRIAL RATE: 81 BPM
ATRIAL RATE: 84 BPM
P AXIS: 57 DEGREES
P AXIS: 60 DEGREES
P AXIS: 60 DEGREES
P AXIS: 63 DEGREES
P AXIS: 68 DEGREES
P AXIS: 73 DEGREES
PR INTERVAL: 148 MS
PR INTERVAL: 154 MS
PR INTERVAL: 154 MS
PR INTERVAL: 158 MS
PR INTERVAL: 166 MS
PR INTERVAL: 172 MS
QRS AXIS: 106 DEGREES
QRS AXIS: 75 DEGREES
QRS AXIS: 80 DEGREES
QRS AXIS: 85 DEGREES
QRS AXIS: 90 DEGREES
QRS AXIS: 92 DEGREES
QRSD INTERVAL: 84 MS
QRSD INTERVAL: 86 MS
QRSD INTERVAL: 92 MS
QRSD INTERVAL: 92 MS
QT INTERVAL: 396 MS
QT INTERVAL: 400 MS
QT INTERVAL: 402 MS
QT INTERVAL: 418 MS
QT INTERVAL: 430 MS
QT INTERVAL: 432 MS
QTC INTERVAL: 460 MS
QTC INTERVAL: 463 MS
QTC INTERVAL: 472 MS
QTC INTERVAL: 485 MS
QTC INTERVAL: 486 MS
QTC INTERVAL: 486 MS
T WAVE AXIS: 45 DEGREES
T WAVE AXIS: 52 DEGREES
T WAVE AXIS: 58 DEGREES
T WAVE AXIS: 58 DEGREES
VENTRICULAR RATE: 76 BPM
VENTRICULAR RATE: 77 BPM
VENTRICULAR RATE: 80 BPM
VENTRICULAR RATE: 81 BPM
VENTRICULAR RATE: 81 BPM
VENTRICULAR RATE: 84 BPM

## 2024-01-30 ENCOUNTER — OFFICE VISIT (OUTPATIENT)
Dept: FAMILY MEDICINE CLINIC | Facility: CLINIC | Age: 49
End: 2024-01-30
Payer: COMMERCIAL

## 2024-01-30 VITALS
RESPIRATION RATE: 18 BRPM | OXYGEN SATURATION: 98 % | DIASTOLIC BLOOD PRESSURE: 70 MMHG | BODY MASS INDEX: 28.45 KG/M2 | WEIGHT: 177 LBS | HEART RATE: 86 BPM | TEMPERATURE: 97 F | HEIGHT: 66 IN | SYSTOLIC BLOOD PRESSURE: 110 MMHG

## 2024-01-30 DIAGNOSIS — R74.01 TRANSAMINITIS: ICD-10-CM

## 2024-01-30 DIAGNOSIS — Z00.6 ENCOUNTER FOR EXAMINATION FOR NORMAL COMPARISON OR CONTROL IN CLINICAL RESEARCH PROGRAM: ICD-10-CM

## 2024-01-30 DIAGNOSIS — R73.09 ABNORMAL GLUCOSE: ICD-10-CM

## 2024-01-30 DIAGNOSIS — K76.0 FATTY LIVER: Primary | ICD-10-CM

## 2024-01-30 PROBLEM — K81.0 ACUTE CHOLECYSTITIS: Status: RESOLVED | Noted: 2023-12-10 | Resolved: 2024-01-30

## 2024-01-30 PROCEDURE — 1111F DSCHRG MED/CURRENT MED MERGE: CPT | Performed by: FAMILY MEDICINE

## 2024-01-30 PROCEDURE — 99496 TRANSJ CARE MGMT HIGH F2F 7D: CPT | Performed by: FAMILY MEDICINE

## 2024-01-30 NOTE — PROGRESS NOTES
Assessment & Plan     1. Fatty liver  Assessment & Plan:  Fatty liver diagnosis discussed at length.  We discussed that the fat in her liver can cause permanent scarring and eventually turn into liver cirrhosis.  Weight loss strongly advised      2. Transaminitis  Assessment & Plan:  Levels have been improving  Had labs this morning        3. Abnormal glucose  Assessment & Plan:  Low sugar diet highly recommended         Return if symptoms worsen or fail to improve.  Subjective     Transitional Care Management Review:   Jocelyn Smith is a 48 y.o. female here for TCM follow up.     During the TCM phone call patient stated:  TCM Call       Date and time call was made  1/26/2024  2:40 PM    Hospital care reviewed  Records reviewed    Patient was hospitialized at  [facility]    Date of Admission  01/22/24    Date of discharge  01/26/24    Diagnosis  Choledocholithiasis    Disposition  Home    Were the patients medications reviewed and updated  No  She declined but knows to call if any questions    Current Symptoms  --  Overall feeling better, just sore          TCM Call       Post hospital issues  None    Should patient be enrolled in anticoag monitoring?  No    Scheduled for follow up?  Yes    Patients specialists  Other (comment)    Other specialists names  [facility]    Did you obtain your prescribed medications  Yes    Do you need help managing your prescriptions or medications  No    Is transportation to your appointment needed  No    Specify why  She is not cleared to drive    I have advised the patient to call PCP with any new or worsening symptoms  Orion Cortes    Living Arrangements  Spouse or Significiant other    Support System  Family    The type of support provided  Physical; Emotional    Do you have social support  Yes, as much as I need    Are you recieving any outpatient services  No    Are you recieving home care services  No    Interperter language line needed  No    Counseling   "Patient    Counseling topics  Activities of daily living; Importance of RX compliance; patient and family education; instructions for management; Risk factor reduction    Comments  Jocelyn states that she is overall feeling better, just sore. She knows to go to the ER if any severe pain and to notify us if any fevers, etc [email or web address]          She is here today for hospital follow-up.  She was in the hospital for multiple days for right upper quadrant pain.  She not having choledocho lithiasis and had to have a ERCP done.  She is feeling much better now that she is home.  She is concerned that her liver enzymes are still elevated.  She had blood work done this morning.    She also questions about her fatty liver found on scans while she was hospitalized.          Review of Systems    Objective     /70   Pulse 86   Temp (!) 97 °F (36.1 °C)   Resp 18   Ht 5' 6\" (1.676 m)   Wt 80.3 kg (177 lb)   LMP 01/02/2024 (Exact Date) Comment: B/L salpingectomy 03/2021  SpO2 98%   BMI 28.57 kg/m²      Physical Exam  Vitals and nursing note reviewed.   Constitutional:       Appearance: She is well-developed.   HENT:      Head: Normocephalic and atraumatic.      Right Ear: External ear normal.      Left Ear: External ear normal.      Nose: Nose normal.   Cardiovascular:      Rate and Rhythm: Normal rate and regular rhythm.      Heart sounds: Normal heart sounds. No murmur heard.     No friction rub.   Pulmonary:      Effort: No respiratory distress.      Breath sounds: Normal breath sounds. No wheezing or rales.   Abdominal:      Palpations: Abdomen is soft.      Tenderness: There is no abdominal tenderness.   Musculoskeletal:      Right lower leg: No edema.      Left lower leg: No edema.   Neurological:      Mental Status: She is oriented to person, place, and time.       Medications have been reviewed by provider in current encounter    Bea Almodovar, DO         "

## 2024-01-30 NOTE — UTILIZATION REVIEW
NOTIFICATION OF ADMISSION DISCHARGE   This is a Notification of Discharge from [facility]. Please be advised that this patient has been discharge from our facility. Below you will find the admission and discharge date and time including the patient’s disposition.   UTILIZATION REVIEW CONTACT:  Clark Euceda  Utilization   Network Utilization Review Department  Phone: [phone] x carefully listen to the prompts. All voicemails are confidential.  Email: [email or web address]     ADMISSION INFORMATION  PRESENTATION DATE: 1/22/2024 11:22 PM  OBERVATION ADMISSION DATE:   INPATIENT ADMISSION DATE: 1/25/24  3:43 PM   DISCHARGE DATE: 1/26/2024  2:30 PM   DISPOSITION:Home/Self Care    Network Utilization Review Department  ATTENTION: Please call with any questions or concerns to [phone] and carefully listen to the prompts so that you are directed to the right person. All voicemails are confidential.   For Discharge needs, contact Care Management DC Support Team at [phone] opt. 2  Send all requests for admission clinical reviews, approved or denied determinations and any other requests to dedicated fax number below belonging to the campus where the patient is receiving treatment. List of dedicated fax numbers for the Facilities:  FACILITY NAME UR FAX NUMBER   ADMISSION DENIALS (Administrative/Medical Necessity) [phone]   DISCHARGE SUPPORT TEAM ([facility]) [phone]   PARENT CHILD HEALTH (Maternity/NICU/Pediatrics) [phone]   [facility] [phone]   [facility] [phone]   [facility] [phone]   [facility] [phone]   [facility] [phone]   [facility] [phone]   [facility] [phone]   [facility] [phone]   [facility]  [facility] [place] [phone]   [facility] [phone]   [facility] [phone]   [facility] [phone]

## 2024-01-30 NOTE — ASSESSMENT & PLAN NOTE
Fatty liver diagnosis discussed at length.  We discussed that the fat in her liver can cause permanent scarring and eventually turn into liver cirrhosis.  Weight loss strongly advised

## 2024-01-31 DIAGNOSIS — R74.01 TRANSAMINITIS: Primary | ICD-10-CM

## 2024-01-31 LAB
BUN SERPL-MCNC: 8 MG/DL (ref 6–24)
BUN/CREAT SERPL: 14 (ref 9–23)
CALCIUM SERPL-MCNC: 9.6 MG/DL (ref 8.7–10.2)
CHLORIDE SERPL-SCNC: 105 MMOL/L (ref 96–106)
CO2 SERPL-SCNC: 24 MMOL/L (ref 20–29)
CREAT SERPL-MCNC: 0.56 MG/DL (ref 0.57–1)
EGFR: 111 ML/MIN/1.73
GLUCOSE SERPL-MCNC: 105 MG/DL (ref 70–99)
POTASSIUM SERPL-SCNC: 4.6 MMOL/L (ref 3.5–5.2)
SODIUM SERPL-SCNC: 145 MMOL/L (ref 134–144)

## 2024-02-06 LAB
ALBUMIN SERPL-MCNC: 4.6 G/DL (ref 3.9–4.9)
ALP SERPL-CCNC: 57 IU/L (ref 44–121)
ALT SERPL-CCNC: 29 IU/L (ref 0–32)
AST SERPL-CCNC: 18 IU/L (ref 0–40)
BILIRUB DIRECT SERPL-MCNC: 0.23 MG/DL (ref 0–0.4)
BILIRUB SERPL-MCNC: 0.5 MG/DL (ref 0–1.2)
PROT SERPL-MCNC: 7.1 G/DL (ref 6–8.5)

## 2024-02-19 ENCOUNTER — OFFICE VISIT (OUTPATIENT)
Dept: SURGICAL ONCOLOGY | Facility: CLINIC | Age: 49
End: 2024-02-19
Payer: COMMERCIAL

## 2024-02-19 VITALS
HEART RATE: 80 BPM | WEIGHT: 179 LBS | DIASTOLIC BLOOD PRESSURE: 90 MMHG | SYSTOLIC BLOOD PRESSURE: 120 MMHG | OXYGEN SATURATION: 98 % | TEMPERATURE: 97.1 F | HEIGHT: 66 IN | BODY MASS INDEX: 28.77 KG/M2 | RESPIRATION RATE: 16 BRPM

## 2024-02-19 DIAGNOSIS — Z79.810 USE OF TAMOXIFEN (NOLVADEX): ICD-10-CM

## 2024-02-19 DIAGNOSIS — C50.212 MALIGNANT NEOPLASM OF UPPER-INNER QUADRANT OF LEFT BREAST IN FEMALE, ESTROGEN RECEPTOR POSITIVE: Primary | ICD-10-CM

## 2024-02-19 DIAGNOSIS — Z17.0 MALIGNANT NEOPLASM OF UPPER-INNER QUADRANT OF LEFT BREAST IN FEMALE, ESTROGEN RECEPTOR POSITIVE: Primary | ICD-10-CM

## 2024-02-19 PROCEDURE — 99214 OFFICE O/P EST MOD 30 MIN: CPT

## 2024-02-19 NOTE — PROGRESS NOTES
Surgical Oncology Follow Up       [facility] [employer]  [address]  [place] [address]    Jocelyn Smith  1975  [id number]  [facility] [employer]  [address]  [place] [address]    Chief Complaint   Patient presents with   • office visit       Assessment/Plan:  1. Malignant neoplasm of upper-inner quadrant of left breast in female, estrogen receptor positive   - 1 year follow up  - mammogram is scheduled    2. Use of tamoxifen (Nolvadex)  - continue use per medical oncology      Discussion/Summary: Pt is a 48 year old female presenting today for a 6 month follow up for left breast cancer diagnosed in June 2019. Pathology revealed invasive lobular carcinoma ER/KY positive, HER2 negative. She underwent genetic testing which was negative.  She had a left breast lumpectomy sentinel node biopsy with Dr. Steinberg and completed whole breast radiation therapy. She is currently on tamoxifen. She is scheduled for mammogram in April. She is scheduled to have more fat grafting done in September of this year. She has a few areas of fat necrosis on the left lateral aspect of the left breast and medial right breast. There were no concerns on cbe. She had her gallbladder removed at the end of last year and was recently hospitalized for epigastric pain due to bile stone which was removed. She is feeling better now.  I will see the patient back in 1 year or sooner should the need arise. She was instructed to call with any questions or concerns prior to this time. All questions were answered today     History of Present Illness:     Oncology History   Malignant neoplasm of upper-inner quadrant of left breast in female, estrogen receptor positive    6/7/2019 Biopsy    Left breast ultrasound-guided biopsy  11 o'clock, 4 cm from nipple  Invasive lobular carcinoma  Grade 1  ER 90  KY 45  HER2 1+      6/13/2019 -  Cancer Staged    Staging form: Breast, AJCC 8th Edition  - Pathologic stage from 6/13/2019: Stage IA (pT1c, pN0, cM0, G1, ER+, AR+, HER2-) - Signed by JACIEL Bruno on 2/19/2024  Stage prefix: Initial diagnosis  Multigene prognostic tests performed: MammaPrint  Histologic grading system: 3 grade system       6/17/2019 Genetic Testing    BRCA negative  [facility]     7/30/2019 Surgery    Left breast lumpectomy with sentinel lymph node biopsy  Invasive lobular carcinoma  Grade 1  1.1 cm  0/3 Lymph nodes  Margins negative  Anatomic/Prognostic Stage IA     8/14/2019 -  Cancer Staged    Staging form: Breast, AJCC 8th Edition  - Clinical: Stage IA (cT1c, cN0, cM0, G1, ER+, AR+, HER2-) - Signed by Yonatan Malcolm MD on 8/14/2019  Laterality: Left  Histologic grading system: 3 grade system       8/27/2019 - 9/25/2019 Radiation    Course: C1    Plan ID Energy Fractions Dose per Fraction (cGy) Dose Correction (cGy) Total Dose Delivered (cGy) Elapsed Days   BH L Breast 6X 16 / 16 266 0 4,256 22   BH L Brst e 12E 5 / 5 200 0 1,000 6      Treatment dates:  C1: 8/27/2019 - 9/25/2019 8/2019 -  Hormone Therapy    Tamoxifen 20 mg daily  With Dr. Allan          -Interval History:  Pt is a 48 year old female presenting today for a 6 month follow up for left breast cancer diagnosed in June 2019. She is on tamoxifen. She feels like she is starting menopause as she is experiencing hot flashes. She had her gallbladder removed at the end of last year and was recently hospitalized for epigastric pain due to bile stone which was removed. Patient denies changes on her breast exam. She denies persistent headaches, bone pain, back pain, shortness of breath, or abdominal pain.      Review of Systems:  Review of Systems   Constitutional:  Negative for activity change, appetite change, fatigue and unexpected weight change.   Respiratory:  Negative for cough and shortness of breath.    Cardiovascular:  Negative  for chest pain.   Gastrointestinal:  Negative for abdominal pain, diarrhea, nausea and vomiting.   Endocrine: Positive for heat intolerance.   Musculoskeletal:  Negative for arthralgias, back pain and myalgias.   Skin:  Negative for rash.   Neurological:  Negative for weakness and headaches.   Hematological:  Negative for adenopathy.       Patient Active Problem List   Diagnosis   • Allergic rhinitis   • Dense breasts   • Migraine headache   • Mild intermittent asthma   • Motion sickness   • Vitamin D deficiency   • Malignant neoplasm of upper-inner quadrant of left breast in female, estrogen receptor positive    • Use of tamoxifen (Nolvadex)   • Abnormal glucose   • History of lumpectomy   • Breast asymmetry   • Transaminitis   • Anemia   • Fatty liver     Past Medical History:   Diagnosis Date   • Adhesive capsulitis of right shoulder     Resolved 11/15/2016    • Asthma    • Benign paroxysmal vertigo, unspecified ear     Resolved 11/15/2016    • BRCA gene mutation negative 06/2019    [facility]   • Breast cancer (HCC) 07/30/2019    left   • Cellulitis of leg, except foot     Resolved 12/10/2014    • Hx of radiation therapy 08/01/2019   • Lymphadenitis     Resolved 12/10/2014    • Migraine    • Snoring    • Wears contact lenses    • Wears glasses      Past Surgical History:   Procedure Laterality Date   • BREAST BIOPSY Left 2019   • BREAST LUMPECTOMY Left 07/30/2019    Procedure: BREAST LUMPECTOMY; BREAST NEEDLE LOCALIZATION (NEEDLE LOC AT 0800);  Surgeon: Miguel Angel Steinberg MD;  [facility];  Service: Surgical Oncology   • CHOLECYSTECTOMY LAPAROSCOPIC N/A 12/10/2023    Procedure: CHOLECYSTECTOMY LAPAROSCOPIC;  Surgeon: Yuan Humphrey MD;  [facility];  Service: General   • COLONOSCOPY     • INTRAUTERINE DEVICE INSERTION  2019   • LYMPH NODE BIOPSY Left 07/30/2019    Procedure: SENTINEL LYMPH NODE BIOPSY; LYMPHATIC MAPPING WITH BLUE DYE AND RADIOACTIVE DYE (INJECT AT 0930 BY DR STEINBERG IN THE OR);   Surgeon: Miguel Angel Steinberg MD;  Location:  Main OR;  Service: Surgical Oncology   • MAMMO NEEDLE LOCALIZATION LEFT (ALL INC) Left 07/30/2019   • SD GRAFTING OF AUTOLOGOUS FAT BY LIPO 50 CC OR LESS Bilateral 5/11/2023    Procedure: FAT GRAFTING TO BILATERAL BREASTS;  Surgeon: Venita Lloyd DO;  Location:  MAIN OR;  Service: Plastics   • SD MASTOPEXY Right 5/11/2023    Procedure: RIGHT CRESCENTRIC MASTOPEXY;  Surgeon: Venita Lloyd DO;  Location:  MAIN OR;  Service: Plastics   • SALPINGECTOMY Bilateral 03/2021   • SHOULDER SURGERY Right     Last assessed 12/29/2015    • US GUIDED BREAST BIOPSY LEFT COMPLETE Left 06/07/2019   • WISDOM TOOTH EXTRACTION       Family History   Problem Relation Age of Onset   • Alzheimer's disease Mother    • COPD Father    • No Known Problems Sister    • No Known Problems Daughter    • Alzheimer's disease Maternal Grandmother    • No Known Problems Maternal Grandfather    • No Known Problems Paternal Grandmother    • No Known Problems Paternal Grandfather    • No Known Problems Brother    • No Known Problems Brother    • No Known Problems Brother    • No Known Problems Daughter      Social History     Socioeconomic History   • Marital status: /Civil Union     Spouse name: Not on file   • Number of children: 3   • Years of education: Not on file   • Highest education level: Not on file   Occupational History   • Not on file   Tobacco Use   • Smoking status: Never     Passive exposure: Never   • Smokeless tobacco: Never   Vaping Use   • Vaping status: Never Used   Substance and Sexual Activity   • Alcohol use: Yes     Comment: rare   • Drug use: No   • Sexual activity: Yes     Partners: Male   Other Topics Concern   • Not on file   Social History Narrative   • Not on file     Social Determinants of Health     Financial Resource Strain: Not on file   Food Insecurity: Not on file   Transportation Needs: Not on file   Physical Activity: Not on file   Stress: Not on file    Social Connections: Not on file   Intimate Partner Violence: Not on file   Housing Stability: Not on file       Current Outpatient Medications:   •  albuterol (ProAir HFA) 90 mcg/act inhaler, Inhale 2 puffs every 4 (four) hours as needed for wheezing, Disp: 18 g, Rfl: 1  •  Biotin w/ Vitamins C & E 1250-7.5-7.5 MCG-MG-UNT CHEW, in the morning, Disp: , Rfl:   •  BIOTIN W/ VITAMINS C & E PO, Take 1 capsule by mouth in the morning, Disp: , Rfl:   •  cetirizine (ZyrTEC) 10 MG chewable tablet, Chew 10 mg daily as needed , Disp: , Rfl:   •  Cholecalciferol (VITAMIN D3) 1000 units CHEW, Chew 1 tablet daily, Disp: , Rfl:   •  fluticasone (FLONASE) 50 mcg/act nasal spray, 1 spray into each nostril daily as needed , Disp: , Rfl:   •  Multiple Vitamins-Minerals (AIRBORNE PO), Take 1 tablet by mouth in the morning, Disp: , Rfl:   •  multivitamin (THERAGRAN) TABS, Take 1 tablet by mouth daily, Disp: , Rfl:   •  rizatriptan (MAXALT-MLT) 10 mg disintegrating tablet, Take 1 tablet (10 mg total) by mouth daily as needed for migraine Take at the onset of migraine; if symptoms continue or return, may take another dose at least 2 hours after first dose. Take no more than 2 doses in a day., Disp: 12 tablet, Rfl: 1  •  tamoxifen (NOLVADEX) 20 mg tablet, TAKE 1 TABLET BY MOUTH DAILY, Disp: 90 tablet, Rfl: 3  No Known Allergies  Vitals:    02/19/24 1002   BP: 120/90   Pulse: 80   Resp: 16   Temp: (!) 97.1 °F (36.2 °C)   SpO2: 98%       Physical Exam  Constitutional:       General: She is not in acute distress.     Appearance: Normal appearance.   Cardiovascular:      Rate and Rhythm: Normal rate and regular rhythm.      Pulses: Normal pulses.      Heart sounds: Normal heart sounds.   Pulmonary:      Effort: Pulmonary effort is normal.      Breath sounds: Normal breath sounds.   Chest:      Chest wall: No mass.   Breasts:     Right: No swelling, bleeding, inverted nipple, mass, nipple discharge, skin change or tenderness.      Left: No  swelling, bleeding, inverted nipple, mass, nipple discharge, skin change or tenderness.       Abdominal:      General: Abdomen is flat.      Palpations: Abdomen is soft.   Lymphadenopathy:      Upper Body:      Right upper body: No supraclavicular, axillary or pectoral adenopathy.      Left upper body: No supraclavicular, axillary or pectoral adenopathy.   Skin:     General: Skin is warm.   Neurological:      General: No focal deficit present.      Mental Status: She is alert and oriented to person, place, and time.   Psychiatric:         Mood and Affect: Mood normal.         Behavior: Behavior normal.           Results:    Imaging  FL ERCP biliary and pancreatic    Result Date: 1/24/2024  Narrative: ERCP INDICATION: Choledocholithiasis COMPARISON: MRI 1/23/2024 IMAGES:  3 FLUOROSCOPY TIME:   32.7 seconds CONTRAST: 5 mL of iohexol (OMNIPAQUE) FINDINGS: Fluoroscopic guidance was provided for performance of ERCP. BILIARY: Side-viewing endoscope in the duodenum noted. Common bile duct cannulated and injected demonstrating ductal dilatation to 9 mm with distal filling defect. Sphincterotomy and balloon sweeping removed calculus. PANCREATIC: Not injected.     Impression: Choledocholithiasis status post stone removal following sphincterotomy and balloon sweeping. Workstation performed: [id number]     ERCP Fluoro    Result Date: 1/24/2024  Narrative: Table formatting from the original result was not included. [facility] [address] [facility] [address] [phone] DATE OF SERVICE: 1/24/24 PHYSICIAN(S): Attending: Linda Farias MD Fellow: No Staff Documented INDICATION: Choledocholithiasis POST-OP DIAGNOSIS: See the impression below. PREPROCEDURE: Informed consent was obtained for the procedure, including sedation.  Risks of perforation, hemorrhage, adverse drug reaction and aspiration were discussed. The patient was placed in the left lateral decubitus position. Patient was explained  about the risks and benefits of the procedure. Risks including but not limited to bleeding, infection, and perforation were explained in detail. Also explained about less than 100% sensitivity with the exam and other alternatives. PROCEDURE: ERCP DETAILS OF PROCEDURE: Patient was taken to the procedure room where a time out was performed to confirm correct patient and correct procedure. The patient underwent general anesthesia, which was administered by an anesthesia professional. The patient's blood pressure, heart rate, level of consciousness, respirations, oxygen, ECG and ETCO2 were monitored throughout the procedure. Clinical intention was achieved. The patient experienced no blood loss. The procedure was not difficult. The patient tolerated the procedure well. There were no apparent adverse events. ANESTHESIA INFORMATION: ASA: II Anesthesia Type: General MEDICATIONS: indomethacin (INDOCIN) rectal suppository 100 mg iohexol (OMNIPAQUE) 240 MG/ML solution 5 mL (Totals for administrations occurring from 1548 to 1618 on 01/24/24) FINDINGS: The common bile duct was deeply cannulated after 1 attempt using a traction sphincterotome with guidewire. Cannulation was not difficult and no bleeding was observed. . Cholangiogram showed common bile duct measuring about 8 to 9 mm with a filling defect distally.  Sphincterotomy was performed and the duct was swept with 9 to 12 mm balloon catheter with extraction of a stone. SPECIMENS: * No specimens in log *      Impression: No impression generated RECOMMENDATION:  Lactated Ringer at 225 mill per hour, clear liquids later ronaldight   Linda Farias MD     MRI abdomen wo contrast and mrcp    Result Date: 1/24/2024  Narrative: MRI OF THE ABDOMEN WITHOUT CONTRAST WITH MRCP INDICATION: 48-year-old female with abdominal pain and history of prior cholecystectomy. COMPARISON: CT abdomen and pelvis 1/23/2024 and 12/10/2023 TECHNIQUE: Multiplanar/multisequence MRI of the abdomen with 3D  MRCP was performed without the administration of contrast. FINDINGS: Evaluation of the solid organs is limited without IV contrast. LOWER CHEST: Unremarkable. LIVER: The liver is mildly enlarged and normal in configuration. No suspicious mass. Hepatic steatosis evident. Limited evaluation of hepatic veins and portal veins on this non-contrast MRI is unremarkable. BILE DUCTS: No intrahepatic bile duct dilation. Common bile duct is mildly dilated at 9 mm, within normal limits for post operative state. 5 mm low signal filling defect distal CBD at the ampulla (series 10 image 45), consistent with choledocholithiasis. GALLBLADDER: Cholecystectomy. PANCREAS: Unremarkable. The pancreatic duct is normal caliber. No evidence of pancreas divisum. ADRENAL GLANDS: Unremarkable. SPLEEN: Normal. KIDNEYS/PROXIMAL URETERS: No hydroureteronephrosis. No suspicious renal mass. BOWEL: No dilated loops of bowel. PERITONEAL CAVITY/RETROPERITONEUM: No ascites. LYMPH NODES: No enlarged lymphadenopathy. VESSELS: No aneurysm. ABDOMINAL WALL: Unremarkable. BONES: No suspicious osseous lesion.     Impression: 5 mm CBD stone at the ampulla consistent with choledocholithiasis. Mild CBD dilatation may reflect reservoir effect from prior surgery. Hepatomegaly with fatty infiltration. Workstation performed: [id number]     CT abdomen pelvis with contrast    Result Date: 1/23/2024  Narrative: CT ABDOMEN AND PELVIS WITH IV CONTRAST INDICATION: Epigastric pain and nausea. COMPARISON: 12/10/2023. TECHNIQUE: CT examination of the abdomen and pelvis was performed. Multiplanar 2D reformatted images were created from the source data. This examination, like all CT scans performed in the [facility], was performed utilizing techniques to minimize radiation dose exposure, including the use of iterative reconstruction and automated exposure control. Radiation dose length product (DLP) for this visit: 744 mGy-cm IV Contrast: 85 mL of iohexol  (OMNIPAQUE) Enteric Contrast: Not administered. FINDINGS: ABDOMEN LOWER CHEST: Clear lung bases. LIVER/BILIARY TREE: Hepatic steatosis. GALLBLADDER: Cholecystectomy. SPLEEN: Unremarkable. PANCREAS: Unremarkable. ADRENAL GLANDS: Unremarkable. KIDNEYS/URETERS: No pyelonephritis or obstructive uropathy. STOMACH AND BOWEL: No evidence of bowel obstruction, colitis or diverticulitis. APPENDIX: No findings to suggest appendicitis. ABDOMINOPELVIC CAVITY: No ascites. No pneumoperitoneum. No lymphadenopathy. VESSELS: No abdominal aortic aneurysm. PELVIS REPRODUCTIVE ORGANS: No adnexal mass or cyst. URINARY BLADDER: Unremarkable. ABDOMINAL WALL/INGUINAL REGIONS: Unremarkable. BONES: No lytic or blastic lesion.     Impression: No evidence of acute intra-abdominal or pelvic process Workstation performed: [id number]       I reviewed the above imaging data.      Advance Care Planning/Advance Directives:  Discussed disease status, cancer treatment plans and/or cancer treatment goals with the patient.

## 2024-02-23 ENCOUNTER — APPOINTMENT (OUTPATIENT)
Dept: RADIOLOGY | Facility: AMBULARY SURGERY CENTER | Age: 49
End: 2024-02-23
Attending: STUDENT IN AN ORGANIZED HEALTH CARE EDUCATION/TRAINING PROGRAM
Payer: COMMERCIAL

## 2024-02-23 ENCOUNTER — OFFICE VISIT (OUTPATIENT)
Dept: OBGYN CLINIC | Facility: CLINIC | Age: 49
End: 2024-02-23
Payer: COMMERCIAL

## 2024-02-23 VITALS
DIASTOLIC BLOOD PRESSURE: 87 MMHG | HEART RATE: 78 BPM | BODY MASS INDEX: 28.77 KG/M2 | RESPIRATION RATE: 16 BRPM | HEIGHT: 66 IN | WEIGHT: 179 LBS | SYSTOLIC BLOOD PRESSURE: 128 MMHG

## 2024-02-23 DIAGNOSIS — M25.511 RIGHT SHOULDER PAIN, UNSPECIFIED CHRONICITY: ICD-10-CM

## 2024-02-23 DIAGNOSIS — M75.41 IMPINGEMENT SYNDROME OF RIGHT SHOULDER: Primary | ICD-10-CM

## 2024-02-23 PROCEDURE — 20610 DRAIN/INJ JOINT/BURSA W/O US: CPT | Performed by: STUDENT IN AN ORGANIZED HEALTH CARE EDUCATION/TRAINING PROGRAM

## 2024-02-23 PROCEDURE — 73030 X-RAY EXAM OF SHOULDER: CPT

## 2024-02-23 PROCEDURE — 99203 OFFICE O/P NEW LOW 30 MIN: CPT | Performed by: STUDENT IN AN ORGANIZED HEALTH CARE EDUCATION/TRAINING PROGRAM

## 2024-02-23 RX ORDER — TRIAMCINOLONE ACETONIDE 40 MG/ML
40 INJECTION, SUSPENSION INTRA-ARTICULAR; INTRAMUSCULAR
Status: COMPLETED | OUTPATIENT
Start: 2024-02-23 | End: 2024-02-23

## 2024-02-23 RX ORDER — BUPIVACAINE HYDROCHLORIDE 2.5 MG/ML
4 INJECTION, SOLUTION INFILTRATION; PERINEURAL
Status: COMPLETED | OUTPATIENT
Start: 2024-02-23 | End: 2024-02-23

## 2024-02-23 RX ADMIN — BUPIVACAINE HYDROCHLORIDE 4 ML: 2.5 INJECTION, SOLUTION INFILTRATION; PERINEURAL at 09:00

## 2024-02-23 RX ADMIN — TRIAMCINOLONE ACETONIDE 40 MG: 40 INJECTION, SUSPENSION INTRA-ARTICULAR; INTRAMUSCULAR at 09:00

## 2024-02-23 NOTE — PROGRESS NOTES
"Ortho Sports Medicine Shoulder New Patient Visit     Assesment:   49 y.o. female right shoulder impingement syndrome, prior open SAD     Plan:    Jocelyn presents today for right shoulder pain. She had a previous subacromial decompression of her right shoulder about 10 years ago. Her exam today is consistent with impingement syndrome. I explained the bone spurs that were cleaned out however her exam is consistent with recurrent impingement so is possible that she has recurrent spur.  Explained her radiographs are slightly abnormal in appearance and this may be related to postsurgical changes.  I recommend we try a cortisone injection, which was provided at today's visit and a referral was placed to physical therapy. She can use ice, heat and over-the-counter medication as needed to help with her pain. She should let pain be a guide to activity. She will follow up with me in 6 weeks at which time we can obtain an MRI if her symptoms worsen or fail to improve.       Large joint arthrocentesis: R subacromial bursa  Universal Protocol:  Consent: Verbal consent obtained.  Consent given by: patient  Time out: Immediately prior to procedure a \"time out\" was called to verify the correct patient, procedure, equipment, support staff and site/side marked as required.  Timeout called at: 2/23/2024 10:04 AM.  Patient understanding: patient states understanding of the procedure being performed  Patient consent: the patient's understanding of the procedure matches consent given  Site marked: the operative site was marked  Patient identity confirmed: verbally with patient  Supporting Documentation  Indications: pain   Procedure Details  Location: shoulder - R subacromial bursa  Needle size: 22 G  Ultrasound guidance: no  Approach: lateral  Medications administered: 4 mL bupivacaine 0.25 %; 40 mg triamcinolone acetonide 40 mg/mL             Conservative treatment:    PT for ROM and strengthening to shoulder, rotator cuff, scapular " stabilizers.  Home exercise program for shoulder, including ROM and strenthening.  Instructions given to patient of what exercises to perform.  Let pain guide return to activities.      Imaging:    All imaging from today was reviewed by myself and explained to the patient.       Injection:    The risks and benefits of the injection (which include but are not limited to: infection, bleeding,damage to nerve/artery, need for further intervention), as well as the risks and benefits of all alternative treatments were explained and understood.  The patient elected to proceed with injection. The procedure was done with aseptic technique, and the patient tolerated the procedure well with no complications.  A corticosteroid injection of the subacromial space was performed.      Surgery:     No surgery is recommended at this point, continue with conservative treatment plan as noted.      Follow up:    Return in about 6 weeks (around 4/5/2024) for appointment with Dr. Voss.        Chief Complaint   Patient presents with    Right Shoulder - Pain       History of Present Illness:    The patient is a 49 y.o., right hand dominant female who presents for right shoulder pain. 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 She reports she had a subacromial decompression on her right shoulder around 10 years ago. She has had the pain for the last two weeks. She does not remember any specific mechanism of injury. She notes her pain is over the anterior aspect of her shoulder. She has pain with reaching across her body and reaching behind her. She notes the pain is sharp in nature and rates it a 6/10. She denies any numbness and tingling.        Shoulder Surgical History:  Right Shoulder - Subacromial Decompression - about 10 years ago    Past Medical, Social and Family History:  Past Medical History:   Diagnosis Date    Adhesive capsulitis of right shoulder     Resolved 11/15/2016     Asthma     Benign  paroxysmal vertigo, unspecified ear     Resolved 11/15/2016     BRCA gene mutation negative 06/2019    [facility]    Breast cancer (HCC) 07/30/2019    left    Cellulitis of leg, except foot     Resolved 12/10/2014     Hx of radiation therapy 08/01/2019    Lymphadenitis     Resolved 12/10/2014     Migraine     Snoring     Wears contact lenses     Wears glasses      Past Surgical History:   Procedure Laterality Date    BREAST BIOPSY Left 2019    BREAST LUMPECTOMY Left 07/30/2019    Procedure: BREAST LUMPECTOMY; BREAST NEEDLE LOCALIZATION (NEEDLE LOC AT 0800);  Surgeon: Miguel Angel Steinberg MD;  [facility];  Service: Surgical Oncology    CHOLECYSTECTOMY LAPAROSCOPIC N/A 12/10/2023    Procedure: CHOLECYSTECTOMY LAPAROSCOPIC;  Surgeon: Yuan Humphrey MD;  [facility];  Service: General    COLONOSCOPY      INTRAUTERINE DEVICE INSERTION  2019    LYMPH NODE BIOPSY Left 07/30/2019    Procedure: SENTINEL LYMPH NODE BIOPSY; LYMPHATIC MAPPING WITH BLUE DYE AND RADIOACTIVE DYE (INJECT AT 0930 BY DR STEINBERG IN THE OR);  Surgeon: Miguel Angel Steinberg MD;  [facility];  Service: Surgical Oncology    MAMMO NEEDLE LOCALIZATION LEFT (ALL INC) Left 07/30/2019    OR GRAFTING OF AUTOLOGOUS FAT BY LIPO 50 CC OR LESS Bilateral 5/11/2023    Procedure: FAT GRAFTING TO BILATERAL BREASTS;  Surgeon: Venita Lloyd DO;  Location:  MAIN OR;  Service: Plastics    OR MASTOPEXY Right 5/11/2023    Procedure: RIGHT CRESCENTRIC MASTOPEXY;  Surgeon: Venita Lloyd DO;  Location:  MAIN OR;  Service: Plastics    SALPINGECTOMY Bilateral 03/2021    SHOULDER SURGERY Right     Last assessed 12/29/2015     US GUIDED BREAST BIOPSY LEFT COMPLETE Left 06/07/2019    WISDOM TOOTH EXTRACTION       No Known Allergies  Current Outpatient Medications on File Prior to Visit   Medication Sig Dispense Refill    albuterol (ProAir HFA) 90 mcg/act inhaler Inhale 2 puffs every 4 (four) hours as needed for wheezing 18 g 1    Biotin w/ Vitamins C & E  1250-7.5-7.5 MCG-MG-UNT CHEW in the morning      BIOTIN W/ VITAMINS C & E PO Take 1 capsule by mouth in the morning      cetirizine (ZyrTEC) 10 MG chewable tablet Chew 10 mg daily as needed       Cholecalciferol (VITAMIN D3) 1000 units CHEW Chew 1 tablet daily      fluticasone (FLONASE) 50 mcg/act nasal spray 1 spray into each nostril daily as needed       Multiple Vitamins-Minerals (AIRBORNE PO) Take 1 tablet by mouth in the morning      multivitamin (THERAGRAN) TABS Take 1 tablet by mouth daily      rizatriptan (MAXALT-MLT) 10 mg disintegrating tablet Take 1 tablet (10 mg total) by mouth daily as needed for migraine Take at the onset of migraine; if symptoms continue or return, may take another dose at least 2 hours after first dose. Take no more than 2 doses in a day. 12 tablet 1    tamoxifen (NOLVADEX) 20 mg tablet TAKE 1 TABLET BY MOUTH DAILY 90 tablet 3     No current facility-administered medications on file prior to visit.     Social History     Socioeconomic History    Marital status: /Civil Union     Spouse name: Not on file    Number of children: 3    Years of education: Not on file    Highest education level: Not on file   Occupational History    Not on file   Tobacco Use    Smoking status: Never     Passive exposure: Never    Smokeless tobacco: Never   Vaping Use    Vaping status: Never Used   Substance and Sexual Activity    Alcohol use: Yes     Comment: rare    Drug use: No    Sexual activity: Yes     Partners: Male   Other Topics Concern    Not on file   Social History Narrative    Not on file     Social Determinants of Health     Financial Resource Strain: Not on file   Food Insecurity: Not on file   Transportation Needs: Not on file   Physical Activity: Not on file   Stress: Not on file   Social Connections: Not on file   Intimate Partner Violence: Not on file   Housing Stability: Not on file         I have reviewed the past medical, surgical, social and family history, medications and  "allergies as documented in the EMR.    Review of systems: ROS is negative other than that noted in the HPI.  Constitutional: Negative for fatigue and fever.   HENT: Negative for sore throat.    Respiratory: Negative for shortness of breath.    Cardiovascular: Negative for chest pain.   Gastrointestinal: Negative for abdominal pain.   Endocrine: Negative for cold intolerance and heat intolerance.   Genitourinary: Negative for flank pain.   Musculoskeletal: Negative for back pain.   Skin: Negative for rash.   Allergic/Immunologic: Negative for immunocompromised state.   Neurological: Negative for dizziness.   Psychiatric/Behavioral: Negative for agitation.      Physical Exam:    Blood pressure 128/87, pulse 78, resp. rate 16, height 5' 6\" (1.676 m), weight 81.2 kg (179 lb), not currently breastfeeding.    General/Constitutional: NAD, well developed, well nourished  HENT: Normocephalic, atraumatic  CV: Intact distal pulses, regular rate  Resp: No respiratory distress or labored breathing  Lymphatic: No lymphadenopathy palpated  Neuro: Alert and Oriented x 3, no focal deficits  Psych: Normal mood, normal affect, normal judgement, normal behavior  Skin: Warm, dry, no rashes, no erythema      Shoulder focused exam:     Visual inspection of the shoulder demonstrates normal contour without atrophy  No evidence of scapular dyskinesia or atrophy.  No scapular winging  Active and passive range of motion demonstrates forward flexion to 170, abduction to 90, external rotation is 70 with the arm the side, internal rotation to T12   Rotator cuff strength is 5/5 to resisted forward flexion, 5/5 resisted abduction, 5/5 resisted external rotation, 5/5 resisted internal rotation  No tenderness to palpation at the distal clavicle, AC joint, acromion, or scapular spine  No pain with cross-body adduction  (+) Neer's test, (+) modified Thomas impingement test  Negative external rotation lag sign  Negative belly press, negative " lift-off  (-) speed's and Yergason's test  (-) tenderness to palpation at the bicipital groove  (-) Ionia's test        UE NV Exam: +2 Radial pulses bilaterally  Sensation intact to light touch C5-T1 bilaterally, Radial/median/ulnar nerve motor intact      Bilateral elbow, wrist, and and forearm ROM full, painless with passive ROM, no ttp or crepitance throughout extremities below shoulder joint    Cervical ROM is full without pain, numbness or tingling      Shoulder Imaging    X-rays of the right shoulder were reviewed, which demonstrate abnormal postoperative changes to the acromion with possible recurrent subacromial spur formation.  No evidence of significant glenohumeral osteoarthritis..  I have reviewed the radiology report and do not currently have a radiology reading from [facility], but will check the result once the reading is performed.    Scribe Attestation      I,:  Paulo Dueñas am acting as a scribe while in the presence of the attending physician.:       I,:  Adis Voss, DO personally performed the services described in this documentation    as scribed in my presence.:

## 2024-03-04 ENCOUNTER — OFFICE VISIT (OUTPATIENT)
Dept: HEMATOLOGY ONCOLOGY | Facility: CLINIC | Age: 49
End: 2024-03-04
Payer: COMMERCIAL

## 2024-03-04 ENCOUNTER — EVALUATION (OUTPATIENT)
Dept: PHYSICAL THERAPY | Facility: CLINIC | Age: 49
End: 2024-03-04
Payer: COMMERCIAL

## 2024-03-04 VITALS
HEIGHT: 66 IN | WEIGHT: 177 LBS | RESPIRATION RATE: 17 BRPM | BODY MASS INDEX: 28.45 KG/M2 | SYSTOLIC BLOOD PRESSURE: 130 MMHG | DIASTOLIC BLOOD PRESSURE: 86 MMHG | OXYGEN SATURATION: 98 % | TEMPERATURE: 98.1 F | HEART RATE: 96 BPM

## 2024-03-04 DIAGNOSIS — Z17.0 MALIGNANT NEOPLASM OF UPPER-INNER QUADRANT OF LEFT BREAST IN FEMALE, ESTROGEN RECEPTOR POSITIVE: Primary | ICD-10-CM

## 2024-03-04 DIAGNOSIS — C50.212 MALIGNANT NEOPLASM OF UPPER-INNER QUADRANT OF LEFT BREAST IN FEMALE, ESTROGEN RECEPTOR POSITIVE: Primary | ICD-10-CM

## 2024-03-04 DIAGNOSIS — M75.41 IMPINGEMENT SYNDROME OF RIGHT SHOULDER: ICD-10-CM

## 2024-03-04 PROBLEM — R74.01 TRANSAMINITIS: Status: RESOLVED | Noted: 2024-01-25 | Resolved: 2024-03-04

## 2024-03-04 PROCEDURE — 99213 OFFICE O/P EST LOW 20 MIN: CPT | Performed by: INTERNAL MEDICINE

## 2024-03-04 PROCEDURE — 97162 PT EVAL MOD COMPLEX 30 MIN: CPT | Performed by: PHYSICAL THERAPIST

## 2024-03-04 NOTE — PROGRESS NOTES
PT Evaluation     Today's date: 3/4/2024  Patient name: Jocelyn Smith  : 1975  MRN: [id number]  Referring provider: Adis Voss DO  Dx:   Encounter Diagnosis     ICD-10-CM    1. Impingement syndrome of right shoulder  M75.41 Ambulatory Referral to Physical Therapy                     Assessment  Assessment details: Jocelyn Smith is a 49 y.o. female who presents to the clinic with complaints of right shoulder pain.  The patient presents with the above listed impairments.  She demonstrates decreased shoulder ROM and strength as well as noting high pain numbers with ADLs.  She is eager to decrease her pain and should benefit from skilled physical therapy.  Thank you for the referral.      Impairments: abnormal muscle firing, abnormal or restricted ROM, activity intolerance, impaired physical strength, lacks appropriate home exercise program, pain with function, poor posture  and poor body mechanics  Understanding of Dx/Px/POC: good   Prognosis: good    Goals  Impairment Goals  - Decrease pain by 50% in 6 weeks  - Increase right flexibility by 50% in 6 weeks  - Increase right upper extremity strength Brainloopbally to 4+/5 in 6 weeks    Functional Goals  - Return to Prior Level of Function in 6 weeks  - Patient will be independent with HEP in 6 weeks  - Patient will be able to get dressed with decreased pain in 6 weeks  - Patient will be able to reach into an overhead cabinet with decreased pain in 6 weeks      Plan  Patient would benefit from: skilled physical therapy  Planned modality interventions: cryotherapy, thermotherapy: hydrocollator packs and TENS  Planned therapy interventions: home exercise program, graded exercise, functional ROM exercises, flexibility, body mechanics training, postural training, patient education, therapeutic activities, therapeutic exercise, manual therapy, joint mobilization and neuromuscular re-education  Frequency: 2x week  Duration in weeks: 4  Treatment plan  "discussed with: patient and PCP        Subjective Evaluation    History of Present Illness  Mechanism of injury: HPI:  Patient notes that she has had right shoulder pain spanning ~1-2 months.  She went to see Dr. Voss where she was given an injection and was referred to PT.  She notes that she has difficulty with rotation and such things as donning/doffing clothing     Pain Location:  R Shoulder   Occupation:  IT   Prior Functional Limitations:  AGG:  Rotation, don/doffing clothing   Ease:  Rest  Patient Goals:  \"Figure out what could be done\"    Pain  Current pain ratin  At best pain ratin  At worst pain ratin  Quality: sharp        Objective     Active Range of Motion   Left Shoulder   Flexion: 157 degrees   Abduction: 170 degrees   External rotation BTH: C7   Internal rotation BTB: mid thoracic     Right Shoulder   Flexion: 149 degrees with pain  Abduction: 120 degrees with pain  External rotation BTH: C7   Internal rotation BTB: lumbar with pain    Strength/Myotome Testing     Left Shoulder   Normal muscle strength    Right Shoulder     Planes of Motion   Flexion: 3+   Abduction: 3+   External rotation at 0°: 3+   Internal rotation at 0°: 3+     Tests     Right Shoulder   Positive Hawkin's.                  Diagnosis:    Precautions:    POC Expires Auth Status Start Date Expiration Date PT Visit Limit    4/3/24 Auth Needed 3/4/24     Date 3/4       Used 1       Remaining        Manuals        PROM        Mobs                                There Ex        UBE        Pulleys        Cane Flexion S        Cane ER S        Sitting ER        Table slides flex/scap        Neruo Re-Ed        SL ER        Bent over H-abd        Bent over Rows        Bent over Lower trap        Bent over Lats        Supine Punch        Scap Stab with towel on wall        Rows        Shld Ext        ER        IR                               Ther Act                                         Modalities             CP PRN      "

## 2024-03-04 NOTE — PROGRESS NOTES
Hematology Outpatient Follow - Up Note  Jocelyn Smith 49 y.o. female MRN: @ Encounter: [id number]        Date:  3/4/2024        Assessment/ Plan:    premenopausal woman with stage IA left breast cancer, grade 1, invasive lobular histology, ER 90 % positive, MT 40 % positive, HER2 negative disease.  She underwent lumpectomy and sentinel lymph node biopsy, resulting in DIEGO.  She is negative for BRCA gene mutation.  She is currently on adjuvant hormonal therapy with tamoxifen since August 2019, no evidence of disease by physical examination, lab criteria or mammogram    Patient to finish a total of 5 years of tamoxifen 20 mg p.o. daily, and she will follow-up with breast surgeon    Follow-up with medical oncology on as-needed basis        Labs and imaging studies are reviewed by ordering provider once results are available. If there are findings that need immediate attention, you will be contacted when results available.   Discussing results and the implication on your healthcare is best discussed in person at your follow-up visit.       HPI:    She was a 44-year-old premenopausal woman was found to have abnormality in her left breast based on a screening mammography.      She underwent left breast biopsy in June 7, 2019 which showed invasive lobular carcinoma, grade 1.   ER 90 % positive, MT 40 % positive, HER2 negative disease.  She underwent genetic testing which was negative for BRCA gene mutation.  She underwent lumpectomy and sentinel lymph node biopsy by Dr. Steinberg in July 30, 2019.     She had 11 millimeter of invasive lobular carcinoma, grade 1. Lymphovascular invasion was not present.  3 sentinel lymph nodes were all negative for metastatic disease.   Primary Diagnosis:     1. Left breast cancer, stage IA (pT1c, pN0, M0) grade 1, invasive lobular histology, ER 90 %, MT 40 %, HER2 negative disease.  Diagnosed in July 2019.      2. BRCA gene mutation negative.     Cancer Staging:  Cancer Staging  Malignant  "neoplasm of upper-inner quadrant of left breast in female, estrogen receptor positive (HCC)  Staging form: Breast, AJCC 8th Edition  - Clinical: Stage IA (cT1c, cN0, cM0, G1, ER+, WI+, HER2-) - Signed by Yonatan Malcolm MD on 8/14/2019  Laterality: Left  Histologic grading system: 3 grade system       Previous Treatment:         Test Results:    Imaging: XR shoulder 2+ vw right    Result Date: 2/23/2024  Narrative: RIGHT SHOULDER INDICATION:   Pain in right shoulder. COMPARISON:  None. VIEWS:  XR SHOULDER 2+ VW RIGHT FINDINGS: There is no acute fracture or dislocation. No significant degenerative changes. No lytic or blastic osseous lesion. Soft tissues are unremarkable.     Impression: No acute osseous abnormality. Electronically signed: 02/23/2024 01:16 PM Prash[address] MD      Labs:   Lab Results   Component Value Date    WBC 7.57 01/26/2024    HGB 11.0 (L) 01/26/2024    HCT 34.6 (L) 01/26/2024    MCV 93 01/26/2024     01/26/2024     Lab Results   Component Value Date     10/26/2017    K 4.6 01/30/2024     01/30/2024    CO2 24 01/30/2024    BUN 8 01/30/2024    CREATININE 0.56 (L) 01/30/2024    GLUCOSE 93 10/26/2017    GLUF 103 (H) 01/23/2024    CALCIUM 8.2 (L) 01/26/2024    AST 18 02/05/2024    ALT 29 02/05/2024    ALKPHOS 55 01/26/2024    PROT 7.1 10/26/2017    BILITOT 0.4 10/26/2017    EGFR 111 01/30/2024       No results found for: \"IRON\", \"TIBC\", \"FERRITIN\"    No results found for: \"[id number]\"      ROS: Review of Systems   Constitutional:  Negative for appetite change, chills, diaphoresis, fatigue and unexpected weight change.   HENT:   Negative for mouth sores, nosebleeds, sore throat, trouble swallowing and voice change.    Eyes:  Negative for eye problems and icterus.   Respiratory:  Negative for chest tightness, cough, hemoptysis and wheezing.    Cardiovascular:  Negative for chest pain, leg swelling and palpitations.   Gastrointestinal:  Negative for abdominal distention, abdominal " pain, blood in stool, constipation, diarrhea, nausea and vomiting.   Endocrine: Negative for hot flashes.   Genitourinary:  Negative for bladder incontinence, difficulty urinating, dyspareunia, dysuria and frequency.    Musculoskeletal:  Negative for arthralgias, back pain, gait problem, neck pain and neck stiffness.   Skin:  Negative for itching and rash.   Neurological:  Negative for dizziness, gait problem, headaches, numbness, seizures and speech difficulty.   Hematological:  Negative for adenopathy. Does not bruise/bleed easily.   Psychiatric/Behavioral:  Negative for decreased concentration, depression, sleep disturbance and suicidal ideas. The patient is not nervous/anxious.           Current Medications: Reviewed  Allergies: Reviewed  PMH/FH/SH:  Reviewed      Physical Exam:    Body surface area is 1.9 meters squared.    Wt Readings from Last 3 Encounters:   03/04/24 80.3 kg (177 lb)   02/23/24 81.2 kg (179 lb)   02/19/24 81.2 kg (179 lb)        Temp Readings from Last 3 Encounters:   03/04/24 98.1 °F (36.7 °C) (Temporal)   02/19/24 (!) 97.1 °F (36.2 °C) (Tympanic)   01/30/24 (!) 97 °F (36.1 °C)        BP Readings from Last 3 Encounters:   03/04/24 130/86   02/23/24 128/87   02/19/24 120/90         Pulse Readings from Last 3 Encounters:   03/04/24 96   02/23/24 78   02/19/24 80        Physical Exam  Constitutional:       Appearance: She is well-developed.   HENT:      Head: Normocephalic.   Eyes:      Pupils: Pupils are equal, round, and reactive to light.   Cardiovascular:      Rate and Rhythm: Normal rate and regular rhythm.   Pulmonary:      Effort: Pulmonary effort is normal.   Chest:   Breasts:     Breasts are symmetrical.      Right: No inverted nipple, mass, nipple discharge, skin change or tenderness.      Left: No inverted nipple, mass, nipple discharge, skin change or tenderness.   Abdominal:      General: There is no distension.      Palpations: Abdomen is soft. There is no hepatomegaly or  splenomegaly.      Tenderness: There is no abdominal tenderness. There is no guarding or rebound.   Musculoskeletal:         General: No deformity. Normal range of motion.      Cervical back: Normal range of motion.      Right lower leg: No edema.      Left lower leg: No edema.         ECO    Goals and Barriers:  Current Goal: Minimize effects of disease.   Barriers: None.      Patient's Capacity to Self Care:  Patient is able to self care.    Code Status: @[facility]@

## 2024-03-07 ENCOUNTER — OFFICE VISIT (OUTPATIENT)
Dept: PHYSICAL THERAPY | Facility: CLINIC | Age: 49
End: 2024-03-07
Payer: COMMERCIAL

## 2024-03-07 DIAGNOSIS — M75.41 IMPINGEMENT SYNDROME OF RIGHT SHOULDER: Primary | ICD-10-CM

## 2024-03-07 PROCEDURE — 97140 MANUAL THERAPY 1/> REGIONS: CPT

## 2024-03-07 PROCEDURE — 97110 THERAPEUTIC EXERCISES: CPT

## 2024-03-07 NOTE — PROGRESS NOTES
Daily Note     Today's date: 3/7/2024  Patient name: Jocelyn Smith  : 1975  MRN: [id number]  Referring provider: Adis Voss DO  Dx:   Encounter Diagnosis     ICD-10-CM    1. Impingement syndrome of right shoulder  M75.41           Start Time: 0700  Stop Time: 0745  Total time in clinic (min): 45 minutes    Subjective: Patient states she is still having pain in her right arm with rotation.       Objective: See treatment diary below      Assessment: Initiated outlined program. Patient was able to perform gentle stretching with cues to avoid pain. She notes some clicking but no pain with PROM in all planes. She responded well to mobilizations performed by PT. She does note soreness following session. Updated HEP with good understanding.       Plan: Continue per plan of care.      Diagnosis:    Precautions:    POC Expires Auth Status Start Date Expiration Date PT Visit Limit    4/3/24 Auth Needed 3/4/24 6/2/24 12   Date 3/4 3/7      Used 1 2      Remaining 11 10      Manuals        PROM  KK, PTA       Mobs  RT, PT Grade III                              There Ex        UBE        Pulleys  2'/2'      Cane Flexion S*  5 sec x10       Cane ER S*  5 sec x10       Sitting ER        Table slides flex/scap        Neruo Re-Ed        SL ER*  2x10       Bent over H-abd        Bent over Rows        Bent over Lower trap        Bent over Lats        Supine Punch        Scap Stab with towel on wall        Rows        Shld Ext        ER        IR                               Ther Act                                         Modalities             CP PRN

## 2024-03-11 ENCOUNTER — OFFICE VISIT (OUTPATIENT)
Dept: PHYSICAL THERAPY | Facility: CLINIC | Age: 49
End: 2024-03-11
Payer: COMMERCIAL

## 2024-03-11 DIAGNOSIS — M75.41 IMPINGEMENT SYNDROME OF RIGHT SHOULDER: Primary | ICD-10-CM

## 2024-03-11 PROCEDURE — 97140 MANUAL THERAPY 1/> REGIONS: CPT | Performed by: PHYSICAL THERAPIST

## 2024-03-11 PROCEDURE — 97112 NEUROMUSCULAR REEDUCATION: CPT | Performed by: PHYSICAL THERAPIST

## 2024-03-11 NOTE — PROGRESS NOTES
Daily Note     Today's date: 3/11/2024  Patient name: Jocelyn Smith  : 1975  MRN: [id number]  Referring provider: Adis Voss DO  Dx:   Encounter Diagnosis     ICD-10-CM    1. Impingement syndrome of right shoulder  M75.41                      Subjective: Patient stated minimal pain prior to treatment session.       Objective: See treatment diary below      Assessment: Patient c/o pain in area of proximal Biceps tendon with TB rows and supine Serratus punches; demonstrated most significant pain with manual shoulder IR PROM.       Plan: Progress treatment as tolerated.       Diagnosis:    Precautions:    POC Expires Auth Status Start Date Expiration Date PT Visit Limit    4/3/24 Auth Needed 3/4/24 6/2/24 12   Date 3/4 3/7 3/11     Used 1 2 3     Remaining 11 10 9     Manuals        PROM  KK, PTA  KK, PT     Mobs  RT, PT Grade III KK, PT gr III                             There Ex        UBE        Pulleys  2'/2' 2'/2'     Cane Flexion S*  5 sec x10       Cane ER S*  5 sec x10       Sitting ER        Wall slides        Table slides flex/scap        Neruo Re-Ed        SL ER*  2x10       Bent over H-abd   1# 2x10     Bent over Rows   3# 2x10     Bent over Lower trap        Bent over Lats   2# 2x10     Supine Punch   1# 2x10     Scap Stab with towel on wall        Rows   GTB 2x10     Shld Ext   GTB 2x10     ER   RTB 2x10     IR   RTB 2x10                            Ther Act                                         Modalities             CP PRN

## 2024-03-14 ENCOUNTER — OFFICE VISIT (OUTPATIENT)
Dept: PHYSICAL THERAPY | Facility: CLINIC | Age: 49
End: 2024-03-14
Payer: COMMERCIAL

## 2024-03-14 DIAGNOSIS — M75.41 IMPINGEMENT SYNDROME OF RIGHT SHOULDER: Primary | ICD-10-CM

## 2024-03-14 PROCEDURE — 97112 NEUROMUSCULAR REEDUCATION: CPT | Performed by: PHYSICAL THERAPIST

## 2024-03-14 PROCEDURE — 97110 THERAPEUTIC EXERCISES: CPT | Performed by: PHYSICAL THERAPIST

## 2024-03-14 NOTE — PROGRESS NOTES
Daily Note     Today's date: 3/14/2024  Patient name: Jocelyn Smith  : 1975  MRN: [id number]  Referring provider: Adis Voss DO  Dx:   Encounter Diagnosis     ICD-10-CM    1. Impingement syndrome of right shoulder  M75.41                      Subjective: Patient notes that her shoulder was sore today.        Objective: See treatment diary below      Assessment: Tolerated treatment well. Patient would benefit from continued PT.  Patient noted soreness and discomfort today with PREs.  Continuing to emphasize RTC and scap stabilization.  Progress as able.        Plan: Progress treatment as tolerated.       Diagnosis:    Precautions:    POC Expires Auth Status Start Date Expiration Date PT Visit Limit    4/3/24 Auth Needed 3/4/24 6/2/24 12   Date 3/4 3/7 3/11 3/14    Used 1 2 3     Remaining 11 10 9     Manuals        PROM  KK, PTA  KK, PT RT    Mobs  RT, PT Grade III KK, PT gr III GH AP Grade III                            There Ex        UBE        Pulleys  2'/2' 2'/2' 2'/2'    Cane Flexion S*  5 sec x10       Cane ER S*  5 sec x10       Sitting ER        Wall slides        Table slides flex/scap        Neruo Re-Ed        SL ER*  2x10       Bent over H-abd   1# 2x10     Bent over Rows   3# 2x10 4# 2x10    Bent over Lower trap    0# x10 - discomfort     Bent over Lats   2# 2x10 4# 2x10    Supine Punch   1# 2x10     Scap Stab with towel on wall        Rows   GTB 2x10 Black TB 2x10    Shld Ext   GTB 2x10 Blue TB 2x10    ER   RTB 2x10 RTB 2x10    IR   RTB 2x10 Blue TB 2x10                           Ther Act                                         Modalities             CP PRN

## 2024-03-18 ENCOUNTER — OFFICE VISIT (OUTPATIENT)
Dept: PHYSICAL THERAPY | Facility: CLINIC | Age: 49
End: 2024-03-18
Payer: COMMERCIAL

## 2024-03-18 ENCOUNTER — TELEPHONE (OUTPATIENT)
Dept: FAMILY MEDICINE CLINIC | Facility: CLINIC | Age: 49
End: 2024-03-18

## 2024-03-18 DIAGNOSIS — K91.5 POST-CHOLECYSTECTOMY SYNDROME: ICD-10-CM

## 2024-03-18 DIAGNOSIS — M75.41 IMPINGEMENT SYNDROME OF RIGHT SHOULDER: Primary | ICD-10-CM

## 2024-03-18 PROCEDURE — 97110 THERAPEUTIC EXERCISES: CPT | Performed by: PHYSICAL THERAPIST

## 2024-03-18 PROCEDURE — 97112 NEUROMUSCULAR REEDUCATION: CPT | Performed by: PHYSICAL THERAPIST

## 2024-03-18 NOTE — TELEPHONE ENCOUNTER
Patient dropped off forms for Dr. Almodovar to review and fill out. There is a letter on the front of the forms explaining the nature of the forms. Placed in Nurse Pending 2. Call patient for

## 2024-03-18 NOTE — PROGRESS NOTES
"Daily Note     Today's date: 3/18/2024  Patient name: Jocelyn Smith  : 1975  MRN: [id number]  Referring provider: Adis Voss DO  Dx:   Encounter Diagnosis     ICD-10-CM    1. Impingement syndrome of right shoulder  M75.41                      Subjective: Patient notes that her should can still  click and that it can feel stiff in the morning.        Objective: See treatment diary below      Assessment: Tolerated treatment well. Patient would benefit from continued PT.  Patient doing well overall.  Can still note discomfort in her shoulder - has been limiting lifting overhead due to her discomfort.  Continuing to work on ROM and strength of her RTC and scapular stabilizers.  Continue to progress as able.        Plan: Progress treatment as tolerated.       Diagnosis:    Precautions:    POC Expires Auth Status Start Date Expiration Date PT Visit Limit    4/3/24 Auth Needed 3/4/24 6/2/24 12   Date 3/4 3/7 3/11 3/14 3/18   Used 1 2 3 4 5   Remaining 11 10 9 8 7   Manuals        PROM  KK, PTA  KK, PT RT RT   Mobs  RT, PT Grade III KK, PT gr III GH AP Grade III GH AP Grade III                           There Ex        UBE        Pulleys  2'/2' 2'/2' 2'/2'    Cane Flexion S*  5 sec x10       Cane ER S*  5 sec x10       Sitting ER     Standing ER 10\" x 6   Wall slides     X10  X10 TD    Table slides flex/scap        Neruo Re-Ed        SL ER*  2x10       Bent over H-abd   1# 2x10     Bent over Rows   3# 2x10 4# 2x10    Bent over Lower trap    0# x10 - discomfort     Bent over Lats   2# 2x10 4# 2x10    Supine Punch   1# 2x10     Scap Stab with towel on wall        Rows   GTB 2x10 Black TB 2x10 Black 2x12   Shld Ext   GTB 2x10 Blue TB 2x10 Blue 2x12   ER   RTB 2x10 RTB 2x10 RTB 2x10    IR   RTB 2x10 Blue TB 2x10 Blue TB 2x10                          Ther Act                                         Modalities             CP PRN                                                   "

## 2024-03-21 ENCOUNTER — OFFICE VISIT (OUTPATIENT)
Dept: PHYSICAL THERAPY | Facility: CLINIC | Age: 49
End: 2024-03-21
Payer: COMMERCIAL

## 2024-03-21 DIAGNOSIS — M75.41 IMPINGEMENT SYNDROME OF RIGHT SHOULDER: Primary | ICD-10-CM

## 2024-03-21 PROCEDURE — 97110 THERAPEUTIC EXERCISES: CPT | Performed by: PHYSICAL THERAPIST

## 2024-03-21 PROCEDURE — 97140 MANUAL THERAPY 1/> REGIONS: CPT | Performed by: PHYSICAL THERAPIST

## 2024-03-21 PROCEDURE — 97112 NEUROMUSCULAR REEDUCATION: CPT | Performed by: PHYSICAL THERAPIST

## 2024-03-21 NOTE — PROGRESS NOTES
"Daily Note     Today's date: 3/21/2024  Patient name: Jocelyn Smith  : 1975  MRN: [id number]  Referring provider: Adis Voss DO  Dx:   Encounter Diagnosis     ICD-10-CM    1. Impingement syndrome of right shoulder  M75.41                      Subjective: Patient notes that she is feeling OK today        Objective: See treatment diary below      Assessment: Tolerated treatment well. Patient would benefit from continued PT.  Patient doing better overall.  Can still note discomfort and tightness in her shoulder, but has been able to tolerate improved PREs.  Did note soreness and heaviness s/p session.  Continue to progress as able.        Plan: Progress treatment as tolerated.       Diagnosis:    Precautions:    POC Expires Auth Status Start Date Expiration Date PT Visit Limit    4/3/24 Auth Needed 3/4/24 6/2/24 12   Date 3/21  3/11 3/14 3/18   Used 6  3 4 5   Remaining 6  9 8 7   Manuals        PROM RT   KK, PT RT RT   Mobs   KK, PT gr III GH AP Grade III GH AP Grade III                           There Ex        UBE        Pulleys   2'/2' 2'/2'    Cane Flexion S* X10        Cane ER S*        Sitting ER     Standing ER 10\" x 6   Wall slides X10 flex    X10  X10 TD    Table slides flex/scap        Neruo Re-Ed        SL ER*        Bent over H-abd   1# 2x10     Bent over Rows   3# 2x10 4# 2x10    Bent over Lower trap    0# x10 - discomfort     Bent over Lats   2# 2x10 4# 2x10    Supine Punch   1# 2x10     Scap Stab with towel on wall        Rows Black 2x10  GTB 2x10 Black TB 2x10 Black 2x12   Shld Ext Black 2x10  GTB 2x10 Blue TB 2x10 Blue 2x12   ER Red 2x10   Green x10  RTB 2x10 RTB 2x10 RTB 2x10    IR Blue 2x10   RTB 2x10 Blue TB 2x10 Blue TB 2x10                        Ther Act                                   Modalities           CP PRN                                                 "

## 2024-03-22 ENCOUNTER — APPOINTMENT (OUTPATIENT)
Dept: PHYSICAL THERAPY | Facility: CLINIC | Age: 49
End: 2024-03-22
Payer: COMMERCIAL

## 2024-03-25 ENCOUNTER — OFFICE VISIT (OUTPATIENT)
Dept: PHYSICAL THERAPY | Facility: CLINIC | Age: 49
End: 2024-03-25
Payer: COMMERCIAL

## 2024-03-25 DIAGNOSIS — M75.41 IMPINGEMENT SYNDROME OF RIGHT SHOULDER: Primary | ICD-10-CM

## 2024-03-25 PROCEDURE — 97110 THERAPEUTIC EXERCISES: CPT | Performed by: PHYSICAL THERAPIST

## 2024-03-25 PROCEDURE — 97140 MANUAL THERAPY 1/> REGIONS: CPT | Performed by: PHYSICAL THERAPIST

## 2024-03-25 PROCEDURE — 97112 NEUROMUSCULAR REEDUCATION: CPT | Performed by: PHYSICAL THERAPIST

## 2024-03-25 NOTE — PROGRESS NOTES
PT Re-Evaluation     Today's date: 3/25/2024  Patient name: Jocelyn Smith  : 1975  MRN: [id number]  Referring provider: Adis Voss DO  Dx:   Encounter Diagnosis     ICD-10-CM    1. Impingement syndrome of right shoulder  M75.41                      Assessment  Assessment details: Jocelyn Smith is a 49 y.o. female who has been consistent with attending and participating in skilled PT sessions.  The patient has been able to demonstrate some improvement with her ROM and strength, but continues to note discomfort in her shoulder.  She is still limited with IR ROM as well as any horizontal abduction with ER.  She will continue to benefit from further PT sessions to decrease her pain, increase her ROM and strength, and increase her overall functional performance.      Impairments: abnormal muscle firing, abnormal or restricted ROM, activity intolerance, impaired physical strength, lacks appropriate home exercise program, pain with function, poor posture  and poor body mechanics  Understanding of Dx/Px/POC: good   Prognosis: good    Goals  Impairment Goals  - Decrease pain by 50% in 6 weeks - PARTIALLY MET   - Increase right flexibility by 50% in 6 weeks - PARTIALLY MET  - Increase right upper extremity strength [place]ally to 4+/5 in 6 weeks - PARTIALLY MET     Functional Goals  - Return to Prior Level of Function in 6 weeks  - Patient will be independent with HEP in 6 weeks  - Patient will be able to get dressed with decreased pain in 6 weeks  - Patient will be able to reach into an overhead cabinet with decreased pain in 6 weeks      Plan  Patient would benefit from: skilled physical therapy  Planned modality interventions: cryotherapy, thermotherapy: hydrocollator packs and TENS  Planned therapy interventions: home exercise program, graded exercise, functional ROM exercises, flexibility, body mechanics training, postural training, patient education, therapeutic activities, therapeutic exercise,  "manual therapy, joint mobilization and neuromuscular re-education  Frequency: 2x week  Duration in weeks: 4  Treatment plan discussed with: patient and PCP        Subjective Evaluation    History of Present Illness  Mechanism of injury: Patient notes that she has improved with her strength.  Patient notes that she still has difficulty with reaching behind her head and reaching behind her back.  She notes that she is still avoiding aggravating factors.  Patient notes that she is improved \"40%\" since starting PT.      HPI:  Patient notes that she has had right shoulder pain spanning ~1-2 months.  She went to see Dr. Voss where she was given an injection and was referred to PT.  She notes that she has difficulty with rotation and such things as donning/doffing clothing     Pain Location:  R Shoulder   Occupation:  IT   Prior Functional Limitations:  AGG:  Rotation, don/doffing clothing   Ease:  Rest  Patient Goals:  \"Figure out what could be done\"    Pain  Current pain ratin (\"sore\")  At best pain ratin  At worst pain ratin  Quality: sharp        Objective     Tenderness     Right Shoulder  Tenderness in the bicipital groove.     Additional Tenderness Details  Tender to palpation right anterior shoulder / right bicep tendon    Active Range of Motion   Left Shoulder   Flexion: 157 degrees   Abduction: 170 degrees   External rotation BTH: C7   Internal rotation BTB: mid thoracic     Right Shoulder   Flexion: 155 degrees   Abduction: 130 degrees with pain  External rotation BTH: T2   Internal rotation BTB: lumbar with pain    Strength/Myotome Testing     Left Shoulder   Normal muscle strength    Right Shoulder     Planes of Motion   Flexion: 4-   Abduction: 4   External rotation at 0°: 4   Internal rotation at 0°: 4     Tests     Right Shoulder   Positive empty can and Hawkin's.                  Diagnosis:    Precautions:    POC Expires Auth Status Start Date Expiration Date PT Visit Limit    4/3/24 Auth " "Needed 3/4/24 6/2/24 12   Date 3/21 3/25  3/14 3/18   Used 6 7  4 5   Remaining 6 5  8 7   Manuals        PROM RT    RT RT   Mobs    GH AP Grade III GH AP Grade III   IASTM  R Anterior Shoulder                       There Ex        UBE        Pulleys    2'/2'    Cane Flexion S* X10        Cane ER S*        Sitting ER     Standing ER 10\" x 6   Wall slides X10 flex X10 flex  X10 ABD    X10  X10 TD    Patient Education   RE - RT               Neruo Re-Ed        SL ER*        Bent over H-abd        Bent over Rows    4# 2x10    Bent over Lower trap    0# x10 - discomfort     Bent over Lats    4# 2x10    Supine Punch        Scap Stab with towel on wall        Rows Black 2x10 9# 2x10   Black TB 2x10 Black 2x12   Shld Ext Black 2x10 7# 2x10   Blue TB 2x10 Blue 2x12   ER Red 2x10   Green x10 Green 2x10   RTB 2x10 RTB 2x10    IR Blue 2x10  Black 2x10   Blue TB 2x10 Blue TB 2x10                       Ther Act                                   Modalities           CP PRN                                  "

## 2024-03-28 ENCOUNTER — OFFICE VISIT (OUTPATIENT)
Dept: PHYSICAL THERAPY | Facility: CLINIC | Age: 49
End: 2024-03-28
Payer: COMMERCIAL

## 2024-03-28 DIAGNOSIS — M75.41 IMPINGEMENT SYNDROME OF RIGHT SHOULDER: Primary | ICD-10-CM

## 2024-03-28 PROCEDURE — 97110 THERAPEUTIC EXERCISES: CPT | Performed by: PHYSICAL THERAPIST

## 2024-03-28 PROCEDURE — 97112 NEUROMUSCULAR REEDUCATION: CPT | Performed by: PHYSICAL THERAPIST

## 2024-03-28 NOTE — PROGRESS NOTES
"Daily Note     Today's date: 3/28/2024  Patient name: Jocelyn Smith  : 1975  MRN: [id number]  Referring provider: Adis Voss DO  Dx:   Encounter Diagnosis     ICD-10-CM    1. Impingement syndrome of right shoulder  M75.41                      Subjective: Patient notes that she can still feel discomfort in her shoulder.        Objective: See treatment diary below      Assessment: Tolerated treatment well. Patient is to see Dr. Voss next week.  Continues to note pain / discomfort in her shoulder.  Strength has improved overall, but still with discomfort.  Progress as able per recommendations / plan for Dr. Voss.          Plan: Progress treatment as tolerated.       Diagnosis:    Precautions:    POC Expires Auth Status Start Date Expiration Date PT Visit Limit    4/3/24 Auth Needed 3/4/24 6/2/24 12   Date 3/21 3/25 3/28  3/18   Used 6 7 8  5   Remaining 6 5 4  7   Manuals        PROM RT     RT   Mobs     GH AP Grade III   IASTM  R Anterior Shoulder                       There Ex        UBE        Pulleys        Cane Flexion S* X10        Cane ER S*        Sitting ER     Standing ER 10\" x 6   Wall slides X10 flex X10 flex  X10 ABD  X15 flex   X10 w/ TD   X10  X10 TD    Patient Education   RE - RT  HEP Review              Neruo Re-Ed        SL ER*        Bent over H-abd        Bent over Rows        Bent over Lower trap        Bent over Lats        Supine Punch        Scap Stab with towel on wall        Rows Black 2x10 9# 2x10  Black 2x10  Black 2x12   Shld Ext Black 2x10 7# 2x10  Black 2x10  Blue 2x12   ER Red 2x10   Green x10 Green 2x10  Green 2x10  RTB 2x10    IR Blue 2x10  Black 2x10  Black 2x10  Blue TB 2x10   Wall Clocks   Yellow 9&3 x10                Ther Act                                Modalities          CP PRN                                       "

## 2024-04-01 ENCOUNTER — OFFICE VISIT (OUTPATIENT)
Dept: OBGYN CLINIC | Facility: CLINIC | Age: 49
End: 2024-04-01
Payer: COMMERCIAL

## 2024-04-01 VITALS
WEIGHT: 180 LBS | SYSTOLIC BLOOD PRESSURE: 130 MMHG | HEART RATE: 80 BPM | DIASTOLIC BLOOD PRESSURE: 80 MMHG | BODY MASS INDEX: 33.13 KG/M2 | HEIGHT: 62 IN

## 2024-04-01 DIAGNOSIS — M75.41 IMPINGEMENT SYNDROME OF RIGHT SHOULDER: Primary | ICD-10-CM

## 2024-04-01 PROCEDURE — 99213 OFFICE O/P EST LOW 20 MIN: CPT | Performed by: STUDENT IN AN ORGANIZED HEALTH CARE EDUCATION/TRAINING PROGRAM

## 2024-04-01 NOTE — PROGRESS NOTES
Ortho Sports Medicine Shoulder Follow Up Visit     Assesment:   49 y.o. female right shoulder impingement syndrome, prior open SAD; concern for rotator cuff dysfunction    Plan:    Jocelyn presents today for follow up right shoulder pain. At last visit patient had subacromial corticosteroid injection, did not provide her much relief. Discussed that due to her right shoulder weakness, no improvement with conservative treatments including physical therapy we recommend an MRI right shoulder to evaluation for possible rotator cuff dysfunction. patient is in agreement and an right shoulder MRI was ordered. Discussed with the patient that she should report back following MRI results and we will review it at her next appointment.       Conservative treatment:    PT for ROM and strengthening to shoulder, rotator cuff, scapular stabilizers.  Home exercise program for shoulder, including ROM and strenthening.    Let pain guide return to activities.      Imaging:    No imaging for review today      Injection:    No injection at this time.       Surgery:     No surgery is recommended at this point, continue with conservative treatment plan as noted.      Follow up:    Following MRI results        Chief Complaint   Patient presents with    Follow-up     Follow up on the right shoulder. Just finished 4 weeks PT.        History of Present Illness:    The patient is a 49 y.o., at this time patient reports that she has minimal to no improvement. Patient has completed 4-5 weeks of physical therapy, reports that she still has weakness of her right shoulder. She is continuing to have 6/10 pain that is worsened with cross body, forward flexion and abduction of her right shoulder. At last visit she was given a subacromial corticosteroid injection which she states provided her no relief. She denies any new injuries to her right shoulder and denies any right upper extremity numbness or tingling.       Shoulder Surgical History:  Right  Shoulder - Subacromial Decompression - about 10 years ago    Past Medical, Social and Family History:  Past Medical History:   Diagnosis Date    Adhesive capsulitis of right shoulder     Resolved 11/15/2016     Asthma     Benign paroxysmal vertigo, unspecified ear     Resolved 11/15/2016     BRCA gene mutation negative 06/2019    [facility]    Breast cancer (HCC) 07/30/2019    left    Cellulitis of leg, except foot     Resolved 12/10/2014     Hx of radiation therapy 08/01/2019    Lymphadenitis     Resolved 12/10/2014     Migraine     Snoring     Wears contact lenses     Wears glasses      Past Surgical History:   Procedure Laterality Date    BREAST BIOPSY Left 2019    BREAST LUMPECTOMY Left 07/30/2019    Procedure: BREAST LUMPECTOMY; BREAST NEEDLE LOCALIZATION (NEEDLE LOC AT 0800);  Surgeon: Miguel Angel Steinberg MD;  [facility];  Service: Surgical Oncology    CHOLECYSTECTOMY LAPAROSCOPIC N/A 12/10/2023    Procedure: CHOLECYSTECTOMY LAPAROSCOPIC;  Surgeon: Yuan Humphrey MD;  Location[facility];  Service: General    COLONOSCOPY      INTRAUTERINE DEVICE INSERTION  2019    LYMPH NODE BIOPSY Left 07/30/2019    Procedure: SENTINEL LYMPH NODE BIOPSY; LYMPHATIC MAPPING WITH BLUE DYE AND RADIOACTIVE DYE (INJECT AT 0930 BY DR STEINBERG IN THE OR);  Surgeon: Miguel Angel Steinberg MD;  Location: [facility];  Service: Surgical Oncology    MAMMO NEEDLE LOCALIZATION LEFT (ALL INC) Left 07/30/2019    IL GRAFTING OF AUTOLOGOUS FAT BY LIPO 50 CC OR LESS Bilateral 5/11/2023    Procedure: FAT GRAFTING TO BILATERAL BREASTS;  Surgeon: Venita Lloyd DO;  Location:  [facility] OR;  Service: Plastics    IL MASTOPEXY Right 5/11/2023    Procedure: RIGHT CRESCENTRIC MASTOPEXY;  Surgeon: Venita Lloyd DO;  Location:  [facility] OR;  Service: Plastics    SALPINGECTOMY Bilateral 03/2021    SHOULDER SURGERY Right     Last assessed 12/29/2015     US GUIDED BREAST BIOPSY LEFT COMPLETE Left 06/07/2019    WISDOM TOOTH EXTRACTION       No Known  Allergies  Current Outpatient Medications on File Prior to Visit   Medication Sig Dispense Refill    albuterol (ProAir HFA) 90 mcg/act inhaler Inhale 2 puffs every 4 (four) hours as needed for wheezing 18 g 1    Biotin w/ Vitamins C & E 1250-7.5-7.5 MCG-MG-UNT CHEW in the morning      BIOTIN W/ VITAMINS C & E PO Take 1 capsule by mouth in the morning      cetirizine (ZyrTEC) 10 MG chewable tablet Chew 10 mg daily as needed       Cholecalciferol (VITAMIN D3) 1000 units CHEW Chew 1 tablet daily      fluticasone (FLONASE) 50 mcg/act nasal spray 1 spray into each nostril daily as needed       Multiple Vitamins-Minerals (AIRBORNE PO) Take 1 tablet by mouth in the morning      multivitamin (THERAGRAN) TABS Take 1 tablet by mouth daily      rizatriptan (MAXALT-MLT) 10 mg disintegrating tablet Take 1 tablet (10 mg total) by mouth daily as needed for migraine Take at the onset of migraine; if symptoms continue or return, may take another dose at least 2 hours after first dose. Take no more than 2 doses in a day. 12 tablet 1    tamoxifen (NOLVADEX) 20 mg tablet TAKE 1 TABLET BY MOUTH DAILY 90 tablet 3     No current facility-administered medications on file prior to visit.     Social History     Socioeconomic History    Marital status: /Civil Union     Spouse name: Not on file    Number of children: 3    Years of education: Not on file    Highest education level: Not on file   Occupational History    Not on file   Tobacco Use    Smoking status: Never     Passive exposure: Never    Smokeless tobacco: Never   Vaping Use    Vaping status: Never Used   Substance and Sexual Activity    Alcohol use: Yes     Comment: rare    Drug use: No    Sexual activity: Yes     Partners: Male   Other Topics Concern    Not on file   Social History Narrative    Not on file     Social Determinants of Health     Financial Resource Strain: Not on file   Food Insecurity: Not on file   Transportation Needs: Not on file   Physical Activity: Not  "on file   Stress: Not on file   Social Connections: Not on file   Intimate Partner Violence: Not on file   Housing Stability: Not on file         I have reviewed the past medical, surgical, social and family history, medications and allergies as documented in the EMR.    Review of systems: ROS is negative other than that noted in the HPI.  Constitutional: Negative for fatigue and fever.   HENT: Negative for sore throat.    Respiratory: Negative for shortness of breath.    Cardiovascular: Negative for chest pain.   Gastrointestinal: Negative for abdominal pain.   Endocrine: Negative for cold intolerance and heat intolerance.   Genitourinary: Negative for flank pain.   Musculoskeletal: Negative for back pain.   Skin: Negative for rash.   Allergic/Immunologic: Negative for immunocompromised state.   Neurological: Negative for dizziness.   Psychiatric/Behavioral: Negative for agitation.      Physical Exam:    Blood pressure 130/80, pulse 80, height 5' 2\" (1.575 m), weight 81.6 kg (180 lb), not currently breastfeeding.    General/Constitutional: NAD, well developed, well nourished  HENT: Normocephalic, atraumatic  CV: Intact distal pulses, regular rate  Resp: No respiratory distress or labored breathing  Lymphatic: No lymphadenopathy palpated  Neuro: Alert and Oriented x 3, no focal deficits  Psych: Normal mood, normal affect, normal judgement, normal behavior  Skin: Warm, dry, no rashes, no erythema      Shoulder focused exam:     Visual inspection of the shoulder demonstrates normal contour without atrophy  No evidence of scapular dyskinesia or atrophy.  No scapular winging  Active and passive range of motion demonstrates forward flexion to 170, abduction to 90, external rotation is 70 with the arm the side, internal rotation to T12   Rotator cuff strength is 4+/5 to resisted forward flexion, 4+/5 resisted abduction, 4+/5 resisted external rotation, 5/5 resisted internal rotation  No tenderness to palpation at the " distal clavicle, AC joint, acromion, or scapular spine  No pain with cross-body adduction  (+) Neer's test, (+) modified Thomas impingement test  Negative external rotation lag sign  Negative belly press, negative lift-off  (-) speed's and Yergason's test  (-) tenderness to palpation at the bicipital groove  (-) Van Buren's test        UE NV Exam: +2 Radial pulses bilaterally  Sensation intact to light touch C5-T1 bilaterally, Radial/median/ulnar nerve motor intact      Bilateral elbow, wrist, and and forearm ROM full, painless with passive ROM, no ttp or crepitance throughout extremities below shoulder joint    Cervical ROM is full without pain, numbness or tingling        Scribe Attestation      I,:  Keren Barbour PA-C am acting as a scribe while in the presence of the attending physician.:       I,:  Adis Voss, DO personally performed the services described in this documentation    as scribed in my presence.:

## 2024-04-04 ENCOUNTER — HOSPITAL ENCOUNTER (OUTPATIENT)
Dept: RADIOLOGY | Facility: HOSPITAL | Age: 49
Discharge: HOME | End: 2024-04-04
Payer: COMMERCIAL

## 2024-04-04 DIAGNOSIS — Z17.0 MALIGNANT NEOPLASM OF UPPER-INNER QUADRANT OF LEFT BREAST IN FEMALE, ESTROGEN RECEPTOR POSITIVE (HCC): ICD-10-CM

## 2024-04-04 DIAGNOSIS — C50.212 MALIGNANT NEOPLASM OF UPPER-INNER QUADRANT OF LEFT BREAST IN FEMALE, ESTROGEN RECEPTOR POSITIVE (HCC): ICD-10-CM

## 2024-04-04 PROCEDURE — G0279 TOMOSYNTHESIS, MAMMO: HCPCS

## 2024-04-04 PROCEDURE — 77066 DX MAMMO INCL CAD BI: CPT

## 2024-04-04 PROCEDURE — 76642 ULTRASOUND BREAST LIMITED: CPT

## 2024-04-07 DIAGNOSIS — J45.20 MILD INTERMITTENT ASTHMA WITHOUT COMPLICATION: ICD-10-CM

## 2024-04-07 RX ORDER — ALBUTEROL SULFATE 90 UG/1
2 AEROSOL, METERED RESPIRATORY (INHALATION) EVERY 4 HOURS PRN
Qty: 18 G | Refills: 0 | Status: SHIPPED | OUTPATIENT
Start: 2024-04-07

## 2024-04-08 DIAGNOSIS — R92.8 ABNORMAL FINDING ON BREAST IMAGING: Primary | ICD-10-CM

## 2024-04-11 ENCOUNTER — TELEPHONE (OUTPATIENT)
Dept: OBGYN CLINIC | Facility: CLINIC | Age: 49
End: 2024-04-11

## 2024-04-11 NOTE — TELEPHONE ENCOUNTER
Caller: Jocelyn    Doctor: Dr Voss     Reason for call: The patient does have questions and is returning your call. Thank you.     Call back#: [phone]

## 2024-04-15 ENCOUNTER — OFFICE VISIT (OUTPATIENT)
Dept: PHYSICAL THERAPY | Facility: CLINIC | Age: 49
End: 2024-04-15
Payer: COMMERCIAL

## 2024-04-15 DIAGNOSIS — M75.41 IMPINGEMENT SYNDROME OF RIGHT SHOULDER: Primary | ICD-10-CM

## 2024-04-15 PROCEDURE — 97112 NEUROMUSCULAR REEDUCATION: CPT | Performed by: PHYSICAL THERAPIST

## 2024-04-15 PROCEDURE — 97110 THERAPEUTIC EXERCISES: CPT | Performed by: PHYSICAL THERAPIST

## 2024-04-15 NOTE — PROGRESS NOTES
"Daily Note     Today's date: 4/15/2024  Patient name: Jocelyn Smiht  : 1975  MRN: [id number]  Referring provider: Adis Voss DO  Dx:   Encounter Diagnosis     ICD-10-CM    1. Impingement syndrome of right shoulder  M75.41                      Subjective: Patient notes continued pain in her shoulder.        Objective: See treatment diary below      Assessment: Tolerated treatment fair. Patient noted pain with most exercises today.  Limited most with rotation of the shoulder.  Reps / weights limited due to pain.  Patient still currently without any true improvement from PT.  She will be seen again where we will attempt to further strengthen her shoulder pending her symptoms.        Plan: Continue strengthening / ROM as able as pain allows.       Diagnosis:    Precautions:    POC Expires Auth Status Start Date Expiration Date PT Visit Limit    4/3/24 Auth Needed 3/4/24 6/2/24 12   Date 3/21 3/25 3/28 4/15    Used 6 7 8 9    Remaining 6 5 4 3    Manuals        PROM RT        Mobs        IASTM  R Anterior Shoulder                       There Ex        UBE        Pulleys    2/2     Cane Flexion S* X10        Cane ER S*        Sitting ER        Wall slides X10 flex X10 flex  X10 ABD  X15 flex   X10 w/ TD      Sleeper Stretch    10\" x 6    Patient Education   RE - RT  HEP Review              Neruo Re-Ed        SL ER*        Bent over H-abd    2# 2x10    Bent over Rows        Bent over Lower trap    2# 2x10    Bent over Lats        Supine Punch        Scap Stab with towel on wall    Red MB fwd/lateral/circles x10    Rows Black 2x10 9# 2x10  Black 2x10 Black 2x10    Shld Ext Black 2x10 7# 2x10  Black 2x10 Black 2x10     ER Red 2x10   Green x10 Green 2x10  Green 2x10     IR Blue 2x10  Black 2x10  Black 2x10     Wall Clocks   Yellow 9&3 x10     Wall Climbs    Red x10                      Ther Act                             Modalities         CP PRN                                       "

## 2024-04-22 ENCOUNTER — OFFICE VISIT (OUTPATIENT)
Dept: PHYSICAL THERAPY | Facility: CLINIC | Age: 49
End: 2024-04-22
Payer: COMMERCIAL

## 2024-04-22 DIAGNOSIS — M75.41 IMPINGEMENT SYNDROME OF RIGHT SHOULDER: Primary | ICD-10-CM

## 2024-04-22 PROCEDURE — 97112 NEUROMUSCULAR REEDUCATION: CPT | Performed by: PHYSICAL THERAPIST

## 2024-04-22 NOTE — PROGRESS NOTES
"Daily Note     Today's date: 2024  Patient name: Jocelyn Smith  : 1975  MRN: [id number]  Referring provider: Adis Voss DO  Dx:   Encounter Diagnosis     ICD-10-CM    1. Impingement syndrome of right shoulder  M75.41                      Subjective: Patient continues to note daily pain.        Objective: See treatment diary below      Assessment: Tolerated treatment fair. Patient continues to note daily pain.  Pain noted today with all stretches and PRES.  Increased pain with ER strengthening.  Due to continued pain and no progress with PT, she will be referred back to ortho.        Plan: Refer back to ortho due to continued pain and limited progress with PT.       Diagnosis:    Precautions:    POC Expires Auth Status Start Date Expiration Date PT Visit Limit    4/3/24 Auth Needed 3/4/24 6/2/24 12   Date 3/21 3/25 3/28 4/15 4/22   Used 6 7 8 9 10   Remaining 6 5 4 3 2   Manuals        PROM RT        Mobs        IASTM  R Anterior Shoulder                       There Ex        UBE        Pulleys    2/2  2/2   Cane Flexion S* X10        Cane ER S*        Sitting ER        Wall slides X10 flex X10 flex  X10 ABD  X15 flex   X10 w/ TD      Sleeper Stretch    10\" x 6    Patient Education   RE - RT  HEP Review              Neruo Re-Ed        SL ER*        Bent over H-abd    2# 2x10 2# x10   Bent over Rows     4# x10   Bent over Lower trap    2# 2x10 2# x10   Bent over Lats     4# x10    Supine Punch        Scap Stab with towel on wall    Red MB fwd/lateral/circles x10 Red MB    Rows Black 2x10 9# 2x10  Black 2x10 Black 2x10 Black 2x10   Shld Ext Black 2x10 7# 2x10  Black 2x10 Black 2x10  Black 2x10    ER Red 2x10   Green x10 Green 2x10  Green 2x10     IR Blue 2x10  Black 2x10  Black 2x10     Wall Clocks   Yellow 9&3 x10     Wall Climbs    Red x10 Red x10                      Ther Act                             Modalities         CP PRN                                         "

## 2024-04-25 ENCOUNTER — RADIATION ONCOLOGY FOLLOW-UP (OUTPATIENT)
Dept: RADIATION ONCOLOGY | Facility: HOSPITAL | Age: 49
End: 2024-04-25
Attending: RADIOLOGY
Payer: COMMERCIAL

## 2024-04-25 VITALS
RESPIRATION RATE: 16 BRPM | HEIGHT: 62 IN | HEART RATE: 78 BPM | WEIGHT: 181.3 LBS | BODY MASS INDEX: 33.36 KG/M2 | DIASTOLIC BLOOD PRESSURE: 86 MMHG | SYSTOLIC BLOOD PRESSURE: 124 MMHG | TEMPERATURE: 97.6 F | OXYGEN SATURATION: 98 %

## 2024-04-25 DIAGNOSIS — Z17.0 MALIGNANT NEOPLASM OF UPPER-INNER QUADRANT OF LEFT BREAST IN FEMALE, ESTROGEN RECEPTOR POSITIVE (HCC): Primary | ICD-10-CM

## 2024-04-25 DIAGNOSIS — C50.212 MALIGNANT NEOPLASM OF UPPER-INNER QUADRANT OF LEFT BREAST IN FEMALE, ESTROGEN RECEPTOR POSITIVE (HCC): Primary | ICD-10-CM

## 2024-04-25 PROCEDURE — 99213 OFFICE O/P EST LOW 20 MIN: CPT | Performed by: RADIOLOGY

## 2024-04-25 PROCEDURE — 99211 OFF/OP EST MAY X REQ PHY/QHP: CPT | Performed by: RADIOLOGY

## 2024-04-25 PROCEDURE — G0463 HOSPITAL OUTPT CLINIC VISIT: HCPCS | Performed by: RADIOLOGY

## 2024-04-25 NOTE — PROGRESS NOTES
Follow-up - Radiation Oncology   Jocelyn Smith 1975 49 y.o. female [id number]      History of Present Illness   Cancer Staging   Malignant neoplasm of upper-inner quadrant of left breast in female, estrogen receptor positive (HCC)  Staging form: Breast, AJCC 8th Edition  - Pathologic stage from 6/13/2019: Stage IA (pT1c, pN0, cM0, G1, ER+, AZ+, HER2-) - Signed by JACIEL Bruno on 2/19/2024  Stage prefix: Initial diagnosis  Multigene prognostic tests performed: MammaPrint  Histologic grading system: 3 grade system  - Clinical: Stage IA (cT1c, cN0, cM0, G1, ER+, AZ+, HER2-) - Signed by Yonatan Malcolm MD on 8/14/2019  Histologic grading system: 3 grade system  Laterality: Left      Jocelyn Smith is a 49 y.o. female with a history of invasive lobular carcinoma of the left breast status post lumpectomy and sentinel node biopsy with negative margins, Stage IA (cT1c, cN0, cM0, G1, ER+, AZ+, HER2-).  She met with Dr Allan and has been on Tamoxifen since August 2019.       Interval History:  5/11/23 Dr. Lloyd, plastic surgery  Bilateral fat grafting to b/l breasts, right crescentric mastopexy  350 ml of lipoaspirate transferred to left breast  250 ml of lipoaspirate transferred to right breast  Right crescentric mastopexy        8/14/23 Surg Ann Ambriz        11/10/23 Plastic surgery   Mild scar fibrosis/firmness remains from fat transfer.  Continue massage.   Will plan for second round of fat transfer.          3/4/24 Med Onc, Dr. Yoo  Patient to finish a total of 5 years of tamoxifen 20 mg p.o. daily, and she will follow-up with breast surgeon   Follow-up with medical oncology on as-needed basis        4/4/24 Diagnostic bilateral mammo & left limited US  Postsurgical changes left breast with new asymmetry probable evolving fat necrosis.  Short-term surveillance recommended.      ASSESSMENT/BI-RADS CATEGORY:  Left: 3 - Probably Benign  Right: 2 - Benign  Overall: 3 - Probably  Benign     RECOMMENDATION:       - Diagnostic mammogram in 6 months for the left breast.       - Ultrasound in 6 months for the left breast.       - Diagnostic mammogram in 1 year for the right breast.        Upcoming appts:  7/26/24 Plastic surgery   9/10/24 OR - fat grafting to left breast  10/10/24 Diagnostic mammogram & US   2/17/25 Surg Onc    Upon interview, she endorses the above history.  She has a small area of fat necrosis in the left breast, not painful.  She denies new breast masses, nipple discharge or skin changes.  She is without additional acute concerns.    Historical Information   Oncology History   Malignant neoplasm of upper-inner quadrant of left breast in female, estrogen receptor positive (HCC)   6/7/2019 Biopsy    Left breast ultrasound-guided biopsy  11 o'clock, 4 cm from nipple  Invasive lobular carcinoma  Grade 1  ER 90  NV 45  HER2 1+     6/13/2019 -  Cancer Staged    Staging form: Breast, AJCC 8th Edition  - Pathologic stage from 6/13/2019: Stage IA (pT1c, pN0, cM0, G1, ER+, NV+, HER2-) - Signed by JACIEL Bruno on 2/19/2024  Stage prefix: Initial diagnosis  Multigene prognostic tests performed: MammaPrint  Histologic grading system: 3 grade system       6/17/2019 Genetic Testing    BRCA negative  [facility]     7/30/2019 Surgery    Left breast lumpectomy with sentinel lymph node biopsy  Invasive lobular carcinoma  Grade 1  1.1 cm  0/3 Lymph nodes  Margins negative  Anatomic/Prognostic Stage IA     8/14/2019 -  Cancer Staged    Staging form: Breast, AJCC 8th Edition  - Clinical: Stage IA (cT1c, cN0, cM0, G1, ER+, NV+, HER2-) - Signed by Yonatan Malcolm MD on 8/14/2019  Laterality: Left  Histologic grading system: 3 grade system       8/27/2019 - 9/25/2019 Radiation    Course: C1    Plan ID Energy Fractions Dose per Fraction (cGy) Dose Correction (cGy) Total Dose Delivered (cGy) Elapsed Days   BH L Breast 6X 16 / 16 266 0 4,256 22   BH L Brst e 12E 5 / 5 200 0 1,000 6       Treatment dates:  C1: 8/27/2019 - 9/25/2019 8/2019 -  Hormone Therapy    Tamoxifen 20 mg daily  With Dr. Allan         Past Medical History:   Diagnosis Date    Adhesive capsulitis of right shoulder     Resolved 11/15/2016     Asthma     Benign paroxysmal vertigo, unspecified ear     Resolved 11/15/2016     BRCA gene mutation negative 06/2019    [facility]    Breast cancer (HCC) 07/30/2019    left    Cellulitis of leg, except foot     Resolved 12/10/2014     History of breast surgery 2023    Fat grafting bilateral breast s/p lumpectomy    Hx of radiation therapy 08/01/2019    Lymphadenitis     Resolved 12/10/2014     Migraine     Snoring     Wears contact lenses     Wears glasses      Past Surgical History:   Procedure Laterality Date    BREAST BIOPSY Left 2019    BREAST LUMPECTOMY Left 07/30/2019    Procedure: BREAST LUMPECTOMY; BREAST NEEDLE LOCALIZATION (NEEDLE LOC AT 0800);  Surgeon: Miguel Angel Leavitt MD;  [facility];  Service: Surgical Oncology    CHOLECYSTECTOMY LAPAROSCOPIC N/A 12/10/2023    Procedure: CHOLECYSTECTOMY LAPAROSCOPIC;  Surgeon: Yuan Humphrey MD;  Location[facility];  Service: General    COLONOSCOPY      INTRAUTERINE DEVICE INSERTION  2019    LYMPH NODE BIOPSY Left 07/30/2019    Procedure: SENTINEL LYMPH NODE BIOPSY; LYMPHATIC MAPPING WITH BLUE DYE AND RADIOACTIVE DYE (INJECT AT 0930 BY DR LEAVITT IN THE OR);  Surgeon: Miguel Angel Leavitt MD;  Location: [facility];  Service: Surgical Oncology    MAMMO NEEDLE LOCALIZATION LEFT (ALL INC) Left 07/30/2019    NY GRAFTING OF AUTOLOGOUS FAT BY LIPO 50 CC OR LESS Bilateral 5/11/2023    Procedure: FAT GRAFTING TO BILATERAL BREASTS;  Surgeon: Venita Lloyd DO;  Location:  MAIN OR;  Service: Plastics    NY MASTOPEXY Right 5/11/2023    Procedure: RIGHT CRESCENTRIC MASTOPEXY;  Surgeon: Venita Lloyd DO;  Location:  MAIN OR;  Service: Plastics    SALPINGECTOMY Bilateral 03/2021    SHOULDER SURGERY Right     Last assessed  12/29/2015     US GUIDED BREAST BIOPSY LEFT COMPLETE Left 06/07/2019    WISDOM TOOTH EXTRACTION         Social History   Social History     Substance and Sexual Activity   Alcohol Use Yes    Comment: rare     Social History     Substance and Sexual Activity   Drug Use No     Social History     Tobacco Use   Smoking Status Never    Passive exposure: Never   Smokeless Tobacco Never         Meds/Allergies     Current Outpatient Medications:     albuterol (ProAir HFA) 90 mcg/act inhaler, Inhale 2 puffs every 4 (four) hours as needed for wheezing, Disp: 18 g, Rfl: 0    Biotin w/ Vitamins C & E 1250-7.5-7.5 MCG-MG-UNT CHEW, in the morning, Disp: , Rfl:     BIOTIN W/ VITAMINS C & E PO, Take 1 capsule by mouth in the morning, Disp: , Rfl:     cetirizine (ZyrTEC) 10 MG chewable tablet, Chew 10 mg daily as needed , Disp: , Rfl:     Cholecalciferol (VITAMIN D3) 1000 units CHEW, Chew 1 tablet daily, Disp: , Rfl:     fluticasone (FLONASE) 50 mcg/act nasal spray, 1 spray into each nostril daily as needed , Disp: , Rfl:     Multiple Vitamins-Minerals (AIRBORNE PO), Take 1 tablet by mouth in the morning, Disp: , Rfl:     multivitamin (THERAGRAN) TABS, Take 1 tablet by mouth daily, Disp: , Rfl:     rizatriptan (MAXALT-MLT) 10 mg disintegrating tablet, Take 1 tablet (10 mg total) by mouth daily as needed for migraine Take at the onset of migraine; if symptoms continue or return, may take another dose at least 2 hours after first dose. Take no more than 2 doses in a day., Disp: 12 tablet, Rfl: 1    tamoxifen (NOLVADEX) 20 mg tablet, TAKE 1 TABLET BY MOUTH DAILY, Disp: 90 tablet, Rfl: 3  No Known Allergies      Review of Systems  Constitutional: Negative.    HENT: Negative.     Eyes: Negative.         Wears glasses    Respiratory: Negative.     Cardiovascular: Negative.    Gastrointestinal: Negative.    Endocrine: Negative.    Genitourinary: Negative.    Musculoskeletal: Negative.    Skin: Negative.    Allergic/Immunologic: Positive  "for environmental allergies.   Neurological: Negative.    Hematological: Negative.    Psychiatric/Behavioral: Negative.         OBJECTIVE:   /86 (BP Location: Right arm, Patient Position: Sitting, Cuff Size: Standard)   Pulse 78   Temp 97.6 °F (36.4 °C) (Temporal)   Resp 16   Ht 5' 2\" (1.575 m)   Wt 82.2 kg (181 lb 4.8 oz)   LMP 03/19/2024   SpO2 98%   BMI 33.16 kg/m²   Pain Assessment:  0  Karnofsky: 90 - Able to carry on normal activity; minor signs or symptoms of disease     The patient presents today in no apparent distress.  Sclera anicteric.  No palpable cervical or supraclavicular lymphadenopathy.  Breathing comfortably on room air.  Breast examination performed in the supine and seated positions with chaperone present.  The right breast is within normal limits.  The left breast is soft, nontender, without visible or palpable suspicious findings.  No axillary lymphadenopathy.  No lymphedema.      RESULTS    Lab Results: No results found for this or any previous visit (from the past 672 hour(s)).    Imaging Studies:Mammo diagnostic bilateral w 3d & cad, US breast left limited (diagnostic)    Result Date: 4/4/2024  Narrative: DIAGNOSIS: Malignant neoplasm of upper-inner quadrant of left breast in female, estrogen receptor positive (HCC) TECHNIQUE: Digital diagnostic mammography was performed. Computer Aided Detection (CAD) analyzed all applicable images. Left breast ultrasound was performed. COMPARISONS: Prior breast imaging dated: 03/30/2023, 03/16/2022, 03/12/2021, 03/11/2020, 07/30/2019, 07/30/2019, 06/07/2019, 06/07/2019, 05/24/2019, 05/08/2019, 11/23/2018, 12/28/2017, 11/22/2017, 11/22/2017, 11/25/2016, 11/25/2016, 12/24/2015, 12/24/2015, 12/24/2015, 12/24/2015, 11/27/2015, 11/27/2015, and 11/26/2014 RELEVANT HISTORY: Family Breast Cancer History: No known family history of breast cancer. Family Medical History: No known relevant family medical history. Personal History: Hormone history " includes birth control and tamoxifen. Surgical history includes breast biopsy and lumpectomy. Medical history includes breast cancer. RISK ASSESSMENT: Tyrer-zick risk assessment reporting was suppressed due to the patient's history and/or demographic factors. TISSUE DENSITY: The breasts are heterogeneously dense, which may obscure small masses. INDICATION: Jocelyn Smith is a 49 y.o. female presenting for annual. FINDINGS: LEFT 1) POST-SURGICAL FINDING [B] Mammo diagnostic bilateral w 3d & cad: There are post-surgical findings from a previous lumpectomy with radiation seen in the upper central region of the left breast in the middle depth. Compared to the previous study, there are no significant changes.  Several benign oil cysts throughout the surgical site have evolved during the interim consistent with fat necrosis. 2) ASYMMETRY [C] Mammo diagnostic bilateral w 3d & cad: There is an asymmetry seen in the inner region of the left breast in the posterior depth on the CC view. US breast left limited (diagnostic): There is a 4 mm x 4 mm x 4 mm round, parallel, hypoechoic mass with circumscribed margins with no posterior features seen in the left breast at 11 o'clock, 4 cm from the nipple. The mass correlates with the prior mammogram finding.  Again, this likely represents ongoing fat necrosis in the surgical bed for which short-term surveillance recommended. Right Mammo diagnostic bilateral w 3d & cad There are no suspicious masses, grouped microcalcifications or areas of unexplained architectural distortion. The skin and nipple areolar complex are unremarkable.  Benign oil cyst in the lower inner breast noted.     Impression: Postsurgical changes left breast with new asymmetry probable evolving fat necrosis.  Short-term surveillance recommended. ASSESSMENT/BI-RADS CATEGORY: Left: 3 - Probably Benign Right: 2 - Benign Overall: 3 - Probably Benign RECOMMENDATION:      - Diagnostic mammogram in 6 months for the  "left breast.      - Ultrasound in 6 months for the left breast.      - Diagnostic mammogram in 1 year for the right breast. Workstation ID: [id number]           Assessment/Plan:  No orders of the defined types were placed in this encounter.       Jocelyn Smith is a 49 y.o. female with a history of invasive lobular carcinoma of the left breast status post lumpectomy and sentinel node biopsy with negative margins, Stage IA (cT1c, cN0, cM0, G1, ER+, GA+, HER2-).  She completed adjuvant radiation on 9/25/2019.  She will continue mammographic surveillance.  She has upcoming appointments with plastic surgery.  She remains without clinical or radiographic evidence of recurrent disease.  She is almost 5 years post-treatment.  She will follow up with this office on an as-needed basis.       Ky Matthews MD  4/25/2024,8:23 AM    Portions of the record may have been created with voice recognition software.  Occasional wrong word or \"sound a like\" substitutions may have occurred due to the inherent limitations of voice recognition software.  Read the chart carefully and recognize, using context, where substitutions have occurred.        "

## 2024-04-25 NOTE — PROGRESS NOTES
Jocelyn Smith 1975 is a 49 y.o. female  with invasive lobular carcinoma of the left breast status post lumpectomy and sentinel node biopsy with negative margins, Stage IA (cT1c, cN0, cM0, G1, ER+, FL+, HER2-).  She has been on Tamoxifen since August 2019. She completed left breast radiation on 9/25/2019.  She was last seen for follow-up on 4/27/23.      5/11/23 Dr. Lloyd, plastic surgery  Bilateral fat grafting to b/l breasts, right crescentric mastopexy  350 ml of lipoaspirate transferred to left breast  250 ml of lipoaspirate transferred to right breast  Right crescentric mastopexy      8/14/23 Surg Onc, Ann GRISSOM      11/10/23 Plastic surgery   Mild scar fibrosis/firmness remains from fat transfer.  Continue massage.   Will plan for second round of fat transfer.        3/4/24 Med Onc, Dr. Yoo  Patient to finish a total of 5 years of tamoxifen 20 mg p.o. daily, and she will follow-up with breast surgeon   Follow-up with medical oncology on as-needed basis      4/4/24 Diagnostic bilateral mammo & left limited US  Postsurgical changes left breast with new asymmetry probable evolving fat necrosis.  Short-term surveillance recommended.      ASSESSMENT/BI-RADS CATEGORY:  Left: 3 - Probably Benign  Right: 2 - Benign  Overall: 3 - Probably Benign     RECOMMENDATION:       - Diagnostic mammogram in 6 months for the left breast.       - Ultrasound in 6 months for the left breast.       - Diagnostic mammogram in 1 year for the right breast.       Upcoming appts:  7/26/24 Plastic surgery   9/10/24 OR - fat grafting to left breast  10/10/24 Diagnostic mammogram & US   2/17/25 Surg Onc      Follow up visit     Oncology History   Malignant neoplasm of upper-inner quadrant of left breast in female, estrogen receptor positive (HCC)   6/7/2019 Biopsy    Left breast ultrasound-guided biopsy  11 o'clock, 4 cm from nipple  Invasive lobular carcinoma  Grade 1  ER 90  FL 45  HER2 1+     6/13/2019 -  Cancer Staged     Staging form: Breast, AJCC 8th Edition  - Pathologic stage from 6/13/2019: Stage IA (pT1c, pN0, cM0, G1, ER+, VT+, HER2-) - Signed by JACIEL Bruno on 2/19/2024  Stage prefix: Initial diagnosis  Multigene prognostic tests performed: MammaPrint  Histologic grading system: 3 grade system       6/17/2019 Genetic Testing    BRCA negative  [facility]     7/30/2019 Surgery    Left breast lumpectomy with sentinel lymph node biopsy  Invasive lobular carcinoma  Grade 1  1.1 cm  0/3 Lymph nodes  Margins negative  Anatomic/Prognostic Stage IA     8/14/2019 -  Cancer Staged    Staging form: Breast, AJCC 8th Edition  - Clinical: Stage IA (cT1c, cN0, cM0, G1, ER+, VT+, HER2-) - Signed by Yonatan Malcolm MD on 8/14/2019  Laterality: Left  Histologic grading system: 3 grade system       8/27/2019 - 9/25/2019 Radiation    Course: C1    Plan ID Energy Fractions Dose per Fraction (cGy) Dose Correction (cGy) Total Dose Delivered (cGy) Elapsed Days   BH L Breast 6X 16 / 16 266 0 4,256 22   BH L Brst e 12E 5 / 5 200 0 1,000 6      Treatment dates:  C1: 8/27/2019 - 9/25/2019 8/2019 -  Hormone Therapy    Tamoxifen 20 mg daily  With Dr. Allan         Review of Systems:  Review of Systems   Constitutional: Negative.    HENT: Negative.     Eyes: Negative.         Wears glasses    Respiratory: Negative.     Cardiovascular: Negative.    Gastrointestinal: Negative.    Endocrine: Negative.    Genitourinary: Negative.    Musculoskeletal: Negative.    Skin: Negative.    Allergic/Immunologic: Positive for environmental allergies.   Neurological: Negative.    Hematological: Negative.    Psychiatric/Behavioral: Negative.         Clinical Trial: no    Pregnancy test needed:  no    Teaching n/a    Health Maintenance   Topic Date Due    HIV Screening  Never done    Cervical Cancer Screening  Never done    Pneumococcal Vaccine: Pediatrics (0 to 5 Years) and At-Risk Patients (6 to 64 Years) (2 of 2 - PCV) 09/17/2011    BMI: Followup  Plan  08/17/2022    ONC Colorectal Surgery Screening  Never done    Breast Cancer Survivorship Visit  Never done    ONC Cervical Cancer Screening  Never done    ONC DXA Scan  Never done    ONC Physical Therapy Referral  Never done    COVID-19 Vaccine (10 - 2023-24 season) 12/29/2023    PT PLAN OF CARE  04/24/2024    Annual Physical  09/07/2024    Depression Screening  01/30/2025    BMI: Adult  04/01/2025    Breast Cancer Screening: Mammogram  04/04/2025    Colorectal Cancer Screening  12/13/2027    DTaP,Tdap,and Td Vaccines (3 - Td or Tdap) 08/17/2031    Hepatitis C Screening  Completed    Zoster Vaccine  Completed    Influenza Vaccine  Completed    HIB Vaccine  Aged Out    IPV Vaccine  Aged Out    Hepatitis A Vaccine  Aged Out    Meningococcal ACWY Vaccine  Aged Out    HPV Vaccine  Aged Out     Patient Active Problem List   Diagnosis    Allergic rhinitis    Dense breasts    Migraine headache    Mild intermittent asthma    Motion sickness    Vitamin D deficiency    Malignant neoplasm of upper-inner quadrant of left breast in female, estrogen receptor positive (HCC)    Use of tamoxifen (Nolvadex)    Abnormal glucose    History of lumpectomy    Breast asymmetry    Anemia    Fatty liver     Past Medical History:   Diagnosis Date    Adhesive capsulitis of right shoulder     Resolved 11/15/2016     Asthma     Benign paroxysmal vertigo, unspecified ear     Resolved 11/15/2016     BRCA gene mutation negative 06/2019    [facility]    Breast cancer (HCC) 07/30/2019    left    Cellulitis of leg, except foot     Resolved 12/10/2014     History of breast surgery 2023    Fat grafting bilateral breast s/p lumpectomy    Hx of radiation therapy 08/01/2019    Lymphadenitis     Resolved 12/10/2014     Migraine     Snoring     Wears contact lenses     Wears glasses      Past Surgical History:   Procedure Laterality Date    BREAST BIOPSY Left 2019    BREAST LUMPECTOMY Left 07/30/2019    Procedure: BREAST LUMPECTOMY; BREAST NEEDLE  LOCALIZATION (NEEDLE LOC AT 0800);  Surgeon: Miguel Angel Steinberg MD;  [facility];  Service: Surgical Oncology    CHOLECYSTECTOMY LAPAROSCOPIC N/A 12/10/2023    Procedure: CHOLECYSTECTOMY LAPAROSCOPIC;  Surgeon: Yuan Humphrey MD;  [facility];  Service: General    COLONOSCOPY      INTRAUTERINE DEVICE INSERTION  2019    LYMPH NODE BIOPSY Left 07/30/2019    Procedure: SENTINEL LYMPH NODE BIOPSY; LYMPHATIC MAPPING WITH BLUE DYE AND RADIOACTIVE DYE (INJECT AT 0930 BY DR STEINBERG IN THE OR);  Surgeon: Miguel Angel Steinberg MD;  [facility];  Service: Surgical Oncology    MAMMO NEEDLE LOCALIZATION LEFT (ALL INC) Left 07/30/2019    MN GRAFTING OF AUTOLOGOUS FAT BY LIPO 50 CC OR LESS Bilateral 5/11/2023    Procedure: FAT GRAFTING TO BILATERAL BREASTS;  Surgeon: Venita Lloyd DO;  Location:  MAIN OR;  Service: Plastics    MN MASTOPEXY Right 5/11/2023    Procedure: RIGHT CRESCENTRIC MASTOPEXY;  Surgeon: Venita Lloyd DO;  Location:  MAIN OR;  Service: Plastics    SALPINGECTOMY Bilateral 03/2021    SHOULDER SURGERY Right     Last assessed 12/29/2015     US GUIDED BREAST BIOPSY LEFT COMPLETE Left 06/07/2019    WISDOM TOOTH EXTRACTION       Family History   Problem Relation Age of Onset    Alzheimer's disease Mother     COPD Father     No Known Problems Sister     No Known Problems Daughter     Alzheimer's disease Maternal Grandmother     No Known Problems Maternal Grandfather     No Known Problems Paternal Grandmother     No Known Problems Paternal Grandfather     No Known Problems Brother     No Known Problems Brother     No Known Problems Brother     No Known Problems Daughter      Social History     Socioeconomic History    Marital status: /Civil Union     Spouse name: Not on file    Number of children: 3    Years of education: Not on file    Highest education level: Not on file   Occupational History    Not on file   Tobacco Use    Smoking status: Never     Passive exposure: Never     Smokeless tobacco: Never   Vaping Use    Vaping status: Never Used   Substance and Sexual Activity    Alcohol use: Yes     Comment: rare    Drug use: No    Sexual activity: Yes     Partners: Male   Other Topics Concern    Not on file   Social History Narrative    Not on file     Social Determinants of Health     Financial Resource Strain: Not on file   Food Insecurity: Not on file   Transportation Needs: Not on file   Physical Activity: Not on file   Stress: Not on file   Social Connections: Not on file   Intimate Partner Violence: Not on file   Housing Stability: Not on file       Current Outpatient Medications:     albuterol (ProAir HFA) 90 mcg/act inhaler, Inhale 2 puffs every 4 (four) hours as needed for wheezing, Disp: 18 g, Rfl: 0    Biotin w/ Vitamins C & E 1250-7.5-7.5 MCG-MG-UNT CHEW, in the morning, Disp: , Rfl:     BIOTIN W/ VITAMINS C & E PO, Take 1 capsule by mouth in the morning, Disp: , Rfl:     cetirizine (ZyrTEC) 10 MG chewable tablet, Chew 10 mg daily as needed , Disp: , Rfl:     Cholecalciferol (VITAMIN D3) 1000 units CHEW, Chew 1 tablet daily, Disp: , Rfl:     fluticasone (FLONASE) 50 mcg/act nasal spray, 1 spray into each nostril daily as needed , Disp: , Rfl:     Multiple Vitamins-Minerals (AIRBORNE PO), Take 1 tablet by mouth in the morning, Disp: , Rfl:     multivitamin (THERAGRAN) TABS, Take 1 tablet by mouth daily, Disp: , Rfl:     rizatriptan (MAXALT-MLT) 10 mg disintegrating tablet, Take 1 tablet (10 mg total) by mouth daily as needed for migraine Take at the onset of migraine; if symptoms continue or return, may take another dose at least 2 hours after first dose. Take no more than 2 doses in a day., Disp: 12 tablet, Rfl: 1    tamoxifen (NOLVADEX) 20 mg tablet, TAKE 1 TABLET BY MOUTH DAILY, Disp: 90 tablet, Rfl: 3  No Known Allergies  There were no vitals filed for this visit.

## 2024-04-30 ENCOUNTER — APPOINTMENT (OUTPATIENT)
Dept: LAB | Facility: AMBULARY SURGERY CENTER | Age: 49
End: 2024-04-30

## 2024-04-30 DIAGNOSIS — Z00.6 ENCOUNTER FOR EXAMINATION FOR NORMAL COMPARISON OR CONTROL IN CLINICAL RESEARCH PROGRAM: ICD-10-CM

## 2024-04-30 PROCEDURE — 36415 COLL VENOUS BLD VENIPUNCTURE: CPT

## 2024-05-03 ENCOUNTER — OFFICE VISIT (OUTPATIENT)
Dept: FAMILY MEDICINE CLINIC | Facility: CLINIC | Age: 49
End: 2024-05-03
Payer: COMMERCIAL

## 2024-05-03 VITALS
TEMPERATURE: 99.1 F | DIASTOLIC BLOOD PRESSURE: 60 MMHG | HEART RATE: 76 BPM | SYSTOLIC BLOOD PRESSURE: 102 MMHG | HEIGHT: 62 IN | WEIGHT: 180.4 LBS | RESPIRATION RATE: 16 BRPM | BODY MASS INDEX: 33.2 KG/M2

## 2024-05-03 DIAGNOSIS — J45.30 MILD PERSISTENT ASTHMA WITHOUT COMPLICATION: Primary | ICD-10-CM

## 2024-05-03 PROCEDURE — 99213 OFFICE O/P EST LOW 20 MIN: CPT | Performed by: NURSE PRACTITIONER

## 2024-05-03 RX ORDER — FLUTICASONE PROPIONATE AND SALMETEROL 100; 50 UG/1; UG/1
1 POWDER RESPIRATORY (INHALATION) 2 TIMES DAILY
Qty: 60 BLISTER | Refills: 2 | Status: SHIPPED | OUTPATIENT
Start: 2024-05-03

## 2024-05-03 NOTE — PATIENT INSTRUCTIONS
Fluticasone/Salmeterol (By breathing)   Fluticasone Propionate (fnmd-FFS-d-sone QSVX-uet-dv-nicole), Salmeterol Xinafoate (dale-ME-ter-ol yrk-PWY-iu-ate)  Treats and prevents symptoms of asthma and chronic obstructive pulmonary disease (COPD). This medicine contains a corticosteroid.   Brand Name(s): Advair Diskus, Advair Diskus 100/50, Advair Diskus 250/50, Advair Diskus 500/50, Advair /21, Advair /21, Advair HFA 45/21, Wixela Inhub   There may be other brand names for this medicine.  When This Medicine Should Not Be Used:   This medicine is not right for everyone. Do not use it if you had an allergic reaction to salmeterol, fluticasone, or milk proteins. Do not use this medicine to treat an asthma attack or a COPD flare-up.  How to Use This Medicine:   Liquid Under Pressure, Powder Under Pressure, Disk  Take your medicine as directed. Your dose may need to be changed several times to find what works best for you. Never use more medicine than your doctor prescribed.  Read and follow the patient instructions that come with this medicine. Talk to your doctor or pharmacist if you have any questions.  Advair Diskus®:   The powder medicine is held in a foil blister package inside the Diskus® inhaler. The Diskus® pokes a hole in each blister one at a time when you push the lever.  Do not use a spacer with the Diskus®.  Keep the Diskus® inhaler closed when you are not using it. Keep the inhaler dry at all times. Do not blow into it.  Before you inhale your dose, breathe out fully, trying to get as much air out of the lungs as possible. Hold the Diskus® level and away from your mouth.  Open your mouth and breathe in quickly and deeply through the Diskus®. Do not breathe in through your nose.  Remove the Diskus® from your mouth. Hold your breath for about 10 seconds or as long as possible, then breathe out slowly.  Throw the inhaler away 1 month after you remove it from the foil pouch or when the dose indicator  reaches 0.  Advair® HFA:   You will use this medicine with a device called a metered-dose inhaler. The inhaler fits on the medicine canister and turns the medicine into a fine spray that you breathe in through your mouth and to your lungs. You may be told to use a spacer, which is a tube that is placed between the inhaler and your mouth. Your caregiver will show you how to use your inhaler and the spacer (if needed).  Shake the inhaler well just before each use. Avoid spraying this medicine into your eyes.  Prime the inhaler before you use it for the first time. Shake well, then spray into the air, away from your face. Shake and spray a total of 4 times. If the inhaler has been dropped or has not been used for more than 4 weeks, do 2 sprays into the air before use.  To inhale this medicine, breathe out fully, trying to get as much air out of the lungs as possible. Put the mouthpiece just in front of your mouth with the canister upright.  Open your mouth and breathe in slowly and deeply (like yawning), and at the same time firmly press down on the top of the canister once.  Hold your breath for about 5 to 10 seconds, and then breathe out slowly.  If you are supposed to use more than one puff, wait 1 to 2 minutes before inhaling the second puff. Repeat these steps for the next puff, starting with shaking the inhaler.  Clean your inhaler at least once a week.  Throw away the inhaler when the dose counter reaches 000.  Rinse your mouth out with water after you inhale your medicine. Do not swallow the water.  Airduo™ Respiclick®:   You will use this medicine with a device called a metered-dose inhaler. The inhaler fits on the medicine canister and turns the medicine into a fine spray that you breathe in through your mouth and to your lungs. You may be told to use a spacer, which is a tube that is placed between the inhaler and your mouth. Your caregiver will show you how to use your inhaler and the spacer (if  needed).  Take the inhaler out of the pouch before you use it for the first time.  Do not use the inhaler for this medicine with any other medicine.  This medicine does not require priming. Do not use it with a spacer or volume holding chamber.  Hold the inhaler upright and open the yellow cap all the way until it clicks. Do not open the yellow cap until you are ready to take a dose of this medicine.  To inhale this medicine, breathe out fully, trying to get as much air out of the lungs as possible. Put the mouthpiece just in front of your mouth with the canister upright.  Inhale slowly.  Hold your breath for about 5 to 10 seconds, and then breathe out slowly.  Close the yellow cap after each inhalation.  Rinse your mouth with water without swallowing after each inhalation.  Keep the inhaler dry and clean at all times. Gently wipe the mouthpiece with a dry cloth or tissue as needed.  The inhaler has a window that shows the number of doses remaining. This tells you when you are getting low on medicine. The doses counting down from 20 to 0 will show up in red to remind you to refill your prescription. Throw away the inhaler when the dose counter displays 0, 30 days after opening the pouch.  Missed dose:  If you miss a dose of this medicine, skip the missed dose and go back to your regular dosing schedule. Do not double doses.  Keep the medicine in the foil pouch until you are ready to use it. Store at room temperature, away from heat and direct light. Do not freeze.  Store the canister at room temperature, away from heat and direct light. Do not freeze. Do not keep this medicine inside a car where it could be exposed to extreme heat or cold. Do not poke holes in the canister or throw it into a fire, even if the canister is empty.  Ask your pharmacist, doctor, or health caregiver about the best way to dispose of the used medicine container and any leftover medicine. You will also need to throw away old medicine after  the expiration date has passed.  Drugs and Foods to Avoid:   Ask your doctor or pharmacist before using any other medicine, including over-the-counter medicines, vitamins, and herbal products.  Some foods and medicines can affect how fluticasone/salmeterol works. Tell your doctor if you are using any of the following:  Blood pressure medicine  Diuretic (water pill)  Medicine to treat depression or an MAO inhibitor within the past 2 weeks (including nefazodone)  Medicine to treat HIV or AIDS (including atazanavir, indinavir, nelfinavir, ritonavir, saquinavir)  Medicine to treat an infection (including clarithromycin, itraconazole, ketoconazole, telithromycin)  Seizure medicine  Warnings While Using This Medicine:   Tell your doctor if you are pregnant or breastfeeding, or if you have liver disease, cataracts, diabetes, glaucoma, heart disease, high blood pressure, heart rhythm problems, thyroid problems, seizures, or osteoporosis. Tell your doctor about any immune system problems or infections, including herpes simplex in your eye, tuberculosis, or parasites. Tell your doctor if you have been exposed to chickenpox or measles.  This medicine may cause the following problems:  Increased risk of asthma-related hospital stays, intubations, and death  Increased trouble breathing right after use (paradoxical bronchospasm)  Increased risk of pneumonia in people who have COPD  Increased risk of infection, including fungus infection in the mouth (thrush)  Low bone mineral density, which may lead to osteoporosis  Cataracts, glaucoma, or other vision problems  Slow growth in children  Adrenal gland problems  This medicine will not stop an asthma attack that has already started. You should have another medicine to use in case of an acute asthma attack or COPD flare-up. Tell your doctor right away if your condition gets worse or you need to use your other medicine more often than usual.  If any of your asthma medicines do not  seem to be working as well as usual, call your doctor right away. Do not change your doses or stop using your medicines without asking your doctor.  Tell any doctor or dentist who treats you that you are using this medicine.  Call your doctor if your symptoms do not improve or if they get worse.  Your doctor will check your progress and the effects of this medicine at regular visits. Keep all appointments.  Keep all medicine out of the reach of children. Never share your medicine with anyone.  Possible Side Effects While Using This Medicine:   Call your doctor right away if you notice any of these side effects:  Allergic reaction: Itching or hives, swelling in your face or hands, swelling or tingling in your mouth or throat, chest tightness, trouble breathing  Changes in skin color, dark freckles, easy bruising, muscle weakness, round or puffy face  Chest pain, trouble breathing  Dry mouth, increased thirst, muscle cramps, nausea, vomiting  Eye pain or trouble seeing  Fast, pounding, or uneven heartbeat  Fever, chills, cough, runny or stuffy nose, sore throat, body aches  Tremors, nervousness, or shaking  Unusual tiredness or weakness  Worsening of breathing problems  If you notice these less serious side effects, talk with your doctor:   Headache  Hoarseness, voice changes  White patches inside the mouth or throat  If you notice other side effects that you think are caused by this medicine, tell your doctor.   Call your doctor for medical advice about side effects. You may report side effects to FDA at 6-962-FDA-7213  © Copyright [employer] 2023 Information is for End User's use only and may not be sold, redistributed or otherwise used for commercial purposes.  The above information is an  only. It is not intended as medical advice for individual conditions or treatments. Talk to your doctor, nurse or pharmacist before following any medical regimen to see if it is safe and effective for you.

## 2024-05-03 NOTE — PROGRESS NOTES
"Assessment/Plan:    1. Mild persistent asthma without complication  -     Fluticasone-Salmeterol (Advair) 100-50 mcg/dose inhaler; Inhale 1 puff 2 (two) times a day Rinse mouth after use.          Patient Instructions:  Supportive care discussed and advised.  Advised to RTO for any worsening and no improvement.   Follow up for no improvement and worsening of conditions.  Patient advised and educated when to see immediate medical care.    Return if symptoms worsen or fail to improve.      Future Appointments   Date Time Provider Department Center   5/9/2024  5:00 PM  MRI 1 [facility] MRI [facility]   5/17/2024  8:15 AM Adis Voss DO ORTHO AND Practice-Ort   7/26/2024  8:30 AM Venita Lloyd DO PLASTICS [facility]   8/29/2024  8:15 AM Bea Almodovar DO VILL MED Practice-Eas   10/10/2024 10:00 AM [place] MAMMO 1 [facility]   10/10/2024 10:30 AM [place] US 3 [facility]   2/17/2025 10:00 AM JACIEL Bruno SURG ONC EAS Practice-Onc           Subjective:      Patient ID: Jocelyn Smith is a 49 y.o. female.    Chief Complaint   Patient presents with   • Cough     Ongoing for about 4 weeks;  YC         Vitals:  /60   Pulse 76   Temp 99.1 °F (37.3 °C) (Tympanic)   Resp 16   Ht 5' 2\" (1.575 m)   Wt 81.8 kg (180 lb 6.4 oz)   LMP 03/19/2024 (Exact Date)   BMI 33.00 kg/m²     Patient stated that started with URI symptoms about 3 weeks ago and was seen in [facility] urgent care and was treated with prednisione and felt better but cough got worse again and worse in evening. Takes zyrtec and flonase nasal spray and uses PROAIR as needed. Seems her asthma is getting worse and will start on Advair and will follow back if no improvement in next 2 to 3 weeks then will consider chest xray and ECHO if needed and patient verbalizes understanding    Cough  Pertinent negatives include no chills, ear pain, fever, headaches, postnasal drip, rhinorrhea, sore throat, shortness of breath or wheezing. "               The following portions of the patient's history were reviewed and updated as appropriate: allergies, current medications, past family history, past medical history, past social history, past surgical history and problem list.      Review of Systems   Constitutional:  Negative for chills, diaphoresis, fatigue, fever and unexpected weight change.   HENT:  Negative for congestion, dental problem, drooling, ear discharge, ear pain, facial swelling, hearing loss, mouth sores, nosebleeds, postnasal drip, rhinorrhea, sinus pressure, sinus pain, sneezing, sore throat, tinnitus, trouble swallowing and voice change.    Respiratory:  Positive for cough. Negative for chest tightness, shortness of breath and wheezing.    Cardiovascular: Negative.    Gastrointestinal:  Negative for abdominal pain, constipation, diarrhea, nausea and vomiting.   Musculoskeletal: Negative.    Skin: Negative.    Neurological:  Negative for dizziness, light-headedness and headaches.         Objective:    Social History     Tobacco Use   Smoking Status Never   • Passive exposure: Never   Smokeless Tobacco Never       Allergies: No Known Allergies      Current Outpatient Medications   Medication Sig Dispense Refill   • albuterol (ProAir HFA) 90 mcg/act inhaler Inhale 2 puffs every 4 (four) hours as needed for wheezing 18 g 0   • Biotin w/ Vitamins C & E 1250-7.5-7.5 MCG-MG-UNT CHEW in the morning     • BIOTIN W/ VITAMINS C & E PO Take 1 capsule by mouth in the morning     • cetirizine (ZyrTEC) 10 MG chewable tablet Chew 10 mg daily as needed      • Cholecalciferol (VITAMIN D3) 1000 units CHEW Chew 1 tablet daily     • fluticasone (FLONASE) 50 mcg/act nasal spray 1 spray into each nostril daily as needed      • Fluticasone-Salmeterol (Advair) 100-50 mcg/dose inhaler Inhale 1 puff 2 (two) times a day Rinse mouth after use. 60 blister 2   • Multiple Vitamins-Minerals (AIRBORNE PO) Take 1 tablet by mouth in the morning     • multivitamin  (THERAGRAN) TABS Take 1 tablet by mouth daily     • rizatriptan (MAXALT-MLT) 10 mg disintegrating tablet Take 1 tablet (10 mg total) by mouth daily as needed for migraine Take at the onset of migraine; if symptoms continue or return, may take another dose at least 2 hours after first dose. Take no more than 2 doses in a day. 12 tablet 1   • tamoxifen (NOLVADEX) 20 mg tablet TAKE 1 TABLET BY MOUTH DAILY 90 tablet 3     No current facility-administered medications for this visit.          Physical Exam  Vitals reviewed.   Constitutional:       Appearance: Normal appearance. She is well-developed.   HENT:      Head: Normocephalic.      Right Ear: External ear normal.      Left Ear: External ear normal.      Nose: Nose normal.      Right Sinus: No maxillary sinus tenderness or frontal sinus tenderness.      Left Sinus: No maxillary sinus tenderness or frontal sinus tenderness.   Cardiovascular:      Rate and Rhythm: Normal rate and regular rhythm.      Heart sounds: Normal heart sounds.   Pulmonary:      Effort: Pulmonary effort is normal.      Breath sounds: Wheezing present.   Musculoskeletal:         General: Normal range of motion.      Cervical back: Neck supple.   Lymphadenopathy:      Cervical:      Right cervical: No superficial or posterior cervical adenopathy.     Left cervical: No superficial or posterior cervical adenopathy.   Skin:     General: Skin is warm and dry.   Neurological:      Mental Status: She is alert and oriented to person, place, and time.   Psychiatric:         Behavior: Behavior normal.         Thought Content: Thought content normal.         Judgment: Judgment normal.                     JACIEL Heath

## 2024-05-09 ENCOUNTER — TELEPHONE (OUTPATIENT)
Age: 49
End: 2024-05-09

## 2024-05-09 NOTE — TELEPHONE ENCOUNTER
It's already cancelled, she will have to r/s, after we get the info from billing that Mary Grace requested

## 2024-05-09 NOTE — TELEPHONE ENCOUNTER
I don't know if she called & told anyone when & where she is having it, because she is not scheduled at [facility]

## 2024-05-09 NOTE — TELEPHONE ENCOUNTER
Caller: Jocelyn    Doctor:Bipin    Reason for call: Patient insurance needs proof of CSI and the PT patient did . This is necessary for the MRI to be approved.  [employer]  3   case # [id number]. Patient is scheduled for today for the MRI 500pm  Call back#: [phone]

## 2024-05-09 NOTE — TELEPHONE ENCOUNTER
I'm wondering if they actually read the notes, because she was getting PT since 3/4, so she's had it for 7w !     I did not see a message from you about the newest MRI request. Dr Voss I'm sure would have done a peer to peer

## 2024-05-17 LAB
APOB+LDLR+PCSK9 GENE MUT ANL BLD/T: NOT DETECTED
BRCA1+BRCA2 DEL+DUP + FULL MUT ANL BLD/T: NOT DETECTED
MLH1+MSH2+MSH6+PMS2 GN DEL+DUP+FUL M: NOT DETECTED

## 2024-05-21 ENCOUNTER — HOSPITAL ENCOUNTER (OUTPATIENT)
Dept: MRI IMAGING | Facility: HOSPITAL | Age: 49
Discharge: HOME | End: 2024-05-21
Payer: COMMERCIAL

## 2024-05-21 DIAGNOSIS — M75.41 IMPINGEMENT SYNDROME OF RIGHT SHOULDER: ICD-10-CM

## 2024-05-21 PROCEDURE — 73221 MRI JOINT UPR EXTREM W/O DYE: CPT

## 2024-06-05 ENCOUNTER — TELEPHONE (OUTPATIENT)
Dept: OBGYN CLINIC | Facility: CLINIC | Age: 49
End: 2024-06-05

## 2024-06-05 ENCOUNTER — OFFICE VISIT (OUTPATIENT)
Dept: OBGYN CLINIC | Facility: CLINIC | Age: 49
End: 2024-06-05
Payer: COMMERCIAL

## 2024-06-05 VITALS
DIASTOLIC BLOOD PRESSURE: 87 MMHG | HEIGHT: 62 IN | WEIGHT: 180.6 LBS | BODY MASS INDEX: 33.23 KG/M2 | HEART RATE: 86 BPM | SYSTOLIC BLOOD PRESSURE: 138 MMHG

## 2024-06-05 DIAGNOSIS — M75.21 BICEPS TENDONITIS ON RIGHT: ICD-10-CM

## 2024-06-05 DIAGNOSIS — M75.41 IMPINGEMENT SYNDROME OF RIGHT SHOULDER: Primary | ICD-10-CM

## 2024-06-05 PROCEDURE — 99214 OFFICE O/P EST MOD 30 MIN: CPT | Performed by: STUDENT IN AN ORGANIZED HEALTH CARE EDUCATION/TRAINING PROGRAM

## 2024-06-05 NOTE — PROGRESS NOTES
[facility] Shoulder Follow Up Visit     Assesment:   49 y.o. female right shoulder impingement and biceps tendinitis.  Status post open right subacromial decompression    Plan:  The patient's diagnosis and treatment were discussed at length today. We discussed no treatment, non-operative treatment, and operative treatment.    Jocelyn presents today for follow-up evaluation right shoulder impingement and biceps tendinitis who is status post right open subacromial decompression several years ago.  I did review her MRI with her at today's visit that demonstrates no evidence of rotator cuff tear with moderate tendinosis.  During today's examination she does demonstrate symptoms consistent with shoulder impingement and rotator cuff inflammation as well as biceps tendinitis.  Given that she had no significant improvement from the cortisone injection was provided to her in the office back in February 2024 I did provide her with a referral to Dr. Hawthorne for ultrasound-guided injection of right biceps tendon sheath and/or subacromial space.  In the meantime she can continue with her home exercise program and outpatient physical therapy.  She can continue activity as tolerated using pain as a guide.  She can continue ice and over-the-counter medication as needed for pain relief.  She can follow-up approximately 6 to 8 weeks after the injection with Dr. Hawthorne, which at that time if she has no significant improvement of her symptoms we can consider further surgical intervention.    Conservative treatment:    Ice to shoulder 1-2 times daily, for 20 minutes at a time.  Home exercise program for shoulder, including ROM and strenthening.  Instructions given to patient of what exercises to perform.  Let pain guide return to activities.      Imaging:    All imaging from today was reviewed by myself and explained to the patient.       Injection:    Referral to Dr. Hawthorne for ultrasound-guided biceps tendon sheath and/or  subacromial space injection provided at today's visit.      Surgery:     No surgery is recommended at this point, continue with conservative treatment plan as noted.      Follow up:    Return for recheck 6-8 weeks after USGI.      Chief Complaint   Patient presents with    Right Shoulder - Follow-up         History of Present Illness:    The patient is returns for follow up of right shoulder impingement and biceps tendinitis.  She states that she continues to exhibit anterior and lateral shoulder pain that is worse with reaching across her body as well as repetitive overhead lifting.  She is status post right open subacromial decompression several years ago.  She has been compliant with outpatient physical therapy which does provide her some relief of her symptoms and has been compliant with a home exercise program.  She does occasionally get a clicking and catching sensation with certain movements.  She denies any new injury or trauma to her shoulder.  She denies any numbness or tingling.     Shoulder Surgical History:  Prior open subacromial decompression on the right side on 12/29/2015    Past Medical, Social and Family History:  Past Medical History:   Diagnosis Date    Adhesive capsulitis of right shoulder     Resolved 11/15/2016     Asthma     Benign paroxysmal vertigo, unspecified ear     Resolved 11/15/2016     BRCA gene mutation negative 06/2019    [facility]    Breast cancer (HCC) 07/30/2019    left    Cellulitis of leg, except foot     Resolved 12/10/2014     History of breast surgery 2023    Fat grafting bilateral breast s/p lumpectomy    Hx of radiation therapy 08/01/2019    Lymphadenitis     Resolved 12/10/2014     Migraine     Snoring     Wears contact lenses     Wears glasses      Past Surgical History:   Procedure Laterality Date    BREAST BIOPSY Left 2019    BREAST LUMPECTOMY Left 07/30/2019    Procedure: BREAST LUMPECTOMY; BREAST NEEDLE LOCALIZATION (NEEDLE LOC AT 0800);  Surgeon: Miguel Angel Steinberg MD;   [facility];  Service: Surgical Oncology    CHOLECYSTECTOMY LAPAROSCOPIC N/A 12/10/2023    Procedure: CHOLECYSTECTOMY LAPAROSCOPIC;  Surgeon: Yuan Humphrey MD;  [facility];  Service: General    COLONOSCOPY      INTRAUTERINE DEVICE INSERTION  2019    LYMPH NODE BIOPSY Left 07/30/2019    Procedure: SENTINEL LYMPH NODE BIOPSY; LYMPHATIC MAPPING WITH BLUE DYE AND RADIOACTIVE DYE (INJECT AT 0930 BY DR STEINBERG IN THE OR);  Surgeon: Miguel Angel Steinberg MD;  Location: [facility];  Service: Surgical Oncology    MAMMO NEEDLE LOCALIZATION LEFT (ALL INC) Left 07/30/2019    MI GRAFTING OF AUTOLOGOUS FAT BY LIPO 50 CC OR LESS Bilateral 5/11/2023    Procedure: FAT GRAFTING TO BILATERAL BREASTS;  Surgeon: Venita Lloyd DO;  Location:  MAIN OR;  Service: Plastics    MI MASTOPEXY Right 5/11/2023    Procedure: RIGHT CRESCENTRIC MASTOPEXY;  Surgeon: Venita Lloyd DO;  Location:  MAIN OR;  Service: Plastics    SALPINGECTOMY Bilateral 03/2021    SHOULDER SURGERY Right     Last assessed 12/29/2015     US GUIDED BREAST BIOPSY LEFT COMPLETE Left 06/07/2019    WISDOM TOOTH EXTRACTION       No Known Allergies  Current Outpatient Medications on File Prior to Visit   Medication Sig Dispense Refill    albuterol (ProAir HFA) 90 mcg/act inhaler Inhale 2 puffs every 4 (four) hours as needed for wheezing 18 g 0    Biotin w/ Vitamins C & E 1250-7.5-7.5 MCG-MG-UNT CHEW in the morning      BIOTIN W/ VITAMINS C & E PO Take 1 capsule by mouth in the morning      cetirizine (ZyrTEC) 10 MG chewable tablet Chew 10 mg daily as needed       Cholecalciferol (VITAMIN D3) 1000 units CHEW Chew 1 tablet daily      fluticasone (FLONASE) 50 mcg/act nasal spray 1 spray into each nostril daily as needed       Fluticasone-Salmeterol (Advair) 100-50 mcg/dose inhaler Inhale 1 puff 2 (two) times a day Rinse mouth after use. 60 blister 2    Multiple Vitamins-Minerals (AIRBORNE PO) Take 1 tablet by mouth in the morning      multivitamin  "(THERAGRAN) TABS Take 1 tablet by mouth daily      rizatriptan (MAXALT-MLT) 10 mg disintegrating tablet Take 1 tablet (10 mg total) by mouth daily as needed for migraine Take at the onset of migraine; if symptoms continue or return, may take another dose at least 2 hours after first dose. Take no more than 2 doses in a day. 12 tablet 1    tamoxifen (NOLVADEX) 20 mg tablet TAKE 1 TABLET BY MOUTH DAILY 90 tablet 3     No current facility-administered medications on file prior to visit.     Social History     Socioeconomic History    Marital status: /Civil Union     Spouse name: Not on file    Number of children: 3    Years of education: Not on file    Highest education level: Not on file   Occupational History    Not on file   Tobacco Use    Smoking status: Never     Passive exposure: Never    Smokeless tobacco: Never   Vaping Use    Vaping status: Never Used   Substance and Sexual Activity    Alcohol use: Yes     Comment: rare    Drug use: No    Sexual activity: Yes     Partners: Male   Other Topics Concern    Not on file   Social History Narrative    Not on file     Social Determinants of Health     Financial Resource Strain: Not on file   Food Insecurity: Not on file   Transportation Needs: Not on file   Physical Activity: Not on file   Stress: Not on file   Social Connections: Not on file   Intimate Partner Violence: Not on file   Housing Stability: Not on file       I have reviewed the past medical, surgical, social and family history, medications and allergies as documented in the EMR.    Review of systems: ROS is negative other than that noted in the HPI.  Constitutional: Negative for fatigue and fever.      Physical Exam:    Blood pressure 138/87, pulse 86, height 5' 2\" (1.575 m), weight 81.9 kg (180 lb 9.6 oz), not currently breastfeeding.    General/Constitutional: NAD, well developed, well nourished  HENT: Normocephalic, atraumatic  CV: Intact distal pulses, regular rate  Resp: No respiratory " distress or labored breathing  Lymphatic: No lymphadenopathy palpated  Neuro: Alert and Oriented x 3, no focal deficits  Psych: Normal mood, normal affect, normal judgement, normal behavior  Skin: Warm, dry, no rashes, no erythema      Shoulder focused exam:        Visual inspection of the shoulder demonstrates normal contour without atrophy  No evidence of scapular dyskinesia or atrophy.  No scapular winging  Active and passive range of motion demonstrates forward flexion to 160, abduction to 100, external rotation is 60 with the arm the side, internal rotation to L1   Rotator cuff strength is 4+/5 to resisted forward flexion, 4+/5 resisted abduction, 4+/5 resisted external rotation, 5/5 resisted internal rotation  No tenderness to palpation at the distal clavicle, AC joint, acromion, or scapular spine  No pain with cross-body adduction  + Neer's test, + modified Thomas impingement test  Negative external rotation lag sign  Negative belly press, negative lift-off  + speed's and Yergason's test  + tenderness to palpation at the bicipital groove  - [place]'s test    UE NV Exam: +2 Radial pulses bilaterally  Sensation intact to light touch C5-T1 bilaterally, Radial/median/ulnar nerve motor intact    Cervical ROM is full without pain, numbness or tingling      Shoulder Imaging    MRI of right shoulder was reviewed at today's visit, which demonstrates moderate supraspinatus and infraspinatus tendinosis without evidence of rotator cuff tear.  There is also evidence of mild to moderate biceps tenosynovitis without evidence of tear.  I reviewed and agree with radiologist interpretation.      Scribe Attestation      I,:  Elvis Kline am acting as a scribe while in the presence of the attending physician.:       I,:  Adis Voss DO personally performed the services described in this documentation    as scribed in my presence.:

## 2024-06-05 NOTE — TELEPHONE ENCOUNTER
Please schedule with Marine, ref by Bipin.    Phone - [phone]      Diagnosis:  Impingement syndrome of right shoulder (M75.41 [ICD-10-CM])  Biceps tendonitis on right (M75.21 [ICD-10-CM])   USGI Right shoulder biceps and/or subacromial

## 2024-06-24 ENCOUNTER — PATIENT MESSAGE (OUTPATIENT)
Dept: FAMILY MEDICINE CLINIC | Facility: CLINIC | Age: 49
End: 2024-06-24

## 2024-06-24 ENCOUNTER — NURSE TRIAGE (OUTPATIENT)
Age: 49
End: 2024-06-24

## 2024-06-24 NOTE — TELEPHONE ENCOUNTER
Regarding: Covid  ----- Message from Noy JOHANSEN sent at 6/24/2024 11:50 AM EDT -----  Jocelyn Smith   to  Primary [facility] (supporting Bea Almodovar DO)         6/24/24  7:51 AM  Hi Dr. Almodovar.  As you know, Herb tested positive for Covid this past Saturday, upon our return from vacation. On that day, I tested negative, but started not feeling well.  I took a test this morning, and I am now positive as well for Covid. Please let me know if I should take Paxlovid,  similar to the last time I tested positive.     Thanks,  Jocelyn Smith   Attachments   [id number]

## 2024-06-24 NOTE — TELEPHONE ENCOUNTER
Unless pt is doing poorly I usually suggest dayquil and nyquil as well as vit c and other OTC remedies.  I generally do not suggets Paxlovid in her age group

## 2024-06-24 NOTE — TELEPHONE ENCOUNTER
"Patient states she tested positive for covid today with at home test. She c/o sore throat, dry cough and body aches. She denies SOB/ trouble breathing. She is taking OTC cold/ flu medication. Patient recently traveled to [place], her  tested positive first and then she tested positive today.  She wants to know if PCP recommends Paxlovid or if she should continue with the OTC medications. Please follow up.       Reason for Disposition   HIGH RISK for severe COVID complications (e.g., weak immune system, age > 64 years, obesity with BMI > 25, pregnant, chronic lung disease or other chronic medical condition)  (Exception: Already seen by PCP and no new or worsening symptoms.)    Answer Assessment - Initial Assessment Questions  1. COVID-19 DIAGNOSIS: \"Who made your COVID-19 diagnosis?\" \"Was it confirmed by a positive lab test or self-test?\" If not diagnosed by a doctor (or NP/PA), ask \"Are there lots of cases (community spread) where you live?\" Note: See [facility] health department website, if unsure.      Positive home test    2. COVID-19 EXPOSURE: \"Was there any known exposure to COVID before the symptoms began?\" CDC Definition of close contact: within 6 feet (2 meters) for a total of 15 minutes or more over a 24-hour period.        tested positive    3. ONSET: \"When did the COVID-19 symptoms start?\"       Friday 6/21    4. WORST SYMPTOM: \"What is your worst symptom?\" (e.g., cough, fever, shortness of breath, muscle aches)      Sore throat    5. COUGH: \"Do you have a cough?\" If Yes, ask: \"How bad is the cough?\"        Dry cough    6. FEVER: \"Do you have a fever?\" If Yes, ask: \"What is your temperature, how was it measured, and when did it start?\"      Denies    7. RESPIRATORY STATUS: \"Describe your breathing?\" (e.g., shortness of breath, wheezing, unable to speak)       Denies        9. HIGH RISK DISEASE: \"Do you have any chronic medical problems?\" (e.g., asthma, heart or lung disease, weak immune system, " "obesity, etc.)      Asthma    10. VACCINE: \"Have you had the COVID-19 vaccine?\" If Yes, ask: \"Which one, how many shots, when did you get it?\"        2 vaccines    11. BOOSTER: \"Have you received your COVID-19 booster?\" If Yes, ask: \"Which one and when did you get it?\"        3 booster    12. PREGNANCY: \"Is there any chance you are pregnant?\" \"When was your last menstrual period?\"        Denies    13. OTHER SYMPTOMS: \"Do you have any other symptoms?\"  (e.g., chills, fatigue, headache, loss of smell or taste, muscle pain, sore throat)        Body aches    14. O2 SATURATION MONITOR:  \"Do you use an oxygen saturation monitor (pulse oximeter) at home?\" If Yes, ask \"What is your reading (oxygen level) today?\" \"What is your usual oxygen saturation reading?\" (e.g., 95%)    Took OTC cold/ flu medicine    Protocols used: Coronavirus (COVID-19) Diagnosed or Suspected-ADULT-AH    "

## 2024-07-05 ENCOUNTER — TELEPHONE (OUTPATIENT)
Age: 49
End: 2024-07-05

## 2024-07-12 ENCOUNTER — OFFICE VISIT (OUTPATIENT)
Dept: FAMILY MEDICINE CLINIC | Facility: CLINIC | Age: 49
End: 2024-07-12
Payer: COMMERCIAL

## 2024-07-12 VITALS
SYSTOLIC BLOOD PRESSURE: 126 MMHG | DIASTOLIC BLOOD PRESSURE: 84 MMHG | BODY MASS INDEX: 33.45 KG/M2 | RESPIRATION RATE: 18 BRPM | HEART RATE: 81 BPM | WEIGHT: 181.8 LBS | HEIGHT: 62 IN | TEMPERATURE: 97.3 F

## 2024-07-12 DIAGNOSIS — U07.1 COVID-19: Primary | ICD-10-CM

## 2024-07-12 PROCEDURE — 99213 OFFICE O/P EST LOW 20 MIN: CPT | Performed by: FAMILY MEDICINE

## 2024-07-12 NOTE — PROGRESS NOTES
"Ambulatory Visit  Name: Jocelyn Smith      : 1975      MRN: [id number]  Encounter Provider: Bea Almodovar DO  Encounter Date: 2024   Encounter department: [facility]    Assessment & Plan   1. COVID-19  Comments:  residual symptoms  flonase recommended   Return if symptoms worsen or fail to improve.  History of Present Illness     She tested positive for COVID 19 on 24  She had typical symptoms.  Her cough has lingered. She can feel the congestion coming from her head.  It is not coming from her chest.     Cough  This is a new problem. The current episode started 1 to 4 weeks ago. The problem has been waxing and waning. The problem occurs hourly. The cough is Non-productive and productive of sputum. Associated symptoms include myalgias. Pertinent negatives include no chest pain, chills, ear congestion, ear pain, fever, headaches, heartburn, hemoptysis, nasal congestion, postnasal drip, rash, rhinorrhea, sore throat, shortness of breath, sweats, weight loss or wheezing. The symptoms are aggravated by lying down.       Review of Systems   Constitutional:  Negative for chills, fever and weight loss.   HENT:  Negative for ear pain, postnasal drip, rhinorrhea and sore throat.    Respiratory:  Positive for cough. Negative for hemoptysis, shortness of breath and wheezing.    Cardiovascular:  Negative for chest pain.   Gastrointestinal:  Negative for heartburn.   Musculoskeletal:  Positive for myalgias.   Skin:  Negative for rash.   Neurological:  Negative for headaches.       Objective     /84   Pulse 81   Temp (!) 97.3 °F (36.3 °C)   Resp 18   Ht 5' 2\" (1.575 m)   Wt 82.5 kg (181 lb 12.8 oz)   BMI 33.25 kg/m²     Physical Exam  Vitals and nursing note reviewed.   Constitutional:       General: She is not in acute distress.     Appearance: She is well-developed.   HENT:      Head: Normocephalic and atraumatic.      Right Ear: Tympanic membrane normal.      Left Ear: Tympanic " membrane normal.   Cardiovascular:      Rate and Rhythm: Normal rate and regular rhythm.      Heart sounds: No murmur heard.  Pulmonary:      Effort: Pulmonary effort is normal. No respiratory distress.      Breath sounds: Normal breath sounds.   Musculoskeletal:         General: No swelling.      Cervical back: Neck supple.   Neurological:      Mental Status: She is alert.       Administrative Statements

## 2024-07-19 ENCOUNTER — OFFICE VISIT (OUTPATIENT)
Dept: OBGYN CLINIC | Facility: CLINIC | Age: 49
End: 2024-07-19
Payer: COMMERCIAL

## 2024-07-19 VITALS
WEIGHT: 181 LBS | HEART RATE: 84 BPM | DIASTOLIC BLOOD PRESSURE: 85 MMHG | SYSTOLIC BLOOD PRESSURE: 129 MMHG | BODY MASS INDEX: 33.31 KG/M2 | HEIGHT: 62 IN

## 2024-07-19 DIAGNOSIS — M75.41 IMPINGEMENT SYNDROME OF RIGHT SHOULDER: Primary | ICD-10-CM

## 2024-07-19 DIAGNOSIS — M75.21 BICEPS TENDONITIS ON RIGHT: ICD-10-CM

## 2024-07-19 PROCEDURE — 99214 OFFICE O/P EST MOD 30 MIN: CPT | Performed by: PHYSICAL MEDICINE & REHABILITATION

## 2024-07-19 PROCEDURE — 20611 DRAIN/INJ JOINT/BURSA W/US: CPT | Performed by: PHYSICAL MEDICINE & REHABILITATION

## 2024-07-19 RX ORDER — TRIAMCINOLONE ACETONIDE 40 MG/ML
80 INJECTION, SUSPENSION INTRA-ARTICULAR; INTRAMUSCULAR
Status: COMPLETED | OUTPATIENT
Start: 2024-07-19 | End: 2024-07-19

## 2024-07-19 RX ORDER — ROPIVACAINE HYDROCHLORIDE 5 MG/ML
10 INJECTION, SOLUTION EPIDURAL; INFILTRATION; PERINEURAL
Status: COMPLETED | OUTPATIENT
Start: 2024-07-19 | End: 2024-07-19

## 2024-07-19 RX ADMIN — ROPIVACAINE HYDROCHLORIDE 10 ML: 5 INJECTION, SOLUTION EPIDURAL; INFILTRATION; PERINEURAL at 08:15

## 2024-07-19 RX ADMIN — TRIAMCINOLONE ACETONIDE 80 MG: 40 INJECTION, SUSPENSION INTRA-ARTICULAR; INTRAMUSCULAR at 08:15

## 2024-07-19 NOTE — PROGRESS NOTES
"1. Impingement syndrome of right shoulder  Ambulatory referral to Orthopedic Surgery      2. Biceps tendonitis on right  Ambulatory referral to Orthopedic Surgery        Orders Placed This Encounter   Procedures    Large joint arthrocentesis      Impression:  This is a patient referred by Dr. Voss's team with right shoulder pain secondary to rotator cuff and bicep tendinosis. She is most symptomatic with impingement testing today.  The patient is a good candidate for USG SA-SD steroid injection.  Please see procedure note below.  Patient tolerated the procedure and had immediate relief of her typical pain symptoms.  We will see her back in 4 weeks for possible USG bicep tendon sheath injection.    Imaging Studies (I personally reviewed images in PACS and report):  MRI right shoulder:  \"SUBCUTANEOUS TISSUES: Susceptibility artifact in the subcutaneous tissues around the right shoulder consistent with prior surgery.     JOINT EFFUSION: None.     ACROMION PROCESS: Normal.     ROTATOR CUFF:  -Supraspinatus: Moderate tendinosis without tear.  -infraspinatus: Moderate tendinosis without tear.  -Subscapularis: Intact.  -Teres minor: Intact.     SUBACROMIAL/SUBDELTOID BURSA: There is a moderate subacromial/subdeltoid bursitis.     LONG HEAD OF BICEPS TENDON: Normal.     GLENOID LABRUM: Intact.     GLENOHUMERAL JOINT: Intact.     ACROMIOCLAVICULAR JOINT:  Normal.     BONES: Normal.     IMPRESSION:     Moderate supraspinatus and infraspinatus tendinosis without rotator cuff tear.     Moderate subacromial/subdeltoid bursitis.\"    No follow-ups on file.    Patient is in agreement with the above plan.    HPI:  Jocelyn Smith is a 49 y.o. female  who presents for evaluation of   Chief Complaint   Patient presents with    Right Shoulder - Pain       History of present illness from referring clinician's notes reviewed.      Following history reviewed and updated:  Past Medical History:   Diagnosis Date    Adhesive capsulitis of " right shoulder     Resolved 11/15/2016     Asthma     Benign paroxysmal vertigo, unspecified ear     Resolved 11/15/2016     BRCA gene mutation negative 06/2019    [facility]    Breast cancer (HCC) 07/30/2019    left    Cellulitis of leg, except foot     Resolved 12/10/2014     History of breast surgery 2023    Fat grafting bilateral breast s/p lumpectomy    Hx of radiation therapy 08/01/2019    Lymphadenitis     Resolved 12/10/2014     Migraine     Snoring     Wears contact lenses     Wears glasses      Past Surgical History:   Procedure Laterality Date    BREAST BIOPSY Left 2019    BREAST LUMPECTOMY Left 07/30/2019    Procedure: BREAST LUMPECTOMY; BREAST NEEDLE LOCALIZATION (NEEDLE LOC AT 0800);  Surgeon: Miguel Angel Steinberg MD;  [facility];  Service: Surgical Oncology    CHOLECYSTECTOMY LAPAROSCOPIC N/A 12/10/2023    Procedure: CHOLECYSTECTOMY LAPAROSCOPIC;  Surgeon: Yuan Humphrey MD;  [facility];  Service: General    COLONOSCOPY      INTRAUTERINE DEVICE INSERTION  2019    LYMPH NODE BIOPSY Left 07/30/2019    Procedure: SENTINEL LYMPH NODE BIOPSY; LYMPHATIC MAPPING WITH BLUE DYE AND RADIOACTIVE DYE (INJECT AT 0930 BY DR STEINBERG IN THE OR);  Surgeon: Miguel Angel Steinberg MD;  [facility];  Service: Surgical Oncology    MAMMO NEEDLE LOCALIZATION LEFT (ALL INC) Left 07/30/2019    IN GRAFTING OF AUTOLOGOUS FAT BY LIPO 50 CC OR LESS Bilateral 5/11/2023    Procedure: FAT GRAFTING TO BILATERAL BREASTS;  Surgeon: Venita Lloyd DO;  Location:  MAIN OR;  Service: Plastics    IN MASTOPEXY Right 5/11/2023    Procedure: RIGHT CRESCENTRIC MASTOPEXY;  Surgeon: Venita Lloyd DO;  Location:  MAIN OR;  Service: Plastics    SALPINGECTOMY Bilateral 03/2021    SHOULDER SURGERY Right     Last assessed 12/29/2015     US GUIDED BREAST BIOPSY LEFT COMPLETE Left 06/07/2019    WISDOM TOOTH EXTRACTION       Social History   Social History     Substance and Sexual Activity   Alcohol Use Yes    Comment: rare  "    Social History     Substance and Sexual Activity   Drug Use No     Social History     Tobacco Use   Smoking Status Never    Passive exposure: Never   Smokeless Tobacco Never     Family History   Problem Relation Age of Onset    Alzheimer's disease Mother     COPD Father     No Known Problems Sister     No Known Problems Daughter     Alzheimer's disease Maternal Grandmother     No Known Problems Maternal Grandfather     No Known Problems Paternal Grandmother     No Known Problems Paternal Grandfather     No Known Problems Brother     No Known Problems Brother     No Known Problems Brother     No Known Problems Daughter      No Known Allergies     Constitutional:  /85   Pulse 84   Ht 5' 2\" (1.575 m)   Wt 82.1 kg (181 lb)   BMI 33.11 kg/m²    General: NAD.  Eyes: Anicteric sclerae.  Neck: Supple.  Lungs: Unlabored breathing.  Cardiovascular: No lower extremity edema.  Skin: Intact without erythema.  Neurologic: Sensation intact to light touch.  Psychiatric: Mood and affect are appropriate.    Right Shoulder Exam     Tenderness   The patient is experiencing tenderness in the biceps tendon and acromion.    Tests   Thomas test: positive  Impingement: positive    Other   Erythema: absent  Scars: absent  Sensation: normal  Pulse: present    Comments:  Positive speed's test.             Large joint arthrocentesis: R subacromial bursa  [place] Protocol:  Procedure performed by:  Consent: Verbal consent obtained. Written consent not obtained.  Consent given by: patient  Timeout called at: 7/19/2024 8:42 AM.  Patient understanding: patient states understanding of the procedure being performed  Site marked: the operative site was marked  Radiology Images displayed and confirmed. If images not available, report reviewed: imaging studies available  Patient identity confirmed: verbally with patient  Supporting Documentation  Indications: pain and diagnostic evaluation   Procedure Details  Location: shoulder - R " subacromial bursa  Ultrasound guidance: yes (US guidance was used to find the area of interest.)  Medications administered: 80 mg triamcinolone acetonide 40 mg/mL; 10 mL ropivacaine 0.5 %    Patient tolerance: patient tolerated the procedure well with no immediate complications  Dressing:  Sterile dressing applied    The right subacromial-subdeltoid bursa was visualized with ultrasound and injected with steroid/anesthetic solution as indicated.    Prior to the injection, the ultrasound was used to evaluate for any neural or vascular structures.  Care was taken to avoid these structures.    The images (and video if taken) were saved to the [email or web address] ultrasound system.    Risks of this procedure include:    - Risk of bleeding since a needle is involved.  - Risk of infection (1/10,000 chance as per recent studies).  Signs/symptoms were discussed and they would prompt an urgent evaluation at an emergency department.  - Risk of pigmentation or skin dimpling in the skin (2-3% chance as per recent studies) from the steroid.  - Risk of increased pain from steroid flare (1% chance as per recent studies) that typically lasts 24-48 hours.  - Risk of increased blood sugars from the steroid medication that can last for a few weeks.  If the patient is a diabetic or pre-diabetic, they were encouraged to closely monitor their blood sugars and discuss with PCP if elevated more than usual or if having symptoms.    We decided that the benefits outweigh the risks and so we proceeded with the procedure.

## 2024-07-29 ENCOUNTER — OFFICE VISIT (OUTPATIENT)
Dept: FAMILY MEDICINE CLINIC | Facility: CLINIC | Age: 49
End: 2024-07-29
Payer: COMMERCIAL

## 2024-07-29 VITALS
DIASTOLIC BLOOD PRESSURE: 78 MMHG | HEART RATE: 95 BPM | TEMPERATURE: 97.2 F | RESPIRATION RATE: 16 BRPM | HEIGHT: 62 IN | OXYGEN SATURATION: 99 % | SYSTOLIC BLOOD PRESSURE: 112 MMHG | BODY MASS INDEX: 32.76 KG/M2 | WEIGHT: 178 LBS

## 2024-07-29 DIAGNOSIS — R42 VERTIGO: Primary | ICD-10-CM

## 2024-07-29 DIAGNOSIS — H66.90 ACUTE OTITIS MEDIA, UNSPECIFIED OTITIS MEDIA TYPE: ICD-10-CM

## 2024-07-29 PROCEDURE — 99213 OFFICE O/P EST LOW 20 MIN: CPT | Performed by: FAMILY MEDICINE

## 2024-07-29 RX ORDER — AMOXICILLIN 875 MG/1
875 TABLET, COATED ORAL 2 TIMES DAILY
Qty: 14 TABLET | Refills: 0 | Status: SHIPPED | OUTPATIENT
Start: 2024-07-29 | End: 2024-08-05

## 2024-07-29 RX ORDER — MECLIZINE HYDROCHLORIDE 25 MG/1
25 TABLET ORAL 3 TIMES DAILY PRN
Qty: 20 TABLET | Refills: 0 | Status: SHIPPED | OUTPATIENT
Start: 2024-07-29

## 2024-07-29 RX ORDER — PREDNISONE 20 MG/1
40 TABLET ORAL DAILY
Qty: 10 TABLET | Refills: 0 | Status: SHIPPED | OUTPATIENT
Start: 2024-07-29 | End: 2024-08-03

## 2024-07-29 NOTE — PROGRESS NOTES
"Ambulatory Visit  Name: Jocelyn Smith      : 1975      MRN: [id number]  Encounter Provider: Bea Almodovar DO  Encounter Date: 2024   Encounter department: [facility]    Assessment & Plan   1. Vertigo  -     meclizine (ANTIVERT) 25 mg tablet; Take 1 tablet (25 mg total) by mouth 3 (three) times a day as needed for dizziness  2. Acute otitis media, unspecified otitis media type  -     amoxicillin (AMOXIL) 875 mg tablet; Take 1 tablet (875 mg total) by mouth 2 (two) times a day for 7 days  -     predniSONE 20 mg tablet; Take 2 tablets (40 mg total) by mouth daily for 5 days Take with food   Return if symptoms worsen or fail to improve.  History of Present Illness     She is still coughing.  She started with dizziness 2 days ago when she moves her head.         Review of Systems   Constitutional:  Negative for fever.   Respiratory:  Positive for cough.    Neurological:  Positive for dizziness.       Objective     /78   Pulse 95   Temp (!) 97.2 °F (36.2 °C)   Resp 16   Ht 5' 2\" (1.575 m)   Wt 80.7 kg (178 lb)   SpO2 99%   BMI 32.56 kg/m²     Physical Exam  Vitals and nursing note reviewed.   Constitutional:       General: She is not in acute distress.     Appearance: She is well-developed.   HENT:      Head: Normocephalic and atraumatic.      Right Ear: A middle ear effusion is present. Tympanic membrane is erythematous.      Left Ear: A middle ear effusion is present. Tympanic membrane is erythematous.   Cardiovascular:      Rate and Rhythm: Normal rate and regular rhythm.      Heart sounds: No murmur heard.  Pulmonary:      Effort: Pulmonary effort is normal. No respiratory distress.      Breath sounds: Normal breath sounds.   Musculoskeletal:      Cervical back: Neck supple.   Neurological:      Mental Status: She is alert.      Comments: Dizzy with laying flat and turning her head to either side, no nystagmus       Administrative Statements           "

## 2024-08-09 NOTE — TELEPHONE ENCOUNTER
----- Message from Jonah Rodarte DO sent at 9/7/2023  8:36 AM EDT -----  She had her Shingrix vaccines at [facility] in [facility]  Please call for dates  Thank you,  Jonah Rodarte DO Symptom Management:  Please take medications as prescribed. NSAIDs such as Aleve (naproxen, Motrin (ibuprofen), aspirin, and Mobic (meloxicam) can cause GI upset or stomach ulcers and should be taken with food.     Apply ice over your splint / ace wrap 3-4 times daily for 15 minutes after you start to feel coolness coming through your splint/ace wrap. Do NOT apply ice directly to the skin as this may cause injury to the area.    Use crutches as needed for comfort.  You have no fractures and may bear weight as tolerated.    Follow Up:  You have been referred to an orthopedic surgeon for follow up as needed in 7-10 days if symptoms not improving. Please follow up as instructed or if symptoms are not improving in the discussed time frame.    Return to the ED for:   New or worsening pain, numbness, tingling, weakness in affected extremity, increased swelling, change in color of skin (pale/purple/blue/dark red) of affected extremity, worsening mobility/inability to use extremity or wiggle toes.

## 2024-08-12 DIAGNOSIS — R42 VERTIGO: ICD-10-CM

## 2024-08-12 RX ORDER — MECLIZINE HYDROCHLORIDE 25 MG/1
25 TABLET ORAL 3 TIMES DAILY PRN
Qty: 20 TABLET | Refills: 0 | Status: SHIPPED | OUTPATIENT
Start: 2024-08-12

## 2024-08-12 RX ORDER — PREDNISONE 20 MG/1
40 TABLET ORAL DAILY
Qty: 10 TABLET | Refills: 0 | Status: SHIPPED | OUTPATIENT
Start: 2024-08-12 | End: 2024-08-17

## 2024-08-22 ENCOUNTER — RA CDI HCC (OUTPATIENT)
Dept: OTHER | Facility: HOSPITAL | Age: 49
End: 2024-08-22

## 2024-08-22 NOTE — PROGRESS NOTES
HCC coding opportunities       Chart reviewed, no opportunity found: CHART REVIEWED, NO OPPORTUNITY FOUND        Patients Insurance        Commercial Insurance: [employer]

## 2024-08-23 ENCOUNTER — OFFICE VISIT (OUTPATIENT)
Dept: OBGYN CLINIC | Facility: CLINIC | Age: 49
End: 2024-08-23
Payer: COMMERCIAL

## 2024-08-23 VITALS
HEART RATE: 85 BPM | DIASTOLIC BLOOD PRESSURE: 90 MMHG | HEIGHT: 62 IN | WEIGHT: 178 LBS | BODY MASS INDEX: 32.76 KG/M2 | SYSTOLIC BLOOD PRESSURE: 135 MMHG

## 2024-08-23 DIAGNOSIS — M25.511 CHRONIC RIGHT SHOULDER PAIN: Primary | ICD-10-CM

## 2024-08-23 DIAGNOSIS — G89.29 CHRONIC RIGHT SHOULDER PAIN: Primary | ICD-10-CM

## 2024-08-23 PROCEDURE — 99213 OFFICE O/P EST LOW 20 MIN: CPT | Performed by: PHYSICAL MEDICINE & REHABILITATION

## 2024-08-23 PROCEDURE — 20611 DRAIN/INJ JOINT/BURSA W/US: CPT | Performed by: PHYSICAL MEDICINE & REHABILITATION

## 2024-08-23 RX ORDER — ROPIVACAINE HYDROCHLORIDE 5 MG/ML
10 INJECTION, SOLUTION EPIDURAL; INFILTRATION; PERINEURAL
Status: COMPLETED | OUTPATIENT
Start: 2024-08-23 | End: 2024-08-23

## 2024-08-23 RX ORDER — TRIAMCINOLONE ACETONIDE 40 MG/ML
80 INJECTION, SUSPENSION INTRA-ARTICULAR; INTRAMUSCULAR
Status: COMPLETED | OUTPATIENT
Start: 2024-08-23 | End: 2024-08-23

## 2024-08-23 RX ADMIN — ROPIVACAINE HYDROCHLORIDE 10 ML: 5 INJECTION, SOLUTION EPIDURAL; INFILTRATION; PERINEURAL at 08:15

## 2024-08-23 RX ADMIN — TRIAMCINOLONE ACETONIDE 80 MG: 40 INJECTION, SUSPENSION INTRA-ARTICULAR; INTRAMUSCULAR at 08:15

## 2024-08-23 NOTE — PROGRESS NOTES
"1. Chronic right shoulder pain          No orders of the defined types were placed in this encounter.     Impression:  This is a patient referred by Dr. Voss's team with right shoulder pain secondary to rotator cuff and bicep tendinosis.  She had an open subacromial decompression years ago.  Recent treatment has included USG SA-SD steroid injection.  She had good improvement but the relief only lasted for a day.  Today we proceeded with an USG bicep tendon sheath injection.  I will see her back if needed.  If continued symptoms, would consider having Jocelyn follow-up with Dr. Voss.     Imaging Studies (I personally reviewed images in PACS and report):  MRI right shoulder:  \"SUBCUTANEOUS TISSUES: Susceptibility artifact in the subcutaneous tissues around the right shoulder consistent with prior surgery.     JOINT EFFUSION: None.     ACROMION PROCESS: Normal.     ROTATOR CUFF:  -Supraspinatus: Moderate tendinosis without tear.  -infraspinatus: Moderate tendinosis without tear.  -Subscapularis: Intact.  -Teres minor: Intact.     SUBACROMIAL/SUBDELTOID BURSA: There is a moderate subacromial/subdeltoid bursitis.     LONG HEAD OF BICEPS TENDON: Normal.     GLENOID LABRUM: Intact.     GLENOHUMERAL JOINT: Intact.     ACROMIOCLAVICULAR JOINT:  Normal.     BONES: Normal.     IMPRESSION:     Moderate supraspinatus and infraspinatus tendinosis without rotator cuff tear.     Moderate subacromial/subdeltoid bursitis.\"    No follow-ups on file.    Patient is in agreement with the above plan.    HPI:  Jocelyn Smith is a 49 y.o. female  who presents in follow up.  Here for   Chief Complaint   Patient presents with    Right Shoulder - Pain, Follow-up       Since last visit: See above.    Following history reviewed and updated:  Past Medical History:   Diagnosis Date    Adhesive capsulitis of right shoulder     Resolved 11/15/2016     Asthma     Benign paroxysmal vertigo, unspecified ear     Resolved 11/15/2016     BRCA gene " mutation negative 06/2019    [facility]    Breast cancer (HCC) 07/30/2019    left    Cellulitis of leg, except foot     Resolved 12/10/2014     History of breast surgery 2023    Fat grafting bilateral breast s/p lumpectomy    Hx of radiation therapy 08/01/2019    Lymphadenitis     Resolved 12/10/2014     Migraine     Snoring     Wears contact lenses     Wears glasses      Past Surgical History:   Procedure Laterality Date    BREAST BIOPSY Left 2019    BREAST LUMPECTOMY Left 07/30/2019    Procedure: BREAST LUMPECTOMY; BREAST NEEDLE LOCALIZATION (NEEDLE LOC AT 0800);  Surgeon: Miguel Angel Steinberg MD;  [facility];  Service: Surgical Oncology    CHOLECYSTECTOMY LAPAROSCOPIC N/A 12/10/2023    Procedure: CHOLECYSTECTOMY LAPAROSCOPIC;  Surgeon: Yuan Humphrey MD;  [facility];  Service: General    COLONOSCOPY      INTRAUTERINE DEVICE INSERTION  2019    LYMPH NODE BIOPSY Left 07/30/2019    Procedure: SENTINEL LYMPH NODE BIOPSY; LYMPHATIC MAPPING WITH BLUE DYE AND RADIOACTIVE DYE (INJECT AT 0930 BY DR STEINBERG IN THE OR);  Surgeon: Miguel Angel Steinberg MD;  [facility];  Service: Surgical Oncology    MAMMO NEEDLE LOCALIZATION LEFT (ALL INC) Left 07/30/2019    CO GRAFTING OF AUTOLOGOUS FAT BY LIPO 50 CC OR LESS Bilateral 5/11/2023    Procedure: FAT GRAFTING TO BILATERAL BREASTS;  Surgeon: Venita Lloyd DO;  Location:  [facility];  Service: Plastics    CO MASTOPEXY Right 5/11/2023    Procedure: RIGHT CRESCENTRIC MASTOPEXY;  Surgeon: Venita Lloyd DO;  Location:  [facility];  Service: Plastics    SALPINGECTOMY Bilateral 03/2021    SHOULDER SURGERY Right     Last assessed 12/29/2015     US GUIDED BREAST BIOPSY LEFT COMPLETE Left 06/07/2019    WISDOM TOOTH EXTRACTION       Social History   Social History     Substance and Sexual Activity   Alcohol Use Yes    Comment: rare     Social History     Substance and Sexual Activity   Drug Use No     Social History     Tobacco Use   Smoking Status Never    Passive  "exposure: Never   Smokeless Tobacco Never     Family History   Problem Relation Age of Onset    Alzheimer's disease Mother     COPD Father     No Known Problems Sister     No Known Problems Daughter     Alzheimer's disease Maternal Grandmother     No Known Problems Maternal Grandfather     No Known Problems Paternal Grandmother     No Known Problems Paternal Grandfather     No Known Problems Brother     No Known Problems Brother     No Known Problems Brother     No Known Problems Daughter      No Known Allergies     Constitutional:  /90   Pulse 85   Ht 5' 2\" (1.575 m)   Wt 80.7 kg (178 lb)   BMI 32.56 kg/m²    General: NAD.  Eyes: Clear sclerae.  ENT: No inflammation, lesion, or mass of lips.  No tracheal deviation.  Musculoskeletal: As mentioned below.  Integumentary: No visible rashes or skin lesions.  Pulmonary/Chest: Effort normal. No respiratory distress.   Neuro: CN's grossly intact, F[place].  Psych: Normal affect and judgement.  Vascular: WWP.    Ortho Exam     Large joint arthrocentesis: R subacromial bursa  [place] Protocol:  Procedure performed by:  Consent: Verbal consent obtained. Written consent not obtained.  Consent given by: patient  Timeout called at: 8/23/2024 8:37 AM.  Patient understanding: patient states understanding of the procedure being performed  Site marked: the operative site was marked  Radiology Images displayed and confirmed. If images not available, report reviewed: imaging studies available  Patient identity confirmed: verbally with patient  Supporting Documentation  Indications: pain and diagnostic evaluation   Procedure Details  Location: shoulder - R subacromial bursa  Ultrasound guidance: yes (US guidance was used to find the area of interest.)  Medications administered: 80 mg triamcinolone acetonide 40 mg/mL; 10 mL ropivacaine 0.5 %    Patient tolerance: patient tolerated the procedure well with no immediate complications  Dressing:  Sterile dressing applied    The right " bicep tendon sheath was visualized with ultrasound and injected with steroid/anesthetic solution as indicated.    Prior to the injection, the ultrasound was used to evaluate for any neural or vascular structures.  Care was taken to avoid these structures.    The images (and video if taken) were saved to the [facility] ultrasound system.    Risks of this procedure include:    - Risk of bleeding since a needle is involved.  - Risk of infection (1/10,000 chance as per recent studies).  Signs/symptoms were discussed and they would prompt an urgent evaluation at an emergency department.  - Risk of pigmentation or skin dimpling in the skin (2-3% chance as per recent studies) from the steroid.  - Risk of increased pain from steroid flare (1% chance as per recent studies) that typically lasts 24-48 hours.  - Risk of increased blood sugars from the steroid medication that can last for a few weeks.  If the patient is a diabetic or pre-diabetic, they were encouraged to closely monitor their blood sugars and discuss with PCP if elevated more than usual or if having symptoms.    We decided that the benefits outweigh the risks and so we proceeded with the procedure.

## 2024-08-29 ENCOUNTER — TELEPHONE (OUTPATIENT)
Age: 49
End: 2024-08-29

## 2024-08-29 ENCOUNTER — OFFICE VISIT (OUTPATIENT)
Dept: FAMILY MEDICINE CLINIC | Facility: CLINIC | Age: 49
End: 2024-08-29
Payer: COMMERCIAL

## 2024-08-29 VITALS
SYSTOLIC BLOOD PRESSURE: 106 MMHG | RESPIRATION RATE: 18 BRPM | OXYGEN SATURATION: 96 % | HEIGHT: 62 IN | TEMPERATURE: 97 F | BODY MASS INDEX: 33.31 KG/M2 | WEIGHT: 181 LBS | DIASTOLIC BLOOD PRESSURE: 70 MMHG | HEART RATE: 77 BPM

## 2024-08-29 DIAGNOSIS — Z13.6 SCREENING FOR CARDIOVASCULAR CONDITION: ICD-10-CM

## 2024-08-29 DIAGNOSIS — Z00.00 ANNUAL PHYSICAL EXAM: Primary | ICD-10-CM

## 2024-08-29 DIAGNOSIS — N95.1 HOT FLASH, MENOPAUSAL: ICD-10-CM

## 2024-08-29 DIAGNOSIS — Z79.899 ENCOUNTER FOR LONG-TERM CURRENT USE OF MEDICATION: ICD-10-CM

## 2024-08-29 DIAGNOSIS — Z13.0 SCREENING FOR DEFICIENCY ANEMIA: ICD-10-CM

## 2024-08-29 DIAGNOSIS — R73.09 ABNORMAL GLUCOSE: ICD-10-CM

## 2024-08-29 PROCEDURE — 99396 PREV VISIT EST AGE 40-64: CPT | Performed by: FAMILY MEDICINE

## 2024-08-29 NOTE — TELEPHONE ENCOUNTER
Return to L&D for any signs or symptoms of  labor, contractions, leaking of fluid, vaginal bleeding or if you are not feeling your baby move as usual.    This is not a duplicate  Patient states she called and spoke to pharmacy staff and they insist no script was sent this morning     Reason for call:   [x] Refill   [] Prior Auth  [] Other:     Office:   [x] PCP/Provider - ollie rosen   [] Specialty/Provider -     Medication:     fezolinetant (Veozah) tablet       Take 1 tablet (45 mg total) by mouth daily     Pharmacy:   [id number] [place], [place] - [address]       Does the patient have enough for 3 days?   [] Yes   [x] No - Send as HP to POD

## 2024-08-29 NOTE — ASSESSMENT & PLAN NOTE
She is not a candidate for hormone replacement due to history of breast cancer.  Due to tamoxifen use she is not a good candidate for SSRIs due to drug interaction.   Will start veozah  Orders:    fezolinetant (Veozah) tablet; Take 1 tablet (45 mg total) by mouth daily

## 2024-08-29 NOTE — PATIENT INSTRUCTIONS
"Patient Education     Routine physical for adults   The Basics   Written by the doctors and editors at [facility]   What is a physical? -- A physical is a routine visit, or \"check-up,\" with your doctor. You might also hear it called a \"wellness visit\" or \"preventive visit.\"  During each visit, the doctor will:   Ask about your physical and mental health   Ask about your habits, behaviors, and lifestyle   Do an exam   Give you vaccines if needed   Talk to you about any medicines you take   Give advice about your health   Answer your questions  Getting regular check-ups is an important part of taking care of your health. It can help your doctor find and treat any problems you have. But it's also important for preventing health problems.  A routine physical is different from a \"sick visit.\" A sick visit is when you see a doctor because of a health concern or problem. Since physicals are scheduled ahead of time, you can think about what you want to ask the doctor.  How often should I get a physical? -- It depends on your age and health. In general, for people age 21 years and older:   If you are younger than 50 years, you might be able to get a physical every 3 years.   If you are 50 years or older, your doctor might recommend a physical every year.  If you have an ongoing health condition, like diabetes or high blood pressure, your doctor will probably want to see you more often.  What happens during a physical? -- In general, each visit will include:   Physical exam - The doctor or nurse will check your height, weight, heart rate, and blood pressure. They will also look at your eyes and ears. They will ask about how you are feeling and whether you have any symptoms that bother you.   Medicines - It's a good idea to bring a list of all the medicines you take to each doctor visit. Your doctor will talk to you about your medicines and answer any questions. Tell them if you are having any side effects that bother you. You " "should also tell them if you are having trouble paying for any of your medicines.   Habits and behaviors - This includes:   Your diet   Your exercise habits   Whether you smoke, drink alcohol, or use drugs   Whether you are sexually active   Whether you feel safe at home  Your doctor will talk to you about things you can do to improve your health and lower your risk of health problems. They will also offer help and support. For example, if you want to quit smoking, they can give you advice and might prescribe medicines. If you want to improve your diet or get more physical activity, they can help you with this, too.   Lab tests, if needed - The tests you get will depend on your age and situation. For example, your doctor might want to check your:   Cholesterol   Blood sugar   Iron level   Vaccines - The recommended vaccines will depend on your age, health, and what vaccines you already had. Vaccines are very important because they can prevent certain serious or deadly infections.   Discussion of screening - \"Screening\" means checking for diseases or other health problems before they cause symptoms. Your doctor can recommend screening based on your age, risk, and preferences. This might include tests to check for:   Cancer, such as breast, prostate, cervical, ovarian, colorectal, prostate, lung, or skin cancer   Sexually transmitted infections, such as chlamydia and gonorrhea   Mental health conditions like depression and anxiety  Your doctor will talk to you about the different types of screening tests. They can help you decide which screenings to have. They can also explain what the results might mean.   Answering questions - The physical is a good time to ask the doctor or nurse questions about your health. If needed, they can refer you to other doctors or specialists, too.  Adults older than 65 years often need other care, too. As you get older, your doctor will talk to you about:   How to prevent falling at " home   Hearing or vision tests   Memory testing   How to take your medicines safely   Making sure that you have the help and support you need at home  All topics are updated as new evidence becomes available and our peer review process is complete.  This topic retrieved from [employer] on: May 02, 2024.  Topic 467126 Version 1.0  Release: 32.4.3 - C32.122  © 2024 UpToDate, Inc. and/or its affiliates. All rights reserved.  Consumer Information Use and Disclaimer   Disclaimer: This generalized information is a limited summary of diagnosis, treatment, and/or medication information. It is not meant to be comprehensive and should be used as a tool to help the user understand and/or assess potential diagnostic and treatment options. It does NOT include all information about conditions, treatments, medications, side effects, or risks that may apply to a specific patient. It is not intended to be medical advice or a substitute for the medical advice, diagnosis, or treatment of a health care provider based on the health care provider's examination and assessment of a patient's specific and unique circumstances. Patients must speak with a health care provider for complete information about their health, medical questions, and treatment options, including any risks or benefits regarding use of medications. This information does not endorse any treatments or medications as safe, effective, or approved for treating a specific patient. UpToDate, Inc. and its affiliates disclaim any warranty or liability relating to this information or the use thereof.The use of this information is governed by the Terms of Use, available at https://www.woltersRobinuwer.com/en/know/clinical-effectiveness-terms. 2024© UpToDate, Inc. and its affiliates and/or licensors. All rights reserved.  Copyright   © 2024 UpToDate, Inc. and/or its affiliates. All rights reserved.

## 2024-08-29 NOTE — PROGRESS NOTES
Adult Annual Physical  Name: Jocelyn Smith      : 1975      MRN: [id number]  Encounter Provider: Bea Almodovar DO  Encounter Date: 2024   Encounter department: [facility]    Assessment & Plan   Assessment & Plan  Annual physical exam           Hot flash, menopausal  She is not a candidate for hormone replacement due to history of breast cancer.  Due to tamoxifen use she is not a good candidate for SSRIs due to drug interaction.   Will start veozah  Orders:  •  fezolinetant (Veozah) tablet; Take 1 tablet (45 mg total) by mouth daily    Abnormal glucose    Orders:  •  Hemoglobin A1C; Future  •  Hemoglobin A1C    Screening for cardiovascular condition    Orders:  •  Comprehensive metabolic panel; Future  •  Lipid Panel with Direct LDL reflex; Future  •  Comprehensive metabolic panel  •  Lipid Panel with Direct LDL reflex      Encounter for long-term current use of medication    Orders:  •  TSH, 3rd generation; Future  •  TSH, 3rd generation      Screening for deficiency anemia    Orders:  •  CBC; Future  •  CBC                Immunizations and preventive care screenings were discussed with patient today. Appropriate education was printed on patient's after visit summary.    Counseling:  Dental Health: discussed importance of regular tooth brushing, flossing, and dental visits.  Exercise: the importance of regular exercise/physical activity was discussed. Recommend exercise 3-5 times per week for at least 30 minutes.   Return in about 1 year (around 2025) for Annual physical.       History of Present Illness     Adult Annual Physical:  Patient presents for annual physical. Her youngest son started college.   She has been having significant problems sleeping due to her hot flashes at night.  It has waking her up at night..     Diet and Physical Activity:  - Diet/Nutrition: well balanced diet.  - Exercise: no formal exercise.    Depression Screening:  - PHQ-2 Score: 0    General  "Health:  - Sleep:. wakes up due to hot flashes  - Hearing: normal hearing bilateral ears.  - Vision: wears glasses.  - Dental: regular dental visits.    /GYN Health:  - Follows with GYN: yes.   - Menopause: perimenopausal.     Advanced Care Planning:  - Has an advanced directive?: no    - Has a durable medical POA?: no      Review of Systems   Constitutional: Negative.    Respiratory: Negative.     Cardiovascular: Negative.          Objective     /70   Pulse 77   Temp (!) 97 °F (36.1 °C)   Resp 18   Ht 5' 2\" (1.575 m)   Wt 82.1 kg (181 lb)   LMP 08/05/2024 (Approximate)   SpO2 96%   BMI 33.11 kg/m²     Physical Exam  Vitals and nursing note reviewed.   Constitutional:       Appearance: She is well-developed.   HENT:      Head: Normocephalic and atraumatic.      Right Ear: External ear normal.      Left Ear: External ear normal.      Nose: Nose normal.   Cardiovascular:      Rate and Rhythm: Normal rate and regular rhythm.      Heart sounds: Normal heart sounds. No murmur heard.     No friction rub.   Pulmonary:      Effort: No respiratory distress.      Breath sounds: Normal breath sounds. No wheezing or rales.   Abdominal:      Palpations: Abdomen is soft.      Tenderness: There is no abdominal tenderness.   Musculoskeletal:      Right lower leg: No edema.      Left lower leg: No edema.   Neurological:      Mental Status: She is oriented to person, place, and time.      Cranial Nerves: No cranial nerve deficit.        Vision Screening    Right eye Left eye Both eyes   Without correction      With correction 20/20 20/20 20/20       "

## 2024-08-30 ENCOUNTER — TELEPHONE (OUTPATIENT)
Dept: FAMILY MEDICINE CLINIC | Facility: CLINIC | Age: 49
End: 2024-08-30

## 2024-09-05 NOTE — TELEPHONE ENCOUNTER
PA for Fezolinetant (Veozah)  APPROVED     Date(s) approved 5/8/24-9/4/25    Case #[id number]     Patient advised by          []MyChart Message  [x]Phone call   []LMOM  []L/M to call office as no active Communication consent on file  []Unable to leave detailed message as VM not approved on Communication consent       Pharmacy advised by    [x]Fax  []Phone call    Approval letter scanned into Media No        
PA for Fezolinetant (Veozah) SUBMITTED     via    []CMM-KEY:   [x]Mo-Case ID # [id number]   []Faxed to plan   []Other website   []Phone call Case ID #     Office notes sent, clinical questions answered. Awaiting determination    Turnaround time for your insurance to make a decision on your Prior Authorization can take 7-21 business days.             
Reason for call:   [x] Prior Auth  [] Other:     Caller:  [x] Patient  Pharmacy    [id number] - [place] - [address]  [address] [place] [address]  Phone: [phone]  Fax: [phone]       Medication:   Medication  fezolinetant (Veozah) tablet [[id number]]  fezolinetant (Veozah) tablet [[id number]]    Order Details  Dose: 45 mg Route: Oral Frequency: Daily   Dispense Quantity: 90 tablet Refills: 1    Indications of Use: Vasomotor Symptoms of Menopause         Sig: Take 1 tablet (45 mg total) by mouth daily         Start Date: 08/29/24 End Date: --   Written Date: 08/29/24 Expiration Date: 08/29/25       Associated Diagnoses: Hot flash, menopausal [N95.1]   Original Order: fezolinetant (Veozah) tablet [[id number]]     Ordering Provider:   [x] PCP/Provider -   Bea Almodovar,   PCP - General  [] Speciality/Provider -     [x] No   [] Yes -   Bea Almodovar DO  PCP - General    Is the patient's insurance updated in EPIC?   [x] Yes   [] No     Is a copy of the patient's insurance scanned in EPIC?   [x] Yes   [] No      
POST-OP DIAGNOSIS:  Acute cholecystitis 12-May-2021 17:23:29  Kayden Silva

## 2024-09-05 NOTE — TELEPHONE ENCOUNTER
Duplicate encounter created, please see telephone encounter from 8/29  regarding Veozah PA status. Please review patient's chart to see if there is already an encounter regarding the medication in question and to document anything regarding this medication in regards to anything regarding the authorization process etc before creating another encounter Thank You.

## 2024-09-07 LAB
ALBUMIN SERPL-MCNC: 4 G/DL (ref 3.8–4.9)
ALP SERPL-CCNC: 42 IU/L (ref 44–121)
ALT SERPL-CCNC: 15 IU/L (ref 0–32)
AST SERPL-CCNC: 16 IU/L (ref 0–40)
BILIRUB SERPL-MCNC: 0.4 MG/DL (ref 0–1.2)
BUN SERPL-MCNC: 8 MG/DL (ref 6–24)
BUN/CREAT SERPL: 16 (ref 9–23)
CALCIUM SERPL-MCNC: 9.3 MG/DL (ref 8.7–10.2)
CHLORIDE SERPL-SCNC: 104 MMOL/L (ref 96–106)
CHOLEST SERPL-MCNC: 235 MG/DL (ref 100–199)
CO2 SERPL-SCNC: 21 MMOL/L (ref 20–29)
CREAT SERPL-MCNC: 0.5 MG/DL (ref 0.57–1)
EGFR: 113 ML/MIN/1.73
ERYTHROCYTE [DISTWIDTH] IN BLOOD BY AUTOMATED COUNT: 13.2 % (ref 11.7–15.4)
EST. AVERAGE GLUCOSE BLD GHB EST-MCNC: 117 MG/DL
GLOBULIN SER-MCNC: 2.6 G/DL (ref 1.5–4.5)
GLUCOSE SERPL-MCNC: 85 MG/DL (ref 70–99)
HBA1C MFR BLD: 5.7 % (ref 4.8–5.6)
HCT VFR BLD AUTO: 39.2 % (ref 34–46.6)
HDLC SERPL-MCNC: 111 MG/DL
HGB BLD-MCNC: 12.5 G/DL (ref 11.1–15.9)
LDLC SERPL CALC-MCNC: 108 MG/DL (ref 0–99)
LDLC/HDLC SERPL: 1 RATIO (ref 0–3.2)
MCH RBC QN AUTO: 30 PG (ref 26.6–33)
MCHC RBC AUTO-ENTMCNC: 31.9 G/DL (ref 31.5–35.7)
MCV RBC AUTO: 94 FL (ref 79–97)
MICRODELETION SYND BLD/T FISH: NORMAL
PLATELET # BLD AUTO: 296 X10E3/UL (ref 150–450)
POTASSIUM SERPL-SCNC: 4.4 MMOL/L (ref 3.5–5.2)
PROT SERPL-MCNC: 6.6 G/DL (ref 6–8.5)
RBC # BLD AUTO: 4.16 X10E6/UL (ref 3.77–5.28)
SL AMB VLDL CHOLESTEROL CALC: 16 MG/DL (ref 5–40)
SODIUM SERPL-SCNC: 141 MMOL/L (ref 134–144)
TRIGL SERPL-MCNC: 95 MG/DL (ref 0–149)
TSH SERPL DL<=0.005 MIU/L-ACNC: 2.68 UIU/ML (ref 0.45–4.5)
WBC # BLD AUTO: 8.8 X10E3/UL (ref 3.4–10.8)

## 2024-10-16 ENCOUNTER — HOSPITAL ENCOUNTER (OUTPATIENT)
Dept: RADIOLOGY | Facility: HOSPITAL | Age: 49
Discharge: HOME | End: 2024-10-16
Payer: COMMERCIAL

## 2024-10-16 DIAGNOSIS — R92.8 ABNORMAL FINDING ON BREAST IMAGING: ICD-10-CM

## 2024-10-16 PROCEDURE — 76642 ULTRASOUND BREAST LIMITED: CPT

## 2024-10-16 PROCEDURE — 77065 DX MAMMO INCL CAD UNI: CPT

## 2024-10-16 PROCEDURE — G0279 TOMOSYNTHESIS, MAMMO: HCPCS

## 2024-12-18 DIAGNOSIS — K76.0 FATTY LIVER: Primary | ICD-10-CM

## 2024-12-23 ENCOUNTER — TELEPHONE (OUTPATIENT)
Age: 49
End: 2024-12-23

## 2024-12-23 NOTE — TELEPHONE ENCOUNTER
Rec'd call from patient stating that she was called prior regarding need to r/s pre-op with Dr. Lloyd. Patient states would now like to be scheduled somewhere closer to [facility] (appt is in the middle of her work day and to travel to [place] would be inconvenient).    Messaged Loren in teams due to it is scheduled as a pre-op. Patient is scheduled for surgery with Dr. Lloyd in 6/2025.  currently unavailable.    Returned to speak to patient. Told her that I would reschedule her pre-op (and if I was erroneous do so, I would be calling her back.)    Re-scheduled pre-op to 3/5/2025 @ 11:30 am with Dr. Lloyd at [facility] location.     Patient aware of office location.  Patient aware to arrive 15 minutes prior.  Confirmed same health insurance for next year. (Unsure if this is cosmetic or medical)

## 2025-01-08 ENCOUNTER — OFFICE VISIT (OUTPATIENT)
Dept: FAMILY MEDICINE CLINIC | Facility: CLINIC | Age: 50
End: 2025-01-08
Payer: COMMERCIAL

## 2025-01-08 VITALS
WEIGHT: 184.2 LBS | TEMPERATURE: 97.9 F | RESPIRATION RATE: 18 BRPM | HEART RATE: 84 BPM | DIASTOLIC BLOOD PRESSURE: 70 MMHG | HEIGHT: 62 IN | SYSTOLIC BLOOD PRESSURE: 122 MMHG | BODY MASS INDEX: 33.9 KG/M2

## 2025-01-08 DIAGNOSIS — C50.212 MALIGNANT NEOPLASM OF UPPER-INNER QUADRANT OF LEFT BREAST IN FEMALE, ESTROGEN RECEPTOR POSITIVE (HCC): ICD-10-CM

## 2025-01-08 DIAGNOSIS — Z17.0 MALIGNANT NEOPLASM OF UPPER-INNER QUADRANT OF LEFT BREAST IN FEMALE, ESTROGEN RECEPTOR POSITIVE (HCC): ICD-10-CM

## 2025-01-08 DIAGNOSIS — J01.00 ACUTE NON-RECURRENT MAXILLARY SINUSITIS: Primary | ICD-10-CM

## 2025-01-08 DIAGNOSIS — J45.30 MILD PERSISTENT ASTHMA WITHOUT COMPLICATION: ICD-10-CM

## 2025-01-08 PROCEDURE — 99214 OFFICE O/P EST MOD 30 MIN: CPT | Performed by: NURSE PRACTITIONER

## 2025-01-08 RX ORDER — AMOXICILLIN 875 MG/1
875 TABLET, COATED ORAL 2 TIMES DAILY
Qty: 20 TABLET | Refills: 0 | Status: SHIPPED | OUTPATIENT
Start: 2025-01-08 | End: 2025-01-10

## 2025-01-08 RX ORDER — BENZONATATE 200 MG/1
200 CAPSULE ORAL 3 TIMES DAILY PRN
Qty: 20 CAPSULE | Refills: 0 | Status: SHIPPED | OUTPATIENT
Start: 2025-01-08 | End: 2025-01-17

## 2025-01-08 NOTE — PROGRESS NOTES
Name: Jocelyn Smith      : 1975      MRN: [id number]  Encounter Provider: JACIEL Hogan  Encounter Date: 2025   Encounter department: [facility]  :  Assessment & Plan  Acute non-recurrent maxillary sinusitis  Will cover with amoxicillin and Tessalon.  Recommended saline sinus rinses and nasal spray.  Follow-up for any persistent or worsening symptoms  Orders:  •  amoxicillin (AMOXIL) 875 mg tablet; Take 1 tablet (875 mg total) by mouth 2 (two) times a day for 10 days  •  benzonatate (TESSALON) 200 MG capsule; Take 1 capsule (200 mg total) by mouth 3 (three) times a day as needed for cough    Malignant neoplasm of upper-inner quadrant of left breast in female, estrogen receptor positive (HCC)  She is no longer on Tamoxifen.       Mild persistent asthma without complication  Stable, although using her inhaler              History of Present Illness     She has been sick for the past week with upper respiratory symptoms.  She had a low-grade fever at symptom onset.  She has increasing congestion, sinus pressure, postnasal drip, and a cough.  Taking Tylenol Cold and flu OTC    Cough  This is a new problem. The current episode started in the past 7 days. The problem has been gradually worsening. The problem occurs every few minutes. The cough is Productive of sputum. Associated symptoms include ear congestion, ear pain, a fever, myalgias, nasal congestion, postnasal drip, rhinorrhea and a sore throat. Pertinent negatives include no chest pain, chills, headaches, heartburn, hemoptysis, rash, shortness of breath, sweats, weight loss or wheezing. The symptoms are aggravated by lying down.     Review of Systems   Constitutional:  Positive for fever. Negative for chills, fatigue and weight loss.   HENT:  Positive for ear pain, postnasal drip, rhinorrhea and sore throat. Negative for congestion and sinus pressure.    Respiratory:  Positive for cough. Negative for hemoptysis, shortness  "of breath and wheezing.    Cardiovascular:  Negative for chest pain.   Gastrointestinal:  Negative for abdominal pain, diarrhea, heartburn, nausea and vomiting.   Musculoskeletal:  Positive for myalgias. Negative for arthralgias.   Skin:  Negative for rash.   Neurological:  Negative for headaches.       Objective   /70   Pulse 84   Temp 97.9 °F (36.6 °C)   Resp 18   Ht 5' 2\" (1.575 m)   Wt 83.6 kg (184 lb 3.2 oz)   BMI 33.69 kg/m²      Physical Exam  Vitals and nursing note reviewed.   Constitutional:       General: She is not in acute distress.     Appearance: Normal appearance.   HENT:      Head: Normocephalic and atraumatic.      Right Ear: Tympanic membrane normal.      Left Ear: Tympanic membrane normal.      Nose: Mucosal edema and congestion present.      Right Sinus: Maxillary sinus tenderness present.      Left Sinus: Maxillary sinus tenderness present.      Mouth/Throat:      Pharynx: Oropharynx is clear.   Eyes:      Conjunctiva/sclera: Conjunctivae normal.   Neck:      Vascular: No carotid bruit.   Cardiovascular:      Rate and Rhythm: Normal rate and regular rhythm.      Pulses: Normal pulses.      Heart sounds: Normal heart sounds. No murmur heard.  Pulmonary:      Effort: Pulmonary effort is normal.      Breath sounds: Normal breath sounds.   Lymphadenopathy:      Cervical: No cervical adenopathy.   Skin:     General: Skin is warm and dry.   Neurological:      Mental Status: She is alert.   Psychiatric:         Mood and Affect: Mood normal.         Behavior: Behavior normal.         "

## 2025-01-10 DIAGNOSIS — J01.00 ACUTE NON-RECURRENT MAXILLARY SINUSITIS: Primary | ICD-10-CM

## 2025-01-10 RX ORDER — AZITHROMYCIN 250 MG/1
TABLET, FILM COATED ORAL
Qty: 6 TABLET | Refills: 0 | Status: SHIPPED | OUTPATIENT
Start: 2025-01-10 | End: 2025-01-15

## 2025-01-17 DIAGNOSIS — J01.00 ACUTE NON-RECURRENT MAXILLARY SINUSITIS: ICD-10-CM

## 2025-01-17 RX ORDER — BENZONATATE 200 MG/1
200 CAPSULE ORAL 3 TIMES DAILY PRN
Qty: 20 CAPSULE | Refills: 0 | Status: SHIPPED | OUTPATIENT
Start: 2025-01-17

## 2025-02-04 ENCOUNTER — RESULTS FOLLOW-UP (OUTPATIENT)
Dept: FAMILY MEDICINE CLINIC | Facility: CLINIC | Age: 50
End: 2025-02-04

## 2025-02-04 LAB
ALBUMIN SERPL-MCNC: 4.4 G/DL (ref 3.8–4.9)
ALP SERPL-CCNC: 53 IU/L (ref 44–121)
ALT SERPL-CCNC: 20 IU/L (ref 0–32)
AST SERPL-CCNC: 21 IU/L (ref 0–40)
BILIRUB DIRECT SERPL-MCNC: 0.16 MG/DL (ref 0–0.4)
BILIRUB SERPL-MCNC: 0.4 MG/DL (ref 0–1.2)
PROT SERPL-MCNC: 6.7 G/DL (ref 6–8.5)

## 2025-02-17 ENCOUNTER — OFFICE VISIT (OUTPATIENT)
Dept: SURGICAL ONCOLOGY | Facility: CLINIC | Age: 50
End: 2025-02-17
Payer: COMMERCIAL

## 2025-02-17 ENCOUNTER — TELEPHONE (OUTPATIENT)
Dept: RADIOLOGY | Facility: HOSPITAL | Age: 50
End: 2025-02-17

## 2025-02-17 VITALS
WEIGHT: 184 LBS | HEART RATE: 89 BPM | HEIGHT: 62 IN | TEMPERATURE: 95.8 F | OXYGEN SATURATION: 99 % | BODY MASS INDEX: 33.86 KG/M2 | SYSTOLIC BLOOD PRESSURE: 126 MMHG | DIASTOLIC BLOOD PRESSURE: 70 MMHG

## 2025-02-17 DIAGNOSIS — Z85.3 HISTORY OF LEFT BREAST CANCER: ICD-10-CM

## 2025-02-17 DIAGNOSIS — Z08 ENCOUNTER FOR FOLLOW-UP EXAMINATION AFTER COMPLETED TREATMENT FOR MALIGNANT NEOPLASM: Primary | ICD-10-CM

## 2025-02-17 DIAGNOSIS — Z12.31 BREAST CANCER SCREENING BY MAMMOGRAM: ICD-10-CM

## 2025-02-17 DIAGNOSIS — Z92.3 S/P RADIATION THERAPY: ICD-10-CM

## 2025-02-17 DIAGNOSIS — N63.21 MASS OF UPPER OUTER QUADRANT OF LEFT BREAST: ICD-10-CM

## 2025-02-17 PROCEDURE — 99213 OFFICE O/P EST LOW 20 MIN: CPT

## 2025-02-17 NOTE — ASSESSMENT & PLAN NOTE
Ms. Smith is a 50y/o year old female presenting today for a 6 month follow up for left breast cancer, diagnosed in June 2019. Pathology revealed invasive lobular carcinoma ER/VT positive, HER2 negative. She underwent genetic testing which was negative.  She is s/p left breast lumpectomy and SLNB with Dr. Steinberg and completed WBRT. She is currently on tamoxifen.       Her last diagnostic bilateral mammogram was on 04/04/2024, which demonstrated BI-RADS 3, category 3 density. This was due to new asymmetry and probable evolving fat necrosis.  Diagnostic mammogram in 6 months for left breast with US.    10/16/2024 Left diagnostic mammo and US - Demonstrated BI-RADS 2, return to screening for left.     She is scheduled to have more fat grafting done in September of this year. She has a few areas of fat necrosis on the left lateral aspect of the left breast and medial right breast.     There were no concerns on cbe.     I will see the patient back in 1 year or sooner should the need arise. She was instructed to call with any questions or concerns prior to this time. All questions were answered today

## 2025-02-17 NOTE — ASSESSMENT & PLAN NOTE
Orders:  •  US breast left limited (diagnostic); Future  •  Mammo diagnostic bilateral w 3d and cad; Future  •  Ambulatory referral to Cardiology; Future

## 2025-02-17 NOTE — ASSESSMENT & PLAN NOTE
Ms. Smith is a 48y/o year old female presenting today for a 6 month follow up for left breast cancer, diagnosed in June 2019. Pathology revealed invasive lobular carcinoma ER/IA positive, HER2 negative. She underwent genetic testing which was negative for pathogenic variants. She is s/p left breast lumpectomy and SLNB with Dr. Steinberg and completed WBRT. She completed 5 years of endocrine therapy with Tamoxifen, and now continues on observation. Of note, pt did complete fat grafting to left breast and plans on repeat procedure in June due to asymmetry. Her last diagnostic bilateral mammogram was on 04/04/2024, which demonstrated BI-RADS 3, category 3 density. This was due to new asymmetry and probable evolving fat necrosis at the 11:00 position, 4CMFN. Follow up diagnostic mammogram and left breast US on 10/16/2024 demonstrated BI-RADS 2, with stable surgical changes and complicated cyst vs fat necrosis at 11:00 in left breast. Pt may return to screening. Upon clinical breast exam today, I appreciated a new firm mass at the 2:00 position at the distal edge of the nipple areolar complex, about 2-3CMFN. Pt is tender with deep palpation. This does not appear to correlate with prior left breast diagnostic imaging however we discussed possible fat necrosis. Given screening due in April, script provided for bilateral diagnostic mammogram and left diagnostic US for further eval. No associated skin findings, enlarged lymph nodes, nipple discharge etc. No concerning findings in the right breast. Assuming benign findings, I will see the patient back in 1 year or sooner should the need arise. She was instructed to call with any questions or concerns prior to this time. All questions were answered today   Orders:  •  US breast left limited (diagnostic); Future  •  Mammo diagnostic bilateral w 3d and cad; Future

## 2025-02-17 NOTE — ASSESSMENT & PLAN NOTE
Orders:  •  US breast left limited (diagnostic); Future  •  Mammo diagnostic bilateral w 3d and cad; Future

## 2025-02-17 NOTE — ASSESSMENT & PLAN NOTE
I discussed pt's hx of left sided radiation and risk for cardiovascular damage. Offered referral to Cardio-Oncology for consult. Pt declines any fam hx of cardiovascular disease or  personal hx of HTN/hyperlipidemia. Pt is interested, referral placed.  Orders:  •  Ambulatory referral to Cardiology; Future

## 2025-02-17 NOTE — PROGRESS NOTES
Name: Jocelyn Smith      : 1975      MRN: [id number]  Encounter Provider: JACIEL Guzman  Encounter Date: 2025   Encounter department: [facility]  :  Assessment & Plan  Encounter for follow-up examination after completed treatment for malignant neoplasm  Ms. Smith is a 50y/o year old female presenting today for a 6 month follow up for left breast cancer, diagnosed in 2019. Pathology revealed invasive lobular carcinoma ER/NM positive, HER2 negative. She underwent genetic testing which was negative for pathogenic variants. She is s/p left breast lumpectomy and SLNB with Dr. Steinberg and completed WBRT. She completed 5 years of endocrine therapy with Tamoxifen, and now continues on observation. Of note, pt did complete fat grafting to left breast and plans on repeat procedure in  due to asymmetry. Her last diagnostic bilateral mammogram was on 2024, which demonstrated BI-RADS 3, category 3 density. This was due to new asymmetry and probable evolving fat necrosis at the 11:00 position, 4CMFN. Follow up diagnostic mammogram and left breast US on 10/16/2024 demonstrated BI-RADS 2, with stable surgical changes and complicated cyst vs fat necrosis at 11:00 in left breast. Pt may return to screening. Upon clinical breast exam today, I appreciated a new firm mass at the 2:00 position at the distal edge of the nipple areolar complex, about 2-3CMFN. Pt is tender with deep palpation. This does not appear to correlate with prior left breast diagnostic imaging however we discussed possible fat necrosis. Given screening due in April, script provided for bilateral diagnostic mammogram and left diagnostic US for further eval. No associated skin findings, enlarged lymph nodes, nipple discharge etc. No concerning findings in the right breast. Assuming benign findings, I will see the patient back in 1 year or sooner should the need arise. She was  instructed to call with any questions or concerns prior to this time. All questions were answered today   Orders:  •  US breast left limited (diagnostic); Future  •  Mammo diagnostic bilateral w 3d and cad; Future    Breast cancer screening by mammogram  \Orders:  •  Mammo diagnostic bilateral w 3d and cad; Future    Mass of upper outer quadrant of left breast  Orders:  •  US breast left limited (diagnostic); Future  •  Mammo diagnostic bilateral w 3d and cad; Future    History of left breast cancer  Orders:  •  US breast left limited (diagnostic); Future  •  Mammo diagnostic bilateral w 3d and cad; Future  •  Ambulatory referral to Cardiology; Future    S/P radiation therapy  I discussed pt's hx of left sided radiation and risk for cardiovascular damage. Offered referral to Cardio-Oncology for consult. Pt declines any fam hx of cardiovascular disease or  personal hx of HTN/hyperlipidemia. Pt is interested, referral placed.  Orders:  •  Ambulatory referral to Cardiology; Future      History of Present Illness   Jocelyn Smith is a 49 y.o. year old female who presents for 6 month follow up for left breast cancer, diagnosed in June 2019. Pt offers no new breast concerns today. She notes tenderness to her left breast with certain movements. She understands there are areas of fat necrosis due to fat grafting. She denies other breast changes, persistent cough, SOB, headaches, as well as any new or persistent back, bone, or abdominal pain.      Oncology History   Cancer Staging   Malignant neoplasm of upper-inner quadrant of left breast in female, estrogen receptor positive (HCC)  Staging form: Breast, AJCC 8th Edition  - Pathologic stage from 6/13/2019: Stage IA (pT1c, pN0, cM0, G1, ER+, TX+, HER2-) - Signed by JACIEL Bruno on 2/19/2024  Stage prefix: Initial diagnosis  Multigene prognostic tests performed: MammaPrint  Histologic grading system: 3 grade system  - Clinical: Stage IA (cT1c, cN0, cM0,  G1, ER+, FL+, HER2-) - Signed by Yonatan Malcolm MD on 8/14/2019  Histologic grading system: 3 grade system  Laterality: Left  Oncology History   Malignant neoplasm of upper-inner quadrant of left breast in female, estrogen receptor positive (HCC)   6/7/2019 Biopsy    Left breast ultrasound-guided biopsy  11 o'clock, 4 cm from nipple  Invasive lobular carcinoma  Grade 1  ER 90  FL 45  HER2 1+     6/13/2019 -  Cancer Staged    Staging form: Breast, AJCC 8th Edition  - Pathologic stage from 6/13/2019: Stage IA (pT1c, pN0, cM0, G1, ER+, FL+, HER2-) - Signed by JACIEL Bruno on 2/19/2024  Stage prefix: Initial diagnosis  Multigene prognostic tests performed: MammaPrint  Histologic grading system: 3 grade system       6/17/2019 Genetic Testing    BRCA negative  [facility]     7/30/2019 Surgery    Left breast lumpectomy with sentinel lymph node biopsy  Invasive lobular carcinoma  Grade 1  1.1 cm  0/3 Lymph nodes  Margins negative  Anatomic/Prognostic Stage IA     8/14/2019 -  Cancer Staged    Staging form: Breast, AJCC 8th Edition  - Clinical: Stage IA (cT1c, cN0, cM0, G1, ER+, FL+, HER2-) - Signed by Yonatan Malcolm MD on 8/14/2019  Laterality: Left  Histologic grading system: 3 grade system       8/27/2019 - 9/25/2019 Radiation    Course: C1    Plan ID Energy Fractions Dose per Fraction (cGy) Dose Correction (cGy) Total Dose Delivered (cGy) Elapsed Days   BH L Breast 6X 16 / 16 266 0 4,256 22   BH L Brst e 12E 5 / 5 200 0 1,000 6      Treatment dates:  C1: 8/27/2019 - 9/25/2019 8/2019 -  Hormone Therapy    Tamoxifen 20 mg daily  With Dr. Allan        Review of Systems   Constitutional:  Negative for activity change, appetite change, fatigue, fever and unexpected weight change.   Respiratory:  Negative for cough and shortness of breath.    Gastrointestinal:  Negative for abdominal pain.   Musculoskeletal:  Negative for back pain.   Skin: Negative.    Neurological: Negative.   "  Psychiatric/Behavioral:  Negative for confusion.     A complete review of systems is negative other than that noted above in the HPI.       Objective   /70   Pulse 89   Temp (!) 95.8 °F (35.4 °C)   Ht 5' 2\" (1.575 m)   Wt 83.5 kg (184 lb)   SpO2 99%   BMI 33.65 kg/m²     ECOG      Physical Exam  Vitals and nursing note reviewed.   Constitutional:       Appearance: Normal appearance. She is normal weight.   Eyes:      General: No scleral icterus.     Conjunctiva/sclera: Conjunctivae normal.   Cardiovascular:      Rate and Rhythm: Normal rate and regular rhythm.   Pulmonary:      Effort: Pulmonary effort is normal.      Breath sounds: Normal breath sounds.   Chest:      Chest wall: No mass.   Breasts:     Right: No swelling, bleeding, inverted nipple, mass, nipple discharge, skin change or tenderness.      Left: Mass and skin change (surgical scar) present. No swelling, bleeding, inverted nipple, nipple discharge or tenderness.       Lymphadenopathy:      Upper Body:      Right upper body: No supraclavicular or axillary adenopathy.      Left upper body: No supraclavicular or axillary adenopathy.   Skin:     General: Skin is warm and dry.   Neurological:      General: No focal deficit present.      Mental Status: She is alert and oriented to person, place, and time. Mental status is at baseline.   Psychiatric:         Mood and Affect: Mood normal.         Behavior: Behavior normal.         Thought Content: Thought content normal.         Judgment: Judgment normal.        I have spent a total time of 36 minutes in caring for this patient on the day of the visit/encounter including Diagnostic results, Risks and benefits of tx options, Instructions for management, Patient and family education, Counseling / Coordination of care, Documenting in the medical record, Reviewing/placing orders in the medical record (including tests, medications, and/or procedures), and Obtaining or reviewing history  .      "

## 2025-02-24 NOTE — PROGRESS NOTES
Cardio-Oncology / Heart Failure Cardiology Clinic Note    Jocelyn Smith 50 y.o. female   MRN: [id number]  Encounter: [id number]        Assessment / Plan:    # Cardio-Oncology Pertinent History  Breast cancer  Dx 2019.  Left sided  S/p surgery   S/p XRT  S/p tamoxifen   Current - observation    # Cardio-Oncology Survivorship Recommendations  Discussed that many cancer survivors are at increased risk of cardiovascular disease due to cancer treatments as well as comorbidities.  Discussed importance of optimizing cardiovascular risk factors and post-treatment cardiac surveillance when appropriate.    Left sided XRT    Annual history and physical  BP management  -  at goal  Lipids - SEE BELOW  Exercise -  not doing.  rec 30min 5 days per week  Healthy diet - recommend Mediterranean diet  Routine dental care - doing this  Post-treatment surveillance:  yearly EKG    # HLD  Lipids Sept 2024 - total cholesterol 235,   ASCVD risk score low  Check calcium score to risk stratify, lisha given hx XRT and pre-diabetes    # Palpitations  Can get palpitations after using albuterol.  If has palpitations other than post albuterol consider Holter    # pre-diabetes  A1c 5.7  Recommend weight loss, diet, exercise      Today's Plan Summary:  See above assessment/plan for full details of today's plan.  Briefly,     Check coronary calcium score                Reason For Visit / Chief Complaint:  Referred by Ivet Sanchez NP for a new patient visit for prior radiation for left-sided breast cancer.    HPI:   Jocelyn Smith is a 50 y.o.  female with history as noted in the problem list and further detailed in the above assessment and plan.    Referred by Ivet Sanchez NP for a new patient visit for prior radiation for left-sided breast cancer.    As above, the patient has a history of left-sided breast cancer in 2019 status post surgery and radiation therapy.  Also had tamoxifen.    Today, the patient reports -   denies chest  pain.   No SOB or COLE (except if asthma acting up).   No orthopnea or PND.   No leg edema.  Can get palpitations after using albuterol.   No syncope.     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.    Nonsmoker.  Rare ETOH.  No drugs.          Cardiac Imaging personally reviewed:  EKG 2-25-25  Normal sinus rhythm       Holter or event monitor    Echo    SADE    Cardiac MRI    Stress testing    [facility]    CP[facility]              Patient Active Problem List    Diagnosis Date Noted   • Mixed hyperlipidemia 02/25/2025   • Pre-diabetes 02/25/2025   • Encounter for follow-up examination after completed treatment for malignant neoplasm 02/17/2025   • History of left breast cancer 02/17/2025   • S/P radiation therapy 02/17/2025   • Mass of upper outer quadrant of left breast 02/17/2025   • Hot flash, menopausal 08/29/2024   • Chronic right shoulder pain 08/23/2024   • Fatty liver 01/30/2024   • Anemia 01/25/2024   • Breast asymmetry 05/24/2023   • History of lumpectomy 03/13/2023   • Abnormal glucose 02/10/2021   • Use of tamoxifen (Nolvadex) 06/02/2020   • Malignant neoplasm of upper-inner quadrant of left breast in female, estrogen receptor positive (HCC) 06/13/2019   • Dense breasts 11/15/2016   • Mild persistent asthma without complication 11/15/2016   • Migraine headache 12/10/2014   • Motion sickness 11/09/2012   • Allergic rhinitis 09/04/2012   • Vitamin D deficiency 09/04/2012       Past Medical History:   Diagnosis Date   • Adhesive capsulitis of right shoulder     Resolved 11/15/2016    • Asthma    • Benign paroxysmal vertigo, unspecified ear     Resolved 11/15/2016    • BRCA gene mutation negative 06/2019    [facility]   • Breast cancer (HCC) 07/30/2019    left   • Cellulitis of leg, except foot     Resolved 12/10/2014    • History of breast surgery 2023    Fat grafting bilateral breast s/p lumpectomy   • Hx of radiation therapy 08/01/2019   •  Lymphadenitis     Resolved 12/10/2014    • Migraine    • Snoring    • Wears contact lenses    • Wears glasses        Review of Systems   Constitutional:  Negative for chills and fever.   HENT:  Negative for nosebleeds.    Gastrointestinal:  Negative for abdominal distention and blood in stool.   Neurological:  Negative for dizziness and syncope.   Psychiatric/Behavioral:  Negative for confusion.    14-point ROS completed and negative except as stated above and/or in the HPI.    No Known Allergies    Current Outpatient Medications   Medication Instructions   • albuterol (ProAir HFA) 90 mcg/act inhaler 2 puffs, Inhalation, Every 4 hours PRN   • Biotin w/ Vitamins C & E 1250-7.5-7.5 MCG-MG-UNT CHEW Daily   • cetirizine (ZYRTEC) 10 mg, Daily PRN   • Cholecalciferol (VITAMIN D3) 1000 units CHEW 1 tablet, Daily   • fezolinetant (VEOZAH) 45 mg, Oral, Daily   • fluticasone (FLONASE) 50 mcg/act nasal spray 1 spray, Daily PRN   • meclizine (ANTIVERT) 25 mg, Oral, 3 times daily PRN   • Multiple Vitamins-Minerals (AIRBORNE PO) 1 tablet, Daily   • multivitamin (THERAGRAN) TABS 1 tablet, Daily   • rizatriptan (MAXALT-MLT) 10 mg, Oral, Daily PRN, Take at the onset of migraine; if symptoms continue or return, may take another dose at least 2 hours after first dose. Take no more than 2 doses in a day.       Social History     Socioeconomic History   • Marital status: /Civil Union     Spouse name: Not on file   • Number of children: 3   • Years of education: Not on file   • Highest education level: Not on file   Occupational History   • Not on file   Tobacco Use   • Smoking status: Never     Passive exposure: Never   • Smokeless tobacco: Never   Vaping Use   • Vaping status: Never Used   Substance and Sexual Activity   • Alcohol use: Not Currently   • Drug use: No   • Sexual activity: Yes     Partners: Male   Other Topics Concern   • Not on file   Social History Narrative   • Not on file     Social Drivers of Health  "    Financial Resource Strain: Not on file   Food Insecurity: Not on file   Transportation Needs: Not on file   Physical Activity: Not on file   Stress: Not on file   Social Connections: Not on file   Intimate Partner Violence: Not on file   Housing Stability: Not on file       Family History   Problem Relation Age of Onset   • Alzheimer's disease Mother    • COPD Father    • No Known Problems Sister    • No Known Problems Daughter    • Alzheimer's disease Maternal Grandmother    • No Known Problems Maternal Grandfather    • No Known Problems Paternal Grandmother    • No Known Problems Paternal Grandfather    • No Known Problems Brother    • No Known Problems Brother    • No Known Problems Brother    • No Known Problems Daughter        Physical Exam:  Blood pressure 120/84, pulse 78, height 5' 1\" (1.549 m), weight 83.2 kg (183 lb 8 oz), not currently breastfeeding.  Body mass index is 34.67 kg/m².  Wt Readings from Last 3 Encounters:   02/25/25 83.2 kg (183 lb 8 oz)   02/17/25 83.5 kg (184 lb)   01/08/25 83.6 kg (184 lb 3.2 oz)     Physical Exam  Vitals reviewed.   Constitutional:       General: She is not in acute distress.     Appearance: She is not toxic-appearing.   HENT:      Head: Normocephalic and atraumatic.   Eyes:      General: No scleral icterus.     Conjunctiva/sclera: Conjunctivae normal.   Neck:      Vascular: No carotid bruit.      Comments: No JVP elevation  Cardiovascular:      Rate and Rhythm: Normal rate and regular rhythm.      Heart sounds: No murmur heard.     No friction rub. No gallop.   Pulmonary:      Breath sounds: Normal breath sounds. No wheezing, rhonchi or rales.   Abdominal:      General: There is no distension.      Palpations: Abdomen is soft.      Tenderness: There is no abdominal tenderness. There is no guarding.   Musculoskeletal:      Right lower leg: No edema.      Left lower leg: No edema.   Skin:     Coloration: Skin is not jaundiced or pale.      Findings: No erythema. " "  Neurological:      Mental Status: She is alert. Mental status is at baseline.   Psychiatric:         Mood and Affect: Mood normal.         Behavior: Behavior normal.         Labs & Results:  Lab Results   Component Value Date    SODIUM 141 09/06/2024    K 4.4 09/06/2024     09/06/2024    CO2 21 09/06/2024    BUN 8 09/06/2024    CREATININE 0.50 (L) 09/06/2024    GLUC 85 09/06/2024    CALCIUM 8.2 (L) 01/26/2024     No results found for: \"NTBNP\"       Thank you for the opportunity to participate in the care of this patient.    Herb Judd MD, [facility]  Staff Cardiologist  Director of Cardio-Oncology  [facility]  "

## 2025-02-25 ENCOUNTER — OFFICE VISIT (OUTPATIENT)
Dept: CARDIOLOGY CLINIC | Facility: CLINIC | Age: 50
End: 2025-02-25
Payer: COMMERCIAL

## 2025-02-25 VITALS
BODY MASS INDEX: 34.64 KG/M2 | HEART RATE: 78 BPM | WEIGHT: 183.5 LBS | DIASTOLIC BLOOD PRESSURE: 84 MMHG | SYSTOLIC BLOOD PRESSURE: 120 MMHG | HEIGHT: 61 IN

## 2025-02-25 DIAGNOSIS — R73.03 PRE-DIABETES: ICD-10-CM

## 2025-02-25 DIAGNOSIS — R00.2 PALPITATIONS: ICD-10-CM

## 2025-02-25 DIAGNOSIS — Z92.3 S/P RADIATION THERAPY: ICD-10-CM

## 2025-02-25 DIAGNOSIS — E78.2 MIXED HYPERLIPIDEMIA: Primary | ICD-10-CM

## 2025-02-25 DIAGNOSIS — Z85.3 HISTORY OF LEFT BREAST CANCER: ICD-10-CM

## 2025-02-25 PROCEDURE — 99204 OFFICE O/P NEW MOD 45 MIN: CPT | Performed by: INTERNAL MEDICINE

## 2025-02-25 PROCEDURE — 93000 ELECTROCARDIOGRAM COMPLETE: CPT | Performed by: INTERNAL MEDICINE

## 2025-02-25 NOTE — LETTER
February 25, 2025     JACIEL Guzman  [address] [facility] [address]    Patient: Jocelyn Smith   YOB: 1975   Date of Visit: 2/25/2025       Dear Dr. Sanchez:    Thank you for referring Jocelyn Smith to me for evaluation. Below are my notes for this consultation.    If you have questions, please do not hesitate to call me. I look forward to following your patient along with you.         Sincerely,        Herb Judd MD        CC: DO Herb Alfonso MD  2/25/2025  1:59 PM  Sign when Signing Visit  Cardio-Oncology / Heart Failure Cardiology Clinic Note    Jocelyn Smith 50 y.o. female   MRN: [id number]  Encounter: [id number]        Assessment / Plan:    # Cardio-Oncology Pertinent History  Breast cancer  Dx 2019.  Left sided  S/p surgery   S/p XRT  S/p tamoxifen   Current - observation    # Cardio-Oncology Survivorship Recommendations  Discussed that many cancer survivors are at increased risk of cardiovascular disease due to cancer treatments as well as comorbidities.  Discussed importance of optimizing cardiovascular risk factors and post-treatment cardiac surveillance when appropriate.    Left sided XRT    Annual history and physical  BP management  -  at goal  Lipids - SEE BELOW  Exercise -  not doing.  rec 30min 5 days per week  Healthy diet - recommend Mediterranean diet  Routine dental care - doing this  Post-treatment surveillance:  yearly EKG    # HLD  Lipids Sept 2024 - total cholesterol 235,   ASCVD risk score low  Check calcium score to risk stratify, lisha given hx XRT and pre-diabetes    # Palpitations  Can get palpitations after using albuterol.  If has palpitations other than post albuterol consider Holter    # pre-diabetes  A1c 5.7  Recommend weight loss, diet, exercise      Today's Plan Summary:  See above assessment/plan for full details of today's plan.  Briefly,     Check coronary calcium  score                Reason For Visit / Chief Complaint:  Referred by Ivet Sanchez NP for a new patient visit for prior radiation for left-sided breast cancer.    HPI:   Jocelyn Smith is a 50 y.o.  female with history as noted in the problem list and further detailed in the above assessment and plan.    Referred by Ivet Sanchez NP for a new patient visit for prior radiation for left-sided breast cancer.    As above, the patient has a history of left-sided breast cancer in 2019 status post surgery and radiation therapy.  Also had tamoxifen.    Today, the patient reports -   denies chest pain.   No SOB or COLE (except if asthma acting up).   No orthopnea or PND.   No leg edema.  Can get palpitations after using albuterol.   No syncope.     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    Nonsmoker.  Rare ETOH.  No drugs.          Cardiac Imaging personally reviewed:  EKG 2-25-25  Normal sinus rhythm       Holter or event monitor    Echo    SADE    Cardiac MRI    Stress testing    Coronary CTA or [facility]    CPET              Patient Active Problem List    Diagnosis Date Noted   • Encounter for follow-up examination after completed treatment for malignant neoplasm 02/17/2025   • History of left breast cancer 02/17/2025   • S/P radiation therapy 02/17/2025   • Mass of upper outer quadrant of left breast 02/17/2025   • Hot flash, menopausal 08/29/2024   • Chronic right shoulder pain 08/23/2024   • Fatty liver 01/30/2024   • Anemia 01/25/2024   • Breast asymmetry 05/24/2023   • History of lumpectomy 03/13/2023   • Abnormal glucose 02/10/2021   • Use of tamoxifen (Nolvadex) 06/02/2020   • Malignant neoplasm of upper-inner quadrant of left breast in female, estrogen receptor positive (HCC) 06/13/2019   • Dense breasts 11/15/2016   • Mild persistent asthma without complication 11/15/2016   • Migraine headache 12/10/2014   • Motion sickness 11/09/2012   • Allergic  rhinitis 09/04/2012   • Vitamin D deficiency 09/04/2012       Past Medical History:   Diagnosis Date   • Adhesive capsulitis of right shoulder     Resolved 11/15/2016    • Asthma    • Benign paroxysmal vertigo, unspecified ear     Resolved 11/15/2016    • BRCA gene mutation negative 06/2019    Invitae   • Breast cancer (HCC) 07/30/2019    left   • Cellulitis of leg, except foot     Resolved 12/10/2014    • History of breast surgery 2023    Fat grafting bilateral breast s/p lumpectomy   • Hx of radiation therapy 08/01/2019   • Lymphadenitis     Resolved 12/10/2014    • Migraine    • Snoring    • Wears contact lenses    • Wears glasses        Review of Systems   Constitutional:  Negative for chills and fever.   HENT:  Negative for nosebleeds.    Gastrointestinal:  Negative for abdominal distention and blood in stool.   Neurological:  Negative for dizziness and syncope.   Psychiatric/Behavioral:  Negative for confusion.    14-point ROS completed and negative except as stated above and/or in the HPI.    No Known Allergies    Current Outpatient Medications   Medication Instructions   • albuterol (ProAir HFA) 90 mcg/act inhaler 2 puffs, Inhalation, Every 4 hours PRN   • Biotin w/ Vitamins C & E 1250-7.5-7.5 MCG-MG-UNT CHEW Daily   • cetirizine (ZYRTEC) 10 mg, Daily PRN   • Cholecalciferol (VITAMIN D3) 1000 units CHEW 1 tablet, Daily   • fezolinetant (VEOZAH) 45 mg, Oral, Daily   • fluticasone (FLONASE) 50 mcg/act nasal spray 1 spray, Daily PRN   • meclizine (ANTIVERT) 25 mg, Oral, 3 times daily PRN   • Multiple Vitamins-Minerals (AIRBORNE PO) 1 tablet, Daily   • multivitamin (THERAGRAN) TABS 1 tablet, Daily   • rizatriptan (MAXALT-MLT) 10 mg, Oral, Daily PRN, Take at the onset of migraine; if symptoms continue or return, may take another dose at least 2 hours after first dose. Take no more than 2 doses in a day.       Social History     Socioeconomic History   • Marital status: /Civil Union     Spouse name: Not on  "file   • Number of children: 3   • Years of education: Not on file   • Highest education level: Not on file   Occupational History   • Not on file   Tobacco Use   • Smoking status: Never     Passive exposure: Never   • Smokeless tobacco: Never   Vaping Use   • Vaping status: Never Used   Substance and Sexual Activity   • Alcohol use: Not Currently   • Drug use: No   • Sexual activity: Yes     Partners: Male   Other Topics Concern   • Not on file   Social History Narrative   • Not on file     Social Drivers of Health     Financial Resource Strain: Not on file   Food Insecurity: Not on file   Transportation Needs: Not on file   Physical Activity: Not on file   Stress: Not on file   Social Connections: Not on file   Intimate Partner Violence: Not on file   Housing Stability: Not on file       Family History   Problem Relation Age of Onset   • Alzheimer's disease Mother    • COPD Father    • No Known Problems Sister    • No Known Problems Daughter    • Alzheimer's disease Maternal Grandmother    • No Known Problems Maternal Grandfather    • No Known Problems Paternal Grandmother    • No Known Problems Paternal Grandfather    • No Known Problems Brother    • No Known Problems Brother    • No Known Problems Brother    • No Known Problems Daughter        Physical Exam:  Blood pressure 120/84, pulse 78, height 5' 1\" (1.549 m), weight 83.2 kg (183 lb 8 oz), not currently breastfeeding.  Body mass index is 34.67 kg/m².  Wt Readings from Last 3 Encounters:   02/25/25 83.2 kg (183 lb 8 oz)   02/17/25 83.5 kg (184 lb)   01/08/25 83.6 kg (184 lb 3.2 oz)     Physical Exam  Vitals reviewed.   Constitutional:       General: She is not in acute distress.     Appearance: She is not toxic-appearing.   HENT:      Head: Normocephalic and atraumatic.   Eyes:      General: No scleral icterus.     Conjunctiva/sclera: Conjunctivae normal.   Neck:      Vascular: No carotid bruit.      Comments: No JVP elevation  Cardiovascular:      Rate and " "Rhythm: Normal rate and regular rhythm.      Heart sounds: No murmur heard.     No friction rub. No gallop.   Pulmonary:      Breath sounds: Normal breath sounds. No wheezing, rhonchi or rales.   Abdominal:      General: There is no distension.      Palpations: Abdomen is soft.      Tenderness: There is no abdominal tenderness. There is no guarding.   Musculoskeletal:      Right lower leg: No edema.      Left lower leg: No edema.   Skin:     Coloration: Skin is not jaundiced or pale.      Findings: No erythema.   Neurological:      Mental Status: She is alert. Mental status is at baseline.   Psychiatric:         Mood and Affect: Mood normal.         Behavior: Behavior normal.         Labs & Results:  Lab Results   Component Value Date    SODIUM 141 09/06/2024    K 4.4 09/06/2024     09/06/2024    CO2 21 09/06/2024    BUN 8 09/06/2024    CREATININE 0.50 (L) 09/06/2024    GLUC 85 09/06/2024    CALCIUM 8.2 (L) 01/26/2024     No results found for: \"NTBNP\"       Thank you for the opportunity to participate in the care of this patient.    Herb Judd MD, [facility]  Staff Cardiologist  Director of Cardio-Oncology  [facility]  "

## 2025-03-02 ENCOUNTER — HOSPITAL ENCOUNTER (OUTPATIENT)
Dept: CT IMAGING | Facility: HOSPITAL | Age: 50
Discharge: HOME | End: 2025-03-02
Attending: INTERNAL MEDICINE
Payer: COMMERCIAL

## 2025-03-02 DIAGNOSIS — Z85.3 HISTORY OF LEFT BREAST CANCER: ICD-10-CM

## 2025-03-02 DIAGNOSIS — Z92.3 S/P RADIATION THERAPY: ICD-10-CM

## 2025-03-02 DIAGNOSIS — E78.2 MIXED HYPERLIPIDEMIA: ICD-10-CM

## 2025-03-02 PROCEDURE — 75571 CT HRT W/O DYE W/CA TEST: CPT

## 2025-03-05 ENCOUNTER — COSMETIC (OUTPATIENT)
Age: 50
End: 2025-03-05
Payer: COMMERCIAL

## 2025-03-05 DIAGNOSIS — N64.89 BREAST ASYMMETRY: Primary | ICD-10-CM

## 2025-03-05 PROCEDURE — 99213 OFFICE O/P EST LOW 20 MIN: CPT | Performed by: STUDENT IN AN ORGANIZED HEALTH CARE EDUCATION/TRAINING PROGRAM

## 2025-03-06 ENCOUNTER — RESULTS FOLLOW-UP (OUTPATIENT)
Dept: SURGICAL ONCOLOGY | Facility: CLINIC | Age: 50
End: 2025-03-06

## 2025-03-06 ENCOUNTER — HOSPITAL ENCOUNTER (OUTPATIENT)
Dept: RADIOLOGY | Facility: HOSPITAL | Age: 50
Discharge: HOME | End: 2025-03-06
Payer: COMMERCIAL

## 2025-03-06 DIAGNOSIS — N63.21 MASS OF UPPER OUTER QUADRANT OF LEFT BREAST: ICD-10-CM

## 2025-03-06 DIAGNOSIS — Z08 ENCOUNTER FOR FOLLOW-UP EXAMINATION AFTER COMPLETED TREATMENT FOR MALIGNANT NEOPLASM: ICD-10-CM

## 2025-03-06 DIAGNOSIS — Z85.3 HISTORY OF LEFT BREAST CANCER: ICD-10-CM

## 2025-03-06 DIAGNOSIS — Z12.31 BREAST CANCER SCREENING BY MAMMOGRAM: ICD-10-CM

## 2025-03-06 PROCEDURE — 76642 ULTRASOUND BREAST LIMITED: CPT

## 2025-03-06 PROCEDURE — 77066 DX MAMMO INCL CAD BI: CPT

## 2025-03-06 PROCEDURE — G0279 TOMOSYNTHESIS, MAMMO: HCPCS

## 2025-03-10 ENCOUNTER — RESULTS FOLLOW-UP (OUTPATIENT)
Dept: CARDIOLOGY CLINIC | Facility: CLINIC | Age: 50
End: 2025-03-10

## 2025-03-11 ENCOUNTER — TELEPHONE (OUTPATIENT)
Age: 50
End: 2025-03-11

## 2025-03-11 NOTE — TELEPHONE ENCOUNTER
Pt returning our call, added pt to 30 min slot CONSULT this Fri 3/14 at 1130am w/Prema at [place]

## 2025-03-14 ENCOUNTER — CONSULT (OUTPATIENT)
Age: 50
End: 2025-03-14
Payer: COMMERCIAL

## 2025-03-14 DIAGNOSIS — Z98.890 HISTORY OF LUMPECTOMY: Primary | ICD-10-CM

## 2025-03-14 PROCEDURE — 99214 OFFICE O/P EST MOD 30 MIN: CPT | Performed by: STUDENT IN AN ORGANIZED HEALTH CARE EDUCATION/TRAINING PROGRAM

## 2025-03-19 PROBLEM — Z08 ENCOUNTER FOR FOLLOW-UP EXAMINATION AFTER COMPLETED TREATMENT FOR MALIGNANT NEOPLASM: Status: RESOLVED | Noted: 2025-02-17 | Resolved: 2025-03-19

## 2025-03-20 NOTE — PROGRESS NOTES
Patient seen and examined.  Patient with small, palpable nodule lateral to NAC on left side.  She is scheduled for an US this week.  Certainly this could be a sequelae of her prior fat transfer but given her history, I agree this is recommended and required.  We discussed having time to do appropriate workup prior to deciding upon additional procedures to achieve improved symmetry following lumpectomy and XRT.  She did ask about mastectomy and reconstruction and we did have a brief discussion of this however I think prudent to wait until results have been determined prior to further discussion of this.    Patient and her  voiced understanding and will follow up following her imaging.    Venita Lloyd, DO  Plastic and Reconstructive Surgery

## 2025-03-21 NOTE — PROGRESS NOTES
We discussed the findings from her US and discussed next steps.  Certainly fat transfer is her decision and not necessary should she be content with her asymmetry.  We did discuss that should there be a palpable nodule at the time of surgery, I got remove it.  We reviewed the spectrum of breast reconstruction should any issues arise or should she change her mind.  We will keep surgery as scheduled but I reinforced she is always welcome to change her mind or change the plan.    Venita Lloyd, DO  Plastic and Reconstructive Surgery

## 2025-03-29 DIAGNOSIS — N95.1 HOT FLASH, MENOPAUSAL: ICD-10-CM

## 2025-03-31 RX ORDER — FEZOLINETANT 45 MG/1
45 TABLET, FILM COATED ORAL DAILY
Qty: 90 TABLET | Refills: 3 | Status: SHIPPED | OUTPATIENT
Start: 2025-03-31

## 2025-04-21 ENCOUNTER — PATIENT MESSAGE (OUTPATIENT)
Dept: FAMILY MEDICINE CLINIC | Facility: CLINIC | Age: 50
End: 2025-04-21

## 2025-04-21 DIAGNOSIS — T75.3XXS MOTION SICKNESS, SEQUELA: Primary | ICD-10-CM

## 2025-04-21 RX ORDER — SCOPOLAMINE 1 MG/3D
1 PATCH, EXTENDED RELEASE TRANSDERMAL
Qty: 4 PATCH | Refills: 0 | Status: SHIPPED | OUTPATIENT
Start: 2025-04-21

## 2025-04-23 ENCOUNTER — OFFICE VISIT (OUTPATIENT)
Dept: OBGYN CLINIC | Facility: CLINIC | Age: 50
End: 2025-04-23
Payer: COMMERCIAL

## 2025-04-23 VITALS — BODY MASS INDEX: 34.17 KG/M2 | WEIGHT: 181 LBS | HEIGHT: 61 IN

## 2025-04-23 DIAGNOSIS — M75.21 BICEPS TENDONITIS ON RIGHT: ICD-10-CM

## 2025-04-23 DIAGNOSIS — M75.41 IMPINGEMENT SYNDROME OF RIGHT SHOULDER: Primary | ICD-10-CM

## 2025-04-23 DIAGNOSIS — M25.511 RIGHT SHOULDER PAIN, UNSPECIFIED CHRONICITY: ICD-10-CM

## 2025-04-23 PROCEDURE — 99213 OFFICE O/P EST LOW 20 MIN: CPT | Performed by: STUDENT IN AN ORGANIZED HEALTH CARE EDUCATION/TRAINING PROGRAM

## 2025-04-23 NOTE — PROGRESS NOTES
"[facility] [place]  [address]  [address]  [place] [id number] [phone]       Jocelyn Smith  [id number]  1975    ORTHOPAEDIC SURGERY OUTPATIENT NOTE  4/23/2025  :  Assessment & Plan  Impingement syndrome of right shoulder    Orders:    Ambulatory Referral to Orthopedic Surgery; Future    Biceps tendonitis on right    Orders:    Ambulatory Referral to Orthopedic Surgery; Future  Discussed and recommended repeat US guide CSI injection, provided over 6 months of pain relief   OTC pain medication  Referral was placed for Dr. Hawthorne for US guided injection  Follow up as needed      Procedures  No procedures today.    Translation: No    HISTORY:  50 y.o. female  who is present in office for follow up. Notes that last US guided CSI given by Dr. Hawthorne allowed over 6 months of relief and notes that her pain has since returned since January while shoveling snow. She notes that pain limits her forward flexion and is inquiring about repeat injection, she has not noticed and weakness and has not sustained injury or trauma to her shoulder. She denies any numbness or tingling. She notes that her symptoms are the same as prior to previous injection          The following portions of the patient's history were reviewed and updated as appropriate: allergies, current medications, past family history, past social history, past surgical history and problem list.    Ht 5' 1\" (1.549 m)   Wt 82.1 kg (181 lb)   BMI 34.20 kg/m²    Lab Results   Component Value Date    HGBA1C 5.7 (H) 09/06/2024         Past Medical History:   Diagnosis Date    Adhesive capsulitis of right shoulder     Resolved 11/15/2016     Asthma     Benign paroxysmal vertigo, unspecified ear     Resolved 11/15/2016     BRCA gene mutation negative 06/2019    Invitae    Breast cancer (HCC) 07/30/2019    left    Cellulitis of leg, except foot     Resolved 12/10/2014     History of breast surgery " 2023    Fat grafting bilateral breast s/p lumpectomy    Hx of radiation therapy 08/01/2019    Lymphadenitis     Resolved 12/10/2014     Migraine     Snoring     Wears contact lenses     Wears glasses        Past Surgical History:   Procedure Laterality Date    BREAST BIOPSY Left 2019    BREAST LUMPECTOMY Left 07/30/2019    Procedure: BREAST LUMPECTOMY; BREAST NEEDLE LOCALIZATION (NEEDLE LOC AT 0800);  Surgeon: Miguel Angel Steinberg MD;  [facility];  Service: Surgical Oncology    CHOLECYSTECTOMY LAPAROSCOPIC N/A 12/10/2023    Procedure: CHOLECYSTECTOMY LAPAROSCOPIC;  Surgeon: Yuan Humphrey MD;  [facility];  Service: General    COLONOSCOPY      INTRAUTERINE DEVICE INSERTION  2019    LYMPH NODE BIOPSY Left 07/30/2019    Procedure: SENTINEL LYMPH NODE BIOPSY; LYMPHATIC MAPPING WITH BLUE DYE AND RADIOACTIVE DYE (INJECT AT 0930 BY DR STEINBERG IN THE OR);  Surgeon: Miguel Angel Steinberg MD;  [facility];  Service: Surgical Oncology    MAMMO NEEDLE LOCALIZATION LEFT (ALL INC) Left 07/30/2019    MI GRAFTING OF AUTOLOGOUS FAT BY LIPO 50 CC OR LESS Bilateral 5/11/2023    Procedure: FAT GRAFTING TO BILATERAL BREASTS;  Surgeon: Venita Lloyd DO;  Location:  MAIN OR;  Service: Plastics    MI MASTOPEXY Right 5/11/2023    Procedure: RIGHT CRESCENTRIC MASTOPEXY;  Surgeon: Venita Lloyd DO;  Location:  MAIN OR;  Service: Plastics    SALPINGECTOMY Bilateral 03/2021    SHOULDER SURGERY Right     Last assessed 12/29/2015     US GUIDED BREAST BIOPSY LEFT COMPLETE Left 06/07/2019    WISDOM TOOTH EXTRACTION         Social History     Socioeconomic History    Marital status: /Civil Union     Spouse name: Not on file    Number of children: 3    Years of education: Not on file    Highest education level: Not on file   Occupational History    Not on file   Tobacco Use    Smoking status: Never     Passive exposure: Never    Smokeless tobacco: Never   Vaping Use    Vaping status: Never Used   Substance and  Sexual Activity    Alcohol use: Not Currently    Drug use: No    Sexual activity: Yes     Partners: Male   Other Topics Concern    Not on file   Social History Narrative    Not on file     Social Drivers of Health     Financial Resource Strain: Not on file   Food Insecurity: Not on file   Transportation Needs: Not on file   Physical Activity: Not on file   Stress: Not on file   Social Connections: Not on file   Intimate Partner Violence: Not on file   Housing Stability: Not on file       Family History   Problem Relation Age of Onset    Alzheimer's disease Mother     COPD Father     No Known Problems Sister     No Known Problems Daughter     Alzheimer's disease Maternal Grandmother     No Known Problems Maternal Grandfather     No Known Problems Paternal Grandmother     No Known Problems Paternal Grandfather     No Known Problems Brother     No Known Problems Brother     No Known Problems Brother     No Known Problems Daughter         Patient's Medications   New Prescriptions    No medications on file   Previous Medications    ALBUTEROL (PROAIR HFA) 90 MCG/ACT INHALER    Inhale 2 puffs every 4 (four) hours as needed for wheezing    BIOTIN W/ VITAMINS C & E 1250-7.5-7.5 MCG-MG-UNT CHEW    in the morning    CETIRIZINE (ZYRTEC) 10 MG CHEWABLE TABLET    Chew 10 mg daily as needed     CHOLECALCIFEROL (VITAMIN D3) 1000 UNITS CHEW    Chew 1 tablet daily    FLUTICASONE (FLONASE) 50 MCG/ACT NASAL SPRAY    1 spray into each nostril daily as needed     MECLIZINE (ANTIVERT) 25 MG TABLET    Take 1 tablet (25 mg total) by mouth 3 (three) times a day as needed for dizziness    MULTIPLE VITAMINS-MINERALS (AIRBORNE PO)    Take 1 tablet by mouth in the morning    MULTIVITAMIN (THERAGRAN) TABS    Take 1 tablet by mouth daily    RIZATRIPTAN (MAXALT-MLT) 10 MG DISINTEGRATING TABLET    Take 1 tablet (10 mg total) by mouth daily as needed for migraine Take at the onset of migraine; if symptoms continue or return, may take another dose at  "least 2 hours after first dose. Take no more than 2 doses in a day.    SCOPOLAMINE (TRANSDERM-SCOP) 1 MG/3 DAYS TD 72 HR PATCH    Place 1 patch on the skin over 72 hours every third day    VEOZAH TABLET    TAKE 1 TABLET BY MOUTH DAILY   Modified Medications    No medications on file   Discontinued Medications    No medications on file       No Known Allergies       REVIEW OF SYSTEMS:  Constitutional: Negative.    HEENT: Negative.    Respiratory: Negative.    Skin: Negative.    Neurological: Negative.    Psychiatric/Behavioral: Negative.  Musculoskeletal: Negative except for that mentioned in the HPI.    Gen: No acute distress, resting comfortably in bed  HEENT: Eyes clear, moist mucus membranes, hearing intact  Respiratory: No audible wheezing or stridor  Cardiovascular: Well Perfused peripherally, 2+ distal pulse  Abdomen: nondistended, no peritoneal signs     PHYSICAL EXAM:      Right Shoulder Exam  Alignment / Posture:  Normal shoulder posture.  Inspection:  No swelling. No erythema. Previous healed incision anterior shoulder  Palpation:  No tenderness.  ROM:  Shoulder . Shoulder ER 90. Shoulder IR 45.  Strength:  4+/5 resisted forward flexion, abduction, 5/5 external rotation, 5/5 internal rotation  Stability:  Not tested.  Tests: (+) Thomas. (+) Neer. (-) Drop arm. (-) Belly press.  Neurovascular:  Sensation intact in Ax/R/M/U nerve distributions. 2+ radial pulse.        Keren Barbour PA-C    Scribe Attestation      I,:  Keren Barbour PA-C am acting as a scribe while in the presence of the attending physician.:       I,:  Adis Voss, DO personally performed the services described in this documentation    as scribed in my presence.:               Portions of the record may have been created with voice recognition software.  Occasional wrong word or \"sound a like\" substitutions may have occurred due to the inherent limitations of voice recognition software.  Read the chart carefully and recognize, " using context, where substitutions have occurred. All patient's questions were answered to their satisfaction.

## 2025-04-24 ENCOUNTER — TELEPHONE (OUTPATIENT)
Dept: OBGYN CLINIC | Facility: MEDICAL CENTER | Age: 50
End: 2025-04-24

## 2025-04-24 NOTE — TELEPHONE ENCOUNTER
[facility] R [facility]er ref by Dr. Voss. Please forward upon call back- thanks!

## 2025-04-25 NOTE — TELEPHONE ENCOUNTER
Caller: Patient    Doctor: Dr. Hawthorne    Reason for call: Returned call for usgi. States is available any day/time-EXCEPT 4/29,4/30,5/1-am and 5/16. Works from home ok to schedule any time other than those dates    Call back#: [phone]

## 2025-04-28 ENCOUNTER — TELEPHONE (OUTPATIENT)
Dept: CARDIOLOGY CLINIC | Facility: CLINIC | Age: 50
End: 2025-04-28

## 2025-04-28 NOTE — TELEPHONE ENCOUNTER
Patient's 6/2025 cardio oncology appointment was cancelled via [employer]. Reached out to assist in rescheduling call. Voicemail left to contact us

## 2025-05-02 ENCOUNTER — OFFICE VISIT (OUTPATIENT)
Dept: OBGYN CLINIC | Facility: CLINIC | Age: 50
End: 2025-05-02
Attending: STUDENT IN AN ORGANIZED HEALTH CARE EDUCATION/TRAINING PROGRAM
Payer: COMMERCIAL

## 2025-05-02 VITALS — BODY MASS INDEX: 34.17 KG/M2 | WEIGHT: 181 LBS | HEIGHT: 61 IN

## 2025-05-02 DIAGNOSIS — G89.29 CHRONIC RIGHT SHOULDER PAIN: Primary | ICD-10-CM

## 2025-05-02 DIAGNOSIS — M25.511 CHRONIC RIGHT SHOULDER PAIN: Primary | ICD-10-CM

## 2025-05-02 DIAGNOSIS — M25.511 RIGHT SHOULDER PAIN, UNSPECIFIED CHRONICITY: ICD-10-CM

## 2025-05-02 DIAGNOSIS — M75.21 BICEPS TENDONITIS ON RIGHT: ICD-10-CM

## 2025-05-02 DIAGNOSIS — M75.41 IMPINGEMENT SYNDROME OF RIGHT SHOULDER: ICD-10-CM

## 2025-05-02 PROCEDURE — 20611 DRAIN/INJ JOINT/BURSA W/US: CPT | Performed by: PHYSICAL MEDICINE & REHABILITATION

## 2025-05-02 PROCEDURE — 99213 OFFICE O/P EST LOW 20 MIN: CPT | Performed by: PHYSICAL MEDICINE & REHABILITATION

## 2025-05-02 RX ORDER — ROPIVACAINE HYDROCHLORIDE 5 MG/ML
10 INJECTION, SOLUTION EPIDURAL; INFILTRATION; PERINEURAL
Status: COMPLETED | OUTPATIENT
Start: 2025-05-02 | End: 2025-05-02

## 2025-05-02 RX ORDER — TRIAMCINOLONE ACETONIDE 40 MG/ML
80 INJECTION, SUSPENSION INTRA-ARTICULAR; INTRAMUSCULAR
Status: COMPLETED | OUTPATIENT
Start: 2025-05-02 | End: 2025-05-02

## 2025-05-02 RX ADMIN — TRIAMCINOLONE ACETONIDE 80 MG: 40 INJECTION, SUSPENSION INTRA-ARTICULAR; INTRAMUSCULAR at 09:15

## 2025-05-02 RX ADMIN — ROPIVACAINE HYDROCHLORIDE 10 ML: 5 INJECTION, SOLUTION EPIDURAL; INFILTRATION; PERINEURAL at 09:15

## 2025-05-02 NOTE — ASSESSMENT & PLAN NOTE
Patient presents in follow up of right shoulder pain secondary to rotator cuff and bicep tendinosis.  She had an open subacromial decompression years ago.  Recent treatment has included USG SA-SD steroid injection and USG bicep tendon sheath injection.  She had good improvement with ultrasound-guided bicep tendon sheath injection which we repeated today.  Can consider repeat injection in the future if she continues to do well with this.

## 2025-05-02 NOTE — PROGRESS NOTES
Assessment & Plan  Chronic right shoulder pain  Patient presents in follow up of right shoulder pain secondary to rotator cuff and bicep tendinosis.  She had an open subacromial decompression years ago.  Recent treatment has included USG SA-SD steroid injection and USG bicep tendon sheath injection.  She had good improvement with ultrasound-guided bicep tendon sheath injection which we repeated today.  Can consider repeat injection in the future if she continues to do well with this.       Impingement syndrome of right shoulder    Orders:    Ambulatory Referral to Orthopedic Surgery    Biceps tendonitis on right    Orders:    Ambulatory Referral to Orthopedic Surgery    Right shoulder pain, unspecified chronicity    Orders:    Ambulatory Referral to Orthopedic Surgery    No follow-ups on file.    Patient is in agreement with the above plan.    HPI:  Jocelyn Smith is a 50 y.o. female  who presents in follow up.  Here for   Chief Complaint   Patient presents with    Right Shoulder - Pain, Follow-up       Since last visit: See above.    Following history reviewed and updated:  Past Medical History:   Diagnosis Date    Adhesive capsulitis of right shoulder     Resolved 11/15/2016     Asthma     Benign paroxysmal vertigo, unspecified ear     Resolved 11/15/2016     BRCA gene mutation negative 06/2019    [facility]    Breast cancer (HCC) 07/30/2019    left    Cellulitis of leg, except foot     Resolved 12/10/2014     History of breast surgery 2023    Fat grafting bilateral breast s/p lumpectomy    Hx of radiation therapy 08/01/2019    Lymphadenitis     Resolved 12/10/2014     Migraine     Snoring     Wears contact lenses     Wears glasses      Past Surgical History:   Procedure Laterality Date    BREAST BIOPSY Left 2019    BREAST LUMPECTOMY Left 07/30/2019    Procedure: BREAST LUMPECTOMY; BREAST NEEDLE LOCALIZATION (NEEDLE LOC AT 0800);  Surgeon: Miguel Angel Steinberg MD;  [facility];  Service: Surgical Oncology     "CHOLECYSTECTOMY LAPAROSCOPIC N/A 12/10/2023    Procedure: CHOLECYSTECTOMY LAPAROSCOPIC;  Surgeon: Yuan Humphrey MD;  [facility];  Service: General    COLONOSCOPY      INTRAUTERINE DEVICE INSERTION  2019    LYMPH NODE BIOPSY Left 07/30/2019    Procedure: SENTINEL LYMPH NODE BIOPSY; LYMPHATIC MAPPING WITH BLUE DYE AND RADIOACTIVE DYE (INJECT AT 0930 BY DR STEINBERG IN THE OR);  Surgeon: Miguel Angel Steinberg MD;  [facility];  Service: Surgical Oncology    MAMMO NEEDLE LOCALIZATION LEFT (ALL INC) Left 07/30/2019    ME GRAFTING OF AUTOLOGOUS FAT BY LIPO 50 CC OR LESS Bilateral 5/11/2023    Procedure: FAT GRAFTING TO BILATERAL BREASTS;  Surgeon: Venita Lloyd DO;  Location:  [facility] OR;  Service: Plastics    ME MASTOPEXY Right 5/11/2023    Procedure: RIGHT CRESCENTRIC MASTOPEXY;  Surgeon: Venita Lloyd DO;  Location:  MAIN OR;  Service: Plastics    SALPINGECTOMY Bilateral 03/2021    SHOULDER SURGERY Right     Last assessed 12/29/2015     US GUIDED BREAST BIOPSY LEFT COMPLETE Left 06/07/2019    WISDOM TOOTH EXTRACTION       Social History   Social History     Substance and Sexual Activity   Alcohol Use Not Currently     Social History     Substance and Sexual Activity   Drug Use No     Social History     Tobacco Use   Smoking Status Never    Passive exposure: Never   Smokeless Tobacco Never     Family History   Problem Relation Age of Onset    Alzheimer's disease Mother     COPD Father     No Known Problems Sister     No Known Problems Daughter     Alzheimer's disease Maternal Grandmother     No Known Problems Maternal Grandfather     No Known Problems Paternal Grandmother     No Known Problems Paternal Grandfather     No Known Problems Brother     No Known Problems Brother     No Known Problems Brother     No Known Problems Daughter      No Known Allergies     Constitutional:  Ht 5' 0.98\" (1.549 m)   Wt 82.1 kg (181 lb)   BMI 34.22 kg/m²    General: NAD.  Eyes: Clear sclerae.  ENT: No " inflammation, lesion, or mass of lips.  No tracheal deviation.  Musculoskeletal: As mentioned below.  Integumentary: No visible rashes or skin lesions.  Pulmonary/Chest: Effort normal. No respiratory distress.   Neuro: CN's grossly intact, FALLON.  Psych: Normal affect and judgement.  Vascular: WWP.    Right Shoulder Exam     Tenderness   The patient is experiencing tenderness in the acromion and biceps tendon.    Other   Erythema: absent  Scars: absent  Sensation: normal  Pulse: present    Comments:  Positive speed's test.             Large joint arthrocentesis: R subacromial bursa  [place] Protocol:  Procedure performed by:  Consent: Verbal consent obtained. Written consent not obtained.  Consent given by: patient  Timeout called at: 5/2/2025 9:39 AM.  Patient understanding: patient states understanding of the procedure being performed  Site marked: the operative site was marked  Radiology Images displayed and confirmed. If images not available, report reviewed: imaging studies available  Patient identity confirmed: verbally with patient  Supporting Documentation  Indications: pain and diagnostic evaluation     Is this a Visco injection? NoProcedure Details  Location: shoulder - R subacromial bursa  Ultrasound guidance: yes (US guidance was used to find the area of interest.)  Medications administered: 80 mg triamcinolone acetonide 40 mg/mL; 10 mL ropivacaine 0.5 %    Patient tolerance: patient tolerated the procedure well with no immediate complications  Dressing:  Sterile dressing applied    The right bicep tendon sheath was visualized with ultrasound and injected with steroid/anesthetic solution as indicated.    Prior to the injection, the ultrasound was used to evaluate for any neural or vascular structures.  Care was taken to avoid these structures.    The images (and video if taken) were saved to the [facility] ultrasound system.    Risks of this procedure include:    - Risk of bleeding since a needle is involved.  -  Risk of infection (1/10,000 chance as per recent studies).  Signs/symptoms were discussed and they would prompt an urgent evaluation at an emergency department.  - Risk of pigmentation or skin dimpling in the skin (2-3% chance as per recent studies) from the steroid.  - Risk of increased pain from steroid flare (1% chance as per recent studies) that typically lasts 24-48 hours.  - Risk of increased blood sugars from the steroid medication that can last for a few weeks.  If the patient is a diabetic or pre-diabetic, they were encouraged to closely monitor their blood sugars and discuss with PCP if elevated more than usual or if having symptoms.    We decided that the benefits outweigh the risks and so we proceeded with the procedure.

## 2025-06-02 NOTE — PRE-PROCEDURE INSTRUCTIONS
Pre-Surgery Instructions:   Medication Instructions    albuterol (ProAir HFA) 90 mcg/act inhaler Uses PRN- OK to take day of surgery    Biotin w/ Vitamins C & E 1250-7.5-7.5 MCG-MG-UNT CHEW Stop taking 7 days prior to surgery.    cetirizine (ZyrTEC) 10 MG chewable tablet Stop taking 7 days prior to surgery.    Cholecalciferol (VITAMIN D3) 1000 units CHEW Stop taking 7 days prior to surgery.    fluticasone (FLONASE) 50 mcg/act nasal spray Uses PRN- OK to take day of surgery    meclizine (ANTIVERT) 25 mg tablet Uses PRN- OK to take day of surgery    Multiple Vitamins-Minerals (AIRBORNE PO) Stop taking 7 days prior to surgery.    multivitamin (THERAGRAN) TABS Stop taking 7 days prior to surgery.    rizatriptan (MAXALT-MLT) 10 mg disintegrating tablet Uses PRN- OK to take day of surgery    [place]ozah tablet Take day of surgery.      Medication instructions for day of surgery reviewed. Please take all instructed medications with only a sip of water.       You will receive a call one business day prior to surgery with an arrival time and hospital directions. If your surgery is scheduled on a Monday, the hospital will be calling you on the Friday prior to your surgery. If you have not heard from anyone by 8pm, please call the hospital supervisor through the hospital  at [phone]. ([place] [phone] or [place] [phone]).    Do not eat or drink anything after midnight the night before your surgery, including candy, mints, lifesavers, or chewing gum. Do not drink alcohol 24hrs before your surgery. Try not to smoke at least 24hrs before your surgery.       Follow the pre surgery showering instructions as listed in the “My Surgical Experience Booklet” or otherwise provided by your surgeon's office. Do not use a blade to shave the surgical area 1 week before surgery. It is okay to use a clean electric clippers up to 24 hours before surgery. Do not apply any lotions, creams, including makeup, cologne, deodorant,  or perfumes after showering on the day of your surgery. Do not use dry shampoo, hair spray, hair gel, or any type of hair products.     No contact lenses, eye make-up, or artificial eyelashes. Remove nail polish, including gel polish, and any artificial, gel, or acrylic nails if possible. Remove all jewelry including rings and body piercing jewelry.     Wear causal clothing that is easy to take on and off. Consider your type of surgery.    Keep any valuables, jewelry, piercings at home. Please bring any specially ordered equipment (sling, braces) if indicated.    Arrange for a responsible person to drive you to and from the hospital on the day of your surgery. Please confirm the visitor policy for the day of your procedure when you receive your phone call with an arrival time.     Call the surgeon's office with any new illnesses, exposures, or additional questions prior to surgery.    Please reference your “My Surgical Experience Booklet” for additional information to prepare for your upcoming surgery.

## 2025-06-09 DIAGNOSIS — Z98.890 HISTORY OF LUMPECTOMY: Primary | ICD-10-CM

## 2025-06-09 PROCEDURE — NC001 PR NO CHARGE

## 2025-06-09 RX ORDER — ACETAMINOPHEN 500 MG
500 TABLET ORAL EVERY 6 HOURS
Qty: 20 TABLET | Refills: 0 | Status: SHIPPED | OUTPATIENT
Start: 2025-06-09 | End: 2025-06-14

## 2025-06-09 RX ORDER — IBUPROFEN 800 MG/1
800 TABLET, FILM COATED ORAL EVERY 8 HOURS SCHEDULED
Qty: 15 TABLET | Refills: 0 | Status: SHIPPED | OUTPATIENT
Start: 2025-06-09 | End: 2025-06-14

## 2025-06-09 RX ORDER — GABAPENTIN 300 MG/1
300 CAPSULE ORAL 3 TIMES DAILY
Qty: 15 CAPSULE | Refills: 0 | Status: SHIPPED | OUTPATIENT
Start: 2025-06-09 | End: 2025-06-14

## 2025-06-09 RX ORDER — CEPHALEXIN 500 MG/1
500 CAPSULE ORAL EVERY 12 HOURS SCHEDULED
Qty: 14 CAPSULE | Refills: 0 | Status: SHIPPED | OUTPATIENT
Start: 2025-06-09 | End: 2025-06-16

## 2025-06-09 RX ORDER — TRAMADOL HYDROCHLORIDE 50 MG/1
50 TABLET ORAL EVERY 6 HOURS PRN
Qty: 10 TABLET | Refills: 0 | Status: SHIPPED | OUTPATIENT
Start: 2025-06-09

## 2025-06-09 NOTE — ASSESSMENT & PLAN NOTE
We discussed the findings from her US and discussed next steps.  Certainly fat transfer is her decision and not necessary should she be content with her asymmetry.  We did discuss that should there be a palpable nodule at the time of surgery, I would remove it.  We reviewed the spectrum of breast reconstruction should any issues arise or should she change her mind.  We will keep surgery as scheduled but I reinforced she is always welcome to change her mind or change the plan.     Venita Lloyd, DO  Plastic and Reconstructive Surgery

## 2025-06-09 NOTE — H&P
H&P - Plastic Surgery   Name: Jocelyn Smith 50 y.o. female I MRN: [id number]  Unit/Bed#:  I Date of Admission: (Not on file)   Date of Service: 6/9/2025 I Hospital Day: 0     Assessment & Plan  History of breast cancer  We discussed the findings from her US and discussed next steps.  Certainly fat transfer is her decision and not necessary should she be content with her asymmetry.  We did discuss that should there be a palpable nodule at the time of surgery, I would remove it.  We reviewed the spectrum of breast reconstruction should any issues arise or should she change her mind.  We will keep surgery as scheduled but I reinforced she is always welcome to change her mind or change the plan.     Venita Lloyd,   Plastic and Reconstructive Surgery      History of Present Illness   Jocelyn Smith is a 50 y.o. female who presents for fat grafting to left breast.    Review of Systems   Constitutional:  Negative for chills and fever.   HENT:  Negative for ear pain and sore throat.    Eyes:  Negative for pain and visual disturbance.   Respiratory:  Negative for cough and shortness of breath.    Cardiovascular:  Negative for chest pain and palpitations.   Gastrointestinal:  Negative for abdominal pain and vomiting.   Genitourinary:  Negative for dysuria and hematuria.   Musculoskeletal:  Negative for arthralgias and back pain.   Skin:  Negative for color change and rash.   Neurological:  Negative for seizures and syncope.   All other systems reviewed and are negative.    Historical Information   Historical Information   Past Medical History[1]  Past Surgical History[2]  Social History[3]  E-Cigarette/Vaping    E-Cigarette Use Never User      E-Cigarette/Vaping Substances    Nicotine No     THC No     CBD No     Flavoring No     Other No     Unknown No          Objective :       Physical Exam  Vitals and nursing note reviewed.   Constitutional:       General: She is not in acute distress.     Appearance: She is  well-developed.   HENT:      Head: Normocephalic and atraumatic.     Eyes:      Conjunctiva/sclera: Conjunctivae normal.       Cardiovascular:      Rate and Rhythm: Normal rate and regular rhythm.      Pulses: Normal pulses.   Pulmonary:      Effort: Pulmonary effort is normal. No respiratory distress.   Abdominal:      Palpations: Abdomen is soft.      Tenderness: There is no abdominal tenderness.     Musculoskeletal:         General: No swelling.      Cervical back: Neck supple.     Skin:     General: Skin is warm and dry.      Capillary Refill: Capillary refill takes less than 2 seconds.     Neurological:      Mental Status: She is alert and oriented to person, place, and time.     Psychiatric:         Mood and Affect: Mood normal.           Lab Results: I have reviewed the following results:                 [1]   Past Medical History:  Diagnosis Date    Adhesive capsulitis of right shoulder     Resolved 11/15/2016     Asthma     Benign paroxysmal vertigo, unspecified ear     Resolved 11/15/2016     BRCA gene mutation negative 06/2019    [facility]    Breast cancer (HCC) 07/30/2019    left    Cellulitis of leg, except foot     Resolved 12/10/2014     History of breast surgery 2023    Fat grafting bilateral breast s/p lumpectomy    Hx of radiation therapy 08/01/2019    Lymphadenitis     Resolved 12/10/2014     Migraine     Snoring     Wears contact lenses     Wears glasses    [2]   Past Surgical History:  Procedure Laterality Date    BREAST BIOPSY Left 2019    BREAST LUMPECTOMY Left 07/30/2019    Procedure: BREAST LUMPECTOMY; BREAST NEEDLE LOCALIZATION (NEEDLE LOC AT 0800);  Surgeon: Miguel Angel Steinberg MD;  [facility];  Service: Surgical Oncology    CHOLECYSTECTOMY LAPAROSCOPIC N/A 12/10/2023    Procedure: CHOLECYSTECTOMY LAPAROSCOPIC;  Surgeon: Yuan Humphrey MD;  [facility];  Service: General    COLONOSCOPY      INTRAUTERINE DEVICE INSERTION  2019    LYMPH NODE BIOPSY Left 07/30/2019    Procedure:  SENTINEL LYMPH NODE BIOPSY; LYMPHATIC MAPPING WITH BLUE DYE AND RADIOACTIVE DYE (INJECT AT 0930 BY DR STEINBERG IN THE OR);  Surgeon: Miguel Angel Steinberg MD;  [facility];  Service: Surgical Oncology    MAMMO NEEDLE LOCALIZATION LEFT (ALL INC) Left 07/30/2019    [place] GRAFTING OF AUTOLOGOUS FAT BY LIPO 50 CC OR LESS Bilateral 5/11/2023    Procedure: FAT GRAFTING TO BILATERAL BREASTS;  Surgeon: Venita Lloyd DO;  Location:  [facility];  Service: Plastics    NY MASTOPEXY Right 5/11/2023    Procedure: RIGHT CRESCENTRIC MASTOPEXY;  Surgeon: Venita Lloyd DO;  Location:  [facility];  Service: Plastics    SALPINGECTOMY Bilateral 03/2021    SHOULDER SURGERY Right     Last assessed 12/29/2015     US GUIDED BREAST BIOPSY LEFT COMPLETE Left 06/07/2019    WISDOM TOOTH EXTRACTION     [3]   Social History  Tobacco Use    Smoking status: Never     Passive exposure: Never    Smokeless tobacco: Never   Vaping Use    Vaping status: Never Used   Substance and Sexual Activity    Alcohol use: Yes     Comment: occasional    Drug use: No    Sexual activity: Yes     Partners: Male

## 2025-06-10 ENCOUNTER — TELEPHONE (OUTPATIENT)
Age: 50
End: 2025-06-10

## 2025-06-10 ENCOUNTER — ANESTHESIA EVENT (OUTPATIENT)
Dept: PERIOP | Facility: HOSPITAL | Age: 50
End: 2025-06-10
Payer: COMMERCIAL

## 2025-06-10 ENCOUNTER — HOSPITAL ENCOUNTER (OUTPATIENT)
Facility: HOSPITAL | Age: 50
Setting detail: OUTPATIENT SURGERY
Discharge: HOME | End: 2025-06-10
Attending: STUDENT IN AN ORGANIZED HEALTH CARE EDUCATION/TRAINING PROGRAM | Admitting: STUDENT IN AN ORGANIZED HEALTH CARE EDUCATION/TRAINING PROGRAM
Payer: COMMERCIAL

## 2025-06-10 ENCOUNTER — ANESTHESIA (OUTPATIENT)
Dept: PERIOP | Facility: HOSPITAL | Age: 50
End: 2025-06-10
Payer: COMMERCIAL

## 2025-06-10 VITALS
DIASTOLIC BLOOD PRESSURE: 79 MMHG | WEIGHT: 181 LBS | RESPIRATION RATE: 16 BRPM | HEIGHT: 61 IN | HEART RATE: 67 BPM | TEMPERATURE: 97 F | OXYGEN SATURATION: 100 % | BODY MASS INDEX: 34.17 KG/M2 | SYSTOLIC BLOOD PRESSURE: 131 MMHG

## 2025-06-10 DIAGNOSIS — Z85.3 HISTORY OF BREAST CANCER: ICD-10-CM

## 2025-06-10 LAB
EXT PREGNANCY TEST URINE: NEGATIVE
EXT. CONTROL: NORMAL

## 2025-06-10 PROCEDURE — 15772 GRFG AUTOL FAT LIPO EA ADDL: CPT | Performed by: STUDENT IN AN ORGANIZED HEALTH CARE EDUCATION/TRAINING PROGRAM

## 2025-06-10 PROCEDURE — 88305 TISSUE EXAM BY PATHOLOGIST: CPT | Performed by: PATHOLOGY

## 2025-06-10 PROCEDURE — 19120 REMOVAL OF BREAST LESION: CPT

## 2025-06-10 PROCEDURE — 19120 REMOVAL OF BREAST LESION: CPT | Performed by: STUDENT IN AN ORGANIZED HEALTH CARE EDUCATION/TRAINING PROGRAM

## 2025-06-10 PROCEDURE — 15772 GRFG AUTOL FAT LIPO EA ADDL: CPT

## 2025-06-10 PROCEDURE — 88342 IMHCHEM/IMCYTCHM 1ST ANTB: CPT | Performed by: PATHOLOGY

## 2025-06-10 PROCEDURE — 15771 GRFG AUTOL FAT LIPO 50 CC/<: CPT | Performed by: STUDENT IN AN ORGANIZED HEALTH CARE EDUCATION/TRAINING PROGRAM

## 2025-06-10 PROCEDURE — 81025 URINE PREGNANCY TEST: CPT | Performed by: STUDENT IN AN ORGANIZED HEALTH CARE EDUCATION/TRAINING PROGRAM

## 2025-06-10 PROCEDURE — 15771 GRFG AUTOL FAT LIPO 50 CC/<: CPT

## 2025-06-10 RX ORDER — PROPOFOL 10 MG/ML
INJECTION, EMULSION INTRAVENOUS AS NEEDED
Status: DISCONTINUED | OUTPATIENT
Start: 2025-06-10 | End: 2025-06-10

## 2025-06-10 RX ORDER — TRAMADOL HYDROCHLORIDE 50 MG/1
50 TABLET ORAL ONCE AS NEEDED
Status: DISCONTINUED | OUTPATIENT
Start: 2025-06-10 | End: 2025-06-10 | Stop reason: HOSPADM

## 2025-06-10 RX ORDER — HYDROMORPHONE HCL/PF 1 MG/ML
0.5 SYRINGE (ML) INJECTION
Status: DISCONTINUED | OUTPATIENT
Start: 2025-06-10 | End: 2025-06-10 | Stop reason: HOSPADM

## 2025-06-10 RX ORDER — ONDANSETRON 2 MG/ML
INJECTION INTRAMUSCULAR; INTRAVENOUS AS NEEDED
Status: DISCONTINUED | OUTPATIENT
Start: 2025-06-10 | End: 2025-06-10

## 2025-06-10 RX ORDER — MIDAZOLAM HYDROCHLORIDE 2 MG/2ML
INJECTION, SOLUTION INTRAMUSCULAR; INTRAVENOUS AS NEEDED
Status: DISCONTINUED | OUTPATIENT
Start: 2025-06-10 | End: 2025-06-10

## 2025-06-10 RX ORDER — DEXAMETHASONE SODIUM PHOSPHATE 10 MG/ML
INJECTION, SOLUTION INTRAMUSCULAR; INTRAVENOUS AS NEEDED
Status: DISCONTINUED | OUTPATIENT
Start: 2025-06-10 | End: 2025-06-10

## 2025-06-10 RX ORDER — CEFAZOLIN SODIUM 2 G/50ML
2000 SOLUTION INTRAVENOUS ONCE
Status: COMPLETED | OUTPATIENT
Start: 2025-06-10 | End: 2025-06-10

## 2025-06-10 RX ORDER — OXYCODONE HYDROCHLORIDE 5 MG/1
5 TABLET ORAL ONCE AS NEEDED
Status: DISCONTINUED | OUTPATIENT
Start: 2025-06-10 | End: 2025-06-10 | Stop reason: HOSPADM

## 2025-06-10 RX ORDER — MINERAL OIL
OIL (ML) MISCELLANEOUS AS NEEDED
Status: DISCONTINUED | OUTPATIENT
Start: 2025-06-10 | End: 2025-06-10 | Stop reason: HOSPADM

## 2025-06-10 RX ORDER — ONDANSETRON 4 MG/1
4 TABLET, ORALLY DISINTEGRATING ORAL ONCE AS NEEDED
Status: DISCONTINUED | OUTPATIENT
Start: 2025-06-10 | End: 2025-06-10 | Stop reason: HOSPADM

## 2025-06-10 RX ORDER — IBUPROFEN 400 MG/1
800 TABLET, FILM COATED ORAL ONCE
Status: COMPLETED | OUTPATIENT
Start: 2025-06-10 | End: 2025-06-10

## 2025-06-10 RX ORDER — FENTANYL CITRATE/PF 50 MCG/ML
25 SYRINGE (ML) INJECTION
Status: DISCONTINUED | OUTPATIENT
Start: 2025-06-10 | End: 2025-06-10 | Stop reason: HOSPADM

## 2025-06-10 RX ORDER — LIDOCAINE HYDROCHLORIDE 10 MG/ML
INJECTION, SOLUTION EPIDURAL; INFILTRATION; INTRACAUDAL; PERINEURAL AS NEEDED
Status: DISCONTINUED | OUTPATIENT
Start: 2025-06-10 | End: 2025-06-10

## 2025-06-10 RX ORDER — ONDANSETRON 2 MG/ML
4 INJECTION INTRAMUSCULAR; INTRAVENOUS ONCE AS NEEDED
Status: DISCONTINUED | OUTPATIENT
Start: 2025-06-10 | End: 2025-06-10 | Stop reason: HOSPADM

## 2025-06-10 RX ORDER — ACETAMINOPHEN 325 MG/1
975 TABLET ORAL ONCE
Status: COMPLETED | OUTPATIENT
Start: 2025-06-10 | End: 2025-06-10

## 2025-06-10 RX ORDER — SODIUM CHLORIDE, SODIUM LACTATE, POTASSIUM CHLORIDE, CALCIUM CHLORIDE 600; 310; 30; 20 MG/100ML; MG/100ML; MG/100ML; MG/100ML
INJECTION, SOLUTION INTRAVENOUS CONTINUOUS PRN
Status: DISCONTINUED | OUTPATIENT
Start: 2025-06-10 | End: 2025-06-10

## 2025-06-10 RX ORDER — FENTANYL CITRATE 50 UG/ML
INJECTION, SOLUTION INTRAMUSCULAR; INTRAVENOUS AS NEEDED
Status: DISCONTINUED | OUTPATIENT
Start: 2025-06-10 | End: 2025-06-10

## 2025-06-10 RX ORDER — ENOXAPARIN SODIUM 100 MG/ML
40 INJECTION SUBCUTANEOUS ONCE
Status: COMPLETED | OUTPATIENT
Start: 2025-06-10 | End: 2025-06-10

## 2025-06-10 RX ORDER — PROMETHAZINE HYDROCHLORIDE 25 MG/ML
6.25 INJECTION, SOLUTION INTRAMUSCULAR; INTRAVENOUS ONCE AS NEEDED
Status: DISCONTINUED | OUTPATIENT
Start: 2025-06-10 | End: 2025-06-10 | Stop reason: HOSPADM

## 2025-06-10 RX ORDER — SODIUM CHLORIDE, SODIUM LACTATE, POTASSIUM CHLORIDE, CALCIUM CHLORIDE 600; 310; 30; 20 MG/100ML; MG/100ML; MG/100ML; MG/100ML
100 INJECTION, SOLUTION INTRAVENOUS CONTINUOUS
Status: CANCELLED | OUTPATIENT
Start: 2025-06-10

## 2025-06-10 RX ADMIN — IBUPROFEN 800 MG: 400 TABLET, FILM COATED ORAL at 10:25

## 2025-06-10 RX ADMIN — FENTANYL CITRATE 50 MCG: 50 INJECTION INTRAMUSCULAR; INTRAVENOUS at 08:33

## 2025-06-10 RX ADMIN — ENOXAPARIN SODIUM 40 MG: 40 INJECTION SUBCUTANEOUS at 07:07

## 2025-06-10 RX ADMIN — FENTANYL CITRATE 50 MCG: 50 INJECTION INTRAMUSCULAR; INTRAVENOUS at 07:38

## 2025-06-10 RX ADMIN — PROPOFOL 180 MG: 10 INJECTION, EMULSION INTRAVENOUS at 07:33

## 2025-06-10 RX ADMIN — PROPOFOL 100 MCG/KG/MIN: 10 INJECTION, EMULSION INTRAVENOUS at 07:35

## 2025-06-10 RX ADMIN — FENTANYL CITRATE 50 MCG: 50 INJECTION INTRAMUSCULAR; INTRAVENOUS at 08:46

## 2025-06-10 RX ADMIN — SODIUM CHLORIDE, SODIUM LACTATE, POTASSIUM CHLORIDE, AND CALCIUM CHLORIDE: .6; .31; .03; .02 INJECTION, SOLUTION INTRAVENOUS at 09:01

## 2025-06-10 RX ADMIN — FENTANYL CITRATE 50 MCG: 50 INJECTION INTRAMUSCULAR; INTRAVENOUS at 07:55

## 2025-06-10 RX ADMIN — DEXAMETHASONE SODIUM PHOSPHATE 10 MG: 10 INJECTION INTRAMUSCULAR; INTRAVENOUS at 07:33

## 2025-06-10 RX ADMIN — ACETAMINOPHEN 975 MG: 325 TABLET ORAL at 07:04

## 2025-06-10 RX ADMIN — ONDANSETRON 4 MG: 2 INJECTION, SOLUTION INTRAMUSCULAR; INTRAVENOUS at 08:57

## 2025-06-10 RX ADMIN — PROPOFOL 40 MG: 10 INJECTION, EMULSION INTRAVENOUS at 08:59

## 2025-06-10 RX ADMIN — LIDOCAINE HYDROCHLORIDE 50 MG: 10 INJECTION, SOLUTION EPIDURAL; INFILTRATION; INTRACAUDAL; PERINEURAL at 07:33

## 2025-06-10 RX ADMIN — SODIUM CHLORIDE, SODIUM LACTATE, POTASSIUM CHLORIDE, AND CALCIUM CHLORIDE: .6; .31; .03; .02 INJECTION, SOLUTION INTRAVENOUS at 07:28

## 2025-06-10 RX ADMIN — CEFAZOLIN SODIUM 2000 MG: 2 SOLUTION INTRAVENOUS at 07:36

## 2025-06-10 RX ADMIN — MIDAZOLAM 2 MG: 1 INJECTION INTRAMUSCULAR; INTRAVENOUS at 07:28

## 2025-06-10 NOTE — OP NOTE
OPERATIVE REPORT  PATIENT NAME: Jocelyn Smith    :  1975  MRN: [id number]  Pt [facility]: [facility]    SURGERY DATE: 6/10/2025    Surgeons and Role:     * Venitajong LloydDO - Primary  BALTAZAR Daugherty PA-C    Preop Diagnosis:  History of breast cancer [Z85.3]    Post-Op Diagnosis Codes:     * History of breast cancer [Z85.3]    Procedure(s):  Left - FAT GRAFTING TO LEFT BREAST, EXCISION OF PALPABLE LEFT BREAST MASS (FAT NECROSIS)    Specimen(s):  ID Type Source Tests Collected by Time Destination   1 : left breast mass Tissue Breast, Left TISSUE EXAM Venita Lloyd DO 6/10/2025 0819        Estimated Blood Loss:   20 ml    Drains:  * No LDAs found *    Anesthesia Type:   General/TIVA    Operative Indications:  49 yo female with h/o left breast lumpectomy and XRT presents for improved symmetry.  She has a small area of presumed fat necrosis, which has decreased in size, but I will attempt to remove.  She understands the long term implications of XRT and those implications on the procedure being performed.    Operative Findings:  250cc of lipoaspirate transferred to left breast       Complications:   None    Procedure and Technique:  The patient was seen preoperatively.  The procedure, risks, benefits and alternatives were discussed.  Informed consent was obtained.  The patient was site marked preoperatively.  The patient was brought to the operating room where she was positioned supine with all of her pressure points appropriately padded.  Anesthesia commenced.  A timeout was performed and verified.     The patient was prepped and draped in usual sterile fashion.  I first turned my attention to the upper abdomen.  A stab incision was made and tumescent solution was infiltrated.   While this set, I made a small incision overlying the palpable area on the left breast.  I dissected down to the area of likely fat necrosis and removed this from the surrounding tissue.  Hemostasis was obtained.   After adequate time to set, a  #4 mercedes cannula was used to perform suction assisted lipectomy.  Once lipoaspirate was obtained, the incision was closed using 4-0 monocryl. The lipoaspirate was decanted and purified using the revC-Note system.  The left breast was subcised using the billy cannula.  The lipoaspirate was then transferred into the left breast.  The stab incision was closed using 4-0 monocryl.  The transferred fat was molded and smoothed.  The incisions were cleansed and glue was applied.  Once dry, the surgical bra and binder were placed.     The instrument, sponge and needle count was correct an verified prior to completion of the case.     The patient emerged from anesthesia and was transferred to the recovery room in stable condition.      Patient Disposition:  PACU          SIGNATURE: Venita Lloyd DO  DATE: Rebecca 10, 2025  TIME: 9:48 AM    No qualified plastic surgery resident was available for the case.  The PA-KATRINA provided assistance with retraction and suturing.

## 2025-06-10 NOTE — ANESTHESIA PREPROCEDURE EVALUATION
Procedure:  FAT GRAFTING TO LEFT BREAST (Left: Breast)    Relevant Problems   ANESTHESIA   (+) Motion sickness      CARDIO   (+) Migraine headache   (+) Mixed hyperlipidemia      GI/HEPATIC   (+) Fatty liver      GYN   (+) Malignant neoplasm of upper-inner quadrant of left breast in female, estrogen receptor positive (HCC)      HEMATOLOGY   (+) Anemia      NEURO/PSYCH   (+) Chronic right shoulder pain   (+) Migraine headache      PULMONARY   (+) Mild persistent asthma without complication        Physical Exam    Airway     Mallampati score: III  TM Distance: >3 FB  Neck ROM: full  Mouth opening: >= 4 cm      Cardiovascular  Rhythm: regular, Rate: normalNo peripheral edema    Dental   No notable dental hx     Pulmonary   No stridor    Neurological    She appears awake, alert and oriented x3.      Other Findings  post-pubertal.      Anesthesia Plan  ASA Score- 2     Anesthesia Type- general with ASA Monitors.         Additional Monitors:     Airway Plan: LMA and LMA.           Plan Factors-Exercise tolerance (METS): >4 METS.    Chart reviewed.   Existing labs reviewed. Patient summary reviewed.                  Induction- intravenous.    Postoperative Plan- Plan for postoperative opioid use.   Monitoring Plan - Monitoring plan - standard ASA monitoring  Post Operative Pain Plan - non-opiod analgesics and plan for postoperative opioid use    Perioperative Resuscitation Plan - Level 1 - Full Code.       Informed Consent- Anesthetic plan and risks discussed with patient.  I personally reviewed this patient with the CRNA. Discussed and agreed on the Anesthesia Plan with the CRNA..      NPO Status:  Vitals Value Taken Time   Date of last liquid 06/09/25 06/10/25 06:39   Time of last liquid 2300 06/10/25 06:39   Date of last solid 06/09/25 06/10/25 06:39   Time of last solid 1900 06/10/25 06:39

## 2025-06-10 NOTE — ANESTHESIA POSTPROCEDURE EVALUATION
Post-Op Assessment Note    CV Status:  Stable  Pain Score: 0    Pain management: adequate       Mental Status:  Alert and awake   Hydration Status:  Euvolemic   PONV Controlled:  Controlled   Airway Patency:  Patent     Post Op Vitals Reviewed: Yes    No anethesia notable event occurred.    Staff: Anesthesiologist           Last Filed PACU Vitals:  Vitals Value Taken Time   Temp 96.5 °F (35.8 °C) 06/10/25 09:44   Pulse 58 06/10/25 09:45   /68 06/10/25 09:45   Resp 15 06/10/25 09:45   SpO2 96 % 06/10/25 09:45   Vitals shown include unfiled device data.    Modified Kyle:     Vitals Value Taken Time   Activity 2 06/10/25 09:44   Respiration 2 06/10/25 09:44   Circulation 2 06/10/25 09:44   Consciousness 2 06/10/25 09:44   Oxygen Saturation 2 06/10/25 09:44     Modified Kyle Score: 10

## 2025-06-10 NOTE — DISCHARGE INSTR - AVS FIRST PAGE
Surgery Date: 6/10/2025                Patient: Jocelyn Smith  Surgeon: Dr. Veniat Lloyd     Postoperative Instructions   Fat Grafting    Dressings:  [x] Skin glue was applied to your incision over absorbable sutures.  You may feel small pieces of suture at the ends of your incision.    [x] Remove compressive garments the morning following your surgery and bathe as directed.  [x] No dressings are required but you may cover the incision with gauze for comfort.  [x] Wear your compression garments (abdominal binder and surgical bra) at all times except while bathing.  [] Other instructions:     Bathing:  [x] Shower 24 hours after surgery.  Allow soap and water to gently wash over the incision.  No scrubbing.  Gently pat dry and apply dressing as needed/instructed above.  [x] No submerging incision in bathtub, pool, hot tub and/or lake.    Activity:  [x] No heavy lifting (> 10lbs).  [x] No strenuous exercise.  [x] Walking is mandatory daily.  [x] No driving until off pain medications and you have resumed full range of motion.  [] Other instructions:     Diet and Medication:  [x] Resume diet as tolerated.  High protein diet is important for healing.  Remain well hydrated with water and minimize sodium intake.  [x] Resume preoperative medications.  [x] Pain medications as prescribed.    [x] Finish all antibiotics (Keflex) as prescribed.  [x] Apply ice to area as needed for pain.  Do not place ice directly on skin.  Do not use heat.  [] Other instructions:     It is expected to have some bruising, swelling and oozing at the incision site and of the surrounding area.  If there is more than you expect, an enlarging area or you suspect an infection, please call the office.    Some patients may experience a low-grade fever after surgery.  If it is above 100.4, please call the office.    If you do not have a postoperative office appointment scheduled, please call the office today and let the staff know Dr. Lloyd/her PA  needs to see you in 7-14  days.    Please call [phone] with any questions, concerns or changes.

## 2025-06-10 NOTE — TELEPHONE ENCOUNTER
Rec'd call from patient requesting to schedule her post-op.    Had surgery today, 6/10/2025 with Dr. Lloyd.    Patient needs to be seen in 7 - 14 days.    Patient would prefer a Monday/Friday at [facility] location (so her  can come). But would be okay with a [facility] appointment.    I told patient that I would contact her tomorrow to get her scheduled. Will most likely be in [facility] office.    Patient is aware that appt will be scheduled with a PA (will try to schedule when Dr. Lloyd is in office).    Patient verbalized understanding.

## 2025-06-10 NOTE — ANESTHESIA POSTPROCEDURE EVALUATION
Post-Op Assessment Note    CV Status:  Stable  Pain Score: 0    Pain management: adequate       Mental Status:  Arousable and sleepy   Hydration Status:  Euvolemic and stable   PONV Controlled:  Controlled   Airway Patency:  Patent and adequate     Post Op Vitals Reviewed: Yes    No anethesia notable event occurred.    Staff: CRNA           Last Filed PACU Vitals:  Vitals Value Taken Time   Temp     Pulse 58 06/10/25 09:12   /71 06/10/25 09:11   Resp     SpO2 100 % 06/10/25 09:12   Vitals shown include unfiled device data.

## 2025-06-11 NOTE — TELEPHONE ENCOUNTER
Called and spoke to patient.    Patient states she feels pretty good - some pain. Patient reports no F/V/N/C.    Patient is S/P FAT GRAFTING TO LEFT BREAST with Dr. Lloyd on 6/10/2025.    Scheduled POPV for 6/23/2025 @ 2:30 pm with Brittani in [place] office.    Patient aware of office location (& parking garage).  Patient aware to arrive 15 minutes prior.

## 2025-06-14 PROCEDURE — 88342 IMHCHEM/IMCYTCHM 1ST ANTB: CPT | Performed by: PATHOLOGY

## 2025-06-14 PROCEDURE — 88305 TISSUE EXAM BY PATHOLOGIST: CPT | Performed by: PATHOLOGY

## 2025-06-18 DIAGNOSIS — G43.809 OTHER MIGRAINE WITHOUT STATUS MIGRAINOSUS, NOT INTRACTABLE: ICD-10-CM

## 2025-06-19 RX ORDER — RIZATRIPTAN BENZOATE 10 MG/1
TABLET, ORALLY DISINTEGRATING ORAL
Qty: 12 TABLET | Refills: 3 | Status: SHIPPED | OUTPATIENT
Start: 2025-06-19

## 2025-06-23 ENCOUNTER — OFFICE VISIT (OUTPATIENT)
Dept: PLASTIC SURGERY | Facility: CLINIC | Age: 50
End: 2025-06-23

## 2025-06-23 DIAGNOSIS — Z85.3 HISTORY OF BREAST CANCER: Primary | ICD-10-CM

## 2025-06-23 PROCEDURE — 99024 POSTOP FOLLOW-UP VISIT: CPT

## 2025-06-23 NOTE — PROGRESS NOTES
[facility]  [address], [address], [place] [address]  [phone]    Patient Identification: Jocelyn Smith is a 50 y.o. female     History of Present Illness: The patient is a 50 y.o. female who presents to the office for routine post-operative evaluation. Patient is 13 days s/p Fat Grafting To Left Breast - Left  on 6/10/2025 by Dr. Lloyd.   Patient is doing well with no complaints. Reports adequate pain control. Wearing abdominal binder and surgical bra at all times. Denies fever, chills, nausea, vomiting, bleeding, malaise, swelling, purulent drainage, incisional erythema, or signs of infection. Monocryl sutures to fat harvest/grafting sites intact. Has been having some clear drainage from stab incision on left areola. She has been covering the area with non-adherent dressing. Hx of radiation to left breast.       Tissue exam results for presumed fat necrosis of left breast:    Breast, Left, left breast mass, excisional biopsy:  - Benign fibrosclerosis and mature adipose tissue with focal fact necrosis.   - Negative for malignancy, in-situ carcinoma and atypical hyperplasia on multiple examined levels.      -- Negative Pankeratin (CKAE1/3) immunostain confirms no invasive carcinoma.       The following portions of the patient's history were reviewed: allergies, current medications, past medical history, past surgical history, and past social history    Past Medical History[1]     Past Surgical History[2]    No Known Allergies     Current Medications[3]      Social History     Socioeconomic History    Marital status: /Civil Union     Spouse name: Not on file    Number of children: 3    Years of education: Not on file    Highest education level: Not on file   Occupational History    Not on file   Tobacco Use    Smoking status: Never     Passive exposure: Never    Smokeless tobacco: Never   Vaping Use    Vaping status: Never Used   Substance and Sexual  Activity    Alcohol use: Yes     Comment: occasional    Drug use: No    Sexual activity: Yes     Partners: Male   Other Topics Concern    Not on file   Social History Narrative    Not on file     Social Drivers of Health     Financial Resource Strain: Not on file   Food Insecurity: No Food Insecurity (6/10/2025)    Nursing - Inadequate Food Risk Classification     Worried About Running Out of Food in the Last Year: Not on file     Ran Out of Food in the Last Year: Not on file     Ran Out of Food in the Last Year: Never true   Transportation Needs: No Transportation Needs (6/10/2025)    Nursing - Transportation Risk Classification     Lack of Transportation: Not on file     Lack of Transportation: No   Physical Activity: Not on file   Stress: Not on file   Social Connections: Not on file   Intimate Partner Violence: Unknown (6/10/2025)    Nursing IPS     Feels Physically and Emotionally Safe: Not on file     Physically Hurt by Someone: Not on file     Humiliated or Emotionally Abused by Someone: Not on file     Physically Hurt by Someone: No     Hurt or Threatened by Someone: No   Housing Stability: Unknown (6/10/2025)    Nursing: Inadequate Housing Risk Classification     Has Housing: Not on file     Worried About Losing Housing: Not on file     Unable to Get Utilities: Not on file     Unable to Pay for Housing in the Last Year: No     Has Housing: No        Review of Systems  Constitutional: Denies fevers, chills, nausea, vomiting, malaise, or pain.  Skin: Denies any bleeding, warmth, erythema, edema, mucopurulent drainage, or signs of infection.     Physical Exam  General: pleasant and well appearing, in no acute distress.   Skin/Abdomen: 3 stab incisions - middle of abdomen and to left breast with monocryl sutures in place, removed without difficulty. Incision to left areola with some irritation and clear drainage. No warmth, odors, mucopurulent drainage, or gross signs of infection. No tenderness to palpation of  breast or abdomen. Breast and abdomen both soft. Expected post-operative bruising to abdomen. Skin perfusion intact.     SEE MEDIA       Assessment and Plan:  The patient is a 50 y.o. female who presents to the office for routine post-operative evaluation. Patient is 13 days s/p Fat Grafting To Left Breast - Left  on 6/10/2025 by Dr. Lloyd        -Suture removal performed in clinic today. Can leave incisions open to air. Massage fat grafting/harvest sites daily. Apply Aquaphor to the areas for moisture. Areas are healing appropriately with no signs of infection.   -Will continue to monitor left areolar incision - area was irritated from the suture. Suture has been removed, keep area clean and can continue covering with non-adherent dressing for comfort.   -Continue with surgical bra and abdominal compression 24/7  -The patient chooses to return in 2 weeks for interval post-operative evaluation, or sooner if needed.   -Gently wash surgical sites daily with mild soap and water (please use gentle/hypoallergenic soap like Dial, no scrubbing). Pat dry. Do not let direct water pressure from shower hit your incision(s). No swimming or soaking in bathtub for 6-8 weeks post-operatively, or until wound is fully healed.   -Continue monitoring for signs of infection including increased redness, swelling, fever (temperature > 100.4 F), pus or foul-smelling drainage. Severe pain not relieved by your pain medications. Please call immediately with any of these symptoms.   -The patient is to call the office with any questions or concerns. All of the patient's questions were answered at this time and they agree with the plan of care.      Brittani Mcdonnell PA-C  [facility]astic and Re[facility] Surgery          [1]   Past Medical History:  Diagnosis Date    Adhesive capsulitis of right shoulder     Resolved 11/15/2016     Asthma     Benign paroxysmal vertigo, unspecified ear     Resolved 11/15/2016     BRCA gene mutation negative  06/2019    [facility]    Breast cancer (HCC) 07/30/2019    left    Cellulitis of leg, except foot     Resolved 12/10/2014     History of breast surgery 2023    Fat grafting bilateral breast s/p lumpectomy    Hx of radiation therapy 08/01/2019    Lymphadenitis     Resolved 12/10/2014     Migraine     Snoring     Wears contact lenses     Wears glasses    [2]   Past Surgical History:  Procedure Laterality Date    BREAST BIOPSY Left 2019    BREAST LUMPECTOMY Left 07/30/2019    Procedure: BREAST LUMPECTOMY; BREAST NEEDLE LOCALIZATION (NEEDLE LOC AT 0800);  Surgeon: Miguel Angel Steinberg MD;  [facility];  Service: Surgical Oncology    CHOLECYSTECTOMY LAPAROSCOPIC N/A 12/10/2023    Procedure: CHOLECYSTECTOMY LAPAROSCOPIC;  Surgeon: Yuan Humphrey MD;  [facility];  Service: General    COLONOSCOPY      INTRAUTERINE DEVICE INSERTION  2019    LYMPH NODE BIOPSY Left 07/30/2019    Procedure: SENTINEL LYMPH NODE BIOPSY; LYMPHATIC MAPPING WITH BLUE DYE AND RADIOACTIVE DYE (INJECT AT 0930 BY DR STEINBERG IN THE OR);  Surgeon: Miguel Angel Steinberg MD;  [facility];  Service: Surgical Oncology    MAMMO NEEDLE LOCALIZATION LEFT (ALL INC) Left 07/30/2019    HI GRAFTING OF AUTOLOGOUS FAT BY LIPO 50 CC OR LESS Bilateral 5/11/2023    Procedure: FAT GRAFTING TO BILATERAL BREASTS;  Surgeon: Venita Lloyd DO;  Location:  MAIN OR;  Service: Plastics    HI GRAFTING OF AUTOLOGOUS FAT BY LIPO 50 CC OR LESS Left 6/10/2025    Procedure: FAT GRAFTING TO LEFT BREAST;  Surgeon: Venita Lloyd DO;  [facility];  Service: Plastics    HI MASTOPEXY Right 5/11/2023    Procedure: RIGHT CRESCENTRIC MASTOPEXY;  Surgeon: Venita Lloyd DO;  Location:  MAIN OR;  Service: Plastics    SALPINGECTOMY Bilateral 03/2021    SHOULDER SURGERY Right     Last assessed 12/29/2015     US GUIDED BREAST BIOPSY LEFT COMPLETE Left 06/07/2019    WISDOM TOOTH EXTRACTION     [3]   Current Outpatient Medications:     albuterol (ProAir  HFA) 90 mcg/act inhaler, Inhale 2 puffs every 4 (four) hours as needed for wheezing, Disp: 18 g, Rfl: 0    Biotin w/ Vitamins C & E 1250-7.5-7.5 MCG-MG-UNT CHEW, in the morning, Disp: , Rfl:     cetirizine (ZyrTEC) 10 MG chewable tablet, Chew 10 mg daily as needed, Disp: , Rfl:     Cholecalciferol (VITAMIN D3) 1000 units CHEW, Chew 1 tablet in the morning., Disp: , Rfl:     fluticasone (FLONASE) 50 mcg/act nasal spray, 1 spray into each nostril daily as needed, Disp: , Rfl:     gabapentin (Neurontin) 300 mg capsule, Take 1 capsule (300 mg total) by mouth 3 (three) times a day for 5 days, Disp: 15 capsule, Rfl: 0    ibuprofen (MOTRIN) 800 mg tablet, Take 1 tablet (800 mg total) by mouth every 8 (eight) hours for 5 days Please begin 24 hours after surgery., Disp: 15 tablet, Rfl: 0    meclizine (ANTIVERT) 25 mg tablet, Take 1 tablet (25 mg total) by mouth 3 (three) times a day as needed for dizziness, Disp: 20 tablet, Rfl: 0    Multiple Vitamins-Minerals (AIRBORNE PO), Take 1 tablet by mouth in the morning, Disp: , Rfl:     multivitamin (THERAGRAN) TABS, Take 1 tablet by mouth in the morning., Disp: , Rfl:     rizatriptan (MAXALT-MLT) 10 mg disintegrating tablet, take 1 tablet by mouth once as needed for migraine. take at onset of migraine, if symptoms continue or return, may take another dose at least 2 hours after first dose. no more than 2 doses in a day, Disp: 12 tablet, Rfl: 3    scopolamine (TRANSDERM-SCOP) 1 mg/3 days TD 72 hr patch, Place 1 patch on the skin over 72 hours every third day (Patient not taking: Reported on 4/23/2025), Disp: 4 patch, Rfl: 0    traMADol (Ultram) 50 mg tablet, Take 1 tablet (50 mg total) by mouth every 6 (six) hours as needed for moderate pain, Disp: 10 tablet, Rfl: 0    Veozah tablet, TAKE 1 TABLET BY MOUTH DAILY, Disp: 90 tablet, Rfl: 3  No current facility-administered medications for this visit.    Facility-Administered Medications Ordered in Other Visits:      lidocaine-epinephrine (XYLOCAINE/EPINEPHRINE) 1 %-1:100,000 50 mL, bupivacaine-epinephrine (PF) (MARCAINE/EPINEPHRINE PF) 0.25 %-1:310336 50 mL in lactated Ringer's 1,000 mL OR irrigation, , Irrigation, Once, Venita Lloyd DO    lidocaine-epinephrine (XYLOCAINE/EPINEPHRINE) 1 %-1:100,000 50 mL, bupivacaine-epinephrine (PF) (MARCAINE/EPINEPHRINE PF) 0.25 %-1:463691 50 mL in lactated Ringer's 1,000 mL OR irrigation, , Irrigation, Once, Yuan Humphrey MD

## 2025-07-09 ENCOUNTER — OFFICE VISIT (OUTPATIENT)
Age: 50
End: 2025-07-09

## 2025-07-09 DIAGNOSIS — Z85.3 HISTORY OF BREAST CANCER: Primary | ICD-10-CM

## 2025-07-09 PROCEDURE — 99024 POSTOP FOLLOW-UP VISIT: CPT

## 2025-07-09 NOTE — PROGRESS NOTES
"[facility]astic [facility] Reconstruct[facility]  [phone]    Patient Identification: Jocelyn Smith is a 50 y.o. female     History of Present Illness: The patient is a 50 y.o.  year-old female  who presents to the office for 1 month post op. Patient is 29 days s/p Fat Grafting To Left Breast - Left  on 6/10/2025 by Dr. Lloyd. Patient reports doing well since her last visit. She has been moisturizing and massaging breast and abdomen. She does feel some bumps on her abdomen that she needs to massage. Reports she has a skin abrasion on her back from her surgical bra.  She has switched to a sports bra.  Denies fevers, chills, erythema, or drainage.    She is wondering if she can use hand held roller for massage. She is also wondering if she can start using silicone scar cream.    Past Medical History[1]       Review of Systems  Constitutional: Denies fevers, chills or pain.  Abdomen: + \"bumps\"  Skin: Denies any warmth, erythema, edema or drainage.     Physical Exam  General: AAOx3, NAD, well appearing  Breast: Fat transfer sites are well healed. Lipoaspirate is uniformly soft. See media  Abdomen: Well healed lipo suction sites on abdomen. Small areas of scar tissue palpable on superior abdomen. See media       Assessment and Plan:  The patient is an 50 y.o.  year-old female who presents to the office for 1 month post op. Patient is 29 days s/p Fat Grafting To Left Breast - Left  on 6/10/2025 by Dr. Lloyd.      -At today's visit patient examined, healing well.   -Continue compression as tolerated with comfortable sports bra  -Continue massage, may use hand held foam roller, or lay on foam roller to massage  -Continue to moisturize daily with Aquaphor   -May start using silicone scar creams or tape as desired  -The patient is to return in 5 months for 6 month post op or sooner with any concerns.  -The patient is to call the office with any questions or concerns. All of the patient's questions were answered at this " time and they agree with the plan of care.      Kenia Luo PA-C  [facility]astic and Reconstructive Surgery           [1]   Past Medical History:  Diagnosis Date    Adhesive capsulitis of right shoulder     Resolved 11/15/2016     Asthma     Benign paroxysmal vertigo, unspecified ear     Resolved 11/15/2016     BRCA gene mutation negative 06/2019    [facility]    Breast cancer (HCC) 07/30/2019    left    Cellulitis of leg, except foot     Resolved 12/10/2014     History of breast surgery 2023    Fat grafting bilateral breast s/p lumpectomy    Hx of radiation therapy 08/01/2019    Lymphadenitis     Resolved 12/10/2014     Migraine     Snoring     Wears contact lenses     Wears glasses

## (undated) DEVICE — CANNISTER WASTE IMPLOSION PROOF

## (undated) DEVICE — DECANTER: Brand: UNBRANDED

## (undated) DEVICE — SUT PROLENE 2-0 SH 30 IN 8833H

## (undated) DEVICE — VIAL DECANTER

## (undated) DEVICE — ADHESIVE SKIN HIGH VISCOSITY EXOFIN 1ML

## (undated) DEVICE — ELECTRODE EZ CLEAN BLADE -0012

## (undated) DEVICE — SYRINGE 5ML LL

## (undated) DEVICE — STANDARD SURGICAL GOWN, L: Brand: CONVERTORS

## (undated) DEVICE — MEDI-VAC YANK SUCT HNDL W/TPRD BULBOUS TIP: Brand: CARDINAL HEALTH

## (undated) DEVICE — 3M™ STERI-STRIP™ REINFORCED ADHESIVE SKIN CLOSURES, R1547, 1/2 IN X 4 IN (12 MM X 100 MM), 6 STRIPS/ENVELOPE: Brand: 3M™ STERI-STRIP™

## (undated) DEVICE — SPONGE LAP 18 X 18 IN STRL RFD

## (undated) DEVICE — SYRINGE 30ML LL

## (undated) DEVICE — LIGAMAX 5 MM ENDOSCOPIC MULTIPLE CLIP APPLIER: Brand: LIGAMAX

## (undated) DEVICE — SUT MONOCRYL 3-0 SH 27 IN Y416H

## (undated) DEVICE — TUBING INFILTRATION 9FT

## (undated) DEVICE — INTENDED FOR TISSUE SEPARATION, AND OTHER PROCEDURES THAT REQUIRE A SHARP SURGICAL BLADE TO PUNCTURE OR CUT.: Brand: BARD-PARKER ® SAFETYLOCK CARBON RIB-BACK BLADES

## (undated) DEVICE — SUT MONOCRYL 5-0 P-3 18 IN Y493G

## (undated) DEVICE — NEEDLE 21 G X 1 1/2 SAFETY

## (undated) DEVICE — NEEDLE 25G X 1 1/2

## (undated) DEVICE — NEEDLE BLUNT 18 G X 1 1/2IN

## (undated) DEVICE — DRAPE EQUIPMENT RF WAND

## (undated) DEVICE — GLOVE SRG BIOGEL 6

## (undated) DEVICE — SUT MONOCRYL 4-0 RB-1 27 IN Y214H

## (undated) DEVICE — BASIC SINGLE BASIN-LF: Brand: MEDLINE INDUSTRIES, INC.

## (undated) DEVICE — BINDER ABDOMINAL 46-62 IN

## (undated) DEVICE — STERILE POLYISOPRENE POWDER-FREE SURGICAL GLOVES: Brand: PROTEXIS

## (undated) DEVICE — SKIN MARKER DUAL TIP WITH RULER CAP, FLEXIBLE RULER AND LABELS: Brand: DEVON

## (undated) DEVICE — SUT VICRYL 3-0 SH 27 IN J416H

## (undated) DEVICE — TUBING SUCTION 5MM X 12 FT

## (undated) DEVICE — BETHLEHEM UNIVERSAL MINOR GEN: Brand: CARDINAL HEALTH

## (undated) DEVICE — ADHESIVE SKIN HIGH VISCOSITY EXOFIN MICRO 0.5ML

## (undated) DEVICE — DISPOSABLE OR TOWEL: Brand: CARDINAL HEALTH

## (undated) DEVICE — GAUZE SPONGES,16 PLY: Brand: CURITY

## (undated) DEVICE — SMOKE EVACUATION TUBING WITH 8 IN INTEGRAL WAND AND SPONGE GUARD: Brand: BUFFALO FILTER

## (undated) DEVICE — BRA SURGICAL SZ MED (33-36)

## (undated) DEVICE — DRAPE SHEET THREE QUARTER

## (undated) DEVICE — PENCIL ELECTROSURG E-Z CLEAN -0035H

## (undated) DEVICE — POV-IOD SOLUTION 4OZ BT

## (undated) DEVICE — SCD SEQUENTIAL COMPRESSION COMFORT SLEEVE MEDIUM KNEE LENGTH: Brand: KENDALL SCD

## (undated) DEVICE — TROCAR: Brand: KII FIOS FIRST ENTRY

## (undated) DEVICE — SUT MONOCRYL 4-0 PS-2 27 IN Y426H

## (undated) DEVICE — TUBING ASPIRATION LIPOSUCTION SET 12FT

## (undated) DEVICE — PROXIMATE SKIN STAPLERS (35 WIDE) CONTAINS 35 STAINLESS STEEL STAPLES (FIXED HEAD): Brand: PROXIMATE

## (undated) DEVICE — SUT MONOCRYL 4-0 P-3 18 IN Y494G

## (undated) DEVICE — Device: Brand: OMNICLOSE TROCAR SITE CLOSURE DEVICE

## (undated) DEVICE — SUT MONOCRYL 4-0 PS-2 18 IN Y496G

## (undated) DEVICE — 1820 FOAM BLOCK NEEDLE COUNTER: Brand: DEVON

## (undated) DEVICE — SUT PDS II 4-0 PS-2 18 IN Z496G

## (undated) DEVICE — METZENBAUM ADTEC SINGLE USE DISSECTING SCISSORS, SHAFT ONLY, MONOPOLAR, CURVED TO LEFT, WORKING LENGTH: 12 1/4", (310 MM), DIAM. 5 MM, INSULATED, DOUBLE ACTION, STERILE, DISPOSABLE, PACKAGE OF 10 PIECES: Brand: AESCULAP

## (undated) DEVICE — CANNULA 4MM MERCEDES 30CM 10MM PORT

## (undated) DEVICE — SYRINGE 50ML LL

## (undated) DEVICE — SUT PDS II 2-0 CT-1 27 IN Z339H

## (undated) DEVICE — DRAPE PROBE NEO-PROBE/ULTRASOUND

## (undated) DEVICE — CHLORAPREP HI-LITE 26ML ORANGE

## (undated) DEVICE — GLOVE SRG BIOGEL 7

## (undated) DEVICE — PACK UNIVERSAL DRAPES SUB-Q ICD

## (undated) DEVICE — BULB SYRINGE,IRRIGATION WITH PROTECTIVE CAP: Brand: DOVER

## (undated) DEVICE — BETHLEHEM UNIVERSAL BREAST PK: Brand: CARDINAL HEALTH

## (undated) DEVICE — NEPTUNE E-SEP SMOKE EVACUATION PENCIL, COATED, 70MM BLADE, PUSH BUTTON SWITCH: Brand: NEPTUNE E-SEP

## (undated) DEVICE — ELECTRODE LAP J HOOK E-Z CLEAN 33CM-0021

## (undated) DEVICE — LIGHT HANDLE COVER SLEEVE DISP BLUE STELLAR

## (undated) DEVICE — GLOVE INDICATOR PI UNDERGLOVE SZ 6.5 BLUE

## (undated) DEVICE — SYRINGE 1ML TB 26G X 3/8 SAFETY

## (undated) DEVICE — MARGIN MARKER SET

## (undated) DEVICE — 5 MM CURVED DISSECTORS WITH MONOPOLAR CAUTERY: Brand: ENDOPATH

## (undated) DEVICE — REM POLYHESIVE ADULT PATIENT RETURN ELECTRODE: Brand: VALLEYLAB

## (undated) DEVICE — CANNULA 3MM MERCEDES 22CM 8MM PORT

## (undated) DEVICE — STERILE POLYISOPRENE POWDER-FREE SURGICAL GLOVES WITH EMOLLIENT COATING: Brand: PROTEXIS

## (undated) DEVICE — INTENDED FOR TISSUE SEPARATION, AND OTHER PROCEDURES THAT REQUIRE A SHARP SURGICAL BLADE TO PUNCTURE OR CUT.: Brand: BARD-PARKER SAFETY BLADES SIZE 15, STERILE

## (undated) DEVICE — TOWEL SURG XR DETECT GREEN STRL RFD

## (undated) DEVICE — SYRINGE 10ML LL

## (undated) DEVICE — INTENDED FOR TISSUE SEPARATION, AND OTHER PROCEDURES THAT REQUIRE A SHARP SURGICAL BLADE TO PUNCTURE OR CUT.: Brand: BARD-PARKER SAFETY BLADES SIZE 11, STERILE

## (undated) DEVICE — HEX-LOCKING BLADE ELECTRODE: Brand: EDGE

## (undated) DEVICE — CANISTER 3L DUAL CHAMBER  F/LIPOFILTER SYSTEM

## (undated) DEVICE — ARGYLE YANKAUER BULB TIP, NO VENT WITH TUBING 1/4” X 6’ (6 MM X 1.8 M): Brand: ARGYLE

## (undated) DEVICE — LIGHT GLOVE GREEN

## (undated) DEVICE — SINGLE PORT MANIFOLD: Brand: NEPTUNE 2

## (undated) DEVICE — SUT VICRYL 0 UR-6 27 IN J603H

## (undated) DEVICE — CRADLE EXTREMITY UNIVERSAL CONTOURED

## (undated) DEVICE — LIGACLIP 10-M/L, 10MM ENDOSCOPIC ROTATING MULTIPLE CLIP APPLIERS: Brand: LIGACLIP

## (undated) DEVICE — GLOVE SRG BIOGEL ORTHOPEDIC 7.5

## (undated) DEVICE — PACK PBDS LAP CHOLE RF

## (undated) DEVICE — Device: Brand: REVOLVE ADVANCED ADIPOSE SYSTEM

## (undated) DEVICE — CANNULA 3MM MERCEDES 30CM 8MM PORT

## (undated) DEVICE — INTENDED FOR TISSUE SEPARATION, AND OTHER PROCEDURES THAT REQUIRE A SHARP SURGICAL BLADE TO PUNCTURE OR CUT.: Brand: BARD-PARKER ® CARBON RIB-BACK BLADES

## (undated) DEVICE — STAPLER INSORB SUBCUTICULAR 30 SINGLE USE